# Patient Record
Sex: FEMALE | Race: WHITE | NOT HISPANIC OR LATINO | Employment: OTHER | URBAN - METROPOLITAN AREA
[De-identification: names, ages, dates, MRNs, and addresses within clinical notes are randomized per-mention and may not be internally consistent; named-entity substitution may affect disease eponyms.]

---

## 2017-02-19 RX ORDER — HYDROCHLOROTHIAZIDE 25 MG/1
TABLET ORAL
Qty: 90 TABLET | Refills: 1 | Status: SHIPPED | OUTPATIENT
Start: 2017-02-19 | End: 2017-08-18 | Stop reason: SDUPTHER

## 2017-06-13 ENCOUNTER — TELEPHONE (OUTPATIENT)
Dept: FAMILY MEDICINE | Facility: CLINIC | Age: 67
End: 2017-06-13

## 2017-06-13 DIAGNOSIS — Z12.31 ENCOUNTER FOR SCREENING MAMMOGRAM FOR BREAST CANCER: Primary | ICD-10-CM

## 2017-06-13 NOTE — TELEPHONE ENCOUNTER
----- Message from Linette Orozco sent at 6/13/2017 11:19 AM CDT -----  Pt called for mammogram order

## 2017-06-27 ENCOUNTER — HOSPITAL ENCOUNTER (OUTPATIENT)
Dept: RADIOLOGY | Facility: HOSPITAL | Age: 67
Discharge: HOME | End: 2017-06-27
Attending: FAMILY MEDICINE
Payer: COMMERCIAL

## 2017-06-27 VITALS — WEIGHT: 132 LBS | BODY MASS INDEX: 21.99 KG/M2 | HEIGHT: 65 IN

## 2017-06-27 DIAGNOSIS — Z12.31 ENCOUNTER FOR SCREENING MAMMOGRAM FOR BREAST CANCER: ICD-10-CM

## 2017-06-27 PROCEDURE — 77067 SCR MAMMO BI INCL CAD: CPT | Mod: 26,,, | Performed by: RADIOLOGY

## 2017-06-27 PROCEDURE — 77063 BREAST TOMOSYNTHESIS BI: CPT | Mod: 26,,, | Performed by: RADIOLOGY

## 2017-06-27 PROCEDURE — 77067 SCR MAMMO BI INCL CAD: CPT | Mod: TC

## 2017-06-28 ENCOUNTER — TELEPHONE (OUTPATIENT)
Dept: RADIOLOGY | Facility: HOSPITAL | Age: 67
End: 2017-06-28

## 2017-06-28 NOTE — TELEPHONE ENCOUNTER
Spoke with patient and explained mammogram findings.Patient expressed understanding of results. Patient is scheduled for a abnormal mammogram follow up appointment at [facility] on 6/29/17

## 2017-06-29 ENCOUNTER — TELEPHONE (OUTPATIENT)
Dept: RADIOLOGY | Facility: HOSPITAL | Age: 67
End: 2017-06-29

## 2017-06-29 ENCOUNTER — HOSPITAL ENCOUNTER (OUTPATIENT)
Dept: RADIOLOGY | Facility: HOSPITAL | Age: 67
Discharge: HOME | End: 2017-06-29
Attending: FAMILY MEDICINE
Payer: COMMERCIAL

## 2017-06-29 VITALS — BODY MASS INDEX: 21.99 KG/M2 | HEIGHT: 65 IN | WEIGHT: 132 LBS

## 2017-06-29 DIAGNOSIS — R92.8 ABNORMAL MAMMOGRAM: ICD-10-CM

## 2017-06-29 PROCEDURE — 77061 BREAST TOMOSYNTHESIS UNI: CPT | Mod: TC,LT

## 2017-06-29 PROCEDURE — 77065 DX MAMMO INCL CAD UNI: CPT | Mod: 26,,, | Performed by: RADIOLOGY

## 2017-06-29 PROCEDURE — 76642 ULTRASOUND BREAST LIMITED: CPT | Mod: TC,LT

## 2017-06-29 PROCEDURE — 76642 ULTRASOUND BREAST LIMITED: CPT | Mod: 26,LT,, | Performed by: RADIOLOGY

## 2017-06-29 PROCEDURE — 77061 BREAST TOMOSYNTHESIS UNI: CPT | Mod: 26,,, | Performed by: RADIOLOGY

## 2017-06-29 NOTE — TELEPHONE ENCOUNTER
Spoke with patient. Reviewed breast biopsy procedure and reviewed instructions for breast biopsy. Patient expressed understanding and all questions were answered. Provided patient with my phone number to call for any further concerns or questions.   Patient scheduled breast biopsy at the [facility] on 7/11/17

## 2017-07-03 ENCOUNTER — TELEPHONE (OUTPATIENT)
Dept: FAMILY MEDICINE | Facility: CLINIC | Age: 67
End: 2017-07-03

## 2017-07-03 NOTE — TELEPHONE ENCOUNTER
Patient had an abnormal mammogram, she had follow up films and she is now scheduled for a fine needle biopsy and states that she get really nervous before procedures.  Patient would like you to prescribe 1-2 valium prior to her procedure.  If you are willing please and sign and I will have to patient stop by to  the prescription. thanks

## 2017-07-03 NOTE — TELEPHONE ENCOUNTER
----- Message from Zenaida Sylmimi sent at 7/3/2017  2:19 PM CDT -----  x_  1st Request  _  2nd Request  _  3rd Request        Who:  MIGUELITO DODGE [[id number]]    Why: Pt would like to speak to the nurse in reference to getting a prescription for valium    What Number to Call Back: [phone]    When to Expect a call back: (With in 24 hours)

## 2017-07-03 NOTE — TELEPHONE ENCOUNTER
----- Message from Zenaida Sylmimi sent at 7/3/2017  2:19 PM CDT -----  x_  1st Request  _  2nd Request  _  3rd Request        Who:  MIGUELITO DODGE [[id number]]    Why: Pt would like to speak to the nurse in reference to getting a prescription for valium    What Number to Call Back: [phone]    When to Expect a call back: (With in 24 hours)

## 2017-07-05 RX ORDER — DIAZEPAM 2 MG/1
TABLET ORAL
Qty: 5 TABLET | Refills: 0 | Status: SHIPPED | OUTPATIENT
Start: 2017-07-05 | End: 2017-07-05 | Stop reason: SDUPTHER

## 2017-07-05 RX ORDER — DIAZEPAM 2 MG/1
TABLET ORAL
Qty: 5 TABLET | Refills: 0 | Status: SHIPPED | OUTPATIENT
Start: 2017-07-05 | End: 2018-02-21 | Stop reason: SDUPTHER

## 2017-07-10 DIAGNOSIS — Z12.11 COLON CANCER SCREENING: ICD-10-CM

## 2017-07-11 ENCOUNTER — HOSPITAL ENCOUNTER (OUTPATIENT)
Dept: RADIOLOGY | Facility: HOSPITAL | Age: 67
Discharge: HOME | End: 2017-07-11
Attending: FAMILY MEDICINE
Payer: COMMERCIAL

## 2017-07-11 DIAGNOSIS — R92.8 ABNORMAL MAMMOGRAM: ICD-10-CM

## 2017-07-11 PROCEDURE — 88305 TISSUE EXAM BY PATHOLOGIST: CPT | Performed by: PATHOLOGY

## 2017-07-11 PROCEDURE — 88342 IMHCHEM/IMCYTCHM 1ST ANTB: CPT | Mod: 26,59,, | Performed by: PATHOLOGY

## 2017-07-11 PROCEDURE — 88305 TISSUE EXAM BY PATHOLOGIST: CPT | Mod: 26,,, | Performed by: PATHOLOGY

## 2017-07-11 PROCEDURE — 88360 TUMOR IMMUNOHISTOCHEM/MANUAL: CPT | Performed by: PATHOLOGY

## 2017-07-11 PROCEDURE — 19081 BX BREAST 1ST LESION STRTCTC: CPT | Mod: ,,, | Performed by: RADIOLOGY

## 2017-07-11 PROCEDURE — 77065 DX MAMMO INCL CAD UNI: CPT | Mod: 26,LT,, | Performed by: RADIOLOGY

## 2017-07-11 PROCEDURE — A4648 IMPLANTABLE TISSUE MARKER: HCPCS

## 2017-07-11 PROCEDURE — 77065 DX MAMMO INCL CAD UNI: CPT | Mod: TC,LT

## 2017-07-11 PROCEDURE — 88360 TUMOR IMMUNOHISTOCHEM/MANUAL: CPT | Mod: 26,,, | Performed by: PATHOLOGY

## 2017-07-13 ENCOUNTER — DOCUMENTATION ONLY (OUTPATIENT)
Dept: SURGERY | Facility: CLINIC | Age: 67
End: 2017-07-13

## 2017-07-13 NOTE — PROGRESS NOTES
Patient phoned with results of left breast core biopsy, invasive mammary carcinoma. She was scheduled with Dr Martin as choice of date and surgeon.

## 2017-07-18 ENCOUNTER — OFFICE VISIT (OUTPATIENT)
Dept: SURGERY | Facility: CLINIC | Age: 67
End: 2017-07-18
Payer: COMMERCIAL

## 2017-07-18 ENCOUNTER — DOCUMENTATION ONLY (OUTPATIENT)
Dept: SURGERY | Facility: CLINIC | Age: 67
End: 2017-07-18

## 2017-07-18 VITALS
DIASTOLIC BLOOD PRESSURE: 78 MMHG | WEIGHT: 134.5 LBS | HEIGHT: 65 IN | SYSTOLIC BLOOD PRESSURE: 190 MMHG | TEMPERATURE: 98 F | BODY MASS INDEX: 22.41 KG/M2 | HEART RATE: 89 BPM

## 2017-07-18 DIAGNOSIS — Z17.0 MALIGNANT NEOPLASM OF LOWER-OUTER QUADRANT OF LEFT BREAST OF FEMALE, ESTROGEN RECEPTOR POSITIVE: Primary | ICD-10-CM

## 2017-07-18 DIAGNOSIS — C50.512 MALIGNANT NEOPLASM OF LOWER-OUTER QUADRANT OF LEFT BREAST OF FEMALE, ESTROGEN RECEPTOR POSITIVE: Primary | ICD-10-CM

## 2017-07-18 PROCEDURE — 1157F ADVNC CARE PLAN IN RCRD: CPT | Mod: S$GLB,,, | Performed by: SURGERY

## 2017-07-18 PROCEDURE — 99999 PR PBB SHADOW E&M-EST. PATIENT-LVL III: CPT | Mod: PBBFAC,,, | Performed by: SURGERY

## 2017-07-18 PROCEDURE — 1159F MED LIST DOCD IN RCRD: CPT | Mod: S$GLB,,, | Performed by: SURGERY

## 2017-07-18 PROCEDURE — 1126F AMNT PAIN NOTED NONE PRSNT: CPT | Mod: S$GLB,,, | Performed by: SURGERY

## 2017-07-18 PROCEDURE — 99205 OFFICE O/P NEW HI 60 MIN: CPT | Mod: S$GLB,,, | Performed by: SURGERY

## 2017-07-18 NOTE — Clinical Note
July 18, 2017      Jazmin Singh MD  [address]  [address]  [place] [address]           [facility]  [address]  [facility] [address]  Phone: [phone]  Fax: [phone]          Patient: Pantera Posey   MR Number: [id number]   YOB: 1950   Date of Visit: 7/18/2017       Dear Dr. Jazmin Singh:    Thank you for referring Pantera Posey to me for evaluation. Attached you will find relevant portions of my assessment and plan of care.    If you have questions, please do not hesitate to call me. I look forward to following Pantera Posey along with you.    Sincerely,    Rosalina Martin MD    Enclosure  CC:  No Recipients    If you would like to receive this communication electronically, please contact [email or web address] or [phone] to request more information on [facility] Link access.    For providers and/or their staff who would like to refer a patient to [facility], please contact us through our one-stop-shop provider referral line, [facility], at [phone].    If you feel you have received this communication in error or would no longer like to receive these types of communications, please e-mail [email or web address]

## 2017-07-18 NOTE — LETTER
July 18, 2017        Jazmin Singh MD  [address]  [address]  [facility] [address]             [facility] Breast Surgery  [address] [address]  Phone: [phone]  Fax: [phone]   Patient: Pantera Posey   MR Number: [id number]   YOB: 1950   Date of Visit: 7/18/2017       Dear Dr. Singh:    Thank you for referring Pantera Posey to me for evaluation. Attached you will find relevant portions of my assessment and plan of care.    If you have questions, please do not hesitate to call me. I look forward to following Pantera Posey along with you.    Sincerely,      Rosalina Martin MD            CC  No Recipients    Enclosure

## 2017-07-19 ENCOUNTER — OFFICE VISIT (OUTPATIENT)
Dept: PLASTIC SURGERY | Facility: CLINIC | Age: 67
End: 2017-07-19
Payer: MEDICARE

## 2017-07-19 VITALS
DIASTOLIC BLOOD PRESSURE: 85 MMHG | BODY MASS INDEX: 22.43 KG/M2 | SYSTOLIC BLOOD PRESSURE: 197 MMHG | HEART RATE: 72 BPM | WEIGHT: 134.81 LBS | TEMPERATURE: 99 F

## 2017-07-19 DIAGNOSIS — Z17.0 MALIGNANT NEOPLASM OF LOWER-OUTER QUADRANT OF LEFT BREAST OF FEMALE, ESTROGEN RECEPTOR POSITIVE: Primary | ICD-10-CM

## 2017-07-19 DIAGNOSIS — C50.512 MALIGNANT NEOPLASM OF LOWER-OUTER QUADRANT OF LEFT BREAST OF FEMALE, ESTROGEN RECEPTOR POSITIVE: Primary | ICD-10-CM

## 2017-07-19 PROCEDURE — 99999 PR PBB SHADOW E&M-EST. PATIENT-LVL II: CPT | Mod: PBBFAC,,, | Performed by: SURGERY

## 2017-07-19 PROCEDURE — 1157F ADVNC CARE PLAN IN RCRD: CPT | Mod: S$GLB,,, | Performed by: SURGERY

## 2017-07-19 PROCEDURE — 1126F AMNT PAIN NOTED NONE PRSNT: CPT | Mod: S$GLB,,, | Performed by: SURGERY

## 2017-07-19 PROCEDURE — 99204 OFFICE O/P NEW MOD 45 MIN: CPT | Mod: S$GLB,,, | Performed by: SURGERY

## 2017-07-19 PROCEDURE — 1159F MED LIST DOCD IN RCRD: CPT | Mod: S$GLB,,, | Performed by: SURGERY

## 2017-07-19 NOTE — PROGRESS NOTES
[facility] Plastic S[facility]  History & Physical  Plastic Surgery    SUBJECTIVE:     Chief Complaint/Reason for Admission: breast cancer considering reconstruction    History of Present Illness:  Patient is a 67 y.o. female presents with a history of R breast cancer s/p MRM in 1997 now presents with a new diagnosis of L breast cancer.  She was seen by breast surgery yesterday and decided that she would like a L mastectomy.  She was sent to see Dr. Murrell to discuss possible reconstruction.      Review of patient's allergies indicates:   Allergen Reactions    Ciprofloxacin Anaphylaxis     Other reaction(s): Difficulty breathing    Floxin [ofloxacin] Anaphylaxis    Amoxicillin      Other reaction(s): Rash    Indocin  [indomethacin sodium]      Other reaction(s): Headache    Amoxicillin Rash    Bactrim [sulfamethoxazole-trimethoprim] Rash       Past Medical History:   Diagnosis Date    Breast cancer     RIGHT    Cancer     breast cancer    Hypertension     Renea-Parkinson-White syndrome      Past Surgical History:   Procedure Laterality Date    BREAST BIOPSY      RIGHT    CARDIAC ELECTROPHYSIOLOGY STUDY AND ABLATION      MASTECTOMY      RIGHT/ RADIATION AND CHEMO     Family History   Problem Relation Age of Onset    Cancer Mother     Hypertension Father     Breast cancer Maternal Aunt     Ovarian cancer Paternal Aunt      Social History   Substance Use Topics    Smoking status: Former Smoker     Packs/day: 1.00     Quit date: 12/3/1988    Smokeless tobacco: Never Used    Alcohol use 3.0 oz/week     5 Glasses of wine per week      Comment: wine nightly        Review of Systems:  Constitutional: no fever or chills  Eyes: no visual changes  ENT: no nasal congestion or sore throat  Respiratory: no cough or shortness of breath  Cardiovascular: no chest pain or palpitations  Gastrointestinal: no nausea or vomiting, tolerating diet  Genitourinary: no hematuria or dysuria  Integument/Breast: no rash or  pruritis  Neurological: no seizures or tremors  Behavioral/Psych: no auditory or visual hallucinations    OBJECTIVE:     Vital Signs (Most Recent):  Temp: 98.5 °F (36.9 °C) (07/19/17 1208)  Pulse: 72 (07/19/17 1208)  BP: (!) 197/85 (07/19/17 1208)    Physical Exam:  NAD  RRR  Non-labored breathing  Soft, Nt, ND        ASSESSMENT/PLAN:     68yo woman with a history of R breast cancer s/p MRM in 1997 now with a new diagnosis of L breast cancer  Discussed reconstruction options including tissue expander/implant as well as SHANTELLE  The patient stated that she does not know if she wants reconstruction, and she would like to think about it more and call back.  She did state that if she does get reconstruction, she would probably prefer tissue expander/implant

## 2017-07-19 NOTE — LETTER
July 20, 2017      Rosalina Martin MD  [address]  [facility] [address]           [facility] - Plastic Surg [place]  [address] [address]  Phone: [phone]  Fax: [phone]          Patient: Pantera Posey   MR Number: [id number]   YOB: 1950   Date of Visit: 7/19/2017       Dear Dr. Rosalina Martin:    Thank you for referring Pantera Posey to me for evaluation. Attached you will find relevant portions of my assessment and plan of care.    If you have questions, please do not hesitate to call me. I look forward to following Pantera Posey along with you.    Sincerely,    Oni Murrell MD    Enclosure  CC:  No Recipients    If you would like to receive this communication electronically, please contact [email or web address] or [phone] to request more information on [employer] Link access.    For providers and/or their staff who would like to refer a patient to [facility], please contact us through our one-stop-shop provider referral line, [facility], at [phone].    If you feel you have received this communication in error or would no longer like to receive these types of communications, please e-mail [email or web address]

## 2017-07-19 NOTE — PROGRESS NOTES
Breast Surgery  [facility]  Department of Surgery      REFERRING PROVIDER: Jazmin Singh MD  [address]  [address]  [place] [address]    Chief Complaint: Consult (New Patient  Hx Right Breast Cancer 20 yrs  New Left Breast Cancer )      Subjective:      Patient ID: Pantera Posey is a 67 y.o. female who presents with newly diagnosed L breast invasive ductal carcinoma, grade III, ER+LA+H2N-, at 3OC, 3CFN just superior to prior biopsied fibroadenoma.  This area of asymmetry was identified on routine screening mammogram.  Her prior cancer in the R breast was identified after the patient felt a palpable abnormality which prompted an excisional biopsy.  There was a positive margin on an un-oriented specimen, so this was followed by MRM without reconstruction with 20 negative nodes.   This was performed in [place].  She has never undergone genetic testing.  She did not have adjuvant chemo or XRT but did take Tamoxifen for 5 years.    Patient does routinely do self breast exams.  Patient has not noted a change on breast exam.  Patient denies nipple discharge. Patient admits to to previous breast biopsy. Patient admits to a personal history of breast cancer.    GYN History:  Age of menarche was 13. Age of menopause was mid-40s. Patient denies current hormonal therapy. Patient is . Age of first live birth was 18. Patient did not breast feed.    Past Medical History:   Diagnosis Date    Breast cancer     RIGHT    Cancer     breast cancer    Hypertension     Renea-Parkinson-White syndrome      Past Surgical History:   Procedure Laterality Date    BREAST BIOPSY      RIGHT    CARDIAC ELECTROPHYSIOLOGY STUDY AND ABLATION      MASTECTOMY      RIGHT/ RADIATION AND CHEMO     Current Outpatient Prescriptions on File Prior to Visit   Medication Sig Dispense Refill    diazePAM (VALIUM) 2 MG tablet 1 po 30 min prior to procedure prn 5 tablet 0    hydrochlorothiazide (HYDRODIURIL) 25 MG tablet TAKE  "1 TABLET DAILY 90 tablet 1    FLUVIRIN [id number] 45 mcg (15 mcg x 3)/0.5 mL Susp   0     No current facility-administered medications on file prior to visit.      Social History     Social History    Marital status:      Spouse name: N/A    Number of children: N/A    Years of education: N/A     Occupational History    Not on file.     Social History Main Topics    Smoking status: Former Smoker     Packs/day: 1.00     Quit date: 12/3/1988    Smokeless tobacco: Never Used    Alcohol use 3.0 oz/week     5 Glasses of wine per week      Comment: wine nightly    Drug use: No    Sexual activity: Yes     Partners: Male     Other Topics Concern    Not on file     Social History Narrative    No narrative on file     Family History   Problem Relation Age of Onset    Cancer Mother     Hypertension Father     Breast cancer Maternal Aunt     Ovarian cancer Paternal Aunt         Review of Systems   Constitutional: Negative for appetite change, chills, fever and unexpected weight change.   HENT: Negative for facial swelling, postnasal drip and sore throat.    Eyes: Negative for redness and itching.   Respiratory: Negative for chest tightness and shortness of breath.    Cardiovascular: Negative for chest pain and palpitations.   Gastrointestinal: Negative for blood in stool, diarrhea, nausea and vomiting.   Genitourinary: Negative for difficulty urinating and dysuria.   Musculoskeletal: Negative for arthralgias and joint swelling.   Skin: Negative for rash and wound.   Neurological: Negative for dizziness and syncope.   Hematological: Negative for adenopathy.   Psychiatric/Behavioral: Negative for agitation. The patient is not nervous/anxious.      Objective:   BP (!) 190/78 (BP Location: Left arm, Patient Position: Sitting, BP Method: Automatic)   Pulse 89   Temp 98.2 °F (36.8 °C) (Oral)   Ht 5' 5" (1.651 m)   Wt 61 kg (134 lb 8 oz)   LMP  (LMP Unknown)   BMI 22.38 kg/m²     Physical Exam "   Constitutional: She appears well-developed and well-nourished.   HENT:   Head: Normocephalic.   Eyes: No scleral icterus.   Neck: Neck supple. No tracheal deviation present.   Cardiovascular: Normal rate and regular rhythm.    Pulmonary/Chest: Breath sounds normal. No respiratory distress. Right breast exhibits no inverted nipple, no mass, no nipple discharge and no skin change. Left breast exhibits no inverted nipple, no mass, no nipple discharge and no skin change.       Abdominal: Soft. She exhibits no mass. There is no tenderness.   Musculoskeletal: She exhibits no edema.   Lymphadenopathy:     She has no cervical adenopathy.   Neurological: She is alert.   Skin: No rash noted. No erythema.     Psychiatric: She has a normal mood and affect.       Radiology review: Images personally reviewed by me in the clinic.   Mammogram:There is asymmetry seen in the upper region of the left breast in the posterior portion, previously identified on screening mammogram.  This persists with additional views, and reflects a change from previous years.      This is superior to a biopsy marker and stable mass which was previously biopsied in 2011 with pathology of benign fibroadenoma.     US Breast Left Limited  At 3 o'clock, 3cm from the nipple there is a vague possible isoechoic mass measuring 13 x 13 x 6 which likely reflects a correlate to mammographic asymmetry.     Stable lobulated mass in the left breast at 4 o'clock measuring 1 cm is consistent with biopsy proven fibroadenoma.      Impression:  Left  Asymmetry: Left breast asymmetry at the posterior position seen on MLO view is suspicious.      BI-RADS Category:   Left: 4 - Suspicious  Overall: 4 - Suspicious     Recommendation:  Biopsy is recommended for the left breast. Biopsy should be performed with tomosynthesis guidance, as finding is not definitively seen sonographically    Assessment:       1. Malignant neoplasm of lower-outer quadrant of left breast of female,  estrogen receptor positive        Plan:     Options for management were discussed with the patient and her family. We reviewed the existing data noting the equivalency of breast conserving surgery with radiation therapy and mastectomy. We also reviewed the guidelines of the National Comprehensive Cancer Network for Stage IA breast carcinoma. We discussed the need for lumpectomy margins to be negative for carcinoma, the necessity for postoperative radiation therapy after breast conservation in most cases, the possibility of a failed or false negative sentinel lymph node biopsy and the potential need for complete lymphadenectomy for a failed or positive sentinel lymph node biopsy were fully discussed. In the setting of mastectomy, delayed or immediate reconstruction options are available and were discussed.     In the setting of lumpectomy, radiation therapy would be recommended majority of the time.  The duration and treatment side effects were discussed with the patient.  This will coordinated with the radiation oncologist pending final pathology.    We also discussed the role of systemic therapy in the treatment of early stage breast cancer.  We discussed that this is based on tumor biology and monik status and will be determined based on final pathology.  We discussed that if the cancer is hormone positive, endocrine therapy would be recommended in most cases and its use can reduce the risk of recurrence as well as improve survival. Side effects of treatment were briefly discussed. We also discussed the potential role for chemotherapy based on a number of factors such as tumor phenotype (ER+ vs. triple negative vs. Krg9zor+), possibility of oncotype, and this would be determined in coordination with the medical oncologist.    Due to her prior experience and existing R mastectomy, she has opted for a L mastectomy.  She does not want to undergo radiation and therefore is not interested in lumpectomy.  The patient,  in consultation with her family, has elected to proceed with left total mastectomy and sentinel lymph node biopsy.  She is considering undergoing bilateral reconstruction at the time of surgery.  She would prefer implant reconstruction and understands the details about tissue expander and implant reconstruction.   We will set her up for an appointment with Dr. Murrell tomorrow.  The operative risks of bleeding, infection, recurrence, scarring, and anesthetic complications and the possibility of requiring further surgery were all noted and informed consent obtained.    Surgery scheduled. Follow-up in clinic roughly 14 days after surgery.     Patient was educated on breast cancer, receptors, wire localization lumpectomy, mastectomy, sentinel lymph node mapping and biopsy, axillary lymph node dissection, reconstruction, breast prosthesis with post-mastectomy bra and radiation therapy. Patient was given patient information binder including [facility] breast cancer treatment brochure.  All her questions were answered.    Total time spent with the patient: 65 minutes.  50 minutes of face to face consultation and 15 minutes of chart review and coordination of care.

## 2017-07-21 NOTE — PROGRESS NOTES
Nurse Navigator Note:     Met with patient during her consult with Dr. Martin. Patient and I reviewed the information she discussed with Dr. Martin, including treatment options, diagnosis, and future plans for workup. Patient and I went through the new patient binder, explained some of the information and why it is provided.     Also offered patient consults with our other specialty clinics: Dr. Nguyen for gynecological health during treatment, Shayy Ayoub for physical therapy evaluation, Dr. Servin for psychological support, and Una Posadas for nutritional counseling. Explained to patient that all of these support services are completely optional. Discussed that physical therapy is the only service that is recommended pre-op specifically, everything else can be requested at a later time. Patient was given a copy of her appointments, Dr. Martin's card, and my card. Encouraged her to call me if she has any questions or concerns or would like to schedule any additional appointments. Verbalized understanding of all information.

## 2017-07-24 ENCOUNTER — TELEPHONE (OUTPATIENT)
Dept: SURGERY | Facility: CLINIC | Age: 67
End: 2017-07-24

## 2017-07-24 DIAGNOSIS — Z91.89 AT RISK FOR LYMPHEDEMA: Primary | ICD-10-CM

## 2017-07-24 NOTE — TELEPHONE ENCOUNTER
"Called patient to discuss referral to PT for evaluation before surgery. Patient is very interested in this, states she was concerned about the post-op complications "now that I'm having bilateral mastectomies." Scheduled for 8/4 at 9am per her request. Referral order placed   "

## 2017-07-27 ENCOUNTER — TELEPHONE (OUTPATIENT)
Dept: PLASTIC SURGERY | Facility: CLINIC | Age: 67
End: 2017-07-27

## 2017-07-27 ENCOUNTER — OFFICE VISIT (OUTPATIENT)
Dept: SURGERY | Facility: CLINIC | Age: 67
End: 2017-07-27
Payer: COMMERCIAL

## 2017-07-27 ENCOUNTER — TELEPHONE (OUTPATIENT)
Dept: SURGERY | Facility: CLINIC | Age: 67
End: 2017-07-27

## 2017-07-27 DIAGNOSIS — Z17.0 ESTROGEN RECEPTOR POSITIVE: Primary | ICD-10-CM

## 2017-07-27 DIAGNOSIS — C50.912 MALIGNANT NEOPLASM OF LEFT FEMALE BREAST, UNSPECIFIED ESTROGEN RECEPTOR STATUS, UNSPECIFIED SITE OF BREAST: Primary | ICD-10-CM

## 2017-07-27 DIAGNOSIS — Z17.0 MALIGNANT NEOPLASM OF LOWER-OUTER QUADRANT OF LEFT BREAST OF FEMALE, ESTROGEN RECEPTOR POSITIVE: Primary | ICD-10-CM

## 2017-07-27 DIAGNOSIS — C50.512 MALIGNANT NEOPLASM OF LOWER-OUTER QUADRANT OF LEFT BREAST OF FEMALE, ESTROGEN RECEPTOR POSITIVE: Primary | ICD-10-CM

## 2017-07-27 DIAGNOSIS — Z17.0 MALIGNANT NEOPLASM OF LOWER-OUTER QUADRANT OF LEFT BREAST OF FEMALE, ESTROGEN RECEPTOR POSITIVE: ICD-10-CM

## 2017-07-27 DIAGNOSIS — Z80.41 FAMILY HISTORY OF OVARIAN CANCER: ICD-10-CM

## 2017-07-27 DIAGNOSIS — C50.512 MALIGNANT NEOPLASM OF LOWER-OUTER QUADRANT OF LEFT BREAST OF FEMALE, ESTROGEN RECEPTOR POSITIVE: ICD-10-CM

## 2017-07-27 DIAGNOSIS — Z80.3 FAMILY HISTORY OF BREAST CANCER: ICD-10-CM

## 2017-07-27 PROCEDURE — 1159F MED LIST DOCD IN RCRD: CPT | Mod: S$GLB,,, | Performed by: NURSE PRACTITIONER

## 2017-07-27 PROCEDURE — 1157F ADVNC CARE PLAN IN RCRD: CPT | Mod: S$GLB,,, | Performed by: NURSE PRACTITIONER

## 2017-07-27 PROCEDURE — 99213 OFFICE O/P EST LOW 20 MIN: CPT | Mod: S$GLB,,, | Performed by: NURSE PRACTITIONER

## 2017-07-27 NOTE — TELEPHONE ENCOUNTER
----- Message from Rosalina Martin MD sent at 7/25/2017 10:23 AM CDT -----  Looks like we can begin to coordinate a surgery date for this patient if she has decided on reconstruction.  She has upcoming genetics appt but we don't need to wait on that if she is ready to work on scheduling with [employer].  Can one of you reach out to her?    Rosalina

## 2017-07-27 NOTE — TELEPHONE ENCOUNTER
Spoke with pt. Pt has decided to do implant reconstruction and will have left mastectomy performed by Dr Martin. Spoke with Tena in Dr Murrell's office to look at a date for surgery. She will contact me back with a date.

## 2017-07-27 NOTE — PROGRESS NOTES
Patient presents for genetic counseling, she is a 67year old female with history of right breast cancer at age 47 and recent diagnosis of left breast cancer at 67. See pedigree for full family history which will be scanned into Epic. Significant for a maternal aunt and 3 maternal cousins with postmenopausal breast cancer (one of which was two primaries) and a paternal aunt with ovarian cancer. This patient does not have a known hereditary cancer genetic mutation on either side of the family and does not have known Ashenazi Cheondoism ancestry.     We reviewed her medical and family history and discussed the genetics of breast cancer, cancer risks associated with a hereditary predisposition to cancer, and the benefits, risks, and limitations of genetic testing according to current NCCN guidelines.  Discussed sporadic verses family clustering verses hereditary cancer. The patients history raises the possibility of a genetic susceptibility to breast cancer, with the two genes most strongly associated with early-onset breast cancer are BRCA1 and BRCA2. Mutations in these genes increase the risk for both breast and ovarian cancer in women and breast and early prostate cancer in men, along with an elevated risk of pancreatic cancer and melanoma. Due to significant clinical overlap in hereditary cancer susceptibility genes, a molecular diagnosis of a hereditary cancer condition is necessary to clarify the cancer risks this patient and screening, management, and treatment options most appropriate for her. Multigene testing was discussed based on family history and possibility of another cancer susceptibility gene. Genetic testing for mutations in the BRCA1 and BRCA2 genes involves the complete sequencing of both BRCA1 and BRCA2, testing for 5 large gene rearrangement mutations in the BRCA1 gene, and the BRACAnalysis Large Rearrangement Test (RAIZA accounts for 6-10% of all mutations in HBOC). This testing is estimated to  identify greater than 92% of mutations in the BRCA1 and BRCA2 genes. We did explain there is an estimated 1-2% chance of a reported variant of uncertain significance which would not be utilized in medical management and up to a 30% with newer genes included in multigene panels.     We discussed possible results of genetic testing: positive result would mean that a mutation known to be associated with a higher risk of cancer was identified; negative result would mean that no mutation known to increase the risk of cancer was identified; variant of uncertain significance (VUS) in which a genetic change was identified in a gene, but it is unclear if this change causes an increase in cancer risk normally associated with mutations in the gene. Due to the clinical uncertainty of this type of change, VUSs are not used to make medical management decisions for a patient. Finally, I advised the patient that her medical management may change based on these results and may include increased surveillance, use of chemoprevention, prophylactic surgery, or modifications to family planning and lifestyle. A tailored cancer prevention plan and implications of the patients test results for relatives will be reviewed once results are available.     Women who carry mutations in the BRCA genes have a 56%-87% risk to develop breast cancer and up to a 27%-44% risk to develop ovarian cancer during their lifetime. Women who carry a BRCA gene mutation also have a greater chance of developing a second primary breast cancer, up to 40%.  Men who carry a BRCA gene mutation have up to a 6% risk to develop breast cancer and an increased risk for early-onset prostate cancer (diagnosed under age 60).  Mutations in the BRCA2 gene have also been associated with an increased risk other types of cancer in both men and women in some families, most notably pancreatic cancer and melanoma.  We discussed cancer risks vary depending on the tumor suppressor gene  in which a mutation arises. We reviewed that if she carries a mutation within an autosomal inherited gene, her children would have a 50% chance of having the same mutation.      This patient is s/p right mastectomy with left mastectomy planned shortly. We discussed management of ovarian cancer risk by increased screening(although unreliable and limited usefulness) with semiannual pelvic exam, CA-125 and transvaginal ultrasound verses recommended prophylactic bilateral salpingo-oophorectomy. We discussed medical management recommendations may be different for other cancers if another genetic susceptibility gene is identified. Discussed the cost of testing, varies labs which can conduct genetic testing and potential insurance coverage. She desires to proceed with testing, she expressed her understanding of the information discussed today and provided informed consent for testing.           RECOMMENDATIONS:                                                                1. Integrated BRACAnalysis with Jill through [employer], results expected in approximately 2-3 weeks.   2. Post test counseling will be provided once results are available with healthcare management per results, including testing of any additional family members if pathogenic mutation is identified.    Time in counseling 45 min, total time 45 min

## 2017-07-31 ENCOUNTER — TELEPHONE (OUTPATIENT)
Dept: PLASTIC SURGERY | Facility: CLINIC | Age: 67
End: 2017-07-31

## 2017-07-31 NOTE — TELEPHONE ENCOUNTER
Spoke to pt re: pre-op instructions for her 8/3/17 surgery. Pt verbalized understanding. All questions were answered.

## 2017-08-01 ENCOUNTER — HOSPITAL ENCOUNTER (OUTPATIENT)
Dept: CARDIOLOGY | Facility: CLINIC | Age: 67
Discharge: HOME | End: 2017-08-01
Payer: COMMERCIAL

## 2017-08-01 ENCOUNTER — TELEPHONE (OUTPATIENT)
Dept: HEMATOLOGY/ONCOLOGY | Facility: CLINIC | Age: 67
End: 2017-08-01

## 2017-08-01 ENCOUNTER — LAB VISIT (OUTPATIENT)
Dept: LAB | Facility: HOSPITAL | Age: 67
End: 2017-08-01
Attending: ANESTHESIOLOGY
Payer: COMMERCIAL

## 2017-08-01 ENCOUNTER — ANESTHESIA EVENT (OUTPATIENT)
Dept: SURGERY | Facility: HOSPITAL | Age: 67
End: 2017-08-01
Payer: COMMERCIAL

## 2017-08-01 DIAGNOSIS — Z01.818 PREOPERATIVE TESTING: Primary | ICD-10-CM

## 2017-08-01 DIAGNOSIS — Z01.818 PREOPERATIVE TESTING: ICD-10-CM

## 2017-08-01 LAB
ANION GAP SERPL CALC-SCNC: 13 MMOL/L
BUN SERPL-MCNC: 17 MG/DL
CALCIUM SERPL-MCNC: 9.8 MG/DL
CHLORIDE SERPL-SCNC: 102 MMOL/L
CO2 SERPL-SCNC: 25 MMOL/L
CREAT SERPL-MCNC: 0.9 MG/DL
EST. GFR  (AFRICAN AMERICAN): >60 ML/MIN/1.73 M^2
EST. GFR  (NON AFRICAN AMERICAN): >60 ML/MIN/1.73 M^2
GLUCOSE SERPL-MCNC: 98 MG/DL
HGB BLD-MCNC: 13.8 G/DL
POTASSIUM SERPL-SCNC: 4.5 MMOL/L
SODIUM SERPL-SCNC: 140 MMOL/L

## 2017-08-01 PROCEDURE — 85018 HEMOGLOBIN: CPT

## 2017-08-01 PROCEDURE — 80048 BASIC METABOLIC PNL TOTAL CA: CPT

## 2017-08-01 PROCEDURE — 93000 ELECTROCARDIOGRAM COMPLETE: CPT | Mod: S$GLB,,, | Performed by: INTERNAL MEDICINE

## 2017-08-01 PROCEDURE — 36415 COLL VENOUS BLD VENIPUNCTURE: CPT

## 2017-08-01 NOTE — ANESTHESIA PREPROCEDURE EVALUATION
Pre Admission Screening  Mellissa Rowland RN      []Hide copied text  Anesthesia Assessment: Preoperative EQUATION     Planned Procedure: Procedure(s) (LRB):  INSERTION-EXPANDER-BREAST Left (Left)  MASTECTOMY (Left)  BIOPSY-EXCISIONAL sentinal node (Left)  DISSECTION-LYMPH NODE-AXILLARY (Left)  Requested Anesthesia Type:General  Surgeon: Oni Murrell MD  Service: Plastics  Known or anticipated Date of Surgery:8/3/2017     Surgeon notes: reviewed     Electronic QUestionnaire Assessment completed via nurse interview with patient.         NO AQ        Triage considerations:      The patient has no apparent active cardiac condition (No unstable coronary Syndrome such as severe unstable angina or recent [<1 month] myocardial infarction, decompensated CHF, severe valvular   disease or significant arrhythmia)     Previous anesthesia records:GETA, MAC and Complications noted  HX OF PONV-requests patch and po meds   Spinal fusion 10/22/13; Placement Time: 0737; Method of Intubation: Direct laryngoscopy; Inserted by:  (teo moreno); Airway Device: Endotracheal Tube; Mask Ventilation: Easy; Intubated: Postinduction; Blade: Dutton #2; Airway Device Size: 7.0; Style: Cuffed; Cuff Inflation: Minimal occlusive pressure; Placement Verified By: Auscultation, Capnometry; Grade: Grade II; Complicating Factors: None; Intubation Findings: Positive EtCO2, Bilateral breath sounds, Atraumatic/Condition of teeth unchanged;  Depth of Insertion: 21; Securment: Lips; Complications: None; Breath Sounds: Equal Bilateral; Insertion Attempts: 1; Removal Date: 10/22/13;   Airway/Jaw/Neck:  Airway Findings: Mouth Opening: Normal Tongue: Normal and Large  General Airway Assessment: Adult  Mallampati: II  Improves to I with phonation.  TM Distance: Normal, at least 6 cm  Jaw/Neck Findings:  Neck ROM: Normal Extension, Painful       Last PCP note: > 1 year ago , within [facility] 5/2016  Subspecialty notes:  Hematology/Oncology     Other important co-morbidities: HTN, breast cancer, HX WPW (ablation x1)     Tests already available:  Available tests,  > 1 year ago .  5/2016 CBC, CMP. 2012 EKG                                                Instructions given. (See in Nurse's note)     Optimization:  Anesthesia Preop Clinic Assessment  Indicated-not required for this procedure                   Plan:              Testing:  Hemoglobin, BMP and EKG                                               Patient  has previously scheduled Medical Appointment:none     Navigation: Tests Scheduled.8/1                                                        Results will be tracked by Preop Clinic.                                          Electronically signed by Mellissa Rowland RN at 8/1/2017  9:53 AM        Pre-admit on 8/3/2017            Detailed Report        8/2 labs and EKG reviewed     Pre-operative evaluation for Procedure(s) (LRB):  INSERTION-EXPANDER-BREAST Left (Left)  MASTECTOMY (Left)  BIOPSY-EXCISIONAL sentinal node (Left)  DISSECTION-LYMPH NODE-AXILLARY (Left)    Pantera Posey is a 67 y.o. female with HTN, hx of WPW syndrome s/p ablation, and hx of right breast cancer s/p modified radical mastectomy in 1997 now with newly diagnosed left breast cancer for which she presents for the above procedure.     LDA: N/A    Prev airway: Method of Intubation: Direct laryngoscopy; Inserted by:  (teo moreno); Airway Device: Endotracheal Tube; Mask Ventilation: Easy; Intubated: Postinduction; Blade: Dutton #2; Airway Device Size: 7.0; Style: Cuffed; Cuff Inflation: Minimal occlusive pressure; Placement Verified By: Auscultation, Capnometry; Grade: Grade II; Complicating Factors: None; Intubation Findings: Positive EtCO2, Bilateral breath sounds, Atraumatic/Condition of teeth unchanged;  Depth of Insertion: 21; Securment: Lips; Complications: None; Breath Sounds: Equal Bilateral; Insertion Attempts: 1;    Drips: N/A    Patient  Active Problem List   Diagnosis    HTN (hypertension), benign    Spondylosis without myelopathy    Spinal stenosis, lumbar region, with neurogenic claudication    Thoracic or lumbosacral neuritis or radiculitis, unspecified    Acquired spondylolisthesis    Lumbago    Degeneration of lumbar or lumbosacral intervertebral disc    Back pain    Spinal stenosis    Fever    Malignant neoplasm of lower-outer quadrant of left breast of female, estrogen receptor positive       Review of patient's allergies indicates:   Allergen Reactions    Ciprofloxacin Anaphylaxis     Other reaction(s): Difficulty breathing    Floxin [ofloxacin] Anaphylaxis    Amoxicillin      Other reaction(s): Rash    Indocin  [indomethacin sodium]      Other reaction(s): Headache    Amoxicillin Rash    Bactrim [sulfamethoxazole-trimethoprim] Rash        No current facility-administered medications on file prior to encounter.      Current Outpatient Prescriptions on File Prior to Encounter   Medication Sig Dispense Refill    diazePAM (VALIUM) 2 MG tablet 1 po 30 min prior to procedure prn 5 tablet 0    hydrochlorothiazide (HYDRODIURIL) 25 MG tablet TAKE 1 TABLET DAILY 90 tablet 1    FLUVIRIN [id number] 45 mcg (15 mcg x 3)/0.5 mL Susp   0       Past Surgical History:   Procedure Laterality Date    BREAST BIOPSY      RIGHT    CARDIAC ELECTROPHYSIOLOGY STUDY AND ABLATION      MASTECTOMY      RIGHT/ RADIATION AND CHEMO       Social History     Social History    Marital status:      Spouse name: N/A    Number of children: N/A    Years of education: N/A     Occupational History    Not on file.     Social History Main Topics    Smoking status: Former Smoker     Packs/day: 1.00     Quit date: 12/3/1988    Smokeless tobacco: Never Used    Alcohol use 3.0 oz/week     5 Glasses of wine per week      Comment: wine nightly    Drug use: No    Sexual activity: Yes     Partners: Male     Other Topics Concern    Not on file      Social History Narrative    No narrative on file         Vital Signs Range (Last 24H):         CBC:   Recent Labs      17   1338   HGB  13.8       CMP:   Recent Labs      17   1338   NA  140   K  4.5   CL  102   CO2  25   BUN  17   CREATININE  0.9   GLU  98   CALCIUM  9.8       INR  No results for input(s): INR, PROTIME, APTT in the last 72 hours.    Invalid input(s): PT        Diagnostic Studies:  17 Left breast US  Impression:  Left  Asymmetry: Left breast asymmetry at the posterior position seen on MLO view is suspicious.      BI-RADS Category:   Left: 4 - Suspicious  Overall: 4 - Suspicious         EK17   Vent. Rate : 089 BPM     Atrial Rate : 089 BPM     P-R Int : 126 ms          QRS Dur : 088 ms      QT Int : 372 ms       P-R-T Axes : 082 073 065 degrees     QTc Int : 452 ms    Normal sinus rhythm  LVH with repolarization abnormality  Abnormal ECG  When compared with ECG of 03-DEC-2012 09:26,  Vent. rate has increased BY  30 BPM  QT has lengthened  Confirmed by SHABNAM BACH MD ([id number]) on 2017 6:31:04 PM    2D Echo: None on file          Anesthesia Evaluation         Review of Systems  Anesthesia Hx:  Denies Hx of Anesthetic complications PONV zofran works History of prior surgery of interest to airway management or planning: Previous anesthesia: General 2013 lumbar fusion with general anesthesia. Procedure performed at an [facility]. Airway issues documented on chart review include mask, easy, easy direct laryngoscopy , view on direct laryngoscopy Grade II  Denies Family Hx of Anesthesia complications.  Personal Hx of Anesthesia complications, Post-Operative Nausea/Vomiting   Social:  Former Smoker, Non-Smoker, Alcohol Use    Hematology/Oncology:  Hematology Normal       -- Denies Anemia:  Breast s/p surgery   Current/Recent Cancer. Breast right axillary node dissection no lymphedema Oncology Comments: 1997 right mastectomy-please do not use right arm for BP or IVs if  possible. New dx left breast cancer.     EENT/Dental:EENT/Dental Normal   Cardiovascular:   Hypertension  Denies Angina. Walks 5 miles three time a week. Functional Capacity 4 METS  Hypertension , Pt in normal range, systolic < 130 and diastolic < 85, Well Controlled on Rx  Disorder of Cardiac Rhythm, WPW, S/P ablation Rx    Pulmonary:  Pulmonary Normal  Denies COPD.  Denies Asthma.  Denies Shortness of breath.  Denies Recent URI.    Renal/:  Renal/ Normal     Hepatic/GI:  Hepatic/GI Normal    Musculoskeletal:   Arthritis     Neurological:  Neurology Normal Denies TIA.  Denies CVA. Denies Seizures.    Endocrine:  Endocrine Normal Denies Diabetes.        Physical Exam  General:  Well nourished    Airway/Jaw/Neck:  Airway Findings: Mouth Opening: Normal Tongue: Normal  General Airway Assessment: Adult, Average  Mallampati: II  TM Distance: Normal, at least 6 cm  Jaw/Neck Findings:  Neck ROM: Normal ROM  Partial removable upper is out now.     Chest/Lungs:  Chest/Lungs Findings: Clear to auscultation     Heart/Vascular:  Heart Findings: Rate: Normal  Rhythm: Regular Rhythm  Sounds: Normal        Mental Status:  Mental Status Findings:  Cooperative, Alert and Oriented         Anesthesia Plan  Type of Anesthesia, risks & benefits discussed:  Anesthesia Type:  general  Patient's Preference:   Intra-op Monitoring Plan:   Intra-op Monitoring Plan Comments:   Post Op Pain Control Plan:   Post Op Pain Control Plan Comments: As per surgeon's plan  Induction:   IV  Beta Blocker:  Patient is not currently on a Beta-Blocker (No further documentation required).       Informed Consent: Patient understands risks and agrees with Anesthesia plan.  Questions answered. Anesthesia consent signed with patient.  ASA Score: 2     Day of Surgery Review of History & Physical:    H&P update referred to the surgeon.         Ready For Surgery From Anesthesia Perspective.     Placement Date: 10/22/13; Placement Time: 0737; Method of  Intubation: Direct laryngoscopy; Inserted by:  (teo moreno); Airway Device: Endotracheal Tube; Mask Ventilation: Easy; Intubated: Postinduction; Blade: Dutton #2; Airway Device Size: 7.0; Style: Cuffed; Cuff Inflation: Minimal occlusive pressure; Placement Verified By: Auscultation, Capnometry; Grade: Grade II; Complicating Factors: None; Intubation Findings: Positive EtCO2, Bilateral breath sounds, Atraumatic/Condition of teeth unchanged;  Depth of Insertion: 21; Securment: Lips; Complications: None; Breath Sounds: Equal Bilateral; Insertion Attempts: 1; Removal Date: 10/22/13;  Removal Time: 1402

## 2017-08-01 NOTE — PRE ADMISSION SCREENING
Anesthesia Assessment: Preoperative EQUATION    Planned Procedure: Procedure(s) (LRB):  INSERTION-EXPANDER-BREAST Left (Left)  MASTECTOMY (Left)  BIOPSY-EXCISIONAL sentinal node (Left)  DISSECTION-LYMPH NODE-AXILLARY (Left)  Requested Anesthesia Type:General  Surgeon: Oni Murrell MD  Service: Plastics  Known or anticipated Date of Surgery:8/3/2017    Surgeon notes: reviewed    Electronic QUestionnaire Assessment completed via nurse interview with patient.        NO AQ      Triage considerations:     The patient has no apparent active cardiac condition (No unstable coronary Syndrome such as severe unstable angina or recent [<1 month] myocardial infarction, decompensated CHF, severe valvular   disease or significant arrhythmia)    Previous anesthesia records:GETA, MAC and Complications noted  HX OF PONV-requests patch and po meds   Spinal fusion 10/22/13; Placement Time: 0737; Method of Intubation: Direct laryngoscopy; Inserted by:  (teo moreno); Airway Device: Endotracheal Tube; Mask Ventilation: Easy; Intubated: Postinduction; Blade: Dutton #2; Airway Device Size: 7.0; Style: Cuffed; Cuff Inflation: Minimal occlusive pressure; Placement Verified By: Auscultation, Capnometry; Grade: Grade II; Complicating Factors: None; Intubation Findings: Positive EtCO2, Bilateral breath sounds, Atraumatic/Condition of teeth unchanged;  Depth of Insertion: 21; Securment: Lips; Complications: None; Breath Sounds: Equal Bilateral; Insertion Attempts: 1; Removal Date: 10/22/13;   Airway/Jaw/Neck:  Airway Findings: Mouth Opening: Normal Tongue: Normal and Large  General Airway Assessment: Adult  Mallampati: II  Improves to I with phonation.  TM Distance: Normal, at least 6 cm  Jaw/Neck Findings:  Neck ROM: Normal Extension, Painful      Last PCP note: > 1 year ago , within [facility] 5/2016  Subspecialty notes: Hematology/Oncology    Other important co-morbidities: HTN, breast cancer, HX WPW (ablation x1)     Tests  already available:  Available tests,  > 1 year ago .  5/2016 CBC, CMP. 2012 EKG            Instructions given. (See in Nurse's note)    Optimization:  Anesthesia Preop Clinic Assessment  Indicated-not required for this procedure      Plan:    Testing:  Hemoglobin, BMP and EKG     Patient  has previously scheduled Medical Appointment:none    Navigation: Tests Scheduled.8/1                       Results will be tracked by Preop Clinic.

## 2017-08-01 NOTE — TELEPHONE ENCOUNTER
"Patient had called and left voicemail "with a question." Returned call and patient states she was able to reach the pre-op center and get her questions answered by them.   "

## 2017-08-02 ENCOUNTER — TELEPHONE (OUTPATIENT)
Dept: PLASTIC SURGERY | Facility: CLINIC | Age: 67
End: 2017-08-02

## 2017-08-03 ENCOUNTER — ANESTHESIA (OUTPATIENT)
Dept: SURGERY | Facility: HOSPITAL | Age: 67
End: 2017-08-03
Payer: COMMERCIAL

## 2017-08-03 ENCOUNTER — SURGERY (OUTPATIENT)
Age: 67
End: 2017-08-03

## 2017-08-03 ENCOUNTER — HOSPITAL ENCOUNTER (OUTPATIENT)
Dept: RADIOLOGY | Facility: HOSPITAL | Age: 67
Discharge: HOME | End: 2017-08-03
Attending: SURGERY
Payer: COMMERCIAL

## 2017-08-03 ENCOUNTER — HOSPITAL ENCOUNTER (OUTPATIENT)
Facility: HOSPITAL | Age: 67
Discharge: HOME | End: 2017-08-04
Attending: SURGERY | Admitting: SURGERY
Payer: COMMERCIAL

## 2017-08-03 DIAGNOSIS — C50.912 MALIGNANT NEOPLASM OF LEFT FEMALE BREAST, UNSPECIFIED ESTROGEN RECEPTOR STATUS, UNSPECIFIED SITE OF BREAST: ICD-10-CM

## 2017-08-03 DIAGNOSIS — C50.912 BREAST CANCER, LEFT: ICD-10-CM

## 2017-08-03 PROCEDURE — 38792 RA TRACER ID OF SENTINL NODE: CPT | Mod: ,,, | Performed by: RADIOLOGY

## 2017-08-03 PROCEDURE — 88342 IMHCHEM/IMCYTCHM 1ST ANTB: CPT | Mod: 26,ICN,, | Performed by: PATHOLOGY

## 2017-08-03 PROCEDURE — 64520 N BLOCK LUMBAR/THORACIC: CPT | Performed by: PAIN MEDICINE

## 2017-08-03 PROCEDURE — 36000707: Performed by: SURGERY

## 2017-08-03 PROCEDURE — 88341 IMHCHEM/IMCYTCHM EA ADD ANTB: CPT | Mod: 26,59,ICN, | Performed by: PATHOLOGY

## 2017-08-03 PROCEDURE — 25000003 PHARM REV CODE 250: Performed by: SURGERY

## 2017-08-03 PROCEDURE — 25000003 PHARM REV CODE 250: Performed by: ANESTHESIOLOGY

## 2017-08-03 PROCEDURE — 88307 TISSUE EXAM BY PATHOLOGIST: CPT | Mod: 26,ICN,, | Performed by: PATHOLOGY

## 2017-08-03 PROCEDURE — C9290 INJ, BUPIVACAINE LIPOSOME: HCPCS | Performed by: SURGERY

## 2017-08-03 PROCEDURE — 27201423 OPTIME MED/SURG SUP & DEVICES STERILE SUPPLY: Performed by: SURGERY

## 2017-08-03 PROCEDURE — 63600175 PHARM REV CODE 636 W HCPCS: Performed by: NURSE ANESTHETIST, CERTIFIED REGISTERED

## 2017-08-03 PROCEDURE — 71000039 HC RECOVERY, EACH ADD'L HOUR: Performed by: SURGERY

## 2017-08-03 PROCEDURE — 63600175 PHARM REV CODE 636 W HCPCS: Performed by: SURGERY

## 2017-08-03 PROCEDURE — 88342 IMHCHEM/IMCYTCHM 1ST ANTB: CPT | Performed by: PATHOLOGY

## 2017-08-03 PROCEDURE — G0378 HOSPITAL OBSERVATION PER HR: HCPCS

## 2017-08-03 PROCEDURE — 38792 RA TRACER ID OF SENTINL NODE: CPT | Mod: TC

## 2017-08-03 PROCEDURE — 25000003 PHARM REV CODE 250: Performed by: GENERAL PRACTICE

## 2017-08-03 PROCEDURE — D9220A PRA ANESTHESIA: Mod: CRNA,,, | Performed by: NURSE ANESTHETIST, CERTIFIED REGISTERED

## 2017-08-03 PROCEDURE — 11000001 HC ACUTE MED/SURG PRIVATE ROOM

## 2017-08-03 PROCEDURE — D9220A PRA ANESTHESIA: Mod: ANES,,, | Performed by: ANESTHESIOLOGY

## 2017-08-03 PROCEDURE — 25000003 PHARM REV CODE 250: Performed by: NURSE ANESTHETIST, CERTIFIED REGISTERED

## 2017-08-03 PROCEDURE — 37000008 HC ANESTHESIA 1ST 15 MINUTES: Performed by: SURGERY

## 2017-08-03 PROCEDURE — 15777 ACELLULAR DERM MATRIX IMPLT: CPT | Mod: 50,,, | Performed by: SURGERY

## 2017-08-03 PROCEDURE — 25000003 PHARM REV CODE 250

## 2017-08-03 PROCEDURE — 88331 PATH CONSLTJ SURG 1 BLK 1SPC: CPT | Mod: 26,ICN,, | Performed by: PATHOLOGY

## 2017-08-03 PROCEDURE — 38525 BIOPSY/REMOVAL LYMPH NODES: CPT | Mod: 51,LT,, | Performed by: SURGERY

## 2017-08-03 PROCEDURE — 25000003 PHARM REV CODE 250: Performed by: PAIN MEDICINE

## 2017-08-03 PROCEDURE — 38792 RA TRACER ID OF SENTINL NODE: CPT | Mod: 59,LT,, | Performed by: SURGERY

## 2017-08-03 PROCEDURE — 63600175 PHARM REV CODE 636 W HCPCS

## 2017-08-03 PROCEDURE — 19303 MAST SIMPLE COMPLETE: CPT | Mod: LT,,, | Performed by: SURGERY

## 2017-08-03 PROCEDURE — 88307 TISSUE EXAM BY PATHOLOGIST: CPT | Performed by: PATHOLOGY

## 2017-08-03 PROCEDURE — 37000009 HC ANESTHESIA EA ADD 15 MINS: Performed by: SURGERY

## 2017-08-03 PROCEDURE — 36000706: Performed by: SURGERY

## 2017-08-03 PROCEDURE — C1729 CATH, DRAINAGE: HCPCS | Performed by: SURGERY

## 2017-08-03 PROCEDURE — 71000033 HC RECOVERY, INTIAL HOUR: Performed by: SURGERY

## 2017-08-03 PROCEDURE — C1789 PROSTHESIS, BREAST, IMP: HCPCS | Performed by: SURGERY

## 2017-08-03 PROCEDURE — C9399 UNCLASSIFIED DRUGS OR BIOLOG: HCPCS | Performed by: SURGERY

## 2017-08-03 PROCEDURE — 19357 TISS XPNDR PLMT BRST RCNSTJ: CPT | Mod: 50,,, | Performed by: SURGERY

## 2017-08-03 PROCEDURE — 63600175 PHARM REV CODE 636 W HCPCS: Performed by: PAIN MEDICINE

## 2017-08-03 PROCEDURE — 64520 N BLOCK LUMBAR/THORACIC: CPT | Mod: 59,50,, | Performed by: ANESTHESIOLOGY

## 2017-08-03 RX ORDER — ONDANSETRON 2 MG/ML
INJECTION INTRAMUSCULAR; INTRAVENOUS
Status: DISCONTINUED | OUTPATIENT
Start: 2017-08-03 | End: 2017-08-03

## 2017-08-03 RX ORDER — ROCURONIUM BROMIDE 10 MG/ML
INJECTION, SOLUTION INTRAVENOUS
Status: DISCONTINUED | OUTPATIENT
Start: 2017-08-03 | End: 2017-08-03

## 2017-08-03 RX ORDER — OXYCODONE HYDROCHLORIDE 5 MG/1
5 TABLET ORAL EVERY 4 HOURS PRN
Status: DISCONTINUED | OUTPATIENT
Start: 2017-08-03 | End: 2017-08-04 | Stop reason: HOSPADM

## 2017-08-03 RX ORDER — MIDAZOLAM HYDROCHLORIDE 1 MG/ML
1 INJECTION INTRAMUSCULAR; INTRAVENOUS EVERY 5 MIN PRN
Status: DISCONTINUED | OUTPATIENT
Start: 2017-08-03 | End: 2017-08-03

## 2017-08-03 RX ORDER — METHYLENE BLUE 5 MG/ML
INJECTION INTRAVENOUS
Status: DISCONTINUED | OUTPATIENT
Start: 2017-08-03 | End: 2017-08-03

## 2017-08-03 RX ORDER — SODIUM CHLORIDE 0.9 % (FLUSH) 0.9 %
3 SYRINGE (ML) INJECTION EVERY 8 HOURS
Status: DISCONTINUED | OUTPATIENT
Start: 2017-08-03 | End: 2017-08-04 | Stop reason: HOSPADM

## 2017-08-03 RX ORDER — BACITRACIN 50000 [IU]/1
INJECTION, POWDER, FOR SOLUTION INTRAMUSCULAR
Status: DISCONTINUED | OUTPATIENT
Start: 2017-08-03 | End: 2017-08-03

## 2017-08-03 RX ORDER — MIDAZOLAM HYDROCHLORIDE 1 MG/ML
INJECTION, SOLUTION INTRAMUSCULAR; INTRAVENOUS
Status: DISCONTINUED | OUTPATIENT
Start: 2017-08-03 | End: 2017-08-03

## 2017-08-03 RX ORDER — GLYCOPYRROLATE 0.2 MG/ML
INJECTION INTRAMUSCULAR; INTRAVENOUS
Status: DISCONTINUED | OUTPATIENT
Start: 2017-08-03 | End: 2017-08-03

## 2017-08-03 RX ORDER — CEFAZOLIN SODIUM 1 G/3ML
INJECTION, POWDER, FOR SOLUTION INTRAMUSCULAR; INTRAVENOUS
Status: DISCONTINUED | OUTPATIENT
Start: 2017-08-03 | End: 2017-08-03

## 2017-08-03 RX ORDER — LABETALOL HYDROCHLORIDE 5 MG/ML
20 INJECTION, SOLUTION INTRAVENOUS ONCE
Status: DISCONTINUED | OUTPATIENT
Start: 2017-08-03 | End: 2017-08-03 | Stop reason: HOSPADM

## 2017-08-03 RX ORDER — SODIUM CHLORIDE 9 MG/ML
INJECTION, SOLUTION INTRAVENOUS CONTINUOUS
Status: DISCONTINUED | OUTPATIENT
Start: 2017-08-03 | End: 2017-08-03

## 2017-08-03 RX ORDER — OXYCODONE HYDROCHLORIDE 5 MG/1
TABLET ORAL
Status: COMPLETED
Start: 2017-08-03 | End: 2017-08-03

## 2017-08-03 RX ORDER — NEOSTIGMINE METHYLSULFATE 1 MG/ML
INJECTION, SOLUTION INTRAVENOUS
Status: DISCONTINUED | OUTPATIENT
Start: 2017-08-03 | End: 2017-08-03

## 2017-08-03 RX ORDER — HYDRALAZINE HYDROCHLORIDE 20 MG/ML
5 INJECTION INTRAMUSCULAR; INTRAVENOUS ONCE
Status: COMPLETED | OUTPATIENT
Start: 2017-08-03 | End: 2017-08-03

## 2017-08-03 RX ORDER — CYCLOBENZAPRINE HCL 5 MG
5 TABLET ORAL 3 TIMES DAILY PRN
Status: DISCONTINUED | OUTPATIENT
Start: 2017-08-03 | End: 2017-08-04 | Stop reason: HOSPADM

## 2017-08-03 RX ORDER — LABETALOL HYDROCHLORIDE 5 MG/ML
10 INJECTION, SOLUTION INTRAVENOUS ONCE
Status: DISCONTINUED | OUTPATIENT
Start: 2017-08-03 | End: 2017-08-03 | Stop reason: HOSPADM

## 2017-08-03 RX ORDER — PROPOFOL 10 MG/ML
VIAL (ML) INTRAVENOUS
Status: DISCONTINUED | OUTPATIENT
Start: 2017-08-03 | End: 2017-08-03

## 2017-08-03 RX ORDER — LABETALOL HYDROCHLORIDE 5 MG/ML
10 INJECTION, SOLUTION INTRAVENOUS EVERY 4 HOURS PRN
Status: DISCONTINUED | OUTPATIENT
Start: 2017-08-03 | End: 2017-08-03 | Stop reason: HOSPADM

## 2017-08-03 RX ORDER — BUPIVACAINE HYDROCHLORIDE AND EPINEPHRINE 2.5; 5 MG/ML; UG/ML
INJECTION, SOLUTION EPIDURAL; INFILTRATION; INTRACAUDAL; PERINEURAL
Status: DISCONTINUED | OUTPATIENT
Start: 2017-08-03 | End: 2017-08-03

## 2017-08-03 RX ORDER — SODIUM CHLORIDE 9 MG/ML
INJECTION, SOLUTION INTRAVENOUS CONTINUOUS PRN
Status: DISCONTINUED | OUTPATIENT
Start: 2017-08-03 | End: 2017-08-03

## 2017-08-03 RX ORDER — OXYCODONE HYDROCHLORIDE 5 MG/1
10 TABLET ORAL EVERY 4 HOURS PRN
Status: DISCONTINUED | OUTPATIENT
Start: 2017-08-03 | End: 2017-08-04 | Stop reason: HOSPADM

## 2017-08-03 RX ORDER — HYDRALAZINE HYDROCHLORIDE 20 MG/ML
INJECTION INTRAMUSCULAR; INTRAVENOUS
Status: COMPLETED
Start: 2017-08-03 | End: 2017-08-03

## 2017-08-03 RX ORDER — FENTANYL CITRATE 50 UG/ML
INJECTION, SOLUTION INTRAMUSCULAR; INTRAVENOUS
Status: DISCONTINUED | OUTPATIENT
Start: 2017-08-03 | End: 2017-08-03

## 2017-08-03 RX ORDER — FENTANYL CITRATE 50 UG/ML
25 INJECTION, SOLUTION INTRAMUSCULAR; INTRAVENOUS EVERY 5 MIN PRN
Status: DISCONTINUED | OUTPATIENT
Start: 2017-08-03 | End: 2017-08-03

## 2017-08-03 RX ORDER — ACETAMINOPHEN 10 MG/ML
INJECTION, SOLUTION INTRAVENOUS
Status: DISCONTINUED | OUTPATIENT
Start: 2017-08-03 | End: 2017-08-03

## 2017-08-03 RX ORDER — LIDOCAINE HYDROCHLORIDE 10 MG/ML
INJECTION, SOLUTION INTRAVENOUS
Status: DISCONTINUED | OUTPATIENT
Start: 2017-08-03 | End: 2017-08-03

## 2017-08-03 RX ORDER — SCOLOPAMINE TRANSDERMAL SYSTEM 1 MG/1
1 PATCH, EXTENDED RELEASE TRANSDERMAL ONCE
Status: COMPLETED | OUTPATIENT
Start: 2017-08-03 | End: 2017-08-03

## 2017-08-03 RX ORDER — LABETALOL HYDROCHLORIDE 5 MG/ML
INJECTION, SOLUTION INTRAVENOUS
Status: DISPENSED
Start: 2017-08-03 | End: 2017-08-04

## 2017-08-03 RX ORDER — HYDROCHLOROTHIAZIDE 25 MG/1
25 TABLET ORAL DAILY
Status: DISCONTINUED | OUTPATIENT
Start: 2017-08-04 | End: 2017-08-04 | Stop reason: HOSPADM

## 2017-08-03 RX ORDER — PHENYLEPHRINE HYDROCHLORIDE 10 MG/ML
INJECTION INTRAVENOUS
Status: DISCONTINUED | OUTPATIENT
Start: 2017-08-03 | End: 2017-08-03

## 2017-08-03 RX ORDER — CLINDAMYCIN HYDROCHLORIDE 150 MG/1
150 CAPSULE ORAL EVERY 8 HOURS
Status: DISCONTINUED | OUTPATIENT
Start: 2017-08-03 | End: 2017-08-04 | Stop reason: HOSPADM

## 2017-08-03 RX ORDER — LIDOCAINE HYDROCHLORIDE 10 MG/ML
1 INJECTION, SOLUTION EPIDURAL; INFILTRATION; INTRACAUDAL; PERINEURAL ONCE
Status: DISCONTINUED | OUTPATIENT
Start: 2017-08-03 | End: 2017-08-03

## 2017-08-03 RX ADMIN — FENTANYL CITRATE 50 MCG: 50 INJECTION, SOLUTION INTRAMUSCULAR; INTRAVENOUS at 03:08

## 2017-08-03 RX ADMIN — ONDANSETRON 4 MG: 2 INJECTION INTRAMUSCULAR; INTRAVENOUS at 03:08

## 2017-08-03 RX ADMIN — MIDAZOLAM HYDROCHLORIDE 2 MG: 1 INJECTION, SOLUTION INTRAMUSCULAR; INTRAVENOUS at 02:08

## 2017-08-03 RX ADMIN — OXYCODONE HYDROCHLORIDE 5 MG: 5 TABLET ORAL at 10:08

## 2017-08-03 RX ADMIN — NEOSTIGMINE METHYLSULFATE 5 MG: 1 INJECTION INTRAVENOUS at 06:08

## 2017-08-03 RX ADMIN — HYDRALAZINE HYDROCHLORIDE 5 MG: 20 INJECTION INTRAMUSCULAR; INTRAVENOUS at 08:08

## 2017-08-03 RX ADMIN — PHENYLEPHRINE HYDROCHLORIDE 200 MCG: 10 INJECTION INTRAVENOUS at 03:08

## 2017-08-03 RX ADMIN — OXYCODONE HYDROCHLORIDE 5 MG: 5 TABLET ORAL at 07:08

## 2017-08-03 RX ADMIN — ACETAMINOPHEN 1000 MG: 10 INJECTION, SOLUTION INTRAVENOUS at 03:08

## 2017-08-03 RX ADMIN — FENTANYL CITRATE 50 MCG: 50 INJECTION, SOLUTION INTRAMUSCULAR; INTRAVENOUS at 04:08

## 2017-08-03 RX ADMIN — CEFAZOLIN 2 G: 1 INJECTION, POWDER, FOR SOLUTION INTRAVENOUS at 02:08

## 2017-08-03 RX ADMIN — SCOPALAMINE 1.5 MG: 1 PATCH, EXTENDED RELEASE TRANSDERMAL at 11:08

## 2017-08-03 RX ADMIN — SODIUM CHLORIDE: 0.9 INJECTION, SOLUTION INTRAVENOUS at 10:08

## 2017-08-03 RX ADMIN — GLYCOPYRROLATE 0.4 MG: 0.2 INJECTION, SOLUTION INTRAMUSCULAR; INTRAVENOUS at 06:08

## 2017-08-03 RX ADMIN — CLINDAMYCIN PHOSPHATE 900 MG: 150 INJECTION, SOLUTION, CONCENTRATE INTRAVENOUS at 03:08

## 2017-08-03 RX ADMIN — FENTANYL CITRATE 100 MCG: 50 INJECTION, SOLUTION INTRAMUSCULAR; INTRAVENOUS at 02:08

## 2017-08-03 RX ADMIN — BUPIVACAINE 20 ML: 13.3 INJECTION, SUSPENSION, LIPOSOMAL INFILTRATION at 03:08

## 2017-08-03 RX ADMIN — PHENYLEPHRINE HYDROCHLORIDE 200 MCG: 10 INJECTION INTRAVENOUS at 04:08

## 2017-08-03 RX ADMIN — SODIUM CHLORIDE: 0.9 INJECTION, SOLUTION INTRAVENOUS at 04:08

## 2017-08-03 RX ADMIN — METHYLENE BLUE 5 MG: 5 INJECTION INTRAVENOUS at 04:08

## 2017-08-03 RX ADMIN — EPHEDRINE SULFATE 25 MG: 50 INJECTION, SOLUTION INTRAMUSCULAR; INTRAVENOUS; SUBCUTANEOUS at 04:08

## 2017-08-03 RX ADMIN — MIDAZOLAM HYDROCHLORIDE 2 MG: 1 INJECTION, SOLUTION INTRAMUSCULAR; INTRAVENOUS at 11:08

## 2017-08-03 RX ADMIN — LIDOCAINE HYDROCHLORIDE 100 MG: 10 INJECTION, SOLUTION INTRAVENOUS at 02:08

## 2017-08-03 RX ADMIN — SODIUM CHLORIDE: 0.9 INJECTION, SOLUTION INTRAVENOUS at 02:08

## 2017-08-03 RX ADMIN — ROCURONIUM BROMIDE 30 MG: 10 INJECTION, SOLUTION INTRAVENOUS at 02:08

## 2017-08-03 RX ADMIN — CLINDAMYCIN HYDROCHLORIDE 150 MG: 150 CAPSULE ORAL at 10:08

## 2017-08-03 RX ADMIN — PROPOFOL 150 MG: 10 INJECTION, EMULSION INTRAVENOUS at 02:08

## 2017-08-03 RX ADMIN — BACITRACIN 50000 UNITS: 50000 INJECTION, POWDER, LYOPHILIZED, FOR SOLUTION INTRAMUSCULAR at 03:08

## 2017-08-03 RX ADMIN — EPHEDRINE SULFATE 25 MG: 50 INJECTION, SOLUTION INTRAMUSCULAR; INTRAVENOUS; SUBCUTANEOUS at 05:08

## 2017-08-03 RX ADMIN — ROCURONIUM BROMIDE 20 MG: 10 INJECTION, SOLUTION INTRAVENOUS at 03:08

## 2017-08-03 RX ADMIN — FENTANYL CITRATE 50 MCG: 50 INJECTION INTRAMUSCULAR; INTRAVENOUS at 11:08

## 2017-08-03 RX ADMIN — SODIUM CHLORIDE: 0.9 INJECTION, SOLUTION INTRAVENOUS at 05:08

## 2017-08-03 RX ADMIN — BUPIVACAINE HYDROCHLORIDE AND EPINEPHRINE BITARTRATE 40 ML: 2.5; .0091 INJECTION, SOLUTION EPIDURAL; INFILTRATION; INTRACAUDAL; PERINEURAL at 04:08

## 2017-08-03 NOTE — BRIEF OP NOTE
[facility]  Brief Operative Note    SUMMARY     Surgery Date: 8/3/2017     Surgeon(s) and Role:  Panel 1:     * Rosalina Martin MD - Primary     * Filiberto Holguin MD - Resident - Assisting    Panel 2:    * Oni Murrell MD - Primary      Pre-op Diagnosis:  Estrogen receptor positive [Z17.0]  Malignant neoplasm of lower-outer quadrant of left breast of female, estrogen receptor positive [C50.512, Z17.0]    Post-op Diagnosis:  Post-Op Diagnosis Codes:     * Estrogen receptor positive [Z17.0]     * Malignant neoplasm of lower-outer quadrant of left breast of female, estrogen receptor positive [C50.512, Z17.0]    Procedure(s) (LRB):  INSERTION-EXPANDER-BREAST Left (Left)  MASTECTOMY (Left)  BIOPSY-EXCISIONAL sentinal node (Left)    Anesthesia: General    Description of Procedure: Left total mastectomy and SLNB    Description of the findings of the procedure: Left total mastectomy via circumareolar incision, sentinel LNs negative on frozen section    Estimated Blood Loss: 15 mL    Total IV Fluids: Per Anesthesia         Specimens: Left breast, left sentinel nodes

## 2017-08-03 NOTE — ANESTHESIA PROCEDURE NOTES
Paravertebral Single Injection Block(s)    Patient location during procedure: pre-op   Block not for primary anesthetic.  Reason for block: at surgeon's request and post-op pain management   Post-op Pain Location: bilateral chest wall pain   Start time: 8/3/2017 11:50 AM  Timeout: 8/3/2017 11:45 AM   End time: 8/3/2017 12:08 PM  Staffing  Anesthesiologist: CRISTIN FARIAS  Resident/CRNA: KALLIE BROWN  Performed: resident/CRNA and anesthesiologist   Preanesthetic Checklist  Completed: patient identified, site marked, surgical consent, pre-op evaluation, timeout performed, IV checked, risks and benefits discussed and monitors and equipment checked  Peripheral Block  Patient position: sitting  Prep: ChloraPrep  Patient monitoring: heart rate, cardiac monitor, continuous pulse ox, continuous capnometry and frequent blood pressure checks  Block type: paravertebral - thoracic  Laterality: bilateral  Injection technique: single shot  Needle  Needle type: Tuohy   Needle gauge: 17 G  Needle length: 3.5 in  Needle localization: anatomical landmarks     Assessment  Injection assessment: negative aspiration and negative parasthesia  Paresthesia pain: none  Heart rate change: no  Slow fractionated injection: yes  Medications:  Bolus administered: 40 mL of 0.375 bupivacaine  Epinephrine added: 3.75 mcg/mL (1/300,000)  Additional Notes  Right side paravertebral block at T4 without difficulty. Left side paravertebral with difficulty at T4 level and adjusted to T3 level and procedure performed without difficulty.   VSS.  [facility] RN monitoring vitals throughout procedure.  Patient tolerated procedure well.

## 2017-08-03 NOTE — PROGRESS NOTES
Dr. Guzmán stated that PIV may be inserted in patient's foot for surgery today due to limb restrictions to BUE.

## 2017-08-04 VITALS
HEIGHT: 65 IN | SYSTOLIC BLOOD PRESSURE: 140 MMHG | BODY MASS INDEX: 22.33 KG/M2 | HEART RATE: 85 BPM | TEMPERATURE: 98 F | OXYGEN SATURATION: 96 % | WEIGHT: 134 LBS | DIASTOLIC BLOOD PRESSURE: 84 MMHG | RESPIRATION RATE: 18 BRPM

## 2017-08-04 PROBLEM — Z85.3 HISTORY OF RIGHT BREAST CANCER: Status: ACTIVE | Noted: 2017-08-04

## 2017-08-04 PROCEDURE — 25000003 PHARM REV CODE 250: Performed by: GENERAL PRACTICE

## 2017-08-04 PROCEDURE — 25000003 PHARM REV CODE 250: Performed by: SURGERY

## 2017-08-04 PROCEDURE — A4216 STERILE WATER/SALINE, 10 ML: HCPCS | Performed by: GENERAL PRACTICE

## 2017-08-04 PROCEDURE — G0378 HOSPITAL OBSERVATION PER HR: HCPCS

## 2017-08-04 RX ORDER — OXYCODONE HYDROCHLORIDE 5 MG/1
5 TABLET ORAL ONCE
Status: COMPLETED | OUTPATIENT
Start: 2017-08-04 | End: 2017-08-04

## 2017-08-04 RX ORDER — CYCLOBENZAPRINE HCL 5 MG
5 TABLET ORAL 3 TIMES DAILY PRN
Qty: 30 TABLET | Refills: 0 | Status: SHIPPED | OUTPATIENT
Start: 2017-08-04 | End: 2017-08-14

## 2017-08-04 RX ORDER — AMOXICILLIN 250 MG
1 CAPSULE ORAL DAILY PRN
Status: DISCONTINUED | OUTPATIENT
Start: 2017-08-04 | End: 2017-08-04 | Stop reason: HOSPADM

## 2017-08-04 RX ORDER — CLINDAMYCIN HYDROCHLORIDE 150 MG/1
300 CAPSULE ORAL EVERY 8 HOURS
Qty: 30 CAPSULE | Refills: 0 | Status: ON HOLD | OUTPATIENT
Start: 2017-08-04 | End: 2017-11-20 | Stop reason: HOSPADM

## 2017-08-04 RX ORDER — OXYCODONE HYDROCHLORIDE 5 MG/1
5 TABLET ORAL EVERY 6 HOURS PRN
Qty: 50 TABLET | Refills: 0 | Status: ON HOLD | OUTPATIENT
Start: 2017-08-04 | End: 2018-02-22 | Stop reason: HOSPADM

## 2017-08-04 RX ADMIN — Medication 3 ML: at 06:08

## 2017-08-04 RX ADMIN — OXYCODONE HYDROCHLORIDE 5 MG: 5 TABLET ORAL at 06:08

## 2017-08-04 RX ADMIN — CLINDAMYCIN HYDROCHLORIDE 150 MG: 150 CAPSULE ORAL at 06:08

## 2017-08-04 RX ADMIN — OXYCODONE HYDROCHLORIDE 5 MG: 5 TABLET ORAL at 04:08

## 2017-08-04 RX ADMIN — OXYCODONE HYDROCHLORIDE 10 MG: 5 TABLET ORAL at 08:08

## 2017-08-04 RX ADMIN — OXYCODONE HYDROCHLORIDE 10 MG: 5 TABLET ORAL at 12:08

## 2017-08-04 RX ADMIN — HYDROCHLOROTHIAZIDE 25 MG: 25 TABLET ORAL at 08:08

## 2017-08-04 NOTE — OP NOTE
DATE OF PROCEDURE:  08/03/2017.    PREOPERATIVE DIAGNOSIS:  Breast cancer.    POSTOPERATIVE DIAGNOSIS:  Breast cancer.    PROCEDURES PERFORMED:  1.  Delayed right breast reconstruction using a tissue expander.  2.  Placement of AlloDerm, right breast.  3.  Immediate left breast reconstruction using tissue expander.  4.  Placement of AlloDerm, left breast.    SURGEON:  Oni Murrell M.D., F.A.C.S.    ANESTHESIA:  General.    DESCRIPTION OF PROCEDURE:  While the general surgeons were performing a   mastectomy on the left side, we went ahead and reconstructed the right breast   using a tissue expander.  The previous mastectomy scar was then reincised.    Flaps were then elevated superiorly and inferiorly.  Next, the inframammary fold   was delineated.  A running 3-0 PDS suture was then used to reinforce the fold.    Exparel mixed with bupivacaine was then injected as a nerve block.  Multiple   nerve blocks were done as well as injected into the pectoralis major muscle.    Next, markings were made for the prepectoral AlloDerm placement.  The AlloDerm   was opened on the back table, it was perforated and was soaked in triple   antibiotic solution.  It was then sutured to the pectoralis muscle using running   2-0 PDS sutures.  A 500 mL tissue expander was opened on the back table.  All   air was removed.  Then, 100 mL of air was replaced.  It was placed in the   sub-AlloDerm pocket.  The two inferior suture tabs were then sutured down to the   chest wall using 2-0 PDS.  Next, the AlloDerm was folded underneath the implant   and a double layer of AlloDerm was then used to suture to the inframammary   fold.  A drain was placed.  The skin was closed using 3-0 Monocryl followed by   running 4-0 Monocryl subcuticular suture.  After completion of the mastectomy on   the left side, again markings were made for the tissue expander placement.  The   inframammary fold was reinforced using 3-0 Prolene suture.  Exparel in    combination with bupivacaine was used for a rib block.  It was also injected   into the pectoralis major muscle, AlloDerm was then sutured to the pectoralis   major muscle superiorly to inferiorly heading down to the inframammary fold.  A   500 mL tissue expander was opened on the back table.  All air was removed, 100   mL was then replaced.  It was placed in the sub-AlloDerm pocket.  Two inferior   suture tabs were then sutured using 2-0 PDS.  The excess AlloDerm was then   folded underneath the implant and a double layer of AlloDerm was then sutured   down to the chest wall at the inframammary fold.  A drain was placed.  The   incision was closed using interrupted 3-0 Monocryl followed by running 4-0   Monocryl subcuticular suture.  There were no complications with this procedure.      TOPHER/KASSIDY  dd: 08/04/2017 12:43:38 (CDT)  td: 08/04/2017 13:10:19 (CD)  Doc ID   #[id number]  Job ID #[id number]    CC:

## 2017-08-04 NOTE — PROGRESS NOTES
[facility]  Plastic Surgery  Progress Note    Subjective:     History of Present Illness:  POD #1 s/p Left mastectomy and bilateral tissue expander placement. Pt doing well today. Pain controlled. Ambulating around halls.     Post-Op Info:  Procedure(s) (LRB):  INSERTION-EXPANDER-BREAST Left (Bilateral)  MASTECTOMY (Left)  BIOPSY-EXCISIONAL sentinal node (Left)   1 Day Post-Op     No current facility-administered medications on file prior to encounter.      Current Outpatient Prescriptions on File Prior to Encounter   Medication Sig    diazePAM (VALIUM) 2 MG tablet 1 po 30 min prior to procedure prn    hydrochlorothiazide (HYDRODIURIL) 25 MG tablet TAKE 1 TABLET DAILY       Review of patient's allergies indicates:   Allergen Reactions    Ciprofloxacin Anaphylaxis     Other reaction(s): Difficulty breathing    Floxin [ofloxacin] Anaphylaxis    Amoxicillin      Other reaction(s): Rash    Indocin  [indomethacin sodium]      Other reaction(s): Headache    Amoxicillin Rash    Bactrim [sulfamethoxazole-trimethoprim] Rash       Past Medical History:   Diagnosis Date    Breast cancer     RIGHT    Cancer     breast cancer    Hypertension     Renea-Parkinson-White syndrome      Past Surgical History:   Procedure Laterality Date    BACK SURGERY      Spinal fusion    BREAST BIOPSY      RIGHT    BREAST BIOPSY Left     CARDIAC ELECTROPHYSIOLOGY STUDY AND ABLATION      MASTECTOMY      RIGHT/ RADIATION AND CHEMO    TUBAL LIGATION       Family History     Problem Relation (Age of Onset)    Breast cancer Maternal Aunt    Cancer Mother    Hypertension Father    Ovarian cancer Paternal Aunt        Social History Main Topics    Smoking status: Former Smoker     Packs/day: 1.00     Quit date: 12/3/1988    Smokeless tobacco: Never Used    Alcohol use 3.0 oz/week     5 Glasses of wine per week      Comment: wine nightly    Drug use: No    Sexual activity: Yes     Partners: Male     Review of  Systems  Objective:     Vital Signs (Most Recent):  Temp: 98 °F (36.7 °C) (08/04/17 0744)  Pulse: 85 (08/04/17 0744)  Resp: 18 (08/04/17 0744)  BP: (!) 140/84 (08/04/17 0744)  SpO2: 96 % (08/04/17 0744) Vital Signs (24h Range):  Temp:  [97.3 °F (36.3 °C)-98.3 °F (36.8 °C)] 98 °F (36.7 °C)  Pulse:  [58-92] 85  Resp:  [15-21] 18  SpO2:  [96 %-100 %] 96 %  BP: (120-197)/(54-95) 140/84     Weight: 60.8 kg (134 lb)  Body mass index is 22.3 kg/m².    Physical Exam   NAD, AAOx3  No pallor of skin or mucous membranes  Symmetric, non labored respirations  Rt Breast: incision c/d/i- no drainage/erythema/signs of infection/TTP; one RHIANNON drain in place with 75 cc s/s output  Left Breast: incision c/d/i- no drainage/erythema/TTP; 2 RHIANNON drains in place with 50 and 130 cc of s/s output;     Significant Labs:  CBC:   Recent Labs  Lab 08/01/17  1338   HGB 13.8     BMP:   Recent Labs  Lab 08/01/17  1338   GLU 98      K 4.5      CO2 25   BUN 17   CREATININE 0.9   CALCIUM 9.8       Significant Diagnostics:  I have reviewed and interpreted all pertinent imaging results/findings within the past 24 hours.    Assessment/Plan:     * Breast cancer, left    - d/c to home today  - complete 10 day course of Clindamycin  - Percocet 5mg for pain and Flexeril for spasms  - followup with Dr. Murrell in clinic on Wednesday             Jeannine Mackey MD  Plastic Surgery  [facility]

## 2017-08-04 NOTE — ANESTHESIA POSTPROCEDURE EVALUATION
"Anesthesia Post Evaluation    Patient: Pantera Posey    Procedure(s) Performed: Procedure(s) (LRB):  INSERTION-EXPANDER-BREAST Left (Bilateral)  MASTECTOMY (Left)  BIOPSY-EXCISIONAL sentinal node (Left)    Final Anesthesia Type: general  Patient location during evaluation: PACU  Patient participation: Yes- Able to Participate  Level of consciousness: awake and alert  Post-procedure vital signs: reviewed and stable  Pain management: adequate  Airway patency: patent  PONV status at discharge: No PONV  Anesthetic complications: no      Cardiovascular status: hemodynamically stable  Respiratory status: unassisted, spontaneous ventilation and room air  Hydration status: euvolemic  Follow-up not needed.        Visit Vitals  BP (!) 159/75   Pulse 66   Temp 36.8 °C (98.3 °F) (Oral)   Resp 18   Ht 5' 5" (1.651 m)   Wt 60.8 kg (134 lb)   LMP  (LMP Unknown)   SpO2 99%   Breastfeeding? No   BMI 22.30 kg/m²       Pain/Jori Score: Pain Assessment Performed: Yes (8/4/2017  6:00 AM)  Presence of Pain: complains of pain/discomfort (8/4/2017  6:27 AM)  Pain Rating Prior to Med Admin: 5 (8/4/2017  6:27 AM)  Pain Rating Post Med Admin: 3 (8/4/2017  4:47 AM)  Jori Score: 10 (8/3/2017 10:00 PM)      "

## 2017-08-04 NOTE — DISCHARGE SUMMARY
[facility]  General Surgery  Discharge Summary      Patient Name: Pantera Poesy  MRN: [id number]  Admission Date: 8/3/2017  Hospital Length of Stay: 0 days  Discharge Date and Time:  08/04/2017 11:15 AM  Attending Physician: Oni Murrell, *   Discharging Provider: Chema Hedrick MD  Primary Care Provider: Jazmin Singh MD     HPI:  Pantera Posey is a 67 y.o. female who presents with newly diagnosed L breast invasive ductal carcinoma, grade III, ER+RI+H2N-, at 3OC, 3CFN just superior to prior biopsied fibroadenoma.  This area of asymmetry was identified on routine screening mammogram.  Her prior cancer in the R breast was identified after the patient felt a palpable abnormality which prompted an excisional biopsy.  There was a positive margin on an un-oriented specimen, so this was followed by MRM without reconstruction with 20 negative nodes.   This was performed in [place].  She has never undergone genetic testing.  She did not have adjuvant chemo or XRT but did take Tamoxifen for 5 years.    Procedure(s) (LRB):  INSERTION-EXPANDER-BREAST Left (Bilateral)  MASTECTOMY (Left)  BIOPSY-EXCISIONAL sentinal node (Left)     Hospital Course: Patient arrived for a scheduled surgery of therapeutic left mastectomy with L SLNB, followed by bilateral tissue expander placement for reconstruction by Plastic Surgery. She tolerated the surgery well. POD1 she was ambulating, tolerating diet well, and her pain was well controlled with PO pain meds. She has 3 yusra drains to bulb suction in place, two on the left and one on the right. The right was draining more of a sanguinous fluid, and the left two were draining serosanguinous fluid. She will be discharged with the drains with instructions for care and logging output at home. She was discharged on POD 1. She was discharged with prescriptions for flexeril and clindamycin for 10 days, and percocet and and bowel regimen. She is to follow up with  Dr. Murrell in 1 week, and with Dr. Martin in 2 weeks.    Consults: Dr. Murrell, Plastic Surgery      Pending Diagnostic Studies:     None        Final Active Diagnoses:    Diagnosis Date Noted POA    PRINCIPAL PROBLEM:  Breast cancer, left [C50.912] 08/03/2017 Yes    History of right breast cancer [Z85.3] 08/04/2017 Not Applicable    HTN (hypertension), benign [I10] 12/03/2012 Yes      Problems Resolved During this Admission:    Diagnosis Date Noted Date Resolved POA      Discharged Condition: good    Disposition: Home or Self Care    Follow Up:  Follow-up Information     Rosalina Martin MD In 2 weeks.    Specialties:  General Surgery, Breast Surgery  Contact information:  [address] [facility] [address]  [phone]             Oni Murrell MD In 1 week.    Specialty:  Plastic Surgery  Contact information:  [address]  [place] [address]  [phone]                 Patient Instructions:     Diet general     Activity as tolerated     Call MD for:  increased confusion or weakness     Call MD for:  persistent dizziness, light-headedness, or visual disturbances     Call MD for:  worsening rash     Call MD for:  severe persistent headache     Call MD for:  difficulty breathing or increased cough     Call MD for:  redness, tenderness, or signs of infection (pain, swelling, redness, odor or green/yellow discharge around incision site)     Call MD for:  severe uncontrolled pain     Call MD for:  persistent nausea and vomiting or diarrhea     Call MD for:  temperature >100.4     Remove dressing in 24 hours   Order Comments: After that, no dressing needed  Ok to shower over incision  No bathing in a tub or pool for 4 weeks  Steri strips will fall off on their own       Medications:  Reconciled Home Medications:   Current Discharge Medication List      START taking these medications    Details   clindamycin (CLEOCIN) 150 MG capsule Take 2 capsules (300 mg total) by mouth every 8 (eight)  hours.  Qty: 30 capsule, Refills: 0      cyclobenzaprine (FLEXERIL) 5 MG tablet Take 1 tablet (5 mg total) by mouth 3 (three) times daily as needed for Muscle spasms.  Qty: 30 tablet, Refills: 0      oxycodone (ROXICODONE) 5 MG immediate release tablet Take 1 tablet (5 mg total) by mouth every 6 (six) hours as needed.  Qty: 50 tablet, Refills: 0         CONTINUE these medications which have NOT CHANGED    Details   diazePAM (VALIUM) 2 MG tablet 1 po 30 min prior to procedure prn  Qty: 5 tablet, Refills: 0      hydrochlorothiazide (HYDRODIURIL) 25 MG tablet TAKE 1 TABLET DAILY  Qty: 90 tablet, Refills: 1             Chema Hedrick MD  General Surgery  [facility]

## 2017-08-04 NOTE — ASSESSMENT & PLAN NOTE
Now s/p left total mastectomy and SLNB with bilateral tissue expander placement 1 Day Post-Op    - Plastic Surgery following, appreciate assistance  - Continue Clindamycin and Flexeril per Plastic Surgery  - Regular diet  - Continue current pain control regimen  - Continue local wound care and SurgiBra  - Mynor drains to remain in place to bulb suction

## 2017-08-04 NOTE — SUBJECTIVE & OBJECTIVE
Interval History:   No acute events overnight. Afebrile. VSS. Denies any chest wall pain this morning. Does report some mild L shoulder soreness. States she is happy with the surgical results from yesterday. Mynor drains in place x 3 to bulb suction. Right chest wall drain slightly more sanguinous in appearance. Right chest wall drain with 75 mL output. Left chest wall drains x 2 with 180 mL (130 mL + 50 mL) serosanguinous output.      Medications:  Continuous Infusions:   Scheduled Meds:   clindamycin  150 mg Oral Q8H    hydrochlorothiazide  25 mg Oral Daily    labetalol        sodium chloride 0.9%  3 mL Intravenous Q8H     PRN Meds:cyclobenzaprine, oxycodone, oxycodone     Review of patient's allergies indicates:   Allergen Reactions    Ciprofloxacin Anaphylaxis     Other reaction(s): Difficulty breathing    Floxin [ofloxacin] Anaphylaxis    Amoxicillin      Other reaction(s): Rash    Indocin  [indomethacin sodium]      Other reaction(s): Headache    Amoxicillin Rash    Bactrim [sulfamethoxazole-trimethoprim] Rash     Objective:     Vital Signs (Most Recent):  Temp: 98.3 °F (36.8 °C) (08/04/17 0417)  Pulse: 66 (08/04/17 0417)  Resp: 18 (08/04/17 0417)  BP: (!) 159/75 (08/04/17 0417)  SpO2: 99 % (08/04/17 0417) Vital Signs (24h Range):  Temp:  [97.3 °F (36.3 °C)-98.3 °F (36.8 °C)] 98.3 °F (36.8 °C)  Pulse:  [58-92] 66  Resp:  [15-21] 18  SpO2:  [97 %-100 %] 99 %  BP: (120-197)/(54-95) 159/75     Weight: 60.8 kg (134 lb)  Body mass index is 22.3 kg/m².    Intake/Output - Last 3 Shifts       08/02 0700 - 08/03 0659 08/03 0700 - 08/04 0659 08/04 0700 - 08/05 0659    P.O.  700     I.V. (mL/kg)  4100 (67.4)     Total Intake(mL/kg)  4800 (78.9)     Urine (mL/kg/hr)  900     Drains  255     Total Output   1155      Net   +3645             Urine Occurrence  1 x           Physical Exam   Constitutional: She is oriented to person, place, and time. She appears well-developed and well-nourished. No distress.   HENT:    Head: Normocephalic and atraumatic.   Eyes: EOM are normal.   Neck: Normal range of motion. Neck supple.   Cardiovascular: Normal rate, regular rhythm and intact distal pulses.    Pulmonary/Chest: Effort normal. No respiratory distress. She has no wheezes.   Abdominal: Soft. She exhibits no distension. There is no tenderness.   Musculoskeletal: Normal range of motion.   Neurological: She is alert and oriented to person, place, and time.   Skin: Skin is warm and dry. No rash noted. She is not diaphoretic. No erythema.   Psychiatric: She has a normal mood and affect. Her behavior is normal.   Nursing note and vitals reviewed.  Chest wall: Bilateral chest wall incisions well approximated and without erythema or drainage, small hematoma at the superior margin of the pocket on the right chest wall with bulb drain in place and [id number] output; left chest wall drains in place x 2 to bulb suction with serosanguinous output    Significant Labs:  None    Significant Diagnostics:  None

## 2017-08-04 NOTE — OP NOTE
Operative Note     8/3/2017    PRE-OP DIAGNOSIS: Estrogen receptor positive [Z17.0]  Malignant neoplasm of lower-outer quadrant of left breast of female, estrogen receptor positive [C50.512, Z17.0]      POST-OP DIAGNOSIS: Post-Op Diagnosis Codes:     * Estrogen receptor positive [Z17.0]     * Malignant neoplasm of lower-outer quadrant of left breast of female, estrogen receptor positive [C50.512, Z17.0]    Procedure(s):  INSERTION-EXPANDER-BREAST Bilateral (To be dictated separately by Dr. Murrell)  MASTECTOMY Total Skin sparing  BIOPSY-EXCISIONAL deep sentinal node   Injection technetium for sentinel node    SURGEON: Surgeon(s) and Role:  Panel 1:     * Oni Murrell MD - Primary    Panel 2:     * Filiberto Holguin MD - Resident - Assisting     * Rosalina Martin MD - Primary    ANESTHESIA: General     OPERATIVE FINDINGS: Cluster of abnormal appearing nodes adjacent to sentinel node #2.  All negative on frozen section.  Tumor posterior in breast.  SOme muscle fibers taken where tumor was adherent.    INDICATION FOR PROCEDURE: This patient presents with a history of IDC of the left breast and remote history of right breast cancer.    PROCEDURE IN DETAIL:  Pantera Posey is a 67 y.o. female brought to the operating room for definitive surgery of IDC of the left breast.  The patient has elected to undergo left simple mastectomy with sentinel lymph node biopsy for monik assessment followed by bilateral expander placement. The patient was informed of the possible risks and complications of the procedure, including but not limited to anesthetic risks, bleeding, infection, and need for additional surgery.  The patient concurred with the proposed plan, and has given informed consent.  The site of surgery was properly noted/marked in the preoperative holding area.    Prior to arriving in the operating room, the patient's left breast was injected with technetium to facilitate sentinel lymph node identification.  The patient was then brought to the operating room and placed in the supine position with both upper extremities extended.  regional and general anesthesia administered.  Perioperative antibiotics were administered consisting of Ancef and a time out was performed confirming the patient, site, and procedure.  The bilateral chest and axilla was then prepped and draped in the usual sterile fashion.    We first turned our attention to the left  breast where an circumareolar incision was made around the  nipple areolar complex.  The incision was made with a 15-blade and deepened through the subcutaneous tissues with Bovie electrocautery.  Skin flaps were raised to the clavicle superiorly, to the lateral border of the sternum medially, to the inframammary fold inferiorly, and to the anterior border of the latissimus dorsi muscle laterally. The breast tissue was sharply excised off the chest wall taking care to incorporate the pectoralis fascia while leaving the serratus fascia behind.  There was an area posterior to the tumor that was adherent to the pectoralis muscle so there were muscle fibers taken in that area although not full thickness.  The resulting mastectomy specimen was marked using a short stitch superiorly and a long stitch laterally.  The breast was sent to pathology for permanent evaluation.   The operative field was irrigated with normal saline and all bleeding points were secured with Bovie electrocautery.    Expander placement will be done by the plastic surgery service.      We then  turned our attention to the left axilla.  The gamma probe was used to identify an area of increased radioactivity within the lower axilla. The clavipectoral sheath was sharply incised to reveal the level I axillary lymph nodes. The probe was used to identify a single node with increased radioactivity.  This node was brought into the operative field and carefully dissected free of the surrounding lymphovascular structures.   The highest ex vivo count of the node was elevated.  The node was then sent to pathology for frozen section evaluation, labeled as sentinel node #1.  A total of 4 axillary sentinel nodes and 0 axillary non-sentinel nodes were identified, excised and submitted to pathology.  Bed counts were obtained to confirm that the 10% rule had not been violated.   The wound was irrigated with normal saline, and all bleeding points were secured with Bovie electrocautery.     Frozen section monik evaluation revealed no evidence of metastatic disease.  Therefore, the operative field was irrigated with normal saline and all bleeding points were secured with Bovie electrocautery.  The closure will be by the plastic surgery service.      A post surgical bra was placed on the patient. At the end of the operation, all sponge, instrument, and needle counts x 2 were correct.    ESTIMATED BLOOD LOSS: less than 100 mL    TOTAL IV FLUIDS: 4000 ml crystalloid    SPECIMENS:   Specimens     Start     Ordered    08/03/17 1756  Specimen to Pathology - Surgery  Once      08/03/17 1755         L breast  L SLNB #1  L SLB #2    COMPLICATIONS: none    DISPOSITION: PACU - hemodynamically stable.    ATTESTATION:   I was present and scrubbed for the entire procedure.

## 2017-08-04 NOTE — PLAN OF CARE
Problem: Fall Risk (Adult)  Intervention: Patient Rounds   Pt AA0x3 and VSS.  Two side rails up, bed locked, call light within reach. No falls noted as these precautions remain. Pt free of skin breakdown as the pt moves well independently. Pain controlled well with PRN meds. Hourly rounds made and no complaints at this time noted. Will resume with plan of care.

## 2017-08-04 NOTE — PROGRESS NOTES
[facility]  Breast Surgery  Progress Note    Subjective:     Post-Op Info:  Procedure(s) (LRB):  INSERTION-EXPANDER-BREAST Left (Bilateral)  MASTECTOMY (Left)  BIOPSY-EXCISIONAL sentinal node (Left)   1 Day Post-Op     Interval History:   No acute events overnight. Afebrile. Some HTN. Otherwise VSS. Denies any chest wall pain this morning. Does report some mild L shoulder soreness. States she is happy with the surgical results from yesterday. Mynor drains in place x 3 to bulb suction. Right chest wall drain slightly more sanguinous in appearance. Right chest wall drain with 75 mL output. Left chest wall drains x 2 with 180 mL (130 mL + 50 mL) serosanguinous output.      Medications:  Continuous Infusions:   Scheduled Meds:   clindamycin  150 mg Oral Q8H    hydrochlorothiazide  25 mg Oral Daily    labetalol        sodium chloride 0.9%  3 mL Intravenous Q8H     PRN Meds:cyclobenzaprine, oxycodone, oxycodone     Review of patient's allergies indicates:   Allergen Reactions    Ciprofloxacin Anaphylaxis     Other reaction(s): Difficulty breathing    Floxin [ofloxacin] Anaphylaxis    Amoxicillin      Other reaction(s): Rash    Indocin  [indomethacin sodium]      Other reaction(s): Headache    Amoxicillin Rash    Bactrim [sulfamethoxazole-trimethoprim] Rash     Objective:     Vital Signs (Most Recent):  Temp: 98.3 °F (36.8 °C) (08/04/17 0417)  Pulse: 66 (08/04/17 0417)  Resp: 18 (08/04/17 0417)  BP: (!) 159/75 (08/04/17 0417)  SpO2: 99 % (08/04/17 0417) Vital Signs (24h Range):  Temp:  [97.3 °F (36.3 °C)-98.3 °F (36.8 °C)] 98.3 °F (36.8 °C)  Pulse:  [58-92] 66  Resp:  [15-21] 18  SpO2:  [97 %-100 %] 99 %  BP: (120-197)/(54-95) 159/75     Weight: 60.8 kg (134 lb)  Body mass index is 22.3 kg/m².    Intake/Output - Last 3 Shifts       08/02 0700 - 08/03 0659 08/03 0700 - 08/04 0659 08/04 0700 - 08/05 0659    P.O.  700     I.V. (mL/kg)  4100 (67.4)     Total Intake(mL/kg)  4800 (78.9)     Urine  (mL/kg/hr)  900     Drains  255     Total Output   1155      Net   +3645             Urine Occurrence  1 x           Physical Exam   Constitutional: She is oriented to person, place, and time. She appears well-developed and well-nourished. No distress.   HENT:   Head: Normocephalic and atraumatic.   Eyes: EOM are normal.   Neck: Normal range of motion. Neck supple.   Cardiovascular: Normal rate, regular rhythm and intact distal pulses.    Pulmonary/Chest: Effort normal. No respiratory distress. She has no wheezes.   Abdominal: Soft. She exhibits no distension. There is no tenderness.   Musculoskeletal: Normal range of motion.   Neurological: She is alert and oriented to person, place, and time.   Skin: Skin is warm and dry. No rash noted. She is not diaphoretic. No erythema.   Psychiatric: She has a normal mood and affect. Her behavior is normal.   Nursing note and vitals reviewed.  Chest wall: Bilateral chest wall incisions well approximated and without erythema or drainage, small hematoma at the superior margin of the pocket on the right chest wall with bulb drain in place and oqlnjhqsopecys-xe-skcqqxeycq output; left chest wall drains in place x 2 to bulb suction with serosanguinous output    Significant Labs:  None    Significant Diagnostics:  None    Assessment/Plan:     * Breast cancer, left    Now s/p left total mastectomy and SLNB with bilateral tissue expander placement 1 Day Post-Op    - Plastic Surgery following, appreciate assistance  - Continue Clindamycin and Flexeril per Plastic Surgery  - Regular diet  - Continue current pain control regimen  - Continue local wound care and SurgiBra  - Mynor drains to remain in place to bulb suction      History of right breast cancer    - Prior R MRM without reconstruction after initial diagnosis  - See above      HTN (hypertension), benign    - Home medications      Dispo        - Anticipate discharge to home today        - Will discuss with Plastic Surgery this  morning      Filiberto Holguin MD  Surgery Resident, PGY-III  Pager: [phone]  8/4/2017 7:43 AM

## 2017-08-04 NOTE — PLAN OF CARE
CM met with patient this am. Patient sitting up in bed. Planned discharge is home with family - Plan (A) or home with family and Home Health - Plan (B).    PCP:  Jazmin Singh MD     Payor: [place] [employer] / Plan: [facility] CHOICE PLUS / Product Type: Commercial /        [facility] [address] [facility] [address] AT [address]  [address]  [facility] [address]  Phone: [phone] Fax: [phone]       08/04/17 0920   Discharge Assessment   Assessment Type Discharge Planning Assessment   Confirmed/corrected address and phone number on facesheet? Yes   Assessment information obtained from? Patient   Expected Length of Stay (days) 1   Communicated expected length of stay with patient/caregiver yes   Prior to hospitilization cognitive status: Alert/Oriented   Prior to hospitalization functional status: Independent   Current cognitive status: Alert/Oriented   Current Functional Status: Independent   Arrived From home or self-care   Lives With spouse   Able to Return to Prior Arrangements yes   Is patient able to care for self after discharge? Yes   How many people do you have in your home that can help with your care after discharge? 1   Who are your caregiver(s) and their phone number(s)? Victorino Kalpesh - spouse [phone]   Patient's perception of discharge disposition home or selfcare   Readmission Within The Last 30 Days no previous admission in last 30 days   Patient currently being followed by outpatient case management? No   Patient currently receives home health services? No   Does the patient currently use HME? No   Patient currently receives private duty nursing? N/A   Patient currently receives any other outside agency services? No   Equipment Currently Used at Home none   Do you have any problems affording any of your prescribed medications? No   Is the patient taking medications as prescribed? yes   Do you have any financial concerns preventing you from  receiving the healthcare you need? No   Does the patient have transportation to healthcare appointments? Yes   Transportation Available family or friend will provide   On Dialysis? No   Does the patient receive services at the Coumadin Clinic? No   Are there any open cases? No   Discharge Plan A Home with family   Discharge Plan B Home Health   Patient/Family In Agreement With Plan yes

## 2017-08-04 NOTE — TRANSFER OF CARE
"Anesthesia Transfer of Care Note    Patient: Pantera Posey    Procedure(s) Performed: Procedure(s) (LRB):  INSERTION-EXPANDER-BREAST Left (Bilateral)  MASTECTOMY (Left)  BIOPSY-EXCISIONAL sentinal node (Left)    Patient location: PACU    Anesthesia Type: general    Transport from OR: Transported from OR on 6-10 L/min O2 by face mask with adequate spontaneous ventilation    Post pain: adequate analgesia    Post assessment: no apparent anesthetic complications    Post vital signs: stable    Level of consciousness: sedated    Nausea/Vomiting: no nausea/vomiting    Complications: none    Transfer of care protocol was followed      Last vitals:   Visit Vitals  /60 (BP Location: Left arm, Patient Position: Lying, BP Method: Automatic)   Pulse 73   Temp 36.8 °C (98.2 °F) (Oral)   Resp 16   Ht 5' 5" (1.651 m)   Wt 60.8 kg (134 lb)   LMP  (LMP Unknown)   SpO2 99%   BMI 22.30 kg/m²     "

## 2017-08-04 NOTE — ANESTHESIA RELEASE NOTE
"Anesthesia Release from PACU Note    Patient: Pantera Posey    Procedure(s) Performed: Procedure(s) (LRB):  INSERTION-EXPANDER-BREAST Left (Bilateral)  MASTECTOMY (Left)  BIOPSY-EXCISIONAL sentinal node (Left)    Anesthesia type: general    Post pain: Adequate analgesia    Post assessment: no apparent anesthetic complications and tolerated procedure well    Last Vitals:   Visit Vitals  BP (!) 159/75   Pulse 66   Temp 36.8 °C (98.3 °F) (Oral)   Resp 18   Ht 5' 5" (1.651 m)   Wt 60.8 kg (134 lb)   LMP  (LMP Unknown)   SpO2 99%   Breastfeeding? No   BMI 22.30 kg/m²       Post vital signs: stable    Level of consciousness: awake and alert     Nausea/Vomiting: no nausea/no vomiting    Complications: none    Airway Patency: patent    Respiratory: unassisted, spontaneous ventilation, room air    Cardiovascular: stable and blood pressure at baseline    Hydration: euvolemic  "

## 2017-08-04 NOTE — PLAN OF CARE
Patient discharged home with family. Patient did not have any home needs. Follow up appointments scheduled.    Future Appointments  Date Time Provider Department Center   8/9/2017 1:45 PM Oni Murrell MD [facility] PLASTIC [place] [facility]   8/16/2017 1:45 PM Rosalina Martin MD [facility] BRSDONALDOUR [place] [facility]        08/04/17 1349   Final Note   Assessment Type Final Discharge Note   Discharge Disposition Home   Discharge planning education complete? Yes   Hospital Follow Up  Appt(s) scheduled? Yes   Discharge plans and expectations educations in teach back method with documentation complete? Yes   Offered [facility] -- Bedside Delivery? n/a   Discharge/Hospital Encounter Summary to ([facility]) PCP n/a   Referral to Outpatient Case Management complete? n/a   Referral to / orders for Home Health Complete? n/a   30 day supply of medicines given at discharge, if documented non-compliance / non-adherence? n/a   Any social issues identified prior to discharge? No   Did you assess the readiness or willingness of the family or caregiver to support self management of care? Yes

## 2017-08-04 NOTE — ASSESSMENT & PLAN NOTE
- d/c to home today  - complete 10 day course of Clindamycin  - Percocet 5mg for pain and Flexeril for spasms  - followup with Dr. Murrell in clinic on Wednesday

## 2017-08-04 NOTE — PROGRESS NOTES
Pt discharged. Instructions and prescriptions given and explained. Pt verbalized understanding with no questions. Pt AAOx3,waiting on transport

## 2017-08-04 NOTE — SUBJECTIVE & OBJECTIVE
No current facility-administered medications on file prior to encounter.      Current Outpatient Prescriptions on File Prior to Encounter   Medication Sig    diazePAM (VALIUM) 2 MG tablet 1 po 30 min prior to procedure prn    hydrochlorothiazide (HYDRODIURIL) 25 MG tablet TAKE 1 TABLET DAILY       Review of patient's allergies indicates:   Allergen Reactions    Ciprofloxacin Anaphylaxis     Other reaction(s): Difficulty breathing    Floxin [ofloxacin] Anaphylaxis    Amoxicillin      Other reaction(s): Rash    Indocin  [indomethacin sodium]      Other reaction(s): Headache    Amoxicillin Rash    Bactrim [sulfamethoxazole-trimethoprim] Rash       Past Medical History:   Diagnosis Date    Breast cancer     RIGHT    Cancer     breast cancer    Hypertension     Renea-Parkinson-White syndrome      Past Surgical History:   Procedure Laterality Date    BACK SURGERY      Spinal fusion    BREAST BIOPSY      RIGHT    BREAST BIOPSY Left     CARDIAC ELECTROPHYSIOLOGY STUDY AND ABLATION      MASTECTOMY      RIGHT/ RADIATION AND CHEMO    TUBAL LIGATION       Family History     Problem Relation (Age of Onset)    Breast cancer Maternal Aunt    Cancer Mother    Hypertension Father    Ovarian cancer Paternal Aunt        Social History Main Topics    Smoking status: Former Smoker     Packs/day: 1.00     Quit date: 12/3/1988    Smokeless tobacco: Never Used    Alcohol use 3.0 oz/week     5 Glasses of wine per week      Comment: wine nightly    Drug use: No    Sexual activity: Yes     Partners: Male     Review of Systems  Objective:     Vital Signs (Most Recent):  Temp: 98 °F (36.7 °C) (08/04/17 0744)  Pulse: 85 (08/04/17 0744)  Resp: 18 (08/04/17 0744)  BP: (!) 140/84 (08/04/17 0744)  SpO2: 96 % (08/04/17 0744) Vital Signs (24h Range):  Temp:  [97.3 °F (36.3 °C)-98.3 °F (36.8 °C)] 98 °F (36.7 °C)  Pulse:  [58-92] 85  Resp:  [15-21] 18  SpO2:  [96 %-100 %] 96 %  BP: (120-197)/(54-95) 140/84     Weight: 60.8 kg (134  lb)  Body mass index is 22.3 kg/m².    Physical Exam   NAD, AAOx3  No pallor of skin or mucous membranes  Symmetric, non labored respirations  Rt Breast: incision c/d/i- no drainage/erythema/signs of infection/TTP; one RHIANNON drain in place with 75 cc s/s output  Left Breast: incision c/d/i- no drainage/erythema/TTP; 2 RHIANNON drains in place with 50 and 130 cc of s/s output;     Significant Labs:  CBC:   Recent Labs  Lab 08/01/17  1338   HGB 13.8     BMP:   Recent Labs  Lab 08/01/17  1338   GLU 98      K 4.5      CO2 25   BUN 17   CREATININE 0.9   CALCIUM 9.8       Significant Diagnostics:  I have reviewed and interpreted all pertinent imaging results/findings within the past 24 hours.

## 2017-08-04 NOTE — ANESTHESIA RELEASE NOTE
"Anesthesia Release from PACU Note    Patient: Pantera Posey    Procedure(s) Performed: Procedure(s) (LRB):  INSERTION-EXPANDER-BREAST Left (Bilateral)  MASTECTOMY (Left)  BIOPSY-EXCISIONAL sentinal node (Left)    Anesthesia type: general    Post pain: Adequate analgesia    Post assessment: no apparent anesthetic complications    Last Vitals:   Visit Vitals  BP (!) 147/69   Pulse 75   Temp 36.3 °C (97.3 °F) (Oral)   Resp (!) 21   Ht 5' 5" (1.651 m)   Wt 60.8 kg (134 lb)   LMP  (LMP Unknown)   SpO2 100%   BMI 22.30 kg/m²       Post vital signs: stable    Level of consciousness: awake, alert  and oriented    Nausea/Vomiting: no nausea/no vomiting    Complications: none    Airway Patency: patent    Respiratory: unassisted    Cardiovascular: stable and blood pressure at baseline    Hydration: euvolemic  "

## 2017-08-09 ENCOUNTER — OFFICE VISIT (OUTPATIENT)
Dept: PLASTIC SURGERY | Facility: CLINIC | Age: 67
End: 2017-08-09
Payer: COMMERCIAL

## 2017-08-09 VITALS — WEIGHT: 132.25 LBS | TEMPERATURE: 98 F | BODY MASS INDEX: 22.01 KG/M2

## 2017-08-09 DIAGNOSIS — Z09 SURGERY FOLLOW-UP EXAMINATION: Primary | ICD-10-CM

## 2017-08-09 PROCEDURE — 99024 POSTOP FOLLOW-UP VISIT: CPT | Mod: S$GLB,,, | Performed by: SURGERY

## 2017-08-09 PROCEDURE — 99999 PR PBB SHADOW E&M-EST. PATIENT-LVL II: CPT | Mod: PBBFAC,,, | Performed by: SURGERY

## 2017-08-09 RX ORDER — CLINDAMYCIN HYDROCHLORIDE 300 MG/1
300 CAPSULE ORAL 3 TIMES DAILY
Qty: 42 CAPSULE | Refills: 0 | Status: SHIPPED | OUTPATIENT
Start: 2017-08-09 | End: 2017-08-23

## 2017-08-09 NOTE — PROGRESS NOTES
S/p bilateral prepectoral tissue expander placement    Doing well, states that there is a little discomfort in the left > right, denies fevers/chills    Vitals:    08/09/17 1345   Temp: 98.3 °F (36.8 °C)     NAD  RRR  Non-labored breathing  L breast with fluid    Drains with SS fluid    A/P: s/p above  L drains stripped with immediate return of 75cc of SS fluid  Both tissue expanders filled to 150cc  Refill on clindamycin given  F/u in 1 week

## 2017-08-10 ENCOUNTER — PATIENT MESSAGE (OUTPATIENT)
Dept: SURGERY | Facility: CLINIC | Age: 67
End: 2017-08-10

## 2017-08-11 ENCOUNTER — DOCUMENTATION ONLY (OUTPATIENT)
Dept: SURGERY | Facility: CLINIC | Age: 67
End: 2017-08-11

## 2017-08-11 NOTE — PROGRESS NOTES
Results received from [facility], negative for clinically significant mutations, there was a VUS reported in RAD51D. She was phoned and results discussed

## 2017-08-16 ENCOUNTER — OFFICE VISIT (OUTPATIENT)
Dept: SURGERY | Facility: CLINIC | Age: 67
End: 2017-08-16
Payer: COMMERCIAL

## 2017-08-16 ENCOUNTER — OFFICE VISIT (OUTPATIENT)
Dept: PLASTIC SURGERY | Facility: CLINIC | Age: 67
End: 2017-08-16
Payer: COMMERCIAL

## 2017-08-16 VITALS
WEIGHT: 127.88 LBS | HEART RATE: 78 BPM | DIASTOLIC BLOOD PRESSURE: 91 MMHG | TEMPERATURE: 99 F | BODY MASS INDEX: 21.28 KG/M2 | SYSTOLIC BLOOD PRESSURE: 193 MMHG

## 2017-08-16 VITALS
TEMPERATURE: 99 F | HEART RATE: 78 BPM | WEIGHT: 127.88 LBS | SYSTOLIC BLOOD PRESSURE: 193 MMHG | BODY MASS INDEX: 21.28 KG/M2 | DIASTOLIC BLOOD PRESSURE: 91 MMHG

## 2017-08-16 DIAGNOSIS — C50.512 MALIGNANT NEOPLASM OF LOWER-OUTER QUADRANT OF LEFT BREAST OF FEMALE, ESTROGEN RECEPTOR POSITIVE: Primary | ICD-10-CM

## 2017-08-16 DIAGNOSIS — Z09 SURGERY FOLLOW-UP EXAMINATION: Primary | ICD-10-CM

## 2017-08-16 DIAGNOSIS — Z17.0 MALIGNANT NEOPLASM OF LOWER-OUTER QUADRANT OF LEFT BREAST OF FEMALE, ESTROGEN RECEPTOR POSITIVE: Primary | ICD-10-CM

## 2017-08-16 PROCEDURE — 99024 POSTOP FOLLOW-UP VISIT: CPT | Mod: S$GLB,,, | Performed by: PHYSICIAN ASSISTANT

## 2017-08-16 PROCEDURE — 99024 POSTOP FOLLOW-UP VISIT: CPT | Mod: S$GLB,,, | Performed by: SURGERY

## 2017-08-16 PROCEDURE — 99999 PR PBB SHADOW E&M-EST. PATIENT-LVL III: CPT | Mod: PBBFAC,,, | Performed by: SURGERY

## 2017-08-16 PROCEDURE — 99999 PR PBB SHADOW E&M-EST. PATIENT-LVL III: CPT | Mod: PBBFAC,,, | Performed by: PHYSICIAN ASSISTANT

## 2017-08-16 NOTE — PROGRESS NOTES
"REFERRING PHYSICIAN:  Jazmin Singh MD    MEDICAL ONCOLOGIST:    Dr. Dionte Fritz    DIAGNOSIS:    This is a 67 y.o. female with a stage IA,  pT1c snN0 MX grade 3 ER + NJ + HER2 - IDC of the left breast.    TREATMENT SUMMARY:  The patient is status post left total mastectomy and sentinel node biopsy on 8/3/17, a hx of R breast cancer 20 years ago s/p R mastectomy with ALND, therefore she had bilateral pre-pectoral tissue expanders placed by Dr. Murrell on 8/3/17. Final pathology showed     FINAL PATHOLOGIC DIAGNOSIS  1. " LEFT BREAST TISSUE" (MASTECTOMY):  -Invasive ductal carcinoma, see synoptic report below  2. SENTINEL LYMPH NODE, LEFT AXILLARY #2 HOT NOT BLUE 360 (RESECTION):  -One lymph node, negative for metastatic carcinoma (0/1)  -Multiple H&E levels and cytokeratins examined (CAM 5.2, keratin AE1/AE3, WSK)  -Immunostains with appropriate positive and negative controls  3. SENTINEL LYMPH NODE, LEFT AXILLARY HOT, NOT BLUE 96 (RESECTION):  -Three lymph nodes, negative for metastatic carcinoma (0/3)  -Multiple H&E levels and cytokeratins examined (CAM 5.2, keratin AE1/AE3, WSK)  -Immunostains with appropriate positive and negative controls    INTERVAL HISTORY:   Pantera Posey comes in for a post-op check.  She denies fever, chills, chest pain or shortness of breath.  Her pain is well controlled.  Her RHIANNON drains are draining less that 10 ccs/day of serosanguinous fluid. She has seen Dr. Murrell already last week, who injected 150 mLs of fluid into each tissue expander. He also extended her clindamycin prescription.    MEDICATIONS:  Current Outpatient Prescriptions   Medication Sig Dispense Refill    clindamycin (CLEOCIN) 300 MG capsule Take 1 capsule (300 mg total) by mouth 3 (three) times daily. 42 capsule 0    diazePAM (VALIUM) 2 MG tablet 1 po 30 min prior to procedure prn 5 tablet 0    hydrochlorothiazide (HYDRODIURIL) 25 MG tablet TAKE 1 TABLET DAILY 90 tablet 1    oxycodone (ROXICODONE) 5 MG " immediate release tablet Take 1 tablet (5 mg total) by mouth every 6 (six) hours as needed. 50 tablet 0    clindamycin (CLEOCIN) 150 MG capsule Take 2 capsules (300 mg total) by mouth every 8 (eight) hours. 30 capsule 0     No current facility-administered medications for this visit.        ALLERGIES:   Review of patient's allergies indicates:   Allergen Reactions    Ciprofloxacin Anaphylaxis     Other reaction(s): Difficulty breathing    Floxin [ofloxacin] Anaphylaxis    Amoxicillin      Other reaction(s): Rash    Indocin  [indomethacin sodium]      Other reaction(s): Headache    Amoxicillin Rash    Bactrim [sulfamethoxazole-trimethoprim] Rash     Review of Systems  Review of Systems   Constitutional: Negative for chills and fever.   HENT: Negative for sore throat.    Eyes: Negative for double vision.   Respiratory: Negative for cough and shortness of breath.    Cardiovascular: Negative for chest pain.   Gastrointestinal: Negative for abdominal pain, constipation, diarrhea, nausea and vomiting.   Genitourinary: Negative for dysuria.   Musculoskeletal: Negative for back pain.   Skin: Negative for itching and rash.   Neurological: Negative for sensory change and headaches.       PHYSICAL EXAMINATION:   Physical Exam   Constitutional: She is oriented to person, place, and time. She appears well-nourished. No distress.   HENT:   Head: Normocephalic and atraumatic.   Eyes: EOM are normal. Pupils are equal, round, and reactive to light.   Neck: Normal range of motion. Neck supple.   Cardiovascular: Normal rate, regular rhythm and normal heart sounds.    Pulmonary/Chest: Effort normal and breath sounds normal. No respiratory distress. Right breast exhibits no mass, no skin change and no tenderness. Left breast exhibits no mass, no skin change and no tenderness.       Abdominal: Soft. She exhibits no distension.   Musculoskeletal: Normal range of motion. She exhibits no edema.   Lymphadenopathy:     She has no  cervical adenopathy.   Neurological: She is alert and oriented to person, place, and time.   Skin: Skin is warm and dry. No rash noted. No erythema.         IMPRESSION:   The patient has had an uneventful postoperative course.  68 y/o female with Stage IA IDC, ER+    PLAN:   1. return in 4 months for a follow up office visit and breast exam  2. The patient is advised in continued exam of the breast chest wall and to report to this office sooner should she note any areas of abnormality or concern.   3.  She has been instructed to meet with med onc for discussion of adjuvant treatment recommendations  4. She will meet with Dr. Murrell today for evaluation of her drains.

## 2017-08-16 NOTE — LETTER
August 21, 2017        Jazmin Singh MD  [address]  [address]  [facility] [address]             [facility] Breast Surgery  [address]  [facility] [address]  Phone: [phone]  Fax: [phone]   Patient: Pantera Posey   MR Number: [id number]   YOB: 1950   Date of Visit: 8/16/2017       Dear Dr. Singh:    Thank you for referring Pantera Posey to me for evaluation. Attached you will find relevant portions of my assessment and plan of care.    If you have questions, please do not hesitate to call me. I look forward to following Pantera Posey along with you.    Sincerely,      Rosalina Martin MD            CC  No Recipients    Enclosure

## 2017-08-16 NOTE — PROGRESS NOTES
Pantera Posey presents to Plastic Surgery Clinic on 8/16/2017 for a follow up status post B breast recon with TE placement on 08/03/2017. Doing well with no issues since her last visit. She was seen today by myself and Dr. Oni Murrell      Review of patient's allergies indicates:   Allergen Reactions    Ciprofloxacin Anaphylaxis     Other reaction(s): Difficulty breathing    Floxin [ofloxacin] Anaphylaxis    Amoxicillin      Other reaction(s): Rash    Indocin  [indomethacin sodium]      Other reaction(s): Headache    Amoxicillin Rash    Bactrim [sulfamethoxazole-trimethoprim] Rash     Current Outpatient Prescriptions on File Prior to Visit   Medication Sig Dispense Refill    clindamycin (CLEOCIN) 150 MG capsule Take 2 capsules (300 mg total) by mouth every 8 (eight) hours. 30 capsule 0    clindamycin (CLEOCIN) 300 MG capsule Take 1 capsule (300 mg total) by mouth 3 (three) times daily. 42 capsule 0    diazePAM (VALIUM) 2 MG tablet 1 po 30 min prior to procedure prn 5 tablet 0    hydrochlorothiazide (HYDRODIURIL) 25 MG tablet TAKE 1 TABLET DAILY 90 tablet 1    oxycodone (ROXICODONE) 5 MG immediate release tablet Take 1 tablet (5 mg total) by mouth every 6 (six) hours as needed. 50 tablet 0     No current facility-administered medications on file prior to visit.      Patient Active Problem List   Diagnosis    HTN (hypertension), benign    Spondylosis without myelopathy    Spinal stenosis, lumbar region, with neurogenic claudication    Thoracic or lumbosacral neuritis or radiculitis, unspecified    Acquired spondylolisthesis    Lumbago    Degeneration of lumbar or lumbosacral intervertebral disc    Back pain    Spinal stenosis    Fever    Malignant neoplasm of lower-outer quadrant of left breast of female, estrogen receptor positive    Breast cancer, left    History of right breast cancer     Past Surgical History:   Procedure Laterality Date    BACK SURGERY      Spinal fusion     BREAST BIOPSY      RIGHT    BREAST BIOPSY Left     CARDIAC ELECTROPHYSIOLOGY STUDY AND ABLATION      MASTECTOMY      RIGHT/ RADIATION AND CHEMO    TUBAL LIGATION       PHYSICAL EXAMINATION  Vitals:    08/16/17 1453   BP: (!) 193/91   Pulse: 78   Temp: 98.5 °F (36.9 °C)     WD WN NAD  VSS  Lungs - normal effort  R breast - incision CDI, no erythema, expander intact  L breast - incision CDI, no erythema, expander intact  Skin - warm/dry, no rash    ASSESSMENT/PLAN  67 y.o. F s/p B breast recon with TE placement  - doing well, no issues  - all drains removed  - 150cc air added to B TE, tolerated well  - RTC x 2 weeks for exchange of air to normal saline    All questions were answered. The patient was advised to call the clinic with any questions or concerns prior to their next visit.

## 2017-08-18 RX ORDER — HYDROCHLOROTHIAZIDE 25 MG/1
TABLET ORAL
Qty: 90 TABLET | Refills: 0 | Status: SHIPPED | OUTPATIENT
Start: 2017-08-18 | End: 2017-11-16 | Stop reason: SDUPTHER

## 2017-08-21 ENCOUNTER — TELEPHONE (OUTPATIENT)
Dept: SURGERY | Facility: CLINIC | Age: 67
End: 2017-08-21

## 2017-08-21 NOTE — TELEPHONE ENCOUNTER
----- Message from Shukri Venegas sent at 8/21/2017 11:46 AM CDT -----  Pt wants to know if the report from her genetic test was ever mailed. Pt said she did get results from [facility] but never received the report. Please call pt at [phone]

## 2017-08-21 NOTE — TELEPHONE ENCOUNTER
Returned the patient's call regarding the message below.  The patient was informed that the results were mailed out on this morning Monday 8/21/17.  The patient voiced understanding of this information.

## 2017-08-24 ENCOUNTER — OFFICE VISIT (OUTPATIENT)
Dept: SURGERY | Facility: CLINIC | Age: 67
End: 2017-08-24
Payer: COMMERCIAL

## 2017-08-24 VITALS
HEIGHT: 65 IN | WEIGHT: 130 LBS | TEMPERATURE: 98 F | RESPIRATION RATE: 18 BRPM | BODY MASS INDEX: 21.66 KG/M2 | DIASTOLIC BLOOD PRESSURE: 99 MMHG | HEART RATE: 66 BPM | SYSTOLIC BLOOD PRESSURE: 220 MMHG

## 2017-08-24 DIAGNOSIS — Z85.3 HISTORY OF RIGHT BREAST CANCER: ICD-10-CM

## 2017-08-24 DIAGNOSIS — C50.512 MALIGNANT NEOPLASM OF LOWER-OUTER QUADRANT OF LEFT BREAST OF FEMALE, ESTROGEN RECEPTOR POSITIVE: Primary | ICD-10-CM

## 2017-08-24 DIAGNOSIS — Z17.0 MALIGNANT NEOPLASM OF LOWER-OUTER QUADRANT OF LEFT BREAST OF FEMALE, ESTROGEN RECEPTOR POSITIVE: Primary | ICD-10-CM

## 2017-08-24 PROCEDURE — 99999 PR PBB SHADOW E&M-EST. PATIENT-LVL III: CPT | Mod: PBBFAC,,, | Performed by: INTERNAL MEDICINE

## 2017-08-24 PROCEDURE — 1157F ADVNC CARE PLAN IN RCRD: CPT | Mod: S$GLB,,, | Performed by: INTERNAL MEDICINE

## 2017-08-24 PROCEDURE — 1126F AMNT PAIN NOTED NONE PRSNT: CPT | Mod: S$GLB,,, | Performed by: INTERNAL MEDICINE

## 2017-08-24 PROCEDURE — 3077F SYST BP >= 140 MM HG: CPT | Mod: S$GLB,,, | Performed by: INTERNAL MEDICINE

## 2017-08-24 PROCEDURE — 1159F MED LIST DOCD IN RCRD: CPT | Mod: S$GLB,,, | Performed by: INTERNAL MEDICINE

## 2017-08-24 PROCEDURE — 3080F DIAST BP >= 90 MM HG: CPT | Mod: S$GLB,,, | Performed by: INTERNAL MEDICINE

## 2017-08-24 PROCEDURE — 3008F BODY MASS INDEX DOCD: CPT | Mod: S$GLB,,, | Performed by: INTERNAL MEDICINE

## 2017-08-24 PROCEDURE — 99205 OFFICE O/P NEW HI 60 MIN: CPT | Mod: S$GLB,,, | Performed by: INTERNAL MEDICINE

## 2017-08-24 NOTE — PROGRESS NOTES
Subjective:       Patient ID: Pantera Posey is a 67 y.o. female.    Chief Complaint: Consult    HPI   REFERRING PHYSICIAN:  Dr. Martin.    REASON FOR REFERRAL:  Recent diagnosis of breast cancer, consideration for   adjuvant treatment.    HISTORY OF PRESENT ILLNESS:  Mrs. Posey is a very pleasant 67-year-old    female originally from [place] who has a remote history of breast   cancer treated in [place] 20 years ago with mastectomy and five years of   tamoxifen.  Her physician at that time was Dr. Sravanthi Ayala at [facility].  A recent routine mammogram on 2017 was read as BIRADS   category 0 and showed an asymmetry in the upper region of the left breast   posteriorly.  A diagnostic mammogram two days later was read as BIRADS category   4 and a biopsy was performed.  The biopsy was performed on 2017 and showed   a carcinoma that was ER strongly positive, OH positive, and HER-2 negative.    The patient was seen by Dr. Martin and she underwent a left-sided mastectomy and   sentinel lymph node biopsy on 2017 with insertion of bilateral tissue   expanders.  The pathology report from the procedure indicates that 4 left   axillary lymph nodes were negative.  On the mastectomy sample, she had a 15 mm   invasive ductal carcinoma; resection margins were clear.  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  There was no lymphovascular invasion.  She   has made an uneventful recovery and she is here to discuss adjuvant treatment   options.    PAST MEDICAL HISTORY:  As above.  She has a history of contralateral carcinoma,   treated 20 years ago as mentioned above.  She also has a history of hypertension   and tachyarrhythmia secondary to Renea-Parkinson-White syndrome, which was   treated with an ablation.    GYN HISTORY:  Menarche was at 13 and menopause in her mid 40s and she denies any   estrogen replacement therapy.  She is  with age at first live birth being   18.   She did not breast-feed.    FAMILY HISTORY:  Her mother had breast cancer while a paternal aunt had ovarian   cancer.  A maternal aunt had breast cancer.      Review of Systems    Overall she feels well.  She is still somewhat sore from her surgery, but she denies any anxiety, depression, easy bruising, fevers, chills, night  sweats, weight loss, nausea, vomiting, diarrhea, constipation, diplopia, blurred vision, headache, chest pain, palpitations, shortness of breath, breast pain, abdominal pain, extremity pain, or difficulty ambulating.  The remainder of the ten-point ROS, including general, skin, lymph, H/N, cardiorespiratory, GI, , Neuro, Endocrine, and psychiatric is negative.       Objective:      Physical Exam    She is alert, oriented to time, place, person, pleasant, well      nourished, in no acute physical distress.                                    VITAL SIGNS:  Reviewed                                      HEENT:    There are no nasal, oral, lip, gingival, auricular, lid,    or conjunctival lesions.  Mucosae are moist and pink, and there is no        thrush.  Pupils are equal, reactive to light and accommodation.              Extraocular muscle movements are intact.  Dentition is fair. Maxillary teeth are missing and she has dentures.  There is no frontal or maxillary tenderness.                                     NECK:  Supple without JVD, adenopathy, or thyromegaly.                       LUNGS:  Clear to auscultation without wheezing, rales, or rhonchi.           CARDIOVASCULAR:  Reveals an S1, S2, no murmurs, no rubs, no gallops.         ABDOMEN:  Soft, nontender, without organomegaly.  Bowel sounds are    present.                                                                     EXTREMITIES:  No cyanosis, clubbing, or edema.                               BREASTS:  She is status post bilateral mastectomies with tissue expanders in place.  Her incisions are well healed.                                         LYMPHATIC:  There is no cervical, axillary, or supraclavicular adenopathy.   SKIN:  Warm and moist, without petechiae, rashes, induration, or ecchymoses.           NEUROLOGIC:  DTRs are 0-1+ bilaterally, symmetrical, motor function is 5/5,  and cranial nerves are  within normal limits.    Assessment:       1. Malignant neoplasm of lower-outer quadrant of left breast of female, estrogen receptor positive    2. History of right breast cancer        Plan:        I had a long discussion with her and her .  Duration of visit was one hour with most of it spent discussing her treatment options.  She would definitely benefit from adjuvant hormonal therapy with anastrazole.  The pros and cons of anastrazole were explained to her in detail.  Whether she would benefit from chemotherapy can be determined after we obtain an Oncotype score, and we will initiate the process.  I will see her again inh 18 days for follow up. Of note, we also discussed the potential chemotherapy options at length (TC x 4 versus CMF x 6). She also understands that in the unlikely event that chemotherapy is recommended, she would need to have a port placed.  Her multiple questions were answered to her satisfaction.      Distress Screening Results: Psychosocial Distress screening score of Distress Score: 3

## 2017-08-30 ENCOUNTER — OFFICE VISIT (OUTPATIENT)
Dept: PLASTIC SURGERY | Facility: CLINIC | Age: 67
End: 2017-08-30
Payer: COMMERCIAL

## 2017-08-30 VITALS
SYSTOLIC BLOOD PRESSURE: 202 MMHG | HEART RATE: 92 BPM | DIASTOLIC BLOOD PRESSURE: 102 MMHG | WEIGHT: 128.13 LBS | BODY MASS INDEX: 21.35 KG/M2 | HEIGHT: 65 IN | TEMPERATURE: 98 F

## 2017-08-30 DIAGNOSIS — Z09 SURGERY FOLLOW-UP EXAMINATION: Primary | ICD-10-CM

## 2017-08-30 PROCEDURE — 99024 POSTOP FOLLOW-UP VISIT: CPT | Mod: S$GLB,,, | Performed by: PHYSICIAN ASSISTANT

## 2017-08-30 PROCEDURE — 99999 PR PBB SHADOW E&M-EST. PATIENT-LVL III: CPT | Mod: PBBFAC,,, | Performed by: PHYSICIAN ASSISTANT

## 2017-08-30 NOTE — PROGRESS NOTES
Pantera Posey [facility] for a follow up visit status post B breast reconstruction with tissue expander placement on 08/03/2017. She has 250cc of air in each tissue expander. She was seen today by myself and Dr. Oni Murrell      Review of patient's allergies indicates:   Allergen Reactions    Ciprofloxacin Anaphylaxis     Other reaction(s): Difficulty breathing    Floxin [ofloxacin] Anaphylaxis    Amoxicillin      Other reaction(s): Rash    Indocin  [indomethacin sodium]      Other reaction(s): Headache    Amoxicillin Rash    Bactrim [sulfamethoxazole-trimethoprim] Rash     Current Outpatient Prescriptions on File Prior to Visit   Medication Sig Dispense Refill    clindamycin (CLEOCIN) 150 MG capsule Take 2 capsules (300 mg total) by mouth every 8 (eight) hours. 30 capsule 0    diazePAM (VALIUM) 2 MG tablet 1 po 30 min prior to procedure prn 5 tablet 0    hydrochlorothiazide (HYDRODIURIL) 25 MG tablet TAKE 1 TABLET DAILY 90 tablet 0    oxycodone (ROXICODONE) 5 MG immediate release tablet Take 1 tablet (5 mg total) by mouth every 6 (six) hours as needed. 50 tablet 0     No current facility-administered medications on file prior to visit.      Patient Active Problem List   Diagnosis    HTN (hypertension), benign    Spondylosis without myelopathy    Spinal stenosis, lumbar region, with neurogenic claudication    Thoracic or lumbosacral neuritis or radiculitis, unspecified    Acquired spondylolisthesis    Lumbago    Degeneration of lumbar or lumbosacral intervertebral disc    Back pain    Spinal stenosis    Fever    Malignant neoplasm of lower-outer quadrant of left breast of female, estrogen receptor positive    Breast cancer, left    History of right breast cancer     Past Surgical History:   Procedure Laterality Date    BACK SURGERY      Spinal fusion    BREAST BIOPSY      RIGHT    BREAST BIOPSY Left     CARDIAC ELECTROPHYSIOLOGY STUDY AND  ABLATION      MASTECTOMY      RIGHT/ RADIATION AND CHEMO    TUBAL LIGATION       PHYSICAL EXAMINATION    Vitals:    08/30/17 0955   BP: (!) 202/102   Pulse: 92   Temp: 98.2 °F (36.8 °C)     WD WN NAD  VSS  Breast - incisions CDI, no erythema, no drainage  Skin - warm/dry, no rash    ASSESSMENT/PLAN  67 y.o. F s/p B breast recon with TE placement  - doing well, no issues  - all air removed (250cc/TE) replaced with 300ccNS/TE  - tolerated expansion well  - RTC x 2 weeks    All questions were answered. The patient was advised to call the clinic with any questions or concerns prior to their next visit.

## 2017-09-11 NOTE — PROGRESS NOTES
Subjective:       Patient ID: Pantera Posey is a 67 y.o. female.    Chief Complaint: No chief complaint on file.    HPI   Mrs. Posey returns today for follow up.  Her Oncotype score was 10, suggesting a 9 % chance of recurrence at ten years with tamoxifen alone..   Briefly, she  is a  67-year-old  female who has a remote history of breast cancer treated in [place] 20 years ago with mastectomy and five years of tamoxifen.      A recent routine mammogram on 06/27/2017 was read as BIRADS category 0 and showed an asymmetry in the upper region of the left breast   posteriorly.  A diagnostic mammogram two days later was read as BIRADS category 4 and a biopsy was performed on 07/11/2017 and showed a carcinoma that was ER strongly positive, MI positive, and HER-2 negative.    The patient was seen by Dr. Martin and eventually underwent a left-sided mastectomy and sentinel lymph node biopsy on 08/03/2017 with insertion of bilateral tissue expanders.      The pathology report from the procedure indicates that 4 left axillary lymph nodes were negative.  On the mastectomy sample, she had a 15 mm invasive ductal carcinoma; resection margins were clear.  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  There was no lymphovascular invasion.      Review of Systems    Overall she feels well.  She has fully recovered from her surgery, but she denies any anxiety, depression, easy bruising, fevers, chills, night  sweats, weight loss, nausea, vomiting, diarrhea, constipation, diplopia, blurred vision, headache, chest pain, palpitations, shortness of breath, breast pain, abdominal pain, extremity pain, or difficulty ambulating.  The remainder of the ten-point ROS, including general, skin, lymph, H/N, cardiorespiratory, GI, , Neuro, Endocrine, and psychiatric is negative.       Objective:      Physical Exam    She is alert, oriented to time, place, person, pleasant, well      nourished, in no acute physical  distress.                                    VITAL SIGNS:  Reviewed                                      HEENT:    There are no nasal, oral, lip, gingival, auricular, lid,    or conjunctival lesions.  Mucosae are moist and pink, and there is no        thrush.  Pupils are equal, reactive to light and accommodation.              Extraocular muscle movements are intact.  Dentition is fair. Maxillary teeth are missing and she has dentures.                                       NECK:  Supple without JVD, adenopathy, or thyromegaly.                       LUNGS:  Clear to auscultation without wheezing, rales, or rhonchi.           CARDIOVASCULAR:  Reveals an S1, S2, no murmurs, no rubs, no gallops.         ABDOMEN:  Soft, nontender, without organomegaly.  Bowel sounds are    present.                                                                     EXTREMITIES:  No cyanosis, clubbing, or edema.                               BREASTS:  She is status post bilateral mastectomies with tissue expanders in place.  Her incisions are well healed.                                        LYMPHATIC:  There is no cervical, axillary, or supraclavicular adenopathy.   SKIN:  Warm and moist, without petechiae, rashes, induration, or ecchymoses.           NEUROLOGIC:  DTRs are 0-1+ bilaterally, symmetrical, motor function is 5/5,  and cranial nerves are  within normal limits.    Assessment:       1. Malignant neoplasm of lower-outer quadrant of left breast of female, estrogen receptor positive        Plan:        I had a long discussion with her and her . Given her low Oncotype score, I see no reason to offer adjuvant chemotherapy.  The pros and cons of adjuvant hormonal therapy with anastrazole were discussed in detail; she was given a prescription for anastrazole 1 mg tablets with instructions to take one per day, and she will return to see me in one month with a DXA scan.  Her questions were answered to her satisfaction.

## 2017-09-12 ENCOUNTER — OFFICE VISIT (OUTPATIENT)
Dept: HEMATOLOGY/ONCOLOGY | Facility: CLINIC | Age: 67
End: 2017-09-12
Payer: COMMERCIAL

## 2017-09-12 VITALS
BODY MASS INDEX: 21.66 KG/M2 | HEART RATE: 86 BPM | TEMPERATURE: 98 F | DIASTOLIC BLOOD PRESSURE: 95 MMHG | RESPIRATION RATE: 18 BRPM | HEIGHT: 65 IN | WEIGHT: 130 LBS | SYSTOLIC BLOOD PRESSURE: 209 MMHG

## 2017-09-12 DIAGNOSIS — C50.512 MALIGNANT NEOPLASM OF LOWER-OUTER QUADRANT OF LEFT BREAST OF FEMALE, ESTROGEN RECEPTOR POSITIVE: Primary | ICD-10-CM

## 2017-09-12 DIAGNOSIS — Z17.0 MALIGNANT NEOPLASM OF LOWER-OUTER QUADRANT OF LEFT BREAST OF FEMALE, ESTROGEN RECEPTOR POSITIVE: Primary | ICD-10-CM

## 2017-09-12 PROCEDURE — 99999 PR PBB SHADOW E&M-EST. PATIENT-LVL II: CPT | Mod: PBBFAC,,, | Performed by: INTERNAL MEDICINE

## 2017-09-12 PROCEDURE — 3008F BODY MASS INDEX DOCD: CPT | Mod: S$GLB,,, | Performed by: INTERNAL MEDICINE

## 2017-09-12 PROCEDURE — 1159F MED LIST DOCD IN RCRD: CPT | Mod: S$GLB,,, | Performed by: INTERNAL MEDICINE

## 2017-09-12 PROCEDURE — 1157F ADVNC CARE PLAN IN RCRD: CPT | Mod: S$GLB,,, | Performed by: INTERNAL MEDICINE

## 2017-09-12 PROCEDURE — 99214 OFFICE O/P EST MOD 30 MIN: CPT | Mod: S$GLB,,, | Performed by: INTERNAL MEDICINE

## 2017-09-12 RX ORDER — ANASTROZOLE 1 MG/1
1 TABLET ORAL DAILY
Qty: 90 TABLET | Refills: 2 | Status: SHIPPED | OUTPATIENT
Start: 2017-09-12 | End: 2018-06-12 | Stop reason: SDUPTHER

## 2017-09-13 ENCOUNTER — OFFICE VISIT (OUTPATIENT)
Dept: PLASTIC SURGERY | Facility: CLINIC | Age: 67
End: 2017-09-13
Payer: COMMERCIAL

## 2017-09-13 VITALS — BODY MASS INDEX: 22.32 KG/M2 | HEIGHT: 64 IN | WEIGHT: 130.75 LBS

## 2017-09-13 DIAGNOSIS — Z09 SURGERY FOLLOW-UP EXAMINATION: Primary | ICD-10-CM

## 2017-09-13 PROCEDURE — 99024 POSTOP FOLLOW-UP VISIT: CPT | Mod: S$GLB,,, | Performed by: PHYSICIAN ASSISTANT

## 2017-09-13 PROCEDURE — 99999 PR PBB SHADOW E&M-EST. PATIENT-LVL III: CPT | Mod: PBBFAC,,, | Performed by: PHYSICIAN ASSISTANT

## 2017-09-28 ENCOUNTER — TELEPHONE (OUTPATIENT)
Dept: PLASTIC SURGERY | Facility: CLINIC | Age: 67
End: 2017-09-28

## 2017-10-10 ENCOUNTER — TELEPHONE (OUTPATIENT)
Dept: PLASTIC SURGERY | Facility: CLINIC | Age: 67
End: 2017-10-10

## 2017-10-10 DIAGNOSIS — Z85.3 PERSONAL HISTORY OF BREAST CANCER: Primary | ICD-10-CM

## 2017-10-23 PROBLEM — Z79.811 USE OF AROMATASE INHIBITORS: Status: ACTIVE | Noted: 2017-10-23

## 2017-10-23 NOTE — PROGRESS NOTES
Subjective:       Patient ID: Pantera Posey is a 67 y.o. female.    Chief Complaint: No chief complaint on file.    HPI   Mrs. Posey returns today for follow up.  As of mid Sep[etmebr 2017 she has been on anastrazole 1 mg daily.  .   Briefly, she  is a  67-year-old  female who has a remote history of breast cancer treated in [place] 20 years ago with mastectomy and five years of tamoxifen.      A recent routine mammogram on 06/27/2017 was read as BIRADS category 0 and showed an asymmetry in the upper region of the left breast   posteriorly.  A diagnostic mammogram two days later was read as BIRADS category 4 and a biopsy was performed on 07/11/2017 and showed a carcinoma that was ER strongly positive, NH positive, and HER-2 negative.    The patient was seen by Dr. Martin and eventually underwent a left-sided mastectomy and sentinel lymph node biopsy on 08/03/2017 with insertion of bilateral tissue expanders.    The pathology report from the procedure indicates that 4 left axillary lymph nodes were negative.  On the mastectomy sample, she had a 15 mm invasive ductal carcinoma; resection margins were clear.  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  There was no lymphovascular invasion.  Her Oncotype score was 10, suggesting a 9 % chance of recurrence at ten years with tamoxifen alone.    Her DXA scan shows mild osteopenia of the hip but normal density on the L spine.    The medical, surgical, as well as family history remain unchanged.        Review of Systems    Overall she feels well.  She has fully recovered from her surgery.   Her main complaint is frequent hot flashes from the AIs.  She has not experienced any joint stiffness. ECOG PS is 1.   She denies any anxiety, depression, easy bruising, fevers, chills, night  sweats, weight loss, nausea, vomiting, diarrhea, constipation, diplopia, blurred vision, headache, chest pain, palpitations, shortness of breath, breast pain, abdominal  pain, extremity pain, or difficulty ambulating.  The remainder of the ten-point ROS, including general, skin, lymph, H/N, cardiorespiratory, GI, , Neuro, Endocrine, and psychiatric is negative.       Objective:      Physical Exam    She is alert, oriented to time, place, person, pleasant, well      nourished, in no acute physical distress.   She is here with her .                                 VITAL SIGNS:  Reviewed                                      HEENT:    There are no nasal, oral, lip, gingival, auricular, lid,    or conjunctival lesions.  Mucosae are moist and pink, and there is no        thrush.  Pupils are equal, reactive to light and accommodation.              Extraocular muscle movements are intact.  Dentition is fair. Maxillary teeth are missing and she has dentures.                                       NECK:  Supple without JVD, adenopathy, or thyromegaly.                       LUNGS:  Clear to auscultation without wheezing, rales, or rhonchi.           CARDIOVASCULAR:  Reveals an S1, S2, no murmurs, no rubs, no gallops.         ABDOMEN:  Soft, nontender, without organomegaly.  Bowel sounds are    present.                                                                     EXTREMITIES:  No cyanosis, clubbing, or edema.                               BREASTS:  She is status post bilateral mastectomies with tissue expanders in place.  Her incisions are well healed.                                        LYMPHATIC:  There is no cervical, axillary, or supraclavicular adenopathy.   SKIN:  Warm and moist, without petechiae, rashes, induration, or ecchymoses.           NEUROLOGIC:  DTRs are 0-1+ bilaterally, symmetrical, motor function is 5/5,  and cranial nerves are  within normal limits.    Assessment:       1. Malignant neoplasm of lower-outer quadrant of left breast of female, estrogen receptor positive    2. Use of aromatase inhibitors      3.    Hot flashes secondary to AIs.  Plan:        I  have asked her to remain on anastrazole, which she will take through September 2022. I will see her again in 3 months.  Her multiple questions were answered to her satisfaction.

## 2017-10-24 ENCOUNTER — OFFICE VISIT (OUTPATIENT)
Dept: HEMATOLOGY/ONCOLOGY | Facility: CLINIC | Age: 67
End: 2017-10-24
Payer: COMMERCIAL

## 2017-10-24 ENCOUNTER — HOSPITAL ENCOUNTER (OUTPATIENT)
Dept: RADIOLOGY | Facility: CLINIC | Age: 67
Discharge: HOME | End: 2017-10-24
Attending: INTERNAL MEDICINE
Payer: COMMERCIAL

## 2017-10-24 VITALS
HEART RATE: 76 BPM | RESPIRATION RATE: 18 BRPM | TEMPERATURE: 98 F | HEIGHT: 65 IN | SYSTOLIC BLOOD PRESSURE: 185 MMHG | BODY MASS INDEX: 22.33 KG/M2 | WEIGHT: 134 LBS | DIASTOLIC BLOOD PRESSURE: 84 MMHG

## 2017-10-24 DIAGNOSIS — C50.512 MALIGNANT NEOPLASM OF LOWER-OUTER QUADRANT OF LEFT BREAST OF FEMALE, ESTROGEN RECEPTOR POSITIVE: Primary | ICD-10-CM

## 2017-10-24 DIAGNOSIS — Z17.0 MALIGNANT NEOPLASM OF LOWER-OUTER QUADRANT OF LEFT BREAST OF FEMALE, ESTROGEN RECEPTOR POSITIVE: ICD-10-CM

## 2017-10-24 DIAGNOSIS — Z17.0 MALIGNANT NEOPLASM OF LOWER-OUTER QUADRANT OF LEFT BREAST OF FEMALE, ESTROGEN RECEPTOR POSITIVE: Primary | ICD-10-CM

## 2017-10-24 DIAGNOSIS — Z79.811 USE OF AROMATASE INHIBITORS: ICD-10-CM

## 2017-10-24 DIAGNOSIS — C50.512 MALIGNANT NEOPLASM OF LOWER-OUTER QUADRANT OF LEFT BREAST OF FEMALE, ESTROGEN RECEPTOR POSITIVE: ICD-10-CM

## 2017-10-24 PROCEDURE — 77080 DXA BONE DENSITY AXIAL: CPT | Mod: 26,,, | Performed by: INTERNAL MEDICINE

## 2017-10-24 PROCEDURE — 99213 OFFICE O/P EST LOW 20 MIN: CPT | Mod: S$GLB,,, | Performed by: INTERNAL MEDICINE

## 2017-10-24 PROCEDURE — 77080 DXA BONE DENSITY AXIAL: CPT | Mod: TC

## 2017-10-24 PROCEDURE — 99999 PR PBB SHADOW E&M-EST. PATIENT-LVL I: CPT | Mod: PBBFAC,,, | Performed by: INTERNAL MEDICINE

## 2017-11-06 ENCOUNTER — ANESTHESIA EVENT (OUTPATIENT)
Dept: SURGERY | Facility: HOSPITAL | Age: 67
End: 2017-11-06
Payer: COMMERCIAL

## 2017-11-06 DIAGNOSIS — Z01.818 PREOPERATIVE TESTING: Primary | ICD-10-CM

## 2017-11-06 NOTE — ANESTHESIA PREPROCEDURE EVALUATION
Pre Admission Screening  Mellissa Rowland RN      []Hide copied text  Anesthesia Assessment: Preoperative EQUATION     Planned Procedure: Procedure(s) (LRB):  EXCHANGE IMPLANT-BREAST (Bilateral)  Requested Anesthesia Type:General  Surgeon: Oni Murrell MD  Service: Plastics  Known or anticipated Date of Surgery:11/20/2017     Surgeon notes: reviewed     Electronic QUestionnaire Assessment completed via nurse interview with patient.         NO AQ     Triage considerations:      The patient has no apparent active cardiac condition (No unstable coronary Syndrome such as severe unstable angina or recent [<1 month] myocardial infarction, decompensated CHF, severe valvular   disease or significant arrhythmia)     Previous anesthesia records:GETA, MAC and Complications noted-HX of PONV -requests patch and po meds  08/03/17; Placement Time: 1444; Method of Intubation: Direct laryngoscopy; Inserted by: CRNA; Airway Device: Endotracheal Tube; Mask Ventilation: Easy; Intubated: Postinduction; Blade: Dutton #2; Airway Device Size: 7.0; Style: Cuffed; Cuff Inflation: Minimal occlusive pressure; Inflation Amount: 7; Placement Verified By: Auscultation, Capnometry, Colorimetric EtCO2 device; Grade: Grade I; Complicating Factors: None; Intubation Findings: Positive EtCO2, Atraumatic/Condition of teeth unchanged, Bilateral breath sounds;  Depth of Insertion: 22; Securment: Lips; Complications: None; Breath Sounds: Equal Bilateral; Insertion Attempts: 1;   Airway/Jaw/Neck:  Airway Findings: Mouth Opening: Normal Tongue: Normal  General Airway Assessment: Adult, Average  Mallampati: II  TM Distance: Normal, at least 6 cm  Jaw/Neck Findings:  Neck ROM: Normal ROM  Partial removable upper is out now.      Last PCP note: > 1 year ago , within [facility] 5/2016  Subspecialty notes: Hematology/Oncology     Other important co-morbidities: HTN, HX WPW (S/P ablation x1), HX of breast cancer     Tests already available:  Available  tests,  3-6 months ago , within [facility] . 8/1/17 BMP, Hgb, EKG                            Instructions given. (See in Nurse's note)     Optimization:  Anesthesia Preop Clinic Assessment  Indicated-not required for that procedure                Plan:    Testing:  Hemoglobin and BMP                           Patient  has previously scheduled Medical Appointment:11/9 preop with surgeon     Navigation: Tests Scheduled. 11/9                                             Results will be tracked by Preop Clinic.                                Electronically signed by Mellissa Rowland RN at 11/6/2017  1:54 PM      11/9/17 lab results noted                                                                                                               11/06/2017  Pantera Posey is a 67 y.o., female.  Pre-operative evaluation for EXCHANGE IMPLANT-BREAST (Bilateral)    Chief Complaint: breast CA    PMH:  Spinal fusion L4-5  And mastectomy in past without anesthetic problems.  blation for Renea-parkinosn-White severla years ago    Past Surgical History:   Procedure Laterality Date    BACK SURGERY      Spinal fusion    BREAST BIOPSY      RIGHT    BREAST BIOPSY Left     CARDIAC ELECTROPHYSIOLOGY STUDY AND ABLATION      MASTECTOMY      RIGHT/ RADIATION AND CHEMO    MASTECTOMY      left    TUBAL LIGATION           Vital Signs Range (Last 24H):  Temp:  [36.7 °C (98.1 °F)]   Pulse:  [75]   Resp:  [14]   BP: (178-208)/(80-89)   SpO2:  [100 %]       CBC:     Recent Labs  Lab 11/09/17  1130   HGB 13.2       CMP:   Recent Labs  Lab 11/09/17  1130      K 4.0      CO2 27   BUN 15   CREATININE 0.8   GLU 93   CALCIUM 9.9       INR:  No results for input(s): INR, PROTIME, APTT in the last 720 hours.    Invalid input(s): PT      Diagnostic Studies:      EKG:abnormal      2D Echo:  Anesthesia Evaluation    I have reviewed the Patient Summary Reports.     I have reviewed the Nursing Notes.   I have reviewed the Medications.      Review of Systems  Anesthesia Hx:  Hx of Anesthetic complications PONV uses scope patch History of prior surgery of interest to airway management or planning: Previous anesthesia: General 8/3/17 mastectomy with general anesthesia.  Procedure performed at an [facility] Facility. Denies Family Hx of Anesthesia complications.  Personal Hx of Anesthesia complications, Post-Operative Nausea/Vomiting (requests patch and po meds) Malignant Hyperthermia   Social:  Former Smoker  Alcohol Use: Pt consumes daily glass of wine,    Hematology/Oncology:  Hematology Normal       Breast s/p surgery  Current/Recent Cancer. Oncology Comments: 1997 right side mastectomy, 2017 left side mastectomy     EENT/Dental:EENT/Dental Normal   Cardiovascular:    Denies Angina.  Functional Capacity 5 METS  Hypertension , Well Controlled on Rx  Disorder of Cardiac Rhythm (S/P ablation), WPW  Disorder of Cardiac Conduction    Pulmonary:  Pulmonary Normal  Denies Shortness of breath.  Denies Recent URI.    Renal/:  Renal/ Normal     Hepatic/GI:  Hepatic/GI Normal    Musculoskeletal:  Lumbar Spine Disorders, Lumbar Disc Disease   Neurological:  Neurology Normal    Endocrine:  Endocrine Normal        Physical Exam  General:  Obesity    Airway/Jaw/Neck:  Airway Findings: Mouth Opening: Normal Tongue: Normal  General Airway Assessment: Good  Mallampati: I  TM Distance: Normal, at least 6 cm  Jaw/Neck Findings:  Neck ROM: Normal ROM      Dental:  Dental Findings: In tact   Chest/Lungs:  Chest/Lungs Findings: Clear to auscultation, Normal Respiratory Rate     Heart/Vascular:  Heart Findings: Rate: Normal  Rhythm: Regular Rhythm  Sounds: Normal        Mental Status:  Mental Status Findings:  Cooperative, Alert and Oriented         Anesthesia Plan  Type of Anesthesia, risks & benefits discussed:  Anesthesia Type:  general  Patient's Preference:   Intra-op Monitoring Plan:   Intra-op Monitoring Plan Comments:   Post Op Pain Control Plan:   Post Op Pain  Control Plan Comments:   Induction:   IV  Beta Blocker:  Patient is not currently on a Beta-Blocker (No further documentation required).       Informed Consent: Patient understands risks and agrees with Anesthesia plan.  Questions answered. Anesthesia consent signed with patient.  ASA Score: 2     Day of Surgery Review of History & Physical:    H&P update referred to the surgeon.         Ready For Surgery From Anesthesia Perspective.

## 2017-11-06 NOTE — PRE ADMISSION SCREENING
Anesthesia Assessment: Preoperative EQUATION    Planned Procedure: Procedure(s) (LRB):  EXCHANGE IMPLANT-BREAST (Bilateral)  Requested Anesthesia Type:General  Surgeon: Oni Murrell MD  Service: Plastics  Known or anticipated Date of Surgery:11/20/2017    Surgeon notes: reviewed    Electronic QUestionnaire Assessment completed via nurse interview with patient.        NO AQ    Triage considerations:     The patient has no apparent active cardiac condition (No unstable coronary Syndrome such as severe unstable angina or recent [<1 month] myocardial infarction, decompensated CHF, severe valvular   disease or significant arrhythmia)    Previous anesthesia records:GETA, MAC and Complications noted-HX of PONV -requests patch and po meds  08/03/17; Placement Time: 1444; Method of Intubation: Direct laryngoscopy; Inserted by: CRNA; Airway Device: Endotracheal Tube; Mask Ventilation: Easy; Intubated: Postinduction; Blade: Dutton #2; Airway Device Size: 7.0; Style: Cuffed; Cuff Inflation: Minimal occlusive pressure; Inflation Amount: 7; Placement Verified By: Auscultation, Capnometry, Colorimetric EtCO2 device; Grade: Grade I; Complicating Factors: None; Intubation Findings: Positive EtCO2, Atraumatic/Condition of teeth unchanged, Bilateral breath sounds;  Depth of Insertion: 22; Securment: Lips; Complications: None; Breath Sounds: Equal Bilateral; Insertion Attempts: 1;   Airway/Jaw/Neck:  Airway Findings: Mouth Opening: Normal Tongue: Normal  General Airway Assessment: Adult, Average  Mallampati: II  TM Distance: Normal, at least 6 cm  Jaw/Neck Findings:  Neck ROM: Normal ROM  Partial removable upper is out now.     Last PCP note: > 1 year ago , within [facility] 5/2016  Subspecialty notes: Hematology/Oncology    Other important co-morbidities: HTN, HX WPW (S/P ablation x1), HX of breast cancer     Tests already available:  Available tests,  3-6 months ago , within [facility] . 8/1/17 BMP, Hgb, EKG             Instructions given. (See in Nurse's note)    Optimization:  Anesthesia Preop Clinic Assessment  Indicated-not required for that procedure       Plan:    Testing:  Hemoglobin and BMP     Patient  has previously scheduled Medical Appointment:11/9 preop with surgeon    Navigation: Tests Scheduled. 11/9                       Results will be tracked by Preop Clinic.

## 2017-11-09 ENCOUNTER — LAB VISIT (OUTPATIENT)
Dept: LAB | Facility: HOSPITAL | Age: 67
End: 2017-11-09
Attending: ANESTHESIOLOGY
Payer: COMMERCIAL

## 2017-11-09 ENCOUNTER — CLINICAL SUPPORT (OUTPATIENT)
Dept: PLASTIC SURGERY | Facility: CLINIC | Age: 67
End: 2017-11-09
Payer: COMMERCIAL

## 2017-11-09 DIAGNOSIS — Z01.818 PREOPERATIVE TESTING: ICD-10-CM

## 2017-11-09 LAB
ANION GAP SERPL CALC-SCNC: 12 MMOL/L
BUN SERPL-MCNC: 15 MG/DL
CALCIUM SERPL-MCNC: 9.9 MG/DL
CHLORIDE SERPL-SCNC: 103 MMOL/L
CO2 SERPL-SCNC: 27 MMOL/L
CREAT SERPL-MCNC: 0.8 MG/DL
EST. GFR  (AFRICAN AMERICAN): >60 ML/MIN/1.73 M^2
EST. GFR  (NON AFRICAN AMERICAN): >60 ML/MIN/1.73 M^2
GLUCOSE SERPL-MCNC: 93 MG/DL
HGB BLD-MCNC: 13.2 G/DL
POTASSIUM SERPL-SCNC: 4 MMOL/L
SODIUM SERPL-SCNC: 142 MMOL/L

## 2017-11-09 PROCEDURE — 80048 BASIC METABOLIC PNL TOTAL CA: CPT

## 2017-11-09 PROCEDURE — 36415 COLL VENOUS BLD VENIPUNCTURE: CPT

## 2017-11-09 PROCEDURE — 85018 HEMOGLOBIN: CPT

## 2017-11-09 NOTE — PROGRESS NOTES
Pt arrived to clinic for pre-op nursing appointment, pre-op education given to patient at this time, pt verbalized understanding, all questions answered at this time, pre-op education paperwork given to patient

## 2017-11-16 RX ORDER — HYDROCHLOROTHIAZIDE 25 MG/1
TABLET ORAL
Qty: 90 TABLET | Refills: 0 | Status: SHIPPED | OUTPATIENT
Start: 2017-11-16 | End: 2018-02-13 | Stop reason: SDUPTHER

## 2017-11-17 ENCOUNTER — TELEPHONE (OUTPATIENT)
Dept: PLASTIC SURGERY | Facility: CLINIC | Age: 67
End: 2017-11-17

## 2017-11-20 ENCOUNTER — SURGERY (OUTPATIENT)
Age: 67
End: 2017-11-20

## 2017-11-20 ENCOUNTER — HOSPITAL ENCOUNTER (OUTPATIENT)
Facility: HOSPITAL | Age: 67
Discharge: HOME | End: 2017-11-20
Attending: SURGERY | Admitting: SURGERY
Payer: COMMERCIAL

## 2017-11-20 ENCOUNTER — ANESTHESIA (OUTPATIENT)
Dept: SURGERY | Facility: HOSPITAL | Age: 67
End: 2017-11-20
Payer: COMMERCIAL

## 2017-11-20 VITALS
HEART RATE: 64 BPM | TEMPERATURE: 100 F | BODY MASS INDEX: 22.33 KG/M2 | SYSTOLIC BLOOD PRESSURE: 161 MMHG | RESPIRATION RATE: 16 BRPM | HEIGHT: 65 IN | DIASTOLIC BLOOD PRESSURE: 78 MMHG | OXYGEN SATURATION: 98 % | WEIGHT: 134 LBS

## 2017-11-20 DIAGNOSIS — C50.919 BREAST CANCER: ICD-10-CM

## 2017-11-20 PROCEDURE — S0077 INJECTION, CLINDAMYCIN PHOSP: HCPCS | Performed by: SURGERY

## 2017-11-20 PROCEDURE — D9220A PRA ANESTHESIA: Mod: CRNA,,, | Performed by: NURSE ANESTHETIST, CERTIFIED REGISTERED

## 2017-11-20 PROCEDURE — 36000706: Performed by: SURGERY

## 2017-11-20 PROCEDURE — 71000015 HC POSTOP RECOV 1ST HR: Performed by: SURGERY

## 2017-11-20 PROCEDURE — 25000003 PHARM REV CODE 250: Performed by: ANESTHESIOLOGY

## 2017-11-20 PROCEDURE — 25000003 PHARM REV CODE 250: Performed by: STUDENT IN AN ORGANIZED HEALTH CARE EDUCATION/TRAINING PROGRAM

## 2017-11-20 PROCEDURE — 37000009 HC ANESTHESIA EA ADD 15 MINS: Performed by: SURGERY

## 2017-11-20 PROCEDURE — 88300 SURGICAL PATH GROSS: CPT | Performed by: PATHOLOGY

## 2017-11-20 PROCEDURE — 71000033 HC RECOVERY, INTIAL HOUR: Performed by: SURGERY

## 2017-11-20 PROCEDURE — 63600175 PHARM REV CODE 636 W HCPCS: Performed by: NURSE ANESTHETIST, CERTIFIED REGISTERED

## 2017-11-20 PROCEDURE — 25000003 PHARM REV CODE 250: Performed by: NURSE ANESTHETIST, CERTIFIED REGISTERED

## 2017-11-20 PROCEDURE — 71000039 HC RECOVERY, EACH ADD'L HOUR: Performed by: SURGERY

## 2017-11-20 PROCEDURE — 11970 RPLCMT TISS XPNDR PERM IMPLT: CPT | Mod: 50,,, | Performed by: SURGERY

## 2017-11-20 PROCEDURE — 94761 N-INVAS EAR/PLS OXIMETRY MLT: CPT

## 2017-11-20 PROCEDURE — 25000003 PHARM REV CODE 250: Performed by: SURGERY

## 2017-11-20 PROCEDURE — C1789 PROSTHESIS, BREAST, IMP: HCPCS | Performed by: SURGERY

## 2017-11-20 PROCEDURE — 71000016 HC POSTOP RECOV ADDL HR: Performed by: SURGERY

## 2017-11-20 PROCEDURE — 63600175 PHARM REV CODE 636 W HCPCS: Performed by: SURGERY

## 2017-11-20 PROCEDURE — S0077 INJECTION, CLINDAMYCIN PHOSP: HCPCS | Performed by: STUDENT IN AN ORGANIZED HEALTH CARE EDUCATION/TRAINING PROGRAM

## 2017-11-20 PROCEDURE — 36000707: Performed by: SURGERY

## 2017-11-20 PROCEDURE — 88300 SURGICAL PATH GROSS: CPT | Mod: 26,,, | Performed by: PATHOLOGY

## 2017-11-20 PROCEDURE — 20926 PR REMV TISSUE FOR GRAFT OTHR: CPT | Mod: 51,RT,, | Performed by: SURGERY

## 2017-11-20 PROCEDURE — 37000008 HC ANESTHESIA 1ST 15 MINUTES: Performed by: SURGERY

## 2017-11-20 PROCEDURE — D9220A PRA ANESTHESIA: Mod: ANES,,, | Performed by: ANESTHESIOLOGY

## 2017-11-20 DEVICE — IMPLANTABLE DEVICE: Type: IMPLANTABLE DEVICE | Site: BREAST | Status: FUNCTIONAL

## 2017-11-20 RX ORDER — GLYCOPYRROLATE 0.2 MG/ML
INJECTION INTRAMUSCULAR; INTRAVENOUS
Status: DISCONTINUED | OUTPATIENT
Start: 2017-11-20 | End: 2017-11-20

## 2017-11-20 RX ORDER — PROPOFOL 10 MG/ML
VIAL (ML) INTRAVENOUS
Status: DISCONTINUED | OUTPATIENT
Start: 2017-11-20 | End: 2017-11-20

## 2017-11-20 RX ORDER — MIDAZOLAM HYDROCHLORIDE 1 MG/ML
INJECTION, SOLUTION INTRAMUSCULAR; INTRAVENOUS
Status: DISCONTINUED | OUTPATIENT
Start: 2017-11-20 | End: 2017-11-20

## 2017-11-20 RX ORDER — SODIUM BICARBONATE 1 MEQ/ML
SYRINGE (ML) INTRAVENOUS
Status: DISCONTINUED | OUTPATIENT
Start: 2017-11-20 | End: 2017-11-20 | Stop reason: HOSPADM

## 2017-11-20 RX ORDER — SCOLOPAMINE TRANSDERMAL SYSTEM 1 MG/1
1 PATCH, EXTENDED RELEASE TRANSDERMAL ONCE
Status: COMPLETED | OUTPATIENT
Start: 2017-11-20 | End: 2017-11-20

## 2017-11-20 RX ORDER — CLINDAMYCIN PHOSPHATE 900 MG/50ML
900 INJECTION, SOLUTION INTRAVENOUS
Status: COMPLETED | OUTPATIENT
Start: 2017-11-20 | End: 2017-11-20

## 2017-11-20 RX ORDER — PHENYLEPHRINE HYDROCHLORIDE 10 MG/ML
INJECTION INTRAVENOUS
Status: DISCONTINUED | OUTPATIENT
Start: 2017-11-20 | End: 2017-11-20

## 2017-11-20 RX ORDER — HYDROCODONE BITARTRATE AND ACETAMINOPHEN 5; 325 MG/1; MG/1
1 TABLET ORAL EVERY 6 HOURS PRN
Qty: 28 TABLET | Refills: 0 | Status: ON HOLD | OUTPATIENT
Start: 2017-11-20 | End: 2018-02-22 | Stop reason: HOSPADM

## 2017-11-20 RX ORDER — HYDROCODONE BITARTRATE AND ACETAMINOPHEN 5; 325 MG/1; MG/1
1 TABLET ORAL ONCE
Status: COMPLETED | OUTPATIENT
Start: 2017-11-20 | End: 2017-11-20

## 2017-11-20 RX ORDER — CLINDAMYCIN HYDROCHLORIDE 300 MG/1
300 CAPSULE ORAL 3 TIMES DAILY
Qty: 30 CAPSULE | Refills: 0 | Status: SHIPPED | OUTPATIENT
Start: 2017-11-20 | End: 2017-11-29 | Stop reason: SDUPTHER

## 2017-11-20 RX ORDER — ONDANSETRON 2 MG/ML
INJECTION INTRAMUSCULAR; INTRAVENOUS
Status: DISCONTINUED | OUTPATIENT
Start: 2017-11-20 | End: 2017-11-20

## 2017-11-20 RX ORDER — ROCURONIUM BROMIDE 10 MG/ML
INJECTION, SOLUTION INTRAVENOUS
Status: DISCONTINUED | OUTPATIENT
Start: 2017-11-20 | End: 2017-11-20

## 2017-11-20 RX ORDER — BACITRACIN 50000 [IU]/1
INJECTION, POWDER, FOR SOLUTION INTRAMUSCULAR
Status: DISCONTINUED | OUTPATIENT
Start: 2017-11-20 | End: 2017-11-20 | Stop reason: HOSPADM

## 2017-11-20 RX ORDER — TRIAMCINOLONE ACETONIDE 40 MG/ML
INJECTION, SUSPENSION INTRA-ARTICULAR; INTRAMUSCULAR
Status: DISCONTINUED | OUTPATIENT
Start: 2017-11-20 | End: 2017-11-20 | Stop reason: HOSPADM

## 2017-11-20 RX ORDER — NEOSTIGMINE METHYLSULFATE 1 MG/ML
INJECTION, SOLUTION INTRAVENOUS
Status: DISCONTINUED | OUTPATIENT
Start: 2017-11-20 | End: 2017-11-20

## 2017-11-20 RX ORDER — LIDOCAINE HYDROCHLORIDE 10 MG/ML
1 INJECTION, SOLUTION EPIDURAL; INFILTRATION; INTRACAUDAL; PERINEURAL ONCE
Status: DISCONTINUED | OUTPATIENT
Start: 2017-11-20 | End: 2017-11-20 | Stop reason: HOSPADM

## 2017-11-20 RX ORDER — LABETALOL HYDROCHLORIDE 5 MG/ML
10 INJECTION, SOLUTION INTRAVENOUS ONCE
Status: COMPLETED | OUTPATIENT
Start: 2017-11-20 | End: 2017-11-20

## 2017-11-20 RX ORDER — FENTANYL CITRATE 50 UG/ML
INJECTION, SOLUTION INTRAMUSCULAR; INTRAVENOUS
Status: DISCONTINUED | OUTPATIENT
Start: 2017-11-20 | End: 2017-11-20

## 2017-11-20 RX ORDER — EPINEPHRINE CONVENIENCE KIT 1 MG/ML(1)
KIT INTRAMUSCULAR; SUBCUTANEOUS
Status: DISCONTINUED | OUTPATIENT
Start: 2017-11-20 | End: 2017-11-20 | Stop reason: HOSPADM

## 2017-11-20 RX ORDER — HYDROMORPHONE HYDROCHLORIDE 1 MG/ML
0.2 INJECTION, SOLUTION INTRAMUSCULAR; INTRAVENOUS; SUBCUTANEOUS EVERY 5 MIN PRN
Status: DISCONTINUED | OUTPATIENT
Start: 2017-11-20 | End: 2017-11-20 | Stop reason: HOSPADM

## 2017-11-20 RX ORDER — ONDANSETRON 4 MG/1
4 TABLET, FILM COATED ORAL EVERY 8 HOURS PRN
Qty: 20 TABLET | Refills: 0 | Status: SHIPPED | OUTPATIENT
Start: 2017-11-20 | End: 2019-01-09

## 2017-11-20 RX ORDER — SODIUM CHLORIDE 9 MG/ML
INJECTION, SOLUTION INTRAVENOUS CONTINUOUS PRN
Status: DISCONTINUED | OUTPATIENT
Start: 2017-11-20 | End: 2017-11-20

## 2017-11-20 RX ORDER — LIDOCAINE HCL/PF 100 MG/5ML
SYRINGE (ML) INTRAVENOUS
Status: DISCONTINUED | OUTPATIENT
Start: 2017-11-20 | End: 2017-11-20

## 2017-11-20 RX ORDER — ACETAMINOPHEN 10 MG/ML
INJECTION, SOLUTION INTRAVENOUS
Status: DISCONTINUED | OUTPATIENT
Start: 2017-11-20 | End: 2017-11-20

## 2017-11-20 RX ORDER — FERROUS SULFATE, DRIED 160(50) MG
2 TABLET, EXTENDED RELEASE ORAL 2 TIMES DAILY WITH MEALS
COMMUNITY

## 2017-11-20 RX ORDER — DEXAMETHASONE SODIUM PHOSPHATE 4 MG/ML
INJECTION, SOLUTION INTRA-ARTICULAR; INTRALESIONAL; INTRAMUSCULAR; INTRAVENOUS; SOFT TISSUE
Status: DISCONTINUED | OUTPATIENT
Start: 2017-11-20 | End: 2017-11-20

## 2017-11-20 RX ORDER — LIDOCAINE HYDROCHLORIDE 10 MG/ML
INJECTION, SOLUTION EPIDURAL; INFILTRATION; INTRACAUDAL; PERINEURAL
Status: DISCONTINUED | OUTPATIENT
Start: 2017-11-20 | End: 2017-11-20 | Stop reason: HOSPADM

## 2017-11-20 RX ADMIN — CLINDAMYCIN PHOSPHATE 900 MG: 18 INJECTION, SOLUTION INTRAVENOUS at 10:11

## 2017-11-20 RX ADMIN — HYDROCODONE BITARTRATE AND ACETAMINOPHEN 1 TABLET: 5; 325 TABLET ORAL at 12:11

## 2017-11-20 RX ADMIN — LIDOCAINE HYDROCHLORIDE 50 MG: 20 INJECTION, SOLUTION INTRAVENOUS at 09:11

## 2017-11-20 RX ADMIN — Medication 20 MG: at 09:11

## 2017-11-20 RX ADMIN — ACETAMINOPHEN 1000 MG: 10 INJECTION, SOLUTION INTRAVENOUS at 10:11

## 2017-11-20 RX ADMIN — CLINDAMYCIN PHOSPHATE 150 MG: 150 INJECTION, SOLUTION, CONCENTRATE INTRAVENOUS at 10:11

## 2017-11-20 RX ADMIN — FENTANYL CITRATE 50 MCG: 50 INJECTION, SOLUTION INTRAMUSCULAR; INTRAVENOUS at 10:11

## 2017-11-20 RX ADMIN — PROPOFOL 150 MG: 10 INJECTION, EMULSION INTRAVENOUS at 09:11

## 2017-11-20 RX ADMIN — PHENYLEPHRINE HYDROCHLORIDE 50 MCG: 10 INJECTION INTRAVENOUS at 11:11

## 2017-11-20 RX ADMIN — NEOSTIGMINE METHYLSULFATE 3 MG: 1 INJECTION INTRAVENOUS at 12:11

## 2017-11-20 RX ADMIN — EPINEPHRINE 1 MG: 1 INJECTION, SOLUTION INTRAMUSCULAR; SUBCUTANEOUS at 10:11

## 2017-11-20 RX ADMIN — PROPOFOL 30 MG: 10 INJECTION, EMULSION INTRAVENOUS at 11:11

## 2017-11-20 RX ADMIN — TRIAMCINOLONE ACETONIDE 40 MG: 40 INJECTION, SUSPENSION INTRA-ARTICULAR; INTRAMUSCULAR at 10:11

## 2017-11-20 RX ADMIN — SODIUM CHLORIDE, SODIUM GLUCONATE, SODIUM ACETATE, POTASSIUM CHLORIDE, MAGNESIUM CHLORIDE, SODIUM PHOSPHATE, DIBASIC, AND POTASSIUM PHOSPHATE: .53; .5; .37; .037; .03; .012; .00082 INJECTION, SOLUTION INTRAVENOUS at 11:11

## 2017-11-20 RX ADMIN — FENTANYL CITRATE 100 MCG: 50 INJECTION, SOLUTION INTRAMUSCULAR; INTRAVENOUS at 09:11

## 2017-11-20 RX ADMIN — MIDAZOLAM HYDROCHLORIDE 2 MG: 1 INJECTION, SOLUTION INTRAMUSCULAR; INTRAVENOUS at 09:11

## 2017-11-20 RX ADMIN — SCOPALAMINE 1 PATCH: 1 PATCH, EXTENDED RELEASE TRANSDERMAL at 09:11

## 2017-11-20 RX ADMIN — LABETALOL HYDROCHLORIDE 10 MG: 5 INJECTION, SOLUTION INTRAVENOUS at 01:11

## 2017-11-20 RX ADMIN — BACITRACIN 50000 UNITS: 50000 INJECTION, POWDER, LYOPHILIZED, FOR SOLUTION INTRAMUSCULAR at 10:11

## 2017-11-20 RX ADMIN — SODIUM CHLORIDE: 0.9 INJECTION, SOLUTION INTRAVENOUS at 09:11

## 2017-11-20 RX ADMIN — LIDOCAINE HYDROCHLORIDE 37.5 ML: 10 INJECTION, SOLUTION EPIDURAL; INFILTRATION; INTRACAUDAL; PERINEURAL at 10:11

## 2017-11-20 RX ADMIN — DEXAMETHASONE SODIUM PHOSPHATE 8 MG: 4 INJECTION, SOLUTION INTRAMUSCULAR; INTRAVENOUS at 10:11

## 2017-11-20 RX ADMIN — PROPOFOL 50 MG: 10 INJECTION, EMULSION INTRAVENOUS at 10:11

## 2017-11-20 RX ADMIN — SODIUM CHLORIDE, SODIUM GLUCONATE, SODIUM ACETATE, POTASSIUM CHLORIDE, MAGNESIUM CHLORIDE, SODIUM PHOSPHATE, DIBASIC, AND POTASSIUM PHOSPHATE: .53; .5; .37; .037; .03; .012; .00082 INJECTION, SOLUTION INTRAVENOUS at 10:11

## 2017-11-20 RX ADMIN — GLYCOPYRROLATE 0.4 MG: 0.2 INJECTION, SOLUTION INTRAMUSCULAR; INTRAVENOUS at 12:11

## 2017-11-20 RX ADMIN — ROCURONIUM BROMIDE 50 MG: 10 INJECTION, SOLUTION INTRAVENOUS at 09:11

## 2017-11-20 RX ADMIN — Medication 20 MG: at 10:11

## 2017-11-20 RX ADMIN — ONDANSETRON 8 MG: 2 INJECTION INTRAMUSCULAR; INTRAVENOUS at 10:11

## 2017-11-20 RX ADMIN — SODIUM BICARBONATE 10 MEQ: 84 INJECTION, SOLUTION INTRAVENOUS at 10:11

## 2017-11-20 NOTE — SUBJECTIVE & OBJECTIVE
No current facility-administered medications on file prior to encounter.      Current Outpatient Prescriptions on File Prior to Encounter   Medication Sig    anastrozole (ARIMIDEX) 1 mg Tab Take 1 tablet (1 mg total) by mouth once daily.    diazePAM (VALIUM) 2 MG tablet 1 po 30 min prior to procedure prn    clindamycin (CLEOCIN) 150 MG capsule Take 2 capsules (300 mg total) by mouth every 8 (eight) hours.    oxycodone (ROXICODONE) 5 MG immediate release tablet Take 1 tablet (5 mg total) by mouth every 6 (six) hours as needed.       Review of patient's allergies indicates:   Allergen Reactions    Ciprofloxacin Anaphylaxis     Other reaction(s): Difficulty breathing    Floxin [ofloxacin] Anaphylaxis    Amoxicillin      Other reaction(s): Rash    Indocin  [indomethacin sodium]      Other reaction(s): Headache    Amoxicillin Rash    Bactrim [sulfamethoxazole-trimethoprim] Rash       Past Medical History:   Diagnosis Date    Breast cancer     RIGHT    Cancer     breast cancer    Hypertension     Renea-Parkinson-White syndrome      Past Surgical History:   Procedure Laterality Date    BACK SURGERY      Spinal fusion    BREAST BIOPSY      RIGHT    BREAST BIOPSY Left     CARDIAC ELECTROPHYSIOLOGY STUDY AND ABLATION      MASTECTOMY      RIGHT/ RADIATION AND CHEMO    MASTECTOMY      left    TUBAL LIGATION       Family History     Problem Relation (Age of Onset)    Breast cancer Maternal Aunt    Cancer Mother    Hypertension Father    Ovarian cancer Paternal Aunt        Social History Main Topics    Smoking status: Former Smoker     Packs/day: 1.00     Quit date: 12/3/1988    Smokeless tobacco: Never Used    Alcohol use 3.0 oz/week     5 Glasses of wine per week      Comment: wine nightly    Drug use: No    Sexual activity: Yes     Partners: Male     Review of Systems   Constitutional: Negative for activity change, appetite change, chills and unexpected weight change.   HENT: Negative.    Respiratory:  Negative.  Negative for chest tightness and shortness of breath.    Cardiovascular: Negative for chest pain and palpitations.   Gastrointestinal: Negative.    Genitourinary: Negative.    Neurological: Negative.      Objective:     Vital Signs (Most Recent):  Temp: 98.1 °F (36.7 °C) (11/20/17 0808)  Pulse: 75 (11/20/17 0808)  Resp: 14 (11/20/17 0808)  BP: (!) 178/80 (11/20/17 0816)  SpO2: 100 % (11/20/17 0808) Vital Signs (24h Range):  Temp:  [98.1 °F (36.7 °C)] 98.1 °F (36.7 °C)  Pulse:  [75] 75  Resp:  [14] 14  SpO2:  [100 %] 100 %  BP: (178-208)/(80-89) 178/80     Weight: 60.8 kg (134 lb)  Body mass index is 22.3 kg/m².    Physical Exam  WD WN NAD  VSS  Breast - incisions CDI, no erythema, no drainage  Skin - warm/dry, no rash

## 2017-11-20 NOTE — DISCHARGE INSTRUCTIONS
"Please see attached discharged instructions titled "Post-Op Instructions Breast Implant Exchange."  "

## 2017-11-20 NOTE — ASSESSMENT & PLAN NOTE
67 y.o. F s/p B breast reconstruction with TE placement.   - Patient to OR Today for removal of TE, exchange of bilateral breast implant with possible fat grafting   -Has been consented and marked in preoperative area. States she understands the risks and benefits of the procedure. All questions and concerns addressed.   -Plan to discharge home today with po pain medications, abx and fu appt in plastic surgery clinic in 1 week

## 2017-11-20 NOTE — HPI
Patient is a 67 y.o. female with history of R breast cancer s/p MRM in 1997 with recent diagnosis of L breast cancer. Had bilateral tissue expanders placed on 08/03/2017. Patient has been seen multiple times in plastic surgery clinic for follow up of bilateral tissue expanders. She is schedule for removal of tissue expanders and placement of bilateral breast implants 11/20/17

## 2017-11-20 NOTE — TRANSFER OF CARE
"Anesthesia Transfer of Care Note    Patient: Pantera Posey    Procedure(s) Performed: Procedure(s) (LRB):  EXCHANGE IMPLANT-BREAST (Bilateral)  LIPOSUCTION WITH FAT TRANSFER (N/A)    Patient location: PACU    Anesthesia Type: general    Transport from OR: Transported from OR on room air with adequate spontaneous ventilation    Post pain: adequate analgesia    Post assessment: no apparent anesthetic complications    Post vital signs: stable    Level of consciousness: awake    Nausea/Vomiting: no nausea/vomiting    Complications: none    Transfer of care protocol was followed      Last vitals:   Visit Vitals  BP (!) 147/67 (BP Location: Left leg, Patient Position: Lying)   Pulse 95   Temp 36.5 °C (97.7 °F) (Temporal)   Resp 15   Ht 5' 5" (1.651 m)   Wt 60.8 kg (134 lb)   LMP  (LMP Unknown)   SpO2 100%   Breastfeeding? No   BMI 22.30 kg/m²     "

## 2017-11-20 NOTE — H&P
[facility]  General Surgery  History & Physical    Patient Name: Pantera Posey  MRN: [id number]  Admission Date: 11/20/2017  Attending Physician: Oni Murrell, *   Primary Care Provider: Jazmin Singh MD    Patient information was obtained from patient.     Subjective:     Chief Complaint/Reason for Admission: breast cancer s/p bilateal reconstruction with TE placement     History of Present Illness: Patient is a 67 y.o. female  with history of R breast cancer s/p MRM in 1997 with recent diagnosis of L breast cancer. Had bilateral tissue expanders placed on 08/03/2017. Patient has been seen multiple times in plastic surgery clinic for follow up of bilateral tissue expanders. She is schedule for removal of tissue expanders and placement of bilateral breast implants 11/20/17    No current facility-administered medications on file prior to encounter.      Current Outpatient Prescriptions on File Prior to Encounter   Medication Sig    anastrozole (ARIMIDEX) 1 mg Tab Take 1 tablet (1 mg total) by mouth once daily.    diazePAM (VALIUM) 2 MG tablet 1 po 30 min prior to procedure prn    clindamycin (CLEOCIN) 150 MG capsule Take 2 capsules (300 mg total) by mouth every 8 (eight) hours.    oxycodone (ROXICODONE) 5 MG immediate release tablet Take 1 tablet (5 mg total) by mouth every 6 (six) hours as needed.       Review of patient's allergies indicates:   Allergen Reactions    Ciprofloxacin Anaphylaxis     Other reaction(s): Difficulty breathing    Floxin [ofloxacin] Anaphylaxis    Amoxicillin      Other reaction(s): Rash    Indocin  [indomethacin sodium]      Other reaction(s): Headache    Amoxicillin Rash    Bactrim [sulfamethoxazole-trimethoprim] Rash       Past Medical History:   Diagnosis Date    Breast cancer     RIGHT    Cancer     breast cancer    Hypertension     Renea-Parkinson-White syndrome      Past Surgical History:   Procedure Laterality Date    BACK SURGERY       Spinal fusion    BREAST BIOPSY      RIGHT    BREAST BIOPSY Left     CARDIAC ELECTROPHYSIOLOGY STUDY AND ABLATION      MASTECTOMY      RIGHT/ RADIATION AND CHEMO    MASTECTOMY      left    TUBAL LIGATION       Family History     Problem Relation (Age of Onset)    Breast cancer Maternal Aunt    Cancer Mother    Hypertension Father    Ovarian cancer Paternal Aunt        Social History Main Topics    Smoking status: Former Smoker     Packs/day: 1.00     Quit date: 12/3/1988    Smokeless tobacco: Never Used    Alcohol use 3.0 oz/week     5 Glasses of wine per week      Comment: wine nightly    Drug use: No    Sexual activity: Yes     Partners: Male     Review of Systems   Constitutional: Negative for activity change, appetite change, chills and unexpected weight change.   HENT: Negative.    Respiratory: Negative.  Negative for chest tightness and shortness of breath.    Cardiovascular: Negative for chest pain and palpitations.   Gastrointestinal: Negative.    Genitourinary: Negative.    Neurological: Negative.      Objective:     Vital Signs (Most Recent):  Temp: 98.1 °F (36.7 °C) (11/20/17 0808)  Pulse: 75 (11/20/17 0808)  Resp: 14 (11/20/17 0808)  BP: (!) 178/80 (11/20/17 0816)  SpO2: 100 % (11/20/17 0808) Vital Signs (24h Range):  Temp:  [98.1 °F (36.7 °C)] 98.1 °F (36.7 °C)  Pulse:  [75] 75  Resp:  [14] 14  SpO2:  [100 %] 100 %  BP: (178-208)/(80-89) 178/80     Weight: 60.8 kg (134 lb)  Body mass index is 22.3 kg/m².    Physical Exam  WD WN NAD  VSS  Breast - incisions CDI, no erythema, no drainage  Skin - warm/dry, no rash      Assessment/Plan:     Breast cancer    67 y.o. F s/p B breast reconstruction with TE placement.   - Patient to OR Today for removal of TE, exchange of bilateral breast implant with possible fat grafting   -Has been consented and marked in preoperative area. States she understands the risks and benefits of the procedure. All questions and concerns addressed.   -Plan to discharge home  today with po pain medications, abx and fu appt in plastic surgery clinic in 1 week          VTE Risk Mitigation         Ordered     Place ED hose  Until discontinued      11/20/17 0730     Place sequential compression device  Until discontinued      11/20/17 0730     Medium Risk of VTE  Once      11/20/17 0730     Place sequential compression device  Until discontinued      11/20/17 0730          Yesenia Pelaez MD  General Surgery  [facility]

## 2017-11-20 NOTE — BRIEF OP NOTE
[facility]  Brief Operative Note     SUMMARY     Surgery Date: 11/20/2017     Surgeon(s) and Role:     * Aldo King MD - Fellow     * Oni Murrell MD - Primary    Assisting Surgeon: None    Pre-op Diagnosis:  Personal history of breast cancer [Z85.3]    Post-op Diagnosis:  Post-Op Diagnosis Codes:     * Personal history of breast cancer [Z85.3]    Procedure(s) (LRB):  EXCHANGE IMPLANT-BREAST (Bilateral)  LIPOSUCTION WITH FAT TRANSFER (N/A)    Anesthesia: General    Description of the findings of the procedure: bilateral implant exchange with bilateral free fat grafting. Please see dictated operative note for more detail     Findings/Key Components: bilateral implant exchange with bilateral free fat grafting. Please see dictated operative note for more detail     Estimated Blood Loss: minimal         Specimens:   Specimen (12h ago through future)    Start     Ordered    11/20/17 1143  Specimen to Pathology - Surgery  Once     Comments:  1.) Bilateral tissue expanders- Gross only.      11/20/17 1147          Discharge Note    SUMMARY     Admit Date: 11/20/2017    Discharge Date and Time:  11/20/2017 12:21 PM    Hospital Course (synopsis of major diagnoses, care, treatment, and services provided during the course of the hospital stay): Pt underwent bilateral implant exchange with bilateral free fat grafting on 11/20/17. See operative note for procedure details. Tolerated procedure well. Post-operative course was uncomplicated. Prior to discharge pt was transitioned to oral pain medications. Patient was instructed and educated on discharge instructions. At time of discharge, pt was tolerating oral intake, ambulating well, and pain was controlled with oral pain medications. Will see patient in general surgery clinic in 1 weeks for follow up.       Final Diagnosis: Post-Op Diagnosis Codes:     * Personal history of breast cancer [Z85.3]    Disposition: Home or Self Care    Follow  Up/Patient Instructions: Call clinic to make follow up appointment for 11/29/17.     Medications:  Reconciled Home Medications:   Current Discharge Medication List      START taking these medications    Details   hydrocodone-acetaminophen 5-325mg (NORCO) 5-325 mg per tablet Take 1 tablet by mouth every 6 (six) hours as needed for Pain.  Qty: 28 tablet, Refills: 0      ondansetron (ZOFRAN) 4 MG tablet Take 1 tablet (4 mg total) by mouth every 8 (eight) hours as needed for Nausea.  Qty: 20 tablet, Refills: 0         CONTINUE these medications which have CHANGED    Details   clindamycin (CLEOCIN) 300 MG capsule Take 1 capsule (300 mg total) by mouth 3 (three) times daily.  Qty: 30 capsule, Refills: 0         CONTINUE these medications which have NOT CHANGED    Details   anastrozole (ARIMIDEX) 1 mg Tab Take 1 tablet (1 mg total) by mouth once daily.  Qty: 90 tablet, Refills: 2    Associated Diagnoses: Malignant neoplasm of lower-outer quadrant of left breast of female, estrogen receptor positive      calcium-vitamin D3 500 mg(1,250mg) -200 unit per tablet Take 2 tablets by mouth 2 (two) times daily with meals.      diazePAM (VALIUM) 2 MG tablet 1 po 30 min prior to procedure prn  Qty: 5 tablet, Refills: 0      hydroCHLOROthiazide (HYDRODIURIL) 25 MG tablet TAKE 1 TABLET DAILY  Qty: 90 tablet, Refills: 0      oxycodone (ROXICODONE) 5 MG immediate release tablet Take 1 tablet (5 mg total) by mouth every 6 (six) hours as needed.  Qty: 50 tablet, Refills: 0             Discharge Procedure Orders  Diet general     Activity as tolerated     Lifting restrictions     Shower on day dressing removed (No bath)   Order Comments: Okay to take a shower in 48 hours.     Call MD for:  increased confusion or weakness     Call MD for:  persistent dizziness, light-headedness, or visual disturbances     Call MD for:  worsening rash     Call MD for:  severe persistent headache     Call MD for:  difficulty breathing or increased cough      Call MD for:  redness, tenderness, or signs of infection (pain, swelling, redness, odor or green/yellow discharge around incision site)     Call MD for:  severe uncontrolled pain     Call MD for:  persistent nausea and vomiting or diarrhea     Call MD for:  temperature >100.4     No dressing needed   Order Comments: Incision dressed with tape and dermabond- do not remove this dressing. Dressing will either be removed in clinic or will begin to fall off within 7-10 days.   Do not wear a bra until seen in clinic.   Please wear abdominal binder until seen in clinic. Okay to take off for a shower but should be worn otherwise.       Follow-up Information     Oni Murrell MD On 11/29/2017.    Specialty:  Plastic Surgery  Why:  Please call office to make appointment   Contact information:  [address]  [facility] [address] [phone]

## 2017-11-20 NOTE — PROGRESS NOTES
at bedside assessing patient. Dr alvarez patients VSS. Instructed patient to take scheduled hydrochlorothiazide when she gets home.

## 2017-11-20 NOTE — ANESTHESIA POSTPROCEDURE EVALUATION
"Anesthesia Post Evaluation    Patient: Pantera Posey    Procedure(s) Performed: Procedure(s) (LRB):  EXCHANGE IMPLANT-BREAST (Bilateral)  LIPOSUCTION WITH FAT TRANSFER (N/A)    Final Anesthesia Type: general  Patient location during evaluation: PACU  Patient participation: Yes- Able to Participate  Level of consciousness: awake and alert and oriented  Post-procedure vital signs: reviewed and stable  Pain management: adequate  Airway patency: patent  PONV status at discharge: No PONV  Anesthetic complications: no      Cardiovascular status: blood pressure returned to baseline  Respiratory status: unassisted  Hydration status: euvolemic  Follow-up not needed.        Visit Vitals  BP (!) 179/78 (BP Location: Left arm, Patient Position: Lying)   Pulse (!) 59   Temp 36.5 °C (97.7 °F) (Temporal)   Resp 16   Ht 5' 5" (1.651 m)   Wt 60.8 kg (134 lb)   LMP  (LMP Unknown)   SpO2 100%   Breastfeeding? No   BMI 22.30 kg/m²       Pain/Jori Score: Pain Assessment Performed: Yes (11/20/2017  1:15 PM)  Presence of Pain: complains of pain/discomfort (11/20/2017  1:15 PM)  Pain Rating Prior to Med Admin: 5 (11/20/2017 12:41 PM)  Pain Rating Post Med Admin: 2 (11/20/2017  1:15 PM)  Jori Score: 10 (11/20/2017  1:15 PM)      "

## 2017-11-21 NOTE — OP NOTE
DATE OF PROCEDURE:  11/20/2017.    PREOPERATIVE DIAGNOSIS:  History of breast cancer.    POSTOPERATIVE DIAGNOSIS:  History of breast cancer.    PROCEDURES PERFORMED:  1.  Bilateral implant exchange.  2.  Free fat transfer to the right breast.  3.  Free fat transfer to the left breast.    SURGEON:  Oni Murrell M.D., F.A.C.S.    ANESTHESIA:  General.    SPECIMENS:  None.    ESTIMATED BLOOD LOSS:  Approximately 150 mL.    DESCRIPTION OF PROCEDURE  The patient was evaluated in the preoperative holding   area.  She had had a prepectoral placement of tissue expanders with AlloDerm.    The patient would need to have extensive fat grafting to the medial aspect of   the left breast as well as superiorly.  We discussed at that time that she may   need multiple fat graftings in this area to equal the opposite side, which   appeared to have more native breast tissue.  The patient was taken to the   Operative Room, placed in the supine position and, after adequate general   endotracheal anesthesia, was prepped and draped in the normal sterile fashion.    The incisions were re-excised.  The tissue expanders were removed.  Next, the   tumescent fluid was instilled into the abdomen.  Next, liposuction through the   Revolve filtration system was performed.  Approximately 250 mL of fat was   harvested and washed and loaded into syringes.  Approximately 150 mL of fat was   injected into the flap on the left side with large volumes in the medial and   superior aspect, but virtually the entire flaps were infiltrated with fat.  On   the opposite side, approximately 75 mL of fat was then used.  Next, the chest   wall was then re-prepped and re-draped with blue towels.  Gloves were changed.    Two smooth silicone implants were opened on the back table, soaked in triple   antibiotic solution and placed in the pocket.  The incisions were closed in   layers using 2-0 Vicryl, 3-0 Monocryl and a running 4-0 Monocryl subcuticular    suture.  There were no complications with this procedure.      CRB/HN  dd: 11/21/2017 09:27:37 (CST)  td: 11/21/2017 11:20:27 (CST)  Doc ID   #[id number]  Job ID #[id number]    CC:

## 2017-11-24 ENCOUNTER — TELEPHONE (OUTPATIENT)
Dept: SURGERY | Facility: CLINIC | Age: 67
End: 2017-11-24

## 2017-11-29 ENCOUNTER — OFFICE VISIT (OUTPATIENT)
Dept: PLASTIC SURGERY | Facility: CLINIC | Age: 67
End: 2017-11-29
Payer: COMMERCIAL

## 2017-11-29 VITALS
SYSTOLIC BLOOD PRESSURE: 175 MMHG | BODY MASS INDEX: 22.96 KG/M2 | WEIGHT: 137.81 LBS | TEMPERATURE: 98 F | HEIGHT: 65 IN | DIASTOLIC BLOOD PRESSURE: 82 MMHG | HEART RATE: 93 BPM

## 2017-11-29 DIAGNOSIS — Z09 SURGERY FOLLOW-UP EXAMINATION: Primary | ICD-10-CM

## 2017-11-29 PROCEDURE — 99024 POSTOP FOLLOW-UP VISIT: CPT | Mod: S$GLB,,, | Performed by: SURGERY

## 2017-11-29 PROCEDURE — 99999 PR PBB SHADOW E&M-EST. PATIENT-LVL III: CPT | Mod: PBBFAC,,, | Performed by: SURGERY

## 2017-11-29 RX ORDER — CLINDAMYCIN HYDROCHLORIDE 300 MG/1
300 CAPSULE ORAL 3 TIMES DAILY
Qty: 30 CAPSULE | Refills: 0 | Status: SHIPPED | OUTPATIENT
Start: 2017-11-29 | End: 2017-12-09

## 2017-11-29 NOTE — PROGRESS NOTES
Pantera Posey presents to Plastic Surgery Clinic on 11/29/2017 for a follow up visit for implant exchange and and free fat grafting. She has been doing well post operatively. She report some pain fromt he liposuction sites but is otherwise well. She reports no wound drainage or breakdown.    Review of patient's allergies indicates:   Allergen Reactions    Ciprofloxacin Anaphylaxis     Other reaction(s): Difficulty breathing    Floxin [ofloxacin] Anaphylaxis    Amoxicillin      Other reaction(s): Rash    Indocin  [indomethacin sodium]      Other reaction(s): Headache    Amoxicillin Rash    Bactrim [sulfamethoxazole-trimethoprim] Rash     Current Outpatient Prescriptions on File Prior to Visit   Medication Sig Dispense Refill    anastrozole (ARIMIDEX) 1 mg Tab Take 1 tablet (1 mg total) by mouth once daily. 90 tablet 2    calcium-vitamin D3 500 mg(1,250mg) -200 unit per tablet Take 2 tablets by mouth 2 (two) times daily with meals.      clindamycin (CLEOCIN) 300 MG capsule Take 1 capsule (300 mg total) by mouth 3 (three) times daily. 30 capsule 0    diazePAM (VALIUM) 2 MG tablet 1 po 30 min prior to procedure prn 5 tablet 0    hydroCHLOROthiazide (HYDRODIURIL) 25 MG tablet TAKE 1 TABLET DAILY 90 tablet 0    hydrocodone-acetaminophen 5-325mg (NORCO) 5-325 mg per tablet Take 1 tablet by mouth every 6 (six) hours as needed for Pain. 28 tablet 0    ondansetron (ZOFRAN) 4 MG tablet Take 1 tablet (4 mg total) by mouth every 8 (eight) hours as needed for Nausea. 20 tablet 0    oxycodone (ROXICODONE) 5 MG immediate release tablet Take 1 tablet (5 mg total) by mouth every 6 (six) hours as needed. 50 tablet 0     No current facility-administered medications on file prior to visit.      Patient Active Problem List   Diagnosis    HTN (hypertension), benign    Spondylosis without myelopathy    Spinal stenosis, lumbar region, with neurogenic claudication    Thoracic or lumbosacral neuritis or radiculitis,  unspecified    Acquired spondylolisthesis    Lumbago    Degeneration of lumbar or lumbosacral intervertebral disc    Back pain    Spinal stenosis    Fever    Malignant neoplasm of lower-outer quadrant of left breast of female, estrogen receptor positive    Breast cancer, left    History of right breast cancer    Use of aromatase inhibitors    Breast cancer     Past Surgical History:   Procedure Laterality Date    BACK SURGERY      Spinal fusion    BREAST BIOPSY      RIGHT    BREAST BIOPSY Left     CARDIAC ELECTROPHYSIOLOGY STUDY AND ABLATION      MASTECTOMY      RIGHT/ RADIATION AND CHEMO    MASTECTOMY      left    TUBAL LIGATION       PHYSICAL EXAMINATION  WD WN NAD  VSS  Normal resp effort  Breast - incisions healed, no erythema or warmts. Liposustion sited well healed, stitched removed.     ASSESSMENT/PLAN  67 y.o. F s/p B breast recon  - doing well, no issues post operatively with minimal pain and no wound breakdown  - Stitches removed from liposuction sites  - f/u in 2 weeks  - clindamycin refilled pt will take for 1 more week.

## 2017-12-13 ENCOUNTER — OFFICE VISIT (OUTPATIENT)
Dept: PLASTIC SURGERY | Facility: CLINIC | Age: 67
End: 2017-12-13
Payer: COMMERCIAL

## 2017-12-13 VITALS — WEIGHT: 133.5 LBS | BODY MASS INDEX: 22.24 KG/M2 | HEIGHT: 65 IN

## 2017-12-13 DIAGNOSIS — Z09 SURGERY FOLLOW-UP EXAMINATION: Primary | ICD-10-CM

## 2017-12-13 PROCEDURE — 99024 POSTOP FOLLOW-UP VISIT: CPT | Mod: S$GLB,,, | Performed by: SURGERY

## 2017-12-13 PROCEDURE — 99999 PR PBB SHADOW E&M-EST. PATIENT-LVL II: CPT | Mod: PBBFAC,,, | Performed by: SURGERY

## 2017-12-13 NOTE — PROGRESS NOTES
Ms. Posey is status post second stage implant exchange and fat grafting.  She   still lacks volume in the superomedial pole of the left breast and I think that   will remain.  We extensively fat grafted her.  I do think that she may actually   need either a larger implant on the left side or a smaller implant on the right   side.  I think it would take extensive fat grafting to even it out.  She is   happy with the result, but I do think that over time when both breasts drop a   little bit, this may be more noticeable and I think that we probably will have   to do that.      TOPHER/KASSIDY  dd: 12/13/2017 09:19:50 (CST)  td: 12/14/2017 07:31:44 (CST)  Doc ID   #[id number]  Job ID #[id number]    CC:

## 2017-12-19 ENCOUNTER — OFFICE VISIT (OUTPATIENT)
Dept: SURGERY | Facility: CLINIC | Age: 67
End: 2017-12-19
Payer: COMMERCIAL

## 2017-12-19 VITALS
BODY MASS INDEX: 22.17 KG/M2 | TEMPERATURE: 98 F | HEIGHT: 65 IN | DIASTOLIC BLOOD PRESSURE: 108 MMHG | SYSTOLIC BLOOD PRESSURE: 186 MMHG | WEIGHT: 133.06 LBS

## 2017-12-19 DIAGNOSIS — Z17.0 MALIGNANT NEOPLASM OF LOWER-OUTER QUADRANT OF LEFT BREAST OF FEMALE, ESTROGEN RECEPTOR POSITIVE: Primary | ICD-10-CM

## 2017-12-19 DIAGNOSIS — C50.512 MALIGNANT NEOPLASM OF LOWER-OUTER QUADRANT OF LEFT BREAST OF FEMALE, ESTROGEN RECEPTOR POSITIVE: Primary | ICD-10-CM

## 2017-12-19 PROCEDURE — 99999 PR PBB SHADOW E&M-EST. PATIENT-LVL III: CPT | Mod: PBBFAC,,, | Performed by: SURGERY

## 2017-12-19 PROCEDURE — 99213 OFFICE O/P EST LOW 20 MIN: CPT | Mod: S$GLB,,, | Performed by: SURGERY

## 2017-12-19 NOTE — PROGRESS NOTES
"REFERRING PHYSICIAN:  Jazmin Singh MD    MEDICAL ONCOLOGIST:    Dr. Dionte Fritz    DIAGNOSIS:    This is a 67 y.o. female with a stage IA,  pT1c snN0 MX grade 3 ER + SD + HER2 - IDC of the left breast.    TREATMENT SUMMARY:  The patient is status post left total mastectomy and sentinel node biopsy on 8/3/17, a hx of R breast cancer 20 years ago s/p R mastectomy with ALND, therefore she had bilateral pre-pectoral tissue expanders placed by Dr. Murrell on 8/3/17, followed by second stage implant exchange and fat grafting on 11/20/17. Final pathology showed:    FINAL PATHOLOGIC DIAGNOSIS  1. " LEFT BREAST TISSUE" (MASTECTOMY):  -Invasive ductal carcinoma, see synoptic report below  2. SENTINEL LYMPH NODE, LEFT AXILLARY #2 HOT NOT BLUE 360 (RESECTION):  -One lymph node, negative for metastatic carcinoma (0/1)  -Multiple H&E levels and cytokeratins examined (CAM 5.2, keratin AE1/AE3, WSK)  -Immunostains with appropriate positive and negative controls  3. SENTINEL LYMPH NODE, LEFT AXILLARY HOT, NOT BLUE 96 (RESECTION):  -Three lymph nodes, negative for metastatic carcinoma (0/3)  -Multiple H&E levels and cytokeratins examined (CAM 5.2, keratin AE1/AE3, WSK)  -Immunostains with appropriate positive and negative controls    INTERVAL HISTORY:   Pantera Posey comes in for a 4-month follow up. She had her implants exchanged by Dr. Murrell on 11/20/17. She is happy with the results but there are some modifications that still need to be done per Dr. Murrell. She is otherwise doing well, just some soreness where the fat was grafted from.  She denies fever, chills, chest pain or shortness of breath.  Her pain is well controlled.      MEDICATIONS:  Current Outpatient Prescriptions   Medication Sig Dispense Refill    anastrozole (ARIMIDEX) 1 mg Tab Take 1 tablet (1 mg total) by mouth once daily. 90 tablet 2    calcium-vitamin D3 500 mg(1,250mg) -200 unit per tablet Take 2 tablets by mouth 2 (two) times daily with " meals.      diazePAM (VALIUM) 2 MG tablet 1 po 30 min prior to procedure prn 5 tablet 0    hydroCHLOROthiazide (HYDRODIURIL) 25 MG tablet TAKE 1 TABLET DAILY 90 tablet 0    hydrocodone-acetaminophen 5-325mg (NORCO) 5-325 mg per tablet Take 1 tablet by mouth every 6 (six) hours as needed for Pain. 28 tablet 0    ondansetron (ZOFRAN) 4 MG tablet Take 1 tablet (4 mg total) by mouth every 8 (eight) hours as needed for Nausea. 20 tablet 0    oxycodone (ROXICODONE) 5 MG immediate release tablet Take 1 tablet (5 mg total) by mouth every 6 (six) hours as needed. 50 tablet 0     No current facility-administered medications for this visit.        ALLERGIES:   Review of patient's allergies indicates:   Allergen Reactions    Ciprofloxacin Anaphylaxis     Other reaction(s): Difficulty breathing    Floxin [ofloxacin] Anaphylaxis    Amoxicillin      Other reaction(s): Rash    Indocin  [indomethacin sodium]      Other reaction(s): Headache    Amoxicillin Rash    Bactrim [sulfamethoxazole-trimethoprim] Rash     Review of Systems  Review of Systems   Constitutional: Negative for chills and fever.   HENT: Negative for sore throat.    Eyes: Negative for double vision.   Respiratory: Negative for cough and shortness of breath.    Cardiovascular: Negative for chest pain.   Gastrointestinal: Negative for abdominal pain, constipation, diarrhea, nausea and vomiting.   Genitourinary: Negative for dysuria.   Musculoskeletal: Negative for back pain.   Skin: Negative for itching and rash.   Neurological: Negative for sensory change and headaches.       PHYSICAL EXAMINATION:   Physical Exam   Constitutional: She is oriented to person, place, and time. She appears well-nourished. No distress.   HENT:   Head: Normocephalic and atraumatic.   Eyes: EOM are normal. Pupils are equal, round, and reactive to light.   Neck: Normal range of motion. Neck supple.   Cardiovascular: Normal rate, regular rhythm and normal heart sounds.     Pulmonary/Chest: Effort normal and breath sounds normal. No respiratory distress. Right breast exhibits no mass, no skin change and no tenderness. Left breast exhibits no mass, no skin change and no tenderness.       Abdominal: Soft. She exhibits no distension.   Musculoskeletal: Normal range of motion. She exhibits no edema.   Lymphadenopathy:     She has no cervical adenopathy.   Neurological: She is alert and oriented to person, place, and time.   Skin: Skin is warm and dry. No rash noted. No erythema.         IMPRESSION:   The patient has had an uneventful postoperative course.  66 y/o female with Stage IA IDC, ER+    PLAN:   1. Return in 6 months for a follow up office visit and breast exam.  2. The patient is advised in continued exam of the breast chest wall and to report to this office sooner should she note any areas of abnormality or concern.   3. She will continue to follow up with medical oncology for management of adjuvant endocrine therapy.  4. She is scheduled to meet with Dr. Murrell the second week of January.    30 minutes were spent on this encounter, 20 of which were face to face, and 10 of chart review

## 2018-01-10 ENCOUNTER — OFFICE VISIT (OUTPATIENT)
Dept: PLASTIC SURGERY | Facility: CLINIC | Age: 68
End: 2018-01-10
Payer: COMMERCIAL

## 2018-01-10 VITALS
HEIGHT: 65 IN | BODY MASS INDEX: 22.59 KG/M2 | WEIGHT: 135.56 LBS | HEART RATE: 87 BPM | TEMPERATURE: 98 F | DIASTOLIC BLOOD PRESSURE: 82 MMHG | SYSTOLIC BLOOD PRESSURE: 173 MMHG

## 2018-01-10 DIAGNOSIS — Z09 SURGERY FOLLOW-UP EXAMINATION: Primary | ICD-10-CM

## 2018-01-10 PROCEDURE — 99024 POSTOP FOLLOW-UP VISIT: CPT | Mod: S$GLB,,, | Performed by: SURGERY

## 2018-01-10 PROCEDURE — 99999 PR PBB SHADOW E&M-EST. PATIENT-LVL III: CPT | Mod: PBBFAC,,, | Performed by: SURGERY

## 2018-01-10 NOTE — PROGRESS NOTES
Pantera Posey presents to [facility] clinic on 1/10/2018 for follow up visit status post B breast reconstruction with implants. On 11/20/2017 she underwent B implant exchange with free fat transfer. She is doing well today with no issues since her last visit.     Review of patient's allergies indicates:   Allergen Reactions    Ciprofloxacin Anaphylaxis     Other reaction(s): Difficulty breathing    Floxin [ofloxacin] Anaphylaxis    Clindamycin Diarrhea and Rash    Amoxicillin      Other reaction(s): Rash    Indocin  [indomethacin sodium]      Other reaction(s): Headache    Bactrim [sulfamethoxazole-trimethoprim] Rash     Current Outpatient Prescriptions on File Prior to Visit   Medication Sig Dispense Refill    anastrozole (ARIMIDEX) 1 mg Tab Take 1 tablet (1 mg total) by mouth once daily. 90 tablet 2    calcium-vitamin D3 500 mg(1,250mg) -200 unit per tablet Take 2 tablets by mouth 2 (two) times daily with meals.      diazePAM (VALIUM) 2 MG tablet 1 po 30 min prior to procedure prn 5 tablet 0    hydroCHLOROthiazide (HYDRODIURIL) 25 MG tablet TAKE 1 TABLET DAILY 90 tablet 0    hydrocodone-acetaminophen 5-325mg (NORCO) 5-325 mg per tablet Take 1 tablet by mouth every 6 (six) hours as needed for Pain. 28 tablet 0    ondansetron (ZOFRAN) 4 MG tablet Take 1 tablet (4 mg total) by mouth every 8 (eight) hours as needed for Nausea. 20 tablet 0    oxycodone (ROXICODONE) 5 MG immediate release tablet Take 1 tablet (5 mg total) by mouth every 6 (six) hours as needed. 50 tablet 0     No current facility-administered medications on file prior to visit.      Patient Active Problem List   Diagnosis    HTN (hypertension), benign    Spondylosis without myelopathy    Spinal stenosis, lumbar region, with neurogenic claudication    Thoracic or lumbosacral neuritis or radiculitis, unspecified    Acquired spondylolisthesis    Lumbago    Degeneration of lumbar or lumbosacral intervertebral disc    Back pain    Spinal  stenosis    Fever    Malignant neoplasm of lower-outer quadrant of left breast of female, estrogen receptor positive    Breast cancer, left    History of right breast cancer    Use of aromatase inhibitors    Breast cancer     Past Surgical History:   Procedure Laterality Date    BACK SURGERY      Spinal fusion    BREAST BIOPSY      RIGHT    BREAST BIOPSY Left     CARDIAC ELECTROPHYSIOLOGY STUDY AND ABLATION      MASTECTOMY      RIGHT/ RADIATION AND CHEMO    MASTECTOMY      left    TUBAL LIGATION       PHYSICAL EXAMINATION  Vitals:    01/10/18 1409   BP: (!) 173/82   Pulse: 87   Temp: 98 °F (36.7 °C)     WD WN NAD  VSS  Normal resp effort  Breast - incisions well healed, no erythema    ASSESSMENT/PLAN  67 y.o. F s/p B breast reconstruction  - doing well, no issues  - patient would like B nipple tattoo, office staff to scheduled

## 2018-01-29 ENCOUNTER — TELEPHONE (OUTPATIENT)
Dept: PLASTIC SURGERY | Facility: CLINIC | Age: 68
End: 2018-01-29

## 2018-01-31 ENCOUNTER — TELEPHONE (OUTPATIENT)
Dept: SURGERY | Facility: CLINIC | Age: 68
End: 2018-01-31

## 2018-01-31 NOTE — TELEPHONE ENCOUNTER
Spoke with pt regarding surgery request, pt states that she will need left breast free fat transfer with bilateral nipple tattooing, will speak with providers at this time, surgery date of 2/22/18 discussed, will give pt a return call after following up with providers, pt verbalized understanding

## 2018-02-02 DIAGNOSIS — Z85.3 PERSONAL HISTORY OF BREAST CANCER: Primary | ICD-10-CM

## 2018-02-08 ENCOUNTER — ANESTHESIA EVENT (OUTPATIENT)
Dept: SURGERY | Facility: HOSPITAL | Age: 68
End: 2018-02-08
Payer: COMMERCIAL

## 2018-02-08 ENCOUNTER — CLINICAL SUPPORT (OUTPATIENT)
Dept: PLASTIC SURGERY | Facility: CLINIC | Age: 68
End: 2018-02-08
Payer: COMMERCIAL

## 2018-02-08 DIAGNOSIS — Z01.818 PREOPERATIVE TESTING: Primary | ICD-10-CM

## 2018-02-08 NOTE — ANESTHESIA PREPROCEDURE EVALUATION
Pre Admission Screening  Mellissa Rowland RN      []Hide copied text  Anesthesia Assessment: Preoperative EQUATION     Planned Procedure: Procedure(s) (LRB):  TRANSFER-FAT (Left)  Requested Anesthesia Type:General  Surgeon: Oni Murrell MD  Service: Plastics  Known or anticipated Date of Surgery:2/22/2018     Surgeon notes: reviewed     Electronic QUestionnaire Assessment completed via nurse interview with patient.         NO AQ     Triage considerations:      The patient has no apparent active cardiac condition (No unstable coronary Syndrome such as severe unstable angina or recent [<1 month] myocardial infarction, decompensated CHF, severe valvular   disease or significant arrhythmia)     Previous anesthesia records:GETA, MAC and Complications noted-HX of severe PONV -requests scopolamine patch and po antiemetics. 11/20/17; Placement Time: 0954; Method of Intubation: Direct laryngoscopy; Inserted by: CRNA; Airway Device: Endotracheal Tube; Mask Ventilation: Easy; Intubated: Postinduction; Blade: Dutton #2; Airway Device Size: 7.0; Style: Cuffed; Cuff Inflation: Minimal occlusive pressure; Placement Verified By: Auscultation, Capnometry, ETT Condensation; Grade: Grade I; Complicating Factors: None; Intubation Findings: Positive EtCO2, Bilateral breath sounds, Atraumatic/Condition of teeth unchanged;  Depth of Insertion: 22; Securment: Lips; Complications: None; Breath Sounds: Equal Bilateral; Insertion Attempts: 1;  Airway/Jaw/Neck:  Airway Findings: Mouth Opening: Normal Tongue: Normal  General Airway Assessment: Good  Mallampati: I  TM Distance: Normal, at least 6 cm  Jaw/Neck Findings:  Neck ROM: Normal ROM      Dental:  Dental Findings: In tact      Last PCP note: > 1 year ago , [facility] (has annual appt 2/21/18)  Subspecialty notes: Hematology/Oncology     Other important co-morbidities: HTN, HX WPW (S/P ablation x1), HX of breast cancer     Tests already available:  Available tests,  3-6  months ago , within [facility] . 11/9/17 BMP, Hgb. 8/2017 EKG.                            Instructions given. (See in Nurse's note)     Optimization:  Anesthesia Preop Clinic Assessment  Indicated-not required for this procedure                Plan:    Testing:  Hemoglobin and BMP                           Patient  has previously scheduled Medical Appointment:2/20 Heme Onc, 2/21 PCP for annual.     Navigation: Tests Scheduled. 2/20                                    Results will be tracked by Preop Clinic.                                Electronically signed by Mellissa Rowland RN at 2/8/2018  2:09 PM        Pre-admit on 2/22/2018            Detailed Report    2/20-lab results noted.    2/21-PCP annual exam note reviewed.                                                                                                               02/08/2018  Pantera Posey is a 67 y.o., female with hx of HTN, WPW syndrome, chronic back pain, breast cancer   who presents for:    Pre-operative evaluation for Procedure(s) (LRB):  TRANSFER-FAT (Left)    Previous Anesthesia:  Present Prior to Hospital Arrival?: No; Placement Date: 11/20/17; Placement Time: 0954; Method of Intubation: Direct laryngoscopy; Inserted by: CRNA; Airway Device: Endotracheal Tube; Mask Ventilation: Easy; Intubated: Postinduction; Blade: Dutton #2; Airway Device Size: 7.0; Style: Cuffed; Cuff Inflation: Minimal occlusive pressure; Placement Verified By: Auscultation, Capnometry, ETT Condensation; Grade: Grade I; Complicating Factors: None; Intubation Findings: Positive EtCO2, Bilateral breath sounds, Atraumatic/Condition of teeth unchanged;  Depth of Insertion: 22; Securment: Lips; Complications: None; Breath Sounds: Equal Bilateral; Insertion Attempts: 1; Removal Date: 11/20/17;  Removal Time: 1214 8/1/17 EKG:  Normal sinus rhythm  LVH with repolarization abnormality      Patient Active Problem List   Diagnosis    HTN (hypertension), benign    Spondylosis  without myelopathy    Spinal stenosis, lumbar region, with neurogenic claudication    Thoracic or lumbosacral neuritis or radiculitis, unspecified    Acquired spondylolisthesis    Lumbago    Degeneration of lumbar or lumbosacral intervertebral disc    Back pain    Spinal stenosis    Fever    Malignant neoplasm of lower-outer quadrant of left breast of female, estrogen receptor positive    Breast cancer, left    History of right breast cancer    Use of aromatase inhibitors    Breast cancer    Osteopenia    Vasomotor instability- secondary to AI's       Review of patient's allergies indicates:   Allergen Reactions    Ciprofloxacin Anaphylaxis     Other reaction(s): Difficulty breathing    Floxin [ofloxacin] Anaphylaxis    Clindamycin Diarrhea and Rash    Amoxicillin      Other reaction(s): Rash    Indocin  [indomethacin sodium]      Other reaction(s): Headache    Bactrim [sulfamethoxazole-trimethoprim] Rash        No current facility-administered medications on file prior to encounter.      Current Outpatient Prescriptions on File Prior to Encounter   Medication Sig Dispense Refill    anastrozole (ARIMIDEX) 1 mg Tab Take 1 tablet (1 mg total) by mouth once daily. 90 tablet 2    calcium-vitamin D3 500 mg(1,250mg) -200 unit per tablet Take 2 tablets by mouth 2 (two) times daily with meals.      hydrocodone-acetaminophen 5-325mg (NORCO) 5-325 mg per tablet Take 1 tablet by mouth every 6 (six) hours as needed for Pain. 28 tablet 0    ondansetron (ZOFRAN) 4 MG tablet Take 1 tablet (4 mg total) by mouth every 8 (eight) hours as needed for Nausea. 20 tablet 0    oxycodone (ROXICODONE) 5 MG immediate release tablet Take 1 tablet (5 mg total) by mouth every 6 (six) hours as needed. 50 tablet 0       Past Surgical History:   Procedure Laterality Date    BACK SURGERY      Spinal fusion    BREAST BIOPSY      RIGHT    BREAST BIOPSY Left     BREAST SURGERY      CARDIAC ELECTROPHYSIOLOGY STUDY AND  ABLATION      MASTECTOMY      RIGHT/ RADIATION AND CHEMO    MASTECTOMY      left    TUBAL LIGATION         Social History     Social History    Marital status:      Spouse name: N/A    Number of children: N/A    Years of education: N/A     Occupational History    Not on file.     Social History Main Topics    Smoking status: Former Smoker     Packs/day: 1.00     Quit date: 12/3/1988    Smokeless tobacco: Never Used    Alcohol use 3.0 oz/week     5 Glasses of wine per week      Comment: wine nightly    Drug use: No    Sexual activity: Yes     Partners: Male     Birth control/ protection: Post-menopausal     Other Topics Concern    Not on file     Social History Narrative    No narrative on file         Vital Signs Range (Last 24H):  Pulse:  [68]   BP: (180)/(94)       CBC:   Recent Labs      02/20/18   1235   HGB  13.6       CMP:   Recent Labs      02/20/18   1235   NA  143   K  4.6   CL  106   CO2  25   BUN  15   CREATININE  0.8   GLU  90   CALCIUM  9.9       INR  No results for input(s): PT, INR, PROTIME, APTT in the last 72 hours.          Anesthesia Evaluation    I have reviewed the Patient Summary Reports.    I have reviewed the Nursing Notes.   I have reviewed the Medications.     Review of Systems  Anesthesia Hx:  Hx of Anesthetic complications  History of prior surgery of interest to airway management or planning: Previous anesthesia: General 11/20/17 breast implant exchange. with general anesthesia.  Procedure performed at an [facility]. Denies Family Hx of Anesthesia complications.  Personal Hx of Anesthesia complications, Post-Operative Nausea/Vomiting (severe-requests scopolamine patch and po antiemetics.)   Social:  Alcohol Use    Hematology/Oncology:  Hematology Normal       -- Cancer in past history: s/p surgery, s/p chemotherapy and s/p radiation therapy   Breast bilateral Oncology Comments: 1997 right breast cancer, S/P mastectomy, radiation and chemo. 2017 left breast  mastectomy, remains on Arimidex.     EENT/Dental:EENT/Dental Normal   Cardiovascular:   Exercise tolerance: good Hypertension  Denies Angina. ECG has been reviewed.  Functional Capacity 4 METS  Hypertension , Well Controlled on Rx , Recent typical home B/P of 116/71 Disorder of Cardiac Conduction (HX of WPW, S/P ablation.)    Pulmonary:  Pulmonary Normal  Denies Shortness of breath.  Denies Recent URI.    Renal/:  Renal/ Normal     Hepatic/GI:  Hepatic/GI Normal    Musculoskeletal:   Arthritis     Neurological:  Neurology Normal    Endocrine:  Endocrine Normal    Psych:   anxiety          Physical Exam  General:  Well nourished    Airway/Jaw/Neck:  Airway Findings: Mouth Opening: Normal Tongue: Normal  General Airway Assessment: Adult  Mallampati: II  Improves to II with phonation.  TM Distance: Normal, at least 6 cm  Jaw/Neck Findings:  Neck ROM: Normal ROM      Dental:  Dental Findings: In tact, upper partial dentures   Chest/Lungs:  Chest/Lungs Findings: Clear to auscultation, Normal Respiratory Rate     Heart/Vascular:  Heart Findings: Rate: Normal  Rhythm: Regular Rhythm  Sounds: Normal        Mental Status:  Mental Status Findings:  Cooperative, Alert and Oriented         Anesthesia Plan  Type of Anesthesia, risks & benefits discussed:  Anesthesia Type:  general  Patient's Preference:   Intra-op Monitoring Plan: standard ASA monitors  Intra-op Monitoring Plan Comments:   Post Op Pain Control Plan: multimodal analgesia  Post Op Pain Control Plan Comments:   Induction:   IV  Beta Blocker:         Informed Consent: Patient understands risks and agrees with Anesthesia plan.  Questions answered. Anesthesia consent signed with patient.  ASA Score: 3     Day of Surgery Review of History & Physical:    H&P update referred to the surgeon.         Ready For Surgery From Anesthesia Perspective.

## 2018-02-08 NOTE — PROGRESS NOTES
Patient arrived for pre op visit. Pre op instructions, dates and times reviewed with patient. Patient verbalized understanding. Paperwork given to patient.

## 2018-02-08 NOTE — PRE ADMISSION SCREENING
Anesthesia Assessment: Preoperative EQUATION    Planned Procedure: Procedure(s) (LRB):  TRANSFER-FAT (Left)  Requested Anesthesia Type:General  Surgeon: Oni Murrell MD  Service: Plastics  Known or anticipated Date of Surgery:2/22/2018    Surgeon notes: reviewed    Electronic QUestionnaire Assessment completed via nurse interview with patient.        NO AQ    Triage considerations:     The patient has no apparent active cardiac condition (No unstable coronary Syndrome such as severe unstable angina or recent [<1 month] myocardial infarction, decompensated CHF, severe valvular   disease or significant arrhythmia)    Previous anesthesia records:GETA, MAC and Complications noted-HX of severe PONV -requests scopolamine patch and po antiemetics. 11/20/17; Placement Time: 0954; Method of Intubation: Direct laryngoscopy; Inserted by: CRNA; Airway Device: Endotracheal Tube; Mask Ventilation: Easy; Intubated: Postinduction; Blade: Dutton #2; Airway Device Size: 7.0; Style: Cuffed; Cuff Inflation: Minimal occlusive pressure; Placement Verified By: Auscultation, Capnometry, ETT Condensation; Grade: Grade I; Complicating Factors: None; Intubation Findings: Positive EtCO2, Bilateral breath sounds, Atraumatic/Condition of teeth unchanged;  Depth of Insertion: 22; Securment: Lips; Complications: None; Breath Sounds: Equal Bilateral; Insertion Attempts: 1;  Airway/Jaw/Neck:  Airway Findings: Mouth Opening: Normal Tongue: Normal  General Airway Assessment: Good  Mallampati: I  TM Distance: Normal, at least 6 cm  Jaw/Neck Findings:  Neck ROM: Normal ROM      Dental:  Dental Findings: In tact     Last PCP note: > 1 year ago , within [facility] (has annual appt 2/21/18)  Subspecialty notes: Hematology/Oncology    Other important co-morbidities: HTN, HX WPW (S/P ablation x1), HX of breast cancer     Tests already available:  Available tests,  3-6 months ago , within [facility] . 11/9/17 BMP, Hgb. 8/2017 EKG.            Instructions  given. (See in Nurse's note)    Optimization:  Anesthesia Preop Clinic Assessment  Indicated-not required for this procedure      Plan:    Testing:  Hemoglobin and BMP      Patient  has previously scheduled Medical Appointment:2/20 Heme Onc, 2/21 PC for annual.    Navigation: Tests Scheduled. 2/20                      Results will be tracked by Preop Clinic.

## 2018-02-14 RX ORDER — HYDROCHLOROTHIAZIDE 25 MG/1
TABLET ORAL
Qty: 90 TABLET | Refills: 0 | Status: SHIPPED | OUTPATIENT
Start: 2018-02-14 | End: 2018-05-15 | Stop reason: SDUPTHER

## 2018-02-19 PROBLEM — R55 VASOMOTOR INSTABILITY: Status: ACTIVE | Noted: 2018-02-19

## 2018-02-19 PROBLEM — M85.80 OSTEOPENIA: Status: ACTIVE | Noted: 2018-02-19

## 2018-02-19 NOTE — PROGRESS NOTES
Subjective:       Patient ID: Pantera Posey is a 67 y.o. female.    Chief Complaint: Breast Cancer    HPI   Mrs. Posey returns today for a follow up.  As of mid Sepetmebr 2017 she has been on anastrazole 1 mg daily. Since her last visit, she underwent a bilateral implant exchange with free fat transfer on 11/20/2017. She is scheduled for left breast free fat transfer with bilateral nipple tattooing on 2/22/18.     Briefly, she  is a  67-year-old  female who has a remote history of breast cancer treated in [place] 20 years ago with mastectomy and five years of tamoxifen.      A recent routine mammogram on 06/27/2017 was read as BIRADS category 0 and showed an asymmetry in the upper region of the left breast   posteriorly.  A diagnostic mammogram two days later was read as BIRADS category 4 and a biopsy was performed on 07/11/2017 and showed a carcinoma that was ER strongly positive, FL positive, and HER-2 negative.    The patient was seen by Dr. Martin and eventually underwent a left-sided mastectomy and sentinel lymph node biopsy on 08/03/2017 with insertion of bilateral tissue expanders.    The pathology report from the procedure indicates that 4 left axillary lymph nodes were negative.  On the mastectomy sample, she had a 15 mm invasive ductal carcinoma; resection margins were clear.  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  There was no lymphovascular invasion.  Her Oncotype score was 10, suggesting a 9 % chance of recurrence at ten years with tamoxifen alone.  She underwent a Bilateral implant exchange with free fat transfer on 11/20/17.     Her DXA scan shows mild osteopenia of the hip but normal density on the L spine.  Repeat BDS 10/2019.     Overall she feels well.  She has fully recovered from her surgery 11/2017 and is ready for her left breast free fat transfer with bilateral nipple tattooing on 2/22/18. Anastrozole is currently on hold (5 days prior to surgery) and she  plans to resume 2/23/18.  She continues to have hot flashes from the AIs but reports them being less frequent. She has noticed occasional mild right knee pain. No injury or trauma.  She has not experienced any joint stiffness. ECOG PS is 1.  She denies any anxiety, depression, easy bruising, fevers, chills, night  sweats, weight loss, nausea, vomiting, diarrhea, constipation, diplopia, blurred vision, headache, chest pain, palpitations, shortness of breath, breast pain, abdominal pain, extremity pain, or difficulty ambulating.  The remainder of the ten-point ROS, including general, skin, lymph, H/N, cardiorespiratory, GI, , Neuro, Endocrine, and psychiatric is negative.       Review of Systems   Constitutional: Negative for appetite change and unexpected weight change.   Eyes: Negative for visual disturbance.   Respiratory: Negative for cough and shortness of breath.    Cardiovascular: Negative for chest pain.   Gastrointestinal: Negative for abdominal pain and diarrhea.   Genitourinary: Negative for frequency.   Musculoskeletal: Negative for back pain.   Skin: Negative for rash.   Neurological: Negative for headaches.   Hematological: Negative for adenopathy.   Psychiatric/Behavioral: The patient is not nervous/anxious.            Objective:      Physical Exam    She is alert, oriented to time, place, person, pleasant, well      nourished, in no acute physical distress.   She is here with her .                                 VITAL SIGNS:  Reviewed                                      HEENT:    There are no nasal, oral, lip, gingival, auricular, lid,    or conjunctival lesions.  Mucosae are moist and pink, and there is no        thrush.  Pupils are equal, reactive to light and accommodation.              Extraocular muscle movements are intact.  Dentition is fair. Maxillary teeth are missing and she has dentures.                                       NECK:  Supple without JVD, adenopathy, or thyromegaly.                        LUNGS:  Clear to auscultation without wheezing, rales, or rhonchi.           CARDIOVASCULAR:  Reveals an S1, S2, no murmurs, no rubs, no gallops.         ABDOMEN:  Soft, nontender, without organomegaly.  Bowel sounds are    present.                                                                     EXTREMITIES:  No cyanosis, clubbing, or edema.                               BREASTS:  She is status post bilateral mastectomies with implants in place.  Her incisions are well healed.                                        LYMPHATIC:  There is no cervical, axillary, or supraclavicular adenopathy.   SKIN:  Warm and moist, without petechiae, rashes, induration, or ecchymoses.           NEUROLOGIC:  Motor function is 5/5,  and cranial nerves are  within normal limits.    Assessment:       1. Malignant neoplasm of lower-outer quadrant of left breast of female, estrogen receptor positive    2. History of right breast cancer    3. Use of aromatase inhibitors    4. Osteopenia, unspecified location    5. 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 HTN (hypertension), benign          Plan:        Patient is doing well and is clinically stable. I have asked her to remain on anastrazole, which she will take through September 2022. RTC in  3 months to see Dr. Trotter.    Continue Calcium and Vit D. Next BDS 10/2019. Long discussion regarding her BP. She is to continue HCTZ 25mg daily as prescribed by her PCP. She has an appointment with Dr. Singh tomorrow and will bring in her home BP recordings as usual. Patient states that her home BP's are normal with systolic usually being around 110. She has calibrated her home BP monitor and it has been shown to be correct.       Patient is in agreement with the proposed treatment plan. All questions were answered to the patient's satisfaction. Pt knows to call clinic if anything is needed before the next clinic visit.      ISABELLE Hanson-KAREN  Hematology &  Oncology  [address] [place] [address]  ph. [phone]  Fax. [phone]     30 minutes were spent in coordination of patient's care, record review and counseling. More than 50% of the time was face-to-face.

## 2018-02-20 ENCOUNTER — LAB VISIT (OUTPATIENT)
Dept: LAB | Facility: HOSPITAL | Age: 68
End: 2018-02-20
Attending: ANESTHESIOLOGY
Payer: COMMERCIAL

## 2018-02-20 ENCOUNTER — OFFICE VISIT (OUTPATIENT)
Dept: HEMATOLOGY/ONCOLOGY | Facility: CLINIC | Age: 68
End: 2018-02-20
Payer: COMMERCIAL

## 2018-02-20 VITALS
BODY MASS INDEX: 23.07 KG/M2 | WEIGHT: 138.44 LBS | TEMPERATURE: 98 F | HEART RATE: 85 BPM | SYSTOLIC BLOOD PRESSURE: 180 MMHG | HEIGHT: 65 IN | DIASTOLIC BLOOD PRESSURE: 77 MMHG

## 2018-02-20 DIAGNOSIS — Z85.3 HISTORY OF RIGHT BREAST CANCER: ICD-10-CM

## 2018-02-20 DIAGNOSIS — R55 VASOMOTOR INSTABILITY: ICD-10-CM

## 2018-02-20 DIAGNOSIS — I10 HTN (HYPERTENSION), BENIGN: ICD-10-CM

## 2018-02-20 DIAGNOSIS — Z79.811 USE OF AROMATASE INHIBITORS: ICD-10-CM

## 2018-02-20 DIAGNOSIS — C50.512 MALIGNANT NEOPLASM OF LOWER-OUTER QUADRANT OF LEFT BREAST OF FEMALE, ESTROGEN RECEPTOR POSITIVE: Primary | ICD-10-CM

## 2018-02-20 DIAGNOSIS — Z01.818 PREOPERATIVE TESTING: ICD-10-CM

## 2018-02-20 DIAGNOSIS — M85.80 OSTEOPENIA, UNSPECIFIED LOCATION: ICD-10-CM

## 2018-02-20 DIAGNOSIS — Z17.0 MALIGNANT NEOPLASM OF LOWER-OUTER QUADRANT OF LEFT BREAST OF FEMALE, ESTROGEN RECEPTOR POSITIVE: Primary | ICD-10-CM

## 2018-02-20 LAB
ANION GAP SERPL CALC-SCNC: 12 MMOL/L
BUN SERPL-MCNC: 15 MG/DL
CALCIUM SERPL-MCNC: 9.9 MG/DL
CHLORIDE SERPL-SCNC: 106 MMOL/L
CO2 SERPL-SCNC: 25 MMOL/L
CREAT SERPL-MCNC: 0.8 MG/DL
EST. GFR  (AFRICAN AMERICAN): >60 ML/MIN/1.73 M^2
EST. GFR  (NON AFRICAN AMERICAN): >60 ML/MIN/1.73 M^2
GLUCOSE SERPL-MCNC: 90 MG/DL
HGB BLD-MCNC: 13.6 G/DL
POTASSIUM SERPL-SCNC: 4.6 MMOL/L
SODIUM SERPL-SCNC: 143 MMOL/L

## 2018-02-20 PROCEDURE — 99213 OFFICE O/P EST LOW 20 MIN: CPT | Mod: SA,S$GLB,, | Performed by: NURSE PRACTITIONER

## 2018-02-20 PROCEDURE — 36415 COLL VENOUS BLD VENIPUNCTURE: CPT

## 2018-02-20 PROCEDURE — 3008F BODY MASS INDEX DOCD: CPT | Mod: S$GLB,,, | Performed by: NURSE PRACTITIONER

## 2018-02-20 PROCEDURE — 80048 BASIC METABOLIC PNL TOTAL CA: CPT

## 2018-02-20 PROCEDURE — 1125F AMNT PAIN NOTED PAIN PRSNT: CPT | Mod: S$GLB,,, | Performed by: NURSE PRACTITIONER

## 2018-02-20 PROCEDURE — 99999 PR PBB SHADOW E&M-EST. PATIENT-LVL III: CPT | Mod: PBBFAC,,, | Performed by: NURSE PRACTITIONER

## 2018-02-20 PROCEDURE — 1159F MED LIST DOCD IN RCRD: CPT | Mod: S$GLB,,, | Performed by: NURSE PRACTITIONER

## 2018-02-20 PROCEDURE — 85018 HEMOGLOBIN: CPT

## 2018-02-21 ENCOUNTER — PATIENT MESSAGE (OUTPATIENT)
Dept: FAMILY MEDICINE | Facility: CLINIC | Age: 68
End: 2018-02-21

## 2018-02-21 ENCOUNTER — OFFICE VISIT (OUTPATIENT)
Dept: FAMILY MEDICINE | Facility: CLINIC | Age: 68
End: 2018-02-21
Attending: FAMILY MEDICINE
Payer: COMMERCIAL

## 2018-02-21 ENCOUNTER — LAB VISIT (OUTPATIENT)
Dept: LAB | Facility: HOSPITAL | Age: 68
End: 2018-02-21
Attending: FAMILY MEDICINE
Payer: COMMERCIAL

## 2018-02-21 VITALS
HEART RATE: 68 BPM | BODY MASS INDEX: 23.16 KG/M2 | SYSTOLIC BLOOD PRESSURE: 180 MMHG | WEIGHT: 139 LBS | HEIGHT: 65 IN | DIASTOLIC BLOOD PRESSURE: 94 MMHG

## 2018-02-21 DIAGNOSIS — Z01.419 ENCOUNTER FOR GYNECOLOGICAL EXAMINATION WITH PAPANICOLAOU SMEAR OF CERVIX: ICD-10-CM

## 2018-02-21 DIAGNOSIS — Z01.419 ENCOUNTER FOR GYNECOLOGICAL EXAMINATION WITH PAPANICOLAOU SMEAR OF CERVIX: Primary | ICD-10-CM

## 2018-02-21 DIAGNOSIS — Z00.00 ANNUAL PHYSICAL EXAM: ICD-10-CM

## 2018-02-21 DIAGNOSIS — I10 HTN (HYPERTENSION), BENIGN: ICD-10-CM

## 2018-02-21 DIAGNOSIS — E78.9 ABNORMAL CHOLESTEROL TEST: ICD-10-CM

## 2018-02-21 DIAGNOSIS — R82.90 ABNORMAL URINE: ICD-10-CM

## 2018-02-21 DIAGNOSIS — Z12.11 SCREEN FOR COLON CANCER: ICD-10-CM

## 2018-02-21 LAB
C TRACH DNA SPEC QL NAA+PROBE: NOT DETECTED
CHOLEST SERPL-MCNC: 280 MG/DL
CHOLEST/HDLC SERPL: 2.3 {RATIO}
HDLC SERPL-MCNC: 122 MG/DL
HDLC SERPL: 43.6 %
LDLC SERPL CALC-MCNC: 148.2 MG/DL
N GONORRHOEA DNA SPEC QL NAA+PROBE: NOT DETECTED
NONHDLC SERPL-MCNC: 158 MG/DL
TRIGL SERPL-MCNC: 49 MG/DL

## 2018-02-21 PROCEDURE — 88175 CYTOPATH C/V AUTO FLUID REDO: CPT

## 2018-02-21 PROCEDURE — 87088 URINE BACTERIA CULTURE: CPT

## 2018-02-21 PROCEDURE — 87186 SC STD MICRODIL/AGAR DIL: CPT

## 2018-02-21 PROCEDURE — 3008F BODY MASS INDEX DOCD: CPT | Mod: S$GLB,,, | Performed by: FAMILY MEDICINE

## 2018-02-21 PROCEDURE — 80061 LIPID PANEL: CPT

## 2018-02-21 PROCEDURE — 87077 CULTURE AEROBIC IDENTIFY: CPT

## 2018-02-21 PROCEDURE — 36415 COLL VENOUS BLD VENIPUNCTURE: CPT | Mod: PO

## 2018-02-21 PROCEDURE — 87491 CHLMYD TRACH DNA AMP PROBE: CPT

## 2018-02-21 PROCEDURE — 1126F AMNT PAIN NOTED NONE PRSNT: CPT | Mod: S$GLB,,, | Performed by: FAMILY MEDICINE

## 2018-02-21 PROCEDURE — 99999 PR PBB SHADOW E&M-EST. PATIENT-LVL III: CPT | Mod: PBBFAC,,, | Performed by: FAMILY MEDICINE

## 2018-02-21 PROCEDURE — 1159F MED LIST DOCD IN RCRD: CPT | Mod: S$GLB,,, | Performed by: FAMILY MEDICINE

## 2018-02-21 PROCEDURE — 99214 OFFICE O/P EST MOD 30 MIN: CPT | Mod: S$GLB,,, | Performed by: FAMILY MEDICINE

## 2018-02-21 PROCEDURE — 87086 URINE CULTURE/COLONY COUNT: CPT

## 2018-02-21 RX ORDER — DIAZEPAM 2 MG/1
TABLET ORAL
Qty: 10 TABLET | Refills: 0 | Status: SHIPPED | OUTPATIENT
Start: 2018-02-21 | End: 2019-04-29

## 2018-02-21 NOTE — PROGRESS NOTES
Subjective:       Patient ID: Pantera Posey is a 67 y.o. female.    Chief Complaint: Annual Exam and Gynecologic Exam    HPI   Pt is here for wwe she is well pt is over 65 however she is sexually active requests pap no discharge itching or burning  Pt has htn takes her bp routinely with normal bp's at home verified with comparison with her cuff at other md office she is on hctz  Declines med change  Review of Systems   Constitutional: Negative for activity change, chills, diaphoresis, fatigue, fever and unexpected weight change.   HENT: Negative for congestion, ear discharge, ear pain, hearing loss, postnasal drip, rhinorrhea, sinus pressure, sneezing, sore throat, trouble swallowing and voice change.    Eyes: Negative for photophobia, discharge, redness, itching and visual disturbance.   Respiratory: Negative for cough, chest tightness, shortness of breath and wheezing.    Cardiovascular: Negative for chest pain, palpitations and leg swelling.   Gastrointestinal: Negative for abdominal pain, anal bleeding, blood in stool, constipation, diarrhea, nausea, rectal pain and vomiting.   Endocrine: Negative for polydipsia and polyuria.   Genitourinary: Negative for difficulty urinating, dyspareunia, dysuria, flank pain, frequency, hematuria, menstrual problem, pelvic pain, urgency, vaginal bleeding and vaginal discharge.   Musculoskeletal: Positive for arthralgias. Negative for back pain, joint swelling and neck pain.   Skin: Negative for color change and rash.   Neurological: Negative for dizziness, speech difficulty, weakness, light-headedness, numbness and headaches.   Hematological: Does not bruise/bleed easily.   Psychiatric/Behavioral: Negative for agitation, confusion, decreased concentration, dysphoric mood, sleep disturbance and suicidal ideas. The patient is not nervous/anxious.        Objective:      Physical Exam   Constitutional: She appears well-developed and well-nourished.   HENT:   Head: Normocephalic  "and atraumatic.   Right Ear: External ear normal.   Left Ear: External ear normal.   Nose: Nose normal.   Mouth/Throat: Oropharynx is clear and moist.   Eyes: Conjunctivae and EOM are normal. Pupils are equal, round, and reactive to light. Right eye exhibits no discharge. Left eye exhibits no discharge.   Neck: Normal range of motion. Neck supple. No thyromegaly present.   Cardiovascular: Normal rate, regular rhythm and intact distal pulses.  Exam reveals no gallop and no friction rub.    Pulmonary/Chest: Effort normal and breath sounds normal. She has no wheezes. She has no rales.   Abdominal: Soft. Bowel sounds are normal. She exhibits no distension. There is no tenderness. There is no rebound and no guarding.   Genitourinary: Vagina normal and uterus normal. No vaginal discharge found.   Genitourinary Comments: nefg v/v urethra/urethral meatus w/o lesions or prolapse normal hair distribution cervix pink no adnexal tenderness or fullness moveable uterus     Breast exam declined s/p bilateral mastectomy   Musculoskeletal: Normal range of motion. She exhibits no edema or tenderness.   Lymphadenopathy:     She has no cervical adenopathy.   Neurological: She is alert. No cranial nerve deficit. She exhibits normal muscle tone. Coordination normal.   Skin: Skin is warm and dry. No rash noted. No erythema.   Psychiatric: She has a normal mood and affect. Her behavior is normal. Judgment and thought content normal.       Assessment:       1. Encounter for gynecological examination with Papanicolaou smear of cervix    2. Abnormal cholesterol test    3. HTN (hypertension), benign        Plan:     orders lipid  Cont meds   low salt diet  Graded exercise  rtc annually and prn    Health maintenance  Pap today pt request  Flu discussed  Tetanus q 10 years  Lipid ordered  Colonoscopy discussed  Pneumonia discussed  zostavax discussed    "This note will not be shared with the patient."   "

## 2018-02-22 ENCOUNTER — HOSPITAL ENCOUNTER (OUTPATIENT)
Facility: HOSPITAL | Age: 68
Discharge: HOME | End: 2018-02-22
Attending: SURGERY | Admitting: SURGERY
Payer: COMMERCIAL

## 2018-02-22 ENCOUNTER — ANESTHESIA (OUTPATIENT)
Dept: SURGERY | Facility: HOSPITAL | Age: 68
End: 2018-02-22
Payer: COMMERCIAL

## 2018-02-22 VITALS
TEMPERATURE: 98 F | WEIGHT: 138.88 LBS | OXYGEN SATURATION: 100 % | SYSTOLIC BLOOD PRESSURE: 149 MMHG | BODY MASS INDEX: 23.14 KG/M2 | RESPIRATION RATE: 18 BRPM | HEART RATE: 65 BPM | DIASTOLIC BLOOD PRESSURE: 75 MMHG | HEIGHT: 65 IN

## 2018-02-22 DIAGNOSIS — Z98.890 S/P BREAST RECONSTRUCTION: Primary | ICD-10-CM

## 2018-02-22 PROCEDURE — 36000704 HC OR TIME LEV I 1ST 15 MIN: Performed by: SURGERY

## 2018-02-22 PROCEDURE — 25000003 PHARM REV CODE 250: Performed by: STUDENT IN AN ORGANIZED HEALTH CARE EDUCATION/TRAINING PROGRAM

## 2018-02-22 PROCEDURE — 36000705 HC OR TIME LEV I EA ADD 15 MIN: Performed by: SURGERY

## 2018-02-22 PROCEDURE — 25000003 PHARM REV CODE 250: Performed by: SURGERY

## 2018-02-22 PROCEDURE — 27201423 OPTIME MED/SURG SUP & DEVICES STERILE SUPPLY: Performed by: SURGERY

## 2018-02-22 PROCEDURE — 71000033 HC RECOVERY, INTIAL HOUR: Performed by: SURGERY

## 2018-02-22 PROCEDURE — D9220A PRA ANESTHESIA: Mod: ,,, | Performed by: ANESTHESIOLOGY

## 2018-02-22 PROCEDURE — 37000009 HC ANESTHESIA EA ADD 15 MINS: Performed by: SURGERY

## 2018-02-22 PROCEDURE — 37000008 HC ANESTHESIA 1ST 15 MINUTES: Performed by: SURGERY

## 2018-02-22 PROCEDURE — 20926 PR REMV TISSUE FOR GRAFT OTHR: CPT | Mod: LT,,, | Performed by: SURGERY

## 2018-02-22 PROCEDURE — 63600175 PHARM REV CODE 636 W HCPCS: Performed by: STUDENT IN AN ORGANIZED HEALTH CARE EDUCATION/TRAINING PROGRAM

## 2018-02-22 PROCEDURE — 25000003 PHARM REV CODE 250: Performed by: ANESTHESIOLOGY

## 2018-02-22 PROCEDURE — 71000015 HC POSTOP RECOV 1ST HR: Performed by: SURGERY

## 2018-02-22 PROCEDURE — 63600175 PHARM REV CODE 636 W HCPCS

## 2018-02-22 PROCEDURE — 63600175 PHARM REV CODE 636 W HCPCS: Performed by: ANESTHESIOLOGY

## 2018-02-22 PROCEDURE — 63600175 PHARM REV CODE 636 W HCPCS: Performed by: SURGERY

## 2018-02-22 PROCEDURE — 71000039 HC RECOVERY, EACH ADD'L HOUR: Performed by: SURGERY

## 2018-02-22 RX ORDER — HYDRALAZINE HYDROCHLORIDE 20 MG/ML
INJECTION INTRAMUSCULAR; INTRAVENOUS
Status: COMPLETED
Start: 2018-02-22 | End: 2018-02-22

## 2018-02-22 RX ORDER — MIDAZOLAM HYDROCHLORIDE 1 MG/ML
INJECTION INTRAMUSCULAR; INTRAVENOUS
Status: DISCONTINUED | OUTPATIENT
Start: 2018-02-22 | End: 2018-02-22

## 2018-02-22 RX ORDER — SODIUM CHLORIDE 0.9 % (FLUSH) 0.9 %
3 SYRINGE (ML) INJECTION
Status: DISCONTINUED | OUTPATIENT
Start: 2018-02-22 | End: 2018-02-22 | Stop reason: HOSPADM

## 2018-02-22 RX ORDER — LIDOCAINE HYDROCHLORIDE 10 MG/ML
INJECTION, SOLUTION EPIDURAL; INFILTRATION; INTRACAUDAL; PERINEURAL
Status: DISCONTINUED | OUTPATIENT
Start: 2018-02-22 | End: 2018-02-22 | Stop reason: HOSPADM

## 2018-02-22 RX ORDER — FENTANYL CITRATE 50 UG/ML
25 INJECTION, SOLUTION INTRAMUSCULAR; INTRAVENOUS EVERY 5 MIN PRN
Status: DISCONTINUED | OUTPATIENT
Start: 2018-02-22 | End: 2018-02-22

## 2018-02-22 RX ORDER — ONDANSETRON HYDROCHLORIDE 8 MG/1
8 TABLET, FILM COATED ORAL EVERY 8 HOURS PRN
Qty: 60 TABLET | Refills: 0 | Status: SHIPPED | OUTPATIENT
Start: 2018-02-22 | End: 2019-01-09

## 2018-02-22 RX ORDER — LIDOCAINE HCL/PF 100 MG/5ML
SYRINGE (ML) INTRAVENOUS
Status: DISCONTINUED | OUTPATIENT
Start: 2018-02-22 | End: 2018-02-22

## 2018-02-22 RX ORDER — ONDANSETRON 2 MG/ML
INJECTION INTRAMUSCULAR; INTRAVENOUS
Status: DISCONTINUED | OUTPATIENT
Start: 2018-02-22 | End: 2018-02-22

## 2018-02-22 RX ORDER — TRIAMCINOLONE ACETONIDE 40 MG/ML
INJECTION, SUSPENSION INTRA-ARTICULAR; INTRAMUSCULAR
Status: DISCONTINUED | OUTPATIENT
Start: 2018-02-22 | End: 2018-02-22 | Stop reason: HOSPADM

## 2018-02-22 RX ORDER — EPINEPHRINE 1 MG/ML
INJECTION INTRAMUSCULAR; INTRAVENOUS; SUBCUTANEOUS
Status: DISCONTINUED | OUTPATIENT
Start: 2018-02-22 | End: 2018-02-22 | Stop reason: HOSPADM

## 2018-02-22 RX ORDER — OXYCODONE HYDROCHLORIDE 5 MG/1
10 TABLET ORAL EVERY 4 HOURS PRN
Status: DISCONTINUED | OUTPATIENT
Start: 2018-02-22 | End: 2018-02-22 | Stop reason: HOSPADM

## 2018-02-22 RX ORDER — SODIUM CHLORIDE 9 MG/ML
INJECTION, SOLUTION INTRAVENOUS CONTINUOUS
Status: DISCONTINUED | OUTPATIENT
Start: 2018-02-22 | End: 2018-02-22 | Stop reason: HOSPADM

## 2018-02-22 RX ORDER — PHENYLEPHRINE HYDROCHLORIDE 10 MG/ML
INJECTION INTRAVENOUS
Status: DISCONTINUED | OUTPATIENT
Start: 2018-02-22 | End: 2018-02-22

## 2018-02-22 RX ORDER — SODIUM BICARBONATE 42 MG/ML
INJECTION, SOLUTION INTRAVENOUS
Status: DISCONTINUED | OUTPATIENT
Start: 2018-02-22 | End: 2018-02-22 | Stop reason: HOSPADM

## 2018-02-22 RX ORDER — HYDROCODONE BITARTRATE AND ACETAMINOPHEN 5; 325 MG/1; MG/1
1 TABLET ORAL EVERY 6 HOURS PRN
Qty: 45 TABLET | Refills: 0 | Status: SHIPPED | OUTPATIENT
Start: 2018-02-22 | End: 2018-06-13

## 2018-02-22 RX ORDER — ONDANSETRON 2 MG/ML
4 INJECTION INTRAMUSCULAR; INTRAVENOUS DAILY PRN
Status: DISCONTINUED | OUTPATIENT
Start: 2018-02-22 | End: 2018-02-22 | Stop reason: HOSPADM

## 2018-02-22 RX ORDER — FENTANYL CITRATE 50 UG/ML
25 INJECTION, SOLUTION INTRAMUSCULAR; INTRAVENOUS EVERY 5 MIN PRN
Status: DISCONTINUED | OUTPATIENT
Start: 2018-02-22 | End: 2018-02-22 | Stop reason: HOSPADM

## 2018-02-22 RX ORDER — ASPIRIN 81 MG
100 TABLET, DELAYED RELEASE (ENTERIC COATED) ORAL 2 TIMES DAILY
Qty: 60 TABLET | Refills: 0 | Status: SHIPPED | OUTPATIENT
Start: 2018-02-22 | End: 2019-01-09

## 2018-02-22 RX ORDER — CEPHALEXIN 500 MG/1
500 CAPSULE ORAL EVERY 6 HOURS
Qty: 40 CAPSULE | Refills: 0 | Status: SHIPPED | OUTPATIENT
Start: 2018-02-22 | End: 2018-03-04

## 2018-02-22 RX ORDER — PROPOFOL 10 MG/ML
INJECTION, EMULSION INTRAVENOUS
Status: DISCONTINUED | OUTPATIENT
Start: 2018-02-22 | End: 2018-02-22

## 2018-02-22 RX ORDER — FENTANYL CITRATE 50 UG/ML
INJECTION, SOLUTION INTRAMUSCULAR; INTRAVENOUS
Status: DISCONTINUED | OUTPATIENT
Start: 2018-02-22 | End: 2018-02-22

## 2018-02-22 RX ORDER — HYDRALAZINE HYDROCHLORIDE 20 MG/ML
5 INJECTION INTRAMUSCULAR; INTRAVENOUS ONCE
Status: COMPLETED | OUTPATIENT
Start: 2018-02-22 | End: 2018-02-22

## 2018-02-22 RX ORDER — HYDRALAZINE HYDROCHLORIDE 20 MG/ML
5 INJECTION INTRAMUSCULAR; INTRAVENOUS ONCE
Status: DISCONTINUED | OUTPATIENT
Start: 2018-02-22 | End: 2018-02-22

## 2018-02-22 RX ORDER — SCOLOPAMINE TRANSDERMAL SYSTEM 1 MG/1
1 PATCH, EXTENDED RELEASE TRANSDERMAL
Status: DISCONTINUED | OUTPATIENT
Start: 2018-02-22 | End: 2018-02-22 | Stop reason: HOSPADM

## 2018-02-22 RX ORDER — LIDOCAINE HYDROCHLORIDE 10 MG/ML
1 INJECTION, SOLUTION EPIDURAL; INFILTRATION; INTRACAUDAL; PERINEURAL ONCE
Status: DISCONTINUED | OUTPATIENT
Start: 2018-02-22 | End: 2018-02-22 | Stop reason: HOSPADM

## 2018-02-22 RX ORDER — LIDOCAINE HYDROCHLORIDE 10 MG/ML
1 INJECTION, SOLUTION EPIDURAL; INFILTRATION; INTRACAUDAL; PERINEURAL ONCE
Status: COMPLETED | OUTPATIENT
Start: 2018-02-22 | End: 2018-02-22

## 2018-02-22 RX ORDER — MIDAZOLAM HYDROCHLORIDE 1 MG/ML
INJECTION, SOLUTION INTRAMUSCULAR; INTRAVENOUS
Status: DISCONTINUED | OUTPATIENT
Start: 2018-02-22 | End: 2018-02-22

## 2018-02-22 RX ADMIN — PROPOFOL 150 MG: 10 INJECTION, EMULSION INTRAVENOUS at 11:02

## 2018-02-22 RX ADMIN — PROPOFOL 50 MG: 10 INJECTION, EMULSION INTRAVENOUS at 11:02

## 2018-02-22 RX ADMIN — HYDRALAZINE HYDROCHLORIDE 5 MG: 20 INJECTION INTRAMUSCULAR; INTRAVENOUS at 01:02

## 2018-02-22 RX ADMIN — ONDANSETRON 4 MG: 2 INJECTION INTRAMUSCULAR; INTRAVENOUS at 12:02

## 2018-02-22 RX ADMIN — OXYCODONE HYDROCHLORIDE 10 MG: 5 TABLET ORAL at 12:02

## 2018-02-22 RX ADMIN — LIDOCAINE HYDROCHLORIDE 75 MG: 20 INJECTION, SOLUTION INTRAVENOUS at 11:02

## 2018-02-22 RX ADMIN — FENTANYL CITRATE 50 MCG: 50 INJECTION, SOLUTION INTRAMUSCULAR; INTRAVENOUS at 12:02

## 2018-02-22 RX ADMIN — FENTANYL CITRATE 50 MCG: 50 INJECTION, SOLUTION INTRAMUSCULAR; INTRAVENOUS at 11:02

## 2018-02-22 RX ADMIN — SCOPALAMINE 1 PATCH: 1 PATCH, EXTENDED RELEASE TRANSDERMAL at 10:02

## 2018-02-22 RX ADMIN — LIDOCAINE HYDROCHLORIDE 10 MG: 10 INJECTION, SOLUTION EPIDURAL; INFILTRATION; INTRACAUDAL; PERINEURAL at 10:02

## 2018-02-22 RX ADMIN — SODIUM CHLORIDE: 0.9 INJECTION, SOLUTION INTRAVENOUS at 11:02

## 2018-02-22 RX ADMIN — MIDAZOLAM HYDROCHLORIDE 2 MG: 1 INJECTION, SOLUTION INTRAMUSCULAR; INTRAVENOUS at 11:02

## 2018-02-22 RX ADMIN — PHENYLEPHRINE HYDROCHLORIDE 200 MCG: 10 INJECTION INTRAVENOUS at 12:02

## 2018-02-22 RX ADMIN — PHENYLEPHRINE HYDROCHLORIDE 100 MCG: 10 INJECTION INTRAVENOUS at 12:02

## 2018-02-22 RX ADMIN — Medication 1000 MG: at 11:02

## 2018-02-22 RX ADMIN — SODIUM CHLORIDE: 0.9 INJECTION, SOLUTION INTRAVENOUS at 10:02

## 2018-02-22 NOTE — PLAN OF CARE
Patient resting. PIV placed in right foot as patient has limb alerts on both arms. Seen by Handy VELEZ; correct site marked. MD on call paged regarding h & p update, procedure consent, and admission order. Awaiting call back. VS stable. Patient safety maintained.

## 2018-02-22 NOTE — TRANSFER OF CARE
"Anesthesia Transfer of Care Note    Patient: Pantera Posey    Procedure(s) Performed: Procedure(s) (LRB):  LIPOSUCTION WITH FAT TRANSFER (Left)    Patient location: PACU    Anesthesia Type: general    Transport from OR: Transported from OR on room air with adequate spontaneous ventilation    Post pain: adequate analgesia    Post assessment: no apparent anesthetic complications    Post vital signs: stable    Level of consciousness: awake and alert    Nausea/Vomiting: no nausea/vomiting    Complications: none          Last vitals:   Visit Vitals  /84 (BP Location: Right leg, Patient Position: Lying)   Pulse 78   Temp 36.4 °C (97.5 °F) (Temporal)   Resp 20   Ht 5' 5" (1.651 m)   Wt 63 kg (138 lb 14.2 oz)   LMP  (LMP Unknown)   SpO2 (!) 94%   BMI 23.11 kg/m²     "

## 2018-02-22 NOTE — ANESTHESIA POSTPROCEDURE EVALUATION
"Anesthesia Post Evaluation    Patient: Pantera Posey    Procedure(s) Performed: Procedure(s) (LRB):  LIPOSUCTION WITH FAT TRANSFER (Left)    Final Anesthesia Type: general  Patient location during evaluation: PACU  Patient participation: Yes- Able to Participate  Level of consciousness: awake and alert  Post-procedure vital signs: reviewed and stable  Pain management: adequate  Airway patency: patent  PONV status at discharge: No PONV  Anesthetic complications: no      Cardiovascular status: blood pressure returned to baseline  Respiratory status: unassisted  Hydration status: euvolemic  Follow-up not needed.        Visit Vitals  BP (!) 149/75   Pulse 65   Temp 36.7 °C (98 °F) (Temporal)   Resp 18   Ht 5' 5" (1.651 m)   Wt 63 kg (138 lb 14.2 oz)   LMP  (LMP Unknown)   SpO2 100%   BMI 23.11 kg/m²       Pain/Jori Score: Pain Assessment Performed: Yes (2/22/2018  3:00 PM)  Presence of Pain: denies (2/22/2018  3:00 PM)  Pain Rating Prior to Med Admin: 5 (2/22/2018 12:51 PM)  Pain Rating Post Med Admin: 4 (2/22/2018  2:10 PM)  Jori Score: 10 (2/22/2018  2:00 PM)      "

## 2018-02-22 NOTE — PLAN OF CARE
"Vss. sats 99% on room air. Left breast incision with nonadherent drsg/tape   No drainage noted.     denny groins with bandaids cdi and surgical pressure griddle.  Pt assisted to bathroom with pacu rn. Voided clear yellow urine.  drsg cdi.  See flowsheet for full assessment.  Pt states "pain is tolerable at 4 on pain scale to denny thighs.".  Ok for pt to go to [facility] per dr bell, telephone order read back.   "

## 2018-02-22 NOTE — H&P
Plastic Surgery History and Physical    HPI:  Pantera Posey 67 y.o. female here today for fat grafting.      PMH:  Past Medical History:   Diagnosis Date    Breast cancer     RIGHT    Cancer     breast cancer    Hypertension     Renea-Parkinson-White syndrome        PSH:  Past Surgical History:   Procedure Laterality Date    BACK SURGERY      Spinal fusion    BREAST BIOPSY      RIGHT    BREAST BIOPSY Left     BREAST SURGERY      CARDIAC ELECTROPHYSIOLOGY STUDY AND ABLATION      MASTECTOMY      RIGHT/ RADIATION AND CHEMO    MASTECTOMY      left    TUBAL LIGATION         SH:  Tobacco:   History   Smoking Status    Former Smoker    Packs/day: 1.00    Quit date: 12/3/1988   Smokeless Tobacco    Never Used     ETOH:   History   Alcohol Use    3.0 oz/week    5 Glasses of wine per week     Comment: wine nightly     Illicit Drugs:   History   Drug Use No       Medications    Current Facility-Administered Medications:     0.9%  NaCl infusion, , Intravenous, Continuous, Alaina Taylor MD, Last Rate: 10 mL/hr at 02/22/18 1008    fentaNYL injection 25 mcg, 25 mcg, Intravenous, Q5 Min PRN, Alaina Taylor MD    lidocaine (PF) 10 mg/ml (1%) injection 10 mg, 1 mL, Intradermal, Once, Johny Wetzel MD    ondansetron injection 4 mg, 4 mg, Intravenous, Daily PRN, Alaina Taylor MD    promethazine (PHENERGAN) 6.25 mg in dextrose 5 % 50 mL IVPB, 6.25 mg, Intravenous, Q10 Min PRN, Alaina Taylor MD    scopolamine 1.3-1.5 mg (1 mg over 3 days) 1 patch, 1 patch, Transdermal, Q3 Days, Alaina Taylor MD, 1 patch at 02/22/18 1012    sodium chloride 0.9% flush 3 mL, 3 mL, Intravenous, PRN, Alaina Taylor MD    vancomycin 1 g in 0.9% sodium chloride 250 mL IVPB (ready to mix system), 15 mg/kg, Intravenous, On Call Procedure, Johny Wetzel MD    Physical Exam  General: NAD, Resting comfortably in bed  Chest: CTA Bl  Heart: RRR  Abd: Soft, NT, ND  Breast - incisions healed, no erythema or  warmts. Liposustion sited well healed    Diagnostics:  Lab Results   Component Value Date    WBC 4.79 05/30/2016    HGB 13.6 02/20/2018    HCT 42.3 05/30/2016     (H) 05/30/2016     05/30/2016     Lab Results   Component Value Date    CREATININE 0.8 02/20/2018    BUN 15 02/20/2018     02/20/2018    K 4.6 02/20/2018     02/20/2018    CO2 25 02/20/2018     Lab Results   Component Value Date    ALT 14 05/30/2016    AST 22 05/30/2016    ALKPHOS 64 05/30/2016    BILITOT 0.6 05/30/2016     Lab Results   Component Value Date    ALBUMIN 4.0 05/30/2016         Assessment/Plan  Pantera Posey 67 y.o. female here today for fat grafting.    -To OR now   -NPO  -Patient has been marked and consented  -I have explained the risks, benefits, and alternatives of the procedure in detail.  The patient voices understanding and all questions have been answered.  The patient agrees to proceed as planned.      Johny Wetzel MD  [facility] [place] [facility]

## 2018-02-24 LAB — BACTERIA UR CULT: NORMAL

## 2018-02-25 NOTE — DISCHARGE SUMMARY
[facility]  Plastic Surgery  Discharge Summary      Patient Name: Pantera Posey  MRN: [id number]  Admission Date: 2/22/2018  Hospital Length of Stay: 0 days  Discharge Date and Time: 2/22/2018  3:11 PM  Attending Physician: Oni Murrell  Discharging Provider: Johny Wetzel MD  Primary Care Provider: Jazmin Singh MD     HPI: Patient is status post B breast reconstruction with implants. On 11/20/2017 she underwent B implant exchange with free fat transfer.  Here today for fat transfer.    Procedure(s) (LRB):  LIPOSUCTION WITH FAT TRANSFER (Left)     Hospital Course: Patient presented for above procedure.  Tolerated it well and was discharged home POD0 after voiding, tolerating diet, ambulating, pain controlled.  Discharge instructions, follow-up appointment, and med rec are below.      Significant Diagnostic Studies: Labs: CBC No results for input(s): WBC, HGB, HCT, PLT in the last 48 hours.    Pending Diagnostic Studies:     None        Final Active Diagnoses:    Diagnosis Date Noted POA    PRINCIPAL PROBLEM:  S/P breast reconstruction [Z98.82] 02/22/2018 Not Applicable      Problems Resolved During this Admission:    Diagnosis Date Noted Date Resolved POA      Discharged Condition: fair    Disposition: Home or Self Care    Follow Up:  Follow-up Information     Oni Murrell MD On 2/28/2018.    Specialty:  Plastic Surgery  Contact information:  [address] [place] [id number]  [phone]                 Patient Instructions:     Call MD for:  temperature >100.4     Call MD for:  persistent nausea and vomiting or diarrhea     Call MD for:  severe uncontrolled pain     Call MD for:  redness, tenderness, or signs of infection (pain, swelling, redness, odor or green/yellow discharge around incision site)     Call MD for:  difficulty breathing or increased cough     Call MD for:  severe persistent headache     Call MD for:  worsening rash     Call MD for:   persistent dizziness, light-headedness, or visual disturbances     Call MD for:  increased confusion or weakness     Other restrictions (specify):   Scheduling Instructions: Patient can shower in 48 hours.  Can NOT wear a bra until after first clinic visit.       Medications:  Reconciled Home Medications:   Discharge Medication List as of 2/22/2018  2:18 PM      START taking these medications    Details   cephALEXin (KEFLEX) 500 MG capsule Take 1 capsule (500 mg total) by mouth every 6 (six) hours., Starting Thu 2/22/2018, Until Sun 3/4/2018, Print      docusate sodium 100 mg capsule Take 100 mg by mouth 2 (two) times daily., Starting Thu 2/22/2018, Print      !! ondansetron (ZOFRAN) 8 MG tablet Take 1 tablet (8 mg total) by mouth every 8 (eight) hours as needed for Nausea., Starting Thu 2/22/2018, Print       !! - Potential duplicate medications found. Please discuss with provider.      CONTINUE these medications which have CHANGED    Details   hydrocodone-acetaminophen 5-325mg (NORCO) 5-325 mg per tablet Take 1 tablet by mouth every 6 (six) hours as needed for Pain., Starting Thu 2/22/2018, Print         CONTINUE these medications which have NOT CHANGED    Details   anastrozole (ARIMIDEX) 1 mg Tab Take 1 tablet (1 mg total) by mouth once daily., Starting Tue 9/12/2017, Until Wed 9/12/2018, Normal      calcium-vitamin D3 500 mg(1,250mg) -200 unit per tablet Take 2 tablets by mouth 2 (two) times daily with meals., Historical Med      diazePAM (VALIUM) 2 MG tablet 1 po 30 min prior to procedure prn, Print      hydroCHLOROthiazide (HYDRODIURIL) 25 MG tablet TAKE 1 TABLET DAILY, Normal      !! ondansetron (ZOFRAN) 4 MG tablet Take 1 tablet (4 mg total) by mouth every 8 (eight) hours as needed for Nausea., Starting Mon 11/20/2017, Normal      pneumococcal 23-kory ps vaccine 25 mcg/0.5 mL Syrg Disp amount required, Print       !! - Potential duplicate medications found. Please discuss with provider.      STOP taking these  medications       oxycodone (ROXICODONE) 5 MG immediate release tablet Comments:   Reason for Stopping:         zoster vaccine live, PF, (ZOSTAVAX) 19,400 unit/0.65 mL injection Comments:   Reason for Stopping:               Johny Wetzel MD  Plastic Surgery  [facility]

## 2018-02-26 NOTE — OP NOTE
DATE OF PROCEDURE:  02/22/2018.    PREOPERATIVE DIAGNOSIS:  History of breast cancer.    POSTOPERATIVE DIAGNOSIS:  History of breast cancer.    PROCEDURE PERFORMED:  Free fat transfer to the left breast.    SURGEON:  Oni Murrell M.D., F.A.C.S.    ANESTHESIA:  General.    DESCRIPTION OF PROCEDURE:  The patient was evaluated in the preoperative holding   area.  In the standing position, markings to harvest the fat from the inner   thighs was then made.  It was to be placed in the superior pole and medial   aspect of the left breast.  The patient was taken to the Operative Room, placed   in the supine position, after adequate general endotracheal anesthesia, was   prepped and draped in the normal sterile fashion.  Tumescent fluid was instilled   into both thighs.  Liposuction then proceeded, hooked up to the Azima system.    The fat was washed, loaded into syringes and then dispersed into the left   breast in the superior and medial aspects of the breast.  Approximately a total   of 150 mL of fat was instilled.  All incisions were closed using 6-0 nylon.    There were no complications.      [facility]  dd: 02/26/2018 07:44:36 (CST)  td: 02/26/2018 08:50:43 (CST)  Doc ID   #[id number]  Job ID #[id number]    CC:

## 2018-02-28 ENCOUNTER — OFFICE VISIT (OUTPATIENT)
Dept: PLASTIC SURGERY | Facility: CLINIC | Age: 68
End: 2018-02-28
Payer: COMMERCIAL

## 2018-02-28 VITALS
HEART RATE: 92 BPM | SYSTOLIC BLOOD PRESSURE: 176 MMHG | BODY MASS INDEX: 22.99 KG/M2 | TEMPERATURE: 98 F | WEIGHT: 138 LBS | DIASTOLIC BLOOD PRESSURE: 82 MMHG | HEIGHT: 65 IN

## 2018-02-28 DIAGNOSIS — Z09 SURGERY FOLLOW-UP EXAMINATION: Primary | ICD-10-CM

## 2018-02-28 PROCEDURE — 99999 PR PBB SHADOW E&M-EST. PATIENT-LVL III: CPT | Mod: PBBFAC,,, | Performed by: SURGERY

## 2018-02-28 PROCEDURE — 99024 POSTOP FOLLOW-UP VISIT: CPT | Mod: S$GLB,,, | Performed by: SURGERY

## 2018-02-28 NOTE — PROGRESS NOTES
Pantera Posey presents to [facility] clinic on 2/28/2018 for follow up after undergoing free fat transfer from her bilateral thighs to her left breast on 2/22/18. She is doing well today with no issues since her last visit.     Review of patient's allergies indicates:   Allergen Reactions    Ciprofloxacin Anaphylaxis     Other reaction(s): Difficulty breathing    Floxin [ofloxacin] Anaphylaxis    Clindamycin Diarrhea and Rash    Amoxicillin      Other reaction(s): Rash    Indocin  [indomethacin sodium]      Other reaction(s): Headache    Bactrim [sulfamethoxazole-trimethoprim] Rash     Current Outpatient Prescriptions on File Prior to Visit   Medication Sig Dispense Refill    anastrozole (ARIMIDEX) 1 mg Tab Take 1 tablet (1 mg total) by mouth once daily. 90 tablet 2    calcium-vitamin D3 500 mg(1,250mg) -200 unit per tablet Take 2 tablets by mouth 2 (two) times daily with meals.      cephALEXin (KEFLEX) 500 MG capsule Take 1 capsule (500 mg total) by mouth every 6 (six) hours. 40 capsule 0    diazePAM (VALIUM) 2 MG tablet 1 po 30 min prior to procedure prn 10 tablet 0    docusate sodium 100 mg capsule Take 100 mg by mouth 2 (two) times daily. 60 tablet 0    hydroCHLOROthiazide (HYDRODIURIL) 25 MG tablet TAKE 1 TABLET DAILY 90 tablet 0    hydrocodone-acetaminophen 5-325mg (NORCO) 5-325 mg per tablet Take 1 tablet by mouth every 6 (six) hours as needed for Pain. 45 tablet 0    ondansetron (ZOFRAN) 4 MG tablet Take 1 tablet (4 mg total) by mouth every 8 (eight) hours as needed for Nausea. 20 tablet 0    ondansetron (ZOFRAN) 8 MG tablet Take 1 tablet (8 mg total) by mouth every 8 (eight) hours as needed for Nausea. 60 tablet 0    pneumococcal 23-kory ps vaccine 25 mcg/0.5 mL Syrg Disp amount required 1 Syringe 0     No current facility-administered medications on file prior to visit.      Patient Active Problem List   Diagnosis    HTN (hypertension), benign    Spondylosis without myelopathy    Spinal  stenosis, lumbar region, with neurogenic claudication    Thoracic or lumbosacral neuritis or radiculitis, unspecified    Acquired spondylolisthesis    Lumbago    Degeneration of lumbar or lumbosacral intervertebral disc    Back pain    Spinal stenosis    Fever    Malignant neoplasm of lower-outer quadrant of left breast of female, estrogen receptor positive    Breast cancer, left    History of right breast cancer    Use of aromatase inhibitors    Breast cancer    Osteopenia    Vasomotor instability- secondary to AI's    S/P breast reconstruction     Past Surgical History:   Procedure Laterality Date    BACK SURGERY      Spinal fusion    BREAST BIOPSY      RIGHT    BREAST BIOPSY Left     BREAST SURGERY      CARDIAC ELECTROPHYSIOLOGY STUDY AND ABLATION      MASTECTOMY      RIGHT/ RADIATION AND CHEMO    MASTECTOMY      left    TUBAL LIGATION       PHYSICAL EXAMINATION  Vitals:    02/28/18 1415   BP: (!) 176/82   Pulse: 92   Temp: 98 °F (36.7 °C)     WD WN NAD  VSS  Normal resp effort  Breast - incision well healed, no erythema  Thigh - Incisions CDI bilaterally, bruising distal to incisions healing well    ASSESSMENT/PLAN  67 y.o. F s/p left breast free fat transplant  - doing well, no issues  - will follow up in clinic in 4 weeks  - patient would like B nipple tattoo, can discuss at follow up visit

## 2018-03-13 ENCOUNTER — OFFICE VISIT (OUTPATIENT)
Dept: PLASTIC SURGERY | Facility: CLINIC | Age: 68
End: 2018-03-13
Payer: COMMERCIAL

## 2018-03-13 VITALS
TEMPERATURE: 99 F | WEIGHT: 140.19 LBS | HEIGHT: 65 IN | DIASTOLIC BLOOD PRESSURE: 89 MMHG | SYSTOLIC BLOOD PRESSURE: 191 MMHG | BODY MASS INDEX: 23.36 KG/M2 | HEART RATE: 83 BPM

## 2018-03-13 DIAGNOSIS — Z09 SURGERY FOLLOW-UP EXAMINATION: Primary | ICD-10-CM

## 2018-03-13 PROCEDURE — 99999 PR PBB SHADOW E&M-EST. PATIENT-LVL III: CPT | Mod: PBBFAC,,, | Performed by: PHYSICIAN ASSISTANT

## 2018-03-13 PROCEDURE — 99024 POSTOP FOLLOW-UP VISIT: CPT | Mod: S$GLB,,, | Performed by: PHYSICIAN ASSISTANT

## 2018-03-13 PROCEDURE — 99499 UNLISTED E&M SERVICE: CPT | Mod: S$GLB,,, | Performed by: PHYSICIAN ASSISTANT

## 2018-03-15 NOTE — PROGRESS NOTES
"Pantera Posey presents to [facility] on 3/13/2018 for a follow up visit status post free fat transfer to left breast on 02/22/2018 for breast reconstruction. She is doing well today but reports "swelling of right inner thigh"    Review of patient's allergies indicates:   Allergen Reactions    Ciprofloxacin Anaphylaxis     Other reaction(s): Difficulty breathing    Floxin [ofloxacin] Anaphylaxis    Clindamycin Diarrhea and Rash    Amoxicillin      Other reaction(s): Rash    Indocin  [indomethacin sodium]      Other reaction(s): Headache    Bactrim [sulfamethoxazole-trimethoprim] Rash     Current Outpatient Prescriptions on File Prior to Visit   Medication Sig Dispense Refill    anastrozole (ARIMIDEX) 1 mg Tab Take 1 tablet (1 mg total) by mouth once daily. 90 tablet 2    calcium-vitamin D3 500 mg(1,250mg) -200 unit per tablet Take 2 tablets by mouth 2 (two) times daily with meals.      diazePAM (VALIUM) 2 MG tablet 1 po 30 min prior to procedure prn 10 tablet 0    docusate sodium 100 mg capsule Take 100 mg by mouth 2 (two) times daily. 60 tablet 0    hydroCHLOROthiazide (HYDRODIURIL) 25 MG tablet TAKE 1 TABLET DAILY 90 tablet 0    hydrocodone-acetaminophen 5-325mg (NORCO) 5-325 mg per tablet Take 1 tablet by mouth every 6 (six) hours as needed for Pain. 45 tablet 0    ondansetron (ZOFRAN) 4 MG tablet Take 1 tablet (4 mg total) by mouth every 8 (eight) hours as needed for Nausea. 20 tablet 0    ondansetron (ZOFRAN) 8 MG tablet Take 1 tablet (8 mg total) by mouth every 8 (eight) hours as needed for Nausea. 60 tablet 0    pneumococcal 23-kory ps vaccine 25 mcg/0.5 mL Syrg Disp amount required 1 Syringe 0     No current facility-administered medications on file prior to visit.      Patient Active Problem List   Diagnosis    HTN (hypertension), benign    Spondylosis without myelopathy    Spinal stenosis, lumbar region, with neurogenic claudication    Thoracic or lumbosacral neuritis or " radiculitis, unspecified    Acquired spondylolisthesis    Lumbago    Degeneration of lumbar or lumbosacral intervertebral disc    Back pain    Spinal stenosis    Fever    Malignant neoplasm of lower-outer quadrant of left breast of female, estrogen receptor positive    Breast cancer, left    History of right breast cancer    Use of aromatase inhibitors    Breast cancer    Osteopenia    Vasomotor instability- secondary to AI's    S/P breast reconstruction     Past Surgical History:   Procedure Laterality Date    BACK SURGERY      Spinal fusion    BREAST BIOPSY      RIGHT    BREAST BIOPSY Left     BREAST SURGERY      CARDIAC ELECTROPHYSIOLOGY STUDY AND ABLATION      MASTECTOMY      RIGHT/ RADIATION AND CHEMO    MASTECTOMY      left    TUBAL LIGATION       PHYSICAL EXAMINATION  Vitals:    03/13/18 0953   BP: (!) 191/89   Pulse: 83   Temp: 98.9 °F (37.2 °C)     WD WN NAD  VSS  Normal resp effort  R Breast - incision CDI, no erythema/drainage  L Breast - incision CDI, no erythema/drainage  R inner thigh- seroma, no erythema/warmth/tenderness    ASSESSMENT/PLAN  67 y.o. F s/p breast reconstruction  - Doing well, no issues, pleased with outcome  - R inner thigh seroma aspirated - 20cc serous fluid  - Stressed importance of wearing LE compression garment at all times  - RTC x 1 week    All questions were answered. The patient was advised to call the clinic with any questions or concerns prior to their next visit.

## 2018-03-28 ENCOUNTER — OFFICE VISIT (OUTPATIENT)
Dept: PLASTIC SURGERY | Facility: CLINIC | Age: 68
End: 2018-03-28
Payer: COMMERCIAL

## 2018-03-28 VITALS — TEMPERATURE: 99 F | HEIGHT: 65 IN | WEIGHT: 137 LBS | BODY MASS INDEX: 22.82 KG/M2

## 2018-03-28 DIAGNOSIS — Z09 SURGERY FOLLOW-UP EXAMINATION: Primary | ICD-10-CM

## 2018-03-28 PROCEDURE — 99999 PR PBB SHADOW E&M-EST. PATIENT-LVL II: CPT | Mod: PBBFAC,,, | Performed by: PHYSICIAN ASSISTANT

## 2018-03-28 PROCEDURE — 99024 POSTOP FOLLOW-UP VISIT: CPT | Mod: S$GLB,,, | Performed by: PHYSICIAN ASSISTANT

## 2018-03-28 NOTE — PROGRESS NOTES
Pantera Posey presents to [facility] on 3/28/2018 for a follow up visit status post B breast reconstruction with implants and free fat transfer. She is doing well today with no issues since her last visit. She was seen and evaluated by myself and Dr. Oni Murrell today    Review of patient's allergies indicates:   Allergen Reactions    Ciprofloxacin Anaphylaxis     Other reaction(s): Difficulty breathing    Floxin [ofloxacin] Anaphylaxis    Clindamycin Diarrhea and Rash    Amoxicillin      Other reaction(s): Rash    Indocin  [indomethacin sodium]      Other reaction(s): Headache    Bactrim [sulfamethoxazole-trimethoprim] Rash     Current Outpatient Prescriptions on File Prior to Visit   Medication Sig Dispense Refill    anastrozole (ARIMIDEX) 1 mg Tab Take 1 tablet (1 mg total) by mouth once daily. 90 tablet 2    calcium-vitamin D3 500 mg(1,250mg) -200 unit per tablet Take 2 tablets by mouth 2 (two) times daily with meals.      diazePAM (VALIUM) 2 MG tablet 1 po 30 min prior to procedure prn 10 tablet 0    docusate sodium 100 mg capsule Take 100 mg by mouth 2 (two) times daily. 60 tablet 0    hydroCHLOROthiazide (HYDRODIURIL) 25 MG tablet TAKE 1 TABLET DAILY 90 tablet 0    hydrocodone-acetaminophen 5-325mg (NORCO) 5-325 mg per tablet Take 1 tablet by mouth every 6 (six) hours as needed for Pain. 45 tablet 0    ondansetron (ZOFRAN) 4 MG tablet Take 1 tablet (4 mg total) by mouth every 8 (eight) hours as needed for Nausea. 20 tablet 0    ondansetron (ZOFRAN) 8 MG tablet Take 1 tablet (8 mg total) by mouth every 8 (eight) hours as needed for Nausea. 60 tablet 0    pneumococcal 23-kory ps vaccine 25 mcg/0.5 mL Syrg Disp amount required 1 Syringe 0     No current facility-administered medications on file prior to visit.      Patient Active Problem List   Diagnosis    HTN (hypertension), benign    Spondylosis without myelopathy    Spinal stenosis, lumbar region, with neurogenic  claudication    Thoracic or lumbosacral neuritis or radiculitis, unspecified    Acquired spondylolisthesis    Lumbago    Degeneration of lumbar or lumbosacral intervertebral disc    Back pain    Spinal stenosis    Fever    Malignant neoplasm of lower-outer quadrant of left breast of female, estrogen receptor positive    Breast cancer, left    History of right breast cancer    Use of aromatase inhibitors    Breast cancer    Osteopenia    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 F s/p B breast reconstruction  - Doing well, no issues. Very pleased with outcome  - Small seroma of Medial thigh, no erythema/warmth/tenderness. Will not aspirate today, continue with compression garment  - Will schedule for B 3D nipple tattoo    All questions were answered. The patient was advised to call the clinic with any questions or concerns prior to their next visit.

## 2018-05-15 RX ORDER — HYDROCHLOROTHIAZIDE 25 MG/1
TABLET ORAL
Qty: 90 TABLET | Refills: 0 | Status: SHIPPED | OUTPATIENT
Start: 2018-05-15 | End: 2018-08-12 | Stop reason: SDUPTHER

## 2018-05-18 ENCOUNTER — PROCEDURE VISIT (OUTPATIENT)
Dept: PLASTIC SURGERY | Facility: CLINIC | Age: 68
End: 2018-05-18
Payer: COMMERCIAL

## 2018-05-18 DIAGNOSIS — Z98.890 S/P BREAST RECONSTRUCTION: Primary | ICD-10-CM

## 2018-05-18 PROCEDURE — 11921 CORRECT SKN COLOR 6.1-20.0CM: CPT | Mod: 78,S$GLB,, | Performed by: PHYSICIAN ASSISTANT

## 2018-05-18 NOTE — PROCEDURES
Pantera Posey presents to [facility] on 5/18/2018 for Bilateral 3D nipple tattoo under local anesthesia for completion of implant based reconstruction.     Review of patient's allergies indicates:   Allergen Reactions    Ciprofloxacin Anaphylaxis     Other reaction(s): Difficulty breathing    Floxin [ofloxacin] Anaphylaxis    Clindamycin Diarrhea and Rash    Amoxicillin      Other reaction(s): Rash    Indocin  [indomethacin sodium]      Other reaction(s): Headache    Bactrim [sulfamethoxazole-trimethoprim] Rash     Current Outpatient Prescriptions on File Prior to Visit   Medication Sig Dispense Refill    anastrozole (ARIMIDEX) 1 mg Tab Take 1 tablet (1 mg total) by mouth once daily. 90 tablet 2    calcium-vitamin D3 500 mg(1,250mg) -200 unit per tablet Take 2 tablets by mouth 2 (two) times daily with meals.      diazePAM (VALIUM) 2 MG tablet 1 po 30 min prior to procedure prn 10 tablet 0    docusate sodium 100 mg capsule Take 100 mg by mouth 2 (two) times daily. 60 tablet 0    hydroCHLOROthiazide (HYDRODIURIL) 25 MG tablet TAKE 1 TABLET DAILY 90 tablet 0    hydrocodone-acetaminophen 5-325mg (NORCO) 5-325 mg per tablet Take 1 tablet by mouth every 6 (six) hours as needed for Pain. 45 tablet 0    ondansetron (ZOFRAN) 4 MG tablet Take 1 tablet (4 mg total) by mouth every 8 (eight) hours as needed for Nausea. 20 tablet 0    ondansetron (ZOFRAN) 8 MG tablet Take 1 tablet (8 mg total) by mouth every 8 (eight) hours as needed for Nausea. 60 tablet 0    pneumococcal 23-kory ps vaccine 25 mcg/0.5 mL Syrg Disp amount required 1 Syringe 0     No current facility-administered medications on file prior to visit.      Patient Active Problem List   Diagnosis    HTN (hypertension), benign    Spondylosis without myelopathy    Spinal stenosis, lumbar region, with neurogenic claudication    Thoracic or lumbosacral neuritis or radiculitis, unspecified    Acquired spondylolisthesis    Lumbago     Degeneration of lumbar or lumbosacral intervertebral disc    Back pain    Spinal stenosis    Fever    Malignant neoplasm of lower-outer quadrant of left breast of female, estrogen receptor positive    Breast cancer, left    History of right breast cancer    Use of aromatase inhibitors    Breast cancer    Osteopenia    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 She understands that the tattoo process is usually a two stage process and she may indeed require second stage tattoo procedure. She understands that the risks include but are not limited to bleeding, scarring, infection, pain, numbness, asymmetry, fading, allergic reaction and need for touch up. After all questions were answered, the patient signed the informed consent form and that will be scanned into her medical record     Bilateral nipple areola complexes were placed using circular stencil and permanent marker while patient was standing. Patient and her  visualized placement and agreed to proceed. Nipple and areola colors were mixed and tested on skin of breast, patient agreed to proceed with colors.      B nipples were then tattoos using 3 dimensional technique. There was punctate bleeding noted in all quadrants. Patient tolerated the procedure well. There were no complications.      Bilateral tattoos were cleaned with normal saline then covered with Bacitracin and nonstick dressing.      Post tattoo instructions were reviewed in detail, patient voiced understanding. She will return to  clinic in 4 weeks, appointment scheduled for 06/20/2018. All questions were answered. The patient was advised to call the clinic with any questions or concerns prior to their next visit

## 2018-05-18 NOTE — PROGRESS NOTES
Subjective:       Patient ID: Pantera Posey is a 67 y.o. female.    Chief Complaint: No chief complaint on file.    HPI   Mrs. Posey returns today for follow up.  As of mid September 2017 she has been on anastrazole 1 mg daily.     Briefly, she  is a  67-year-old  female who has a remote history of breast cancer treated in [place] 20 years ago with mastectomy and five years of tamoxifen.      A recent routine mammogram on 06/27/2017 was read as BIRADS category 0 and showed an asymmetry in the upper region of the left breast   posteriorly.  A diagnostic mammogram two days later was read as BIRADS category 4 and a biopsy was performed on 07/11/2017 and showed a carcinoma that was ER strongly positive, IL positive, and HER-2 negative.    The patient was seen by Dr. Martin and eventually underwent a left-sided mastectomy and sentinel lymph node biopsy on 08/03/2017 with insertion of bilateral tissue expanders.    The pathology report from the procedure indicates that 4 left axillary lymph nodes were negative.  On the mastectomy sample, she had a 15 mm invasive ductal carcinoma; resection margins were clear.  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  There was no lymphovascular invasion.  Her Oncotype score was 10, suggesting a 9 % chance of recurrence at ten years with tamoxifen alone.    Her DXA scan showed mild osteopenia of the hip but normal density on the L spine.    The medical, surgical, as well as family history remain unchanged.        Review of Systems    Overall she feels well.  She states that her hot flashes have improved buts he does experience intermittent joint stiffness. ECOG PS is 1.   She denies any anxiety, depression, easy bruising, fevers, chills, night  sweats, weight loss, nausea, vomiting, diarrhea, constipation, diplopia, blurred vision, headache, chest pain, palpitations, shortness of breath, breast pain, abdominal pain, extremity pain, or difficulty ambulating.   The remainder of the ten-point ROS, including general, skin, lymph, H/N, cardiorespiratory, GI, , Neuro, Endocrine, and psychiatric is negative.       Objective:      Physical Exam    She is alert, oriented to time, place, person, pleasant, well      nourished, in no acute physical distress.   She is here with her .                                 VITAL SIGNS:  Reviewed                                      HEENT:    There are no nasal, oral, lip, gingival, auricular, lid,    or conjunctival lesions.  Mucosae are moist and pink, and there is no        thrush.  Pupils are equal, reactive to light and accommodation.              Extraocular muscle movements are intact.  Dentition is fair. Maxillary teeth are missing and she has dentures.                                       NECK:  Supple without JVD, adenopathy, or thyromegaly.                       LUNGS:  Clear to auscultation without wheezing, rales, or rhonchi.           CARDIOVASCULAR:  Reveals an S1, S2, no murmurs, no rubs, no gallops.         ABDOMEN:  Soft, nontender, without organomegaly.  Bowel sounds are    present.                                                                     EXTREMITIES:  No cyanosis, clubbing, or edema.                               BREASTS:  She is status post bilateral mastectomies with reconstructions,.  EHr nipples have recently been tattooed.                                        LYMPHATIC:  There is no cervical, axillary, or supraclavicular adenopathy.   SKIN:  Warm and moist, without petechiae, rashes, induration, or ecchymoses.           NEUROLOGIC:  DTRs are 0-1+ bilaterally, symmetrical, motor function is 5/5,  and cranial nerves are  within normal limits.    Assessment:       1. Malignant neoplasm of lower-outer quadrant of left breast of female, estrogen receptor positive    2. Use of aromatase inhibitors      3.    Musculoskeletal pains secondary to AIs.  Plan:        I have asked her to remain on  anastrazole, which she will take through September 2022. I will see her again in 3 months.  Her multiple questions were answered to her satisfaction.

## 2018-05-21 ENCOUNTER — OFFICE VISIT (OUTPATIENT)
Dept: HEMATOLOGY/ONCOLOGY | Facility: CLINIC | Age: 68
End: 2018-05-21
Payer: COMMERCIAL

## 2018-05-21 VITALS
OXYGEN SATURATION: 99 % | DIASTOLIC BLOOD PRESSURE: 76 MMHG | HEART RATE: 86 BPM | SYSTOLIC BLOOD PRESSURE: 140 MMHG | RESPIRATION RATE: 18 BRPM | TEMPERATURE: 98 F

## 2018-05-21 DIAGNOSIS — C50.512 MALIGNANT NEOPLASM OF LOWER-OUTER QUADRANT OF LEFT BREAST OF FEMALE, ESTROGEN RECEPTOR POSITIVE: Primary | ICD-10-CM

## 2018-05-21 DIAGNOSIS — Z79.811 USE OF AROMATASE INHIBITORS: ICD-10-CM

## 2018-05-21 DIAGNOSIS — Z17.0 MALIGNANT NEOPLASM OF LOWER-OUTER QUADRANT OF LEFT BREAST OF FEMALE, ESTROGEN RECEPTOR POSITIVE: Primary | ICD-10-CM

## 2018-05-21 PROCEDURE — 99999 PR PBB SHADOW E&M-EST. PATIENT-LVL I: CPT | Mod: PBBFAC,,, | Performed by: INTERNAL MEDICINE

## 2018-05-21 PROCEDURE — 99213 OFFICE O/P EST LOW 20 MIN: CPT | Mod: S$GLB,,, | Performed by: INTERNAL MEDICINE

## 2018-05-28 ENCOUNTER — PATIENT MESSAGE (OUTPATIENT)
Dept: SURGERY | Facility: CLINIC | Age: 68
End: 2018-05-28

## 2018-05-28 DIAGNOSIS — M79.89 SWELLING OF HAND, UNSPECIFIED LATERALITY: Primary | ICD-10-CM

## 2018-06-01 ENCOUNTER — CLINICAL SUPPORT (OUTPATIENT)
Dept: REHABILITATION | Facility: HOSPITAL | Age: 68
End: 2018-06-01
Attending: SURGERY
Payer: COMMERCIAL

## 2018-06-01 DIAGNOSIS — I89.0 LYMPHEDEMA OF BREAST: Primary | ICD-10-CM

## 2018-06-01 DIAGNOSIS — M79.89 SWELLING OF HAND, UNSPECIFIED LATERALITY: ICD-10-CM

## 2018-06-01 DIAGNOSIS — I89.0 LYMPHEDEMA OF LEFT UPPER EXTREMITY: ICD-10-CM

## 2018-06-01 PROCEDURE — 97161 PT EVAL LOW COMPLEX 20 MIN: CPT | Mod: PO | Performed by: PHYSICAL THERAPIST

## 2018-06-01 NOTE — PLAN OF CARE
Physical Therapy Initial Evaluation     Patient: Pantera Posey  [facility] #: [id number]    Date: 6/1/2018  Physician: Rosalina Martin MD  Diagnosis: L hand swelling    Patient is a 67 y.o. female with breast cancer in 1997 on the R, 20 LL nodes removed. L axilla 4 ll nodes removed. Estrogen + on hormone replacement.  pt reports L UE swelling. Pt is R handed  Onset: pt noticed swelling began this past Saturday. No precipitating event  Surgery: pt is status post B breast reconstruction with implants and free fat transfer.   Radiation: none   Chemotherapy: anastrazole 1 mg  Previous Lymphedema Treatment: none    Past Medical History:   Diagnosis Date    Breast cancer     RIGHT    Cancer     breast cancer    Hypertension     Renea-Parkinson-White syndrome      Current Outpatient Prescriptions   Medication Sig    anastrozole (ARIMIDEX) 1 mg Tab Take 1 tablet (1 mg total) by mouth once daily.    calcium-vitamin D3 500 mg(1,250mg) -200 unit per tablet Take 2 tablets by mouth 2 (two) times daily with meals.    diazePAM (VALIUM) 2 MG tablet 1 po 30 min prior to procedure prn    docusate sodium 100 mg capsule Take 100 mg by mouth 2 (two) times daily.    hydroCHLOROthiazide (HYDRODIURIL) 25 MG tablet TAKE 1 TABLET DAILY    hydrocodone-acetaminophen 5-325mg (NORCO) 5-325 mg per tablet Take 1 tablet by mouth every 6 (six) hours as needed for Pain.    ondansetron (ZOFRAN) 4 MG tablet Take 1 tablet (4 mg total) by mouth every 8 (eight) hours as needed for Nausea.    ondansetron (ZOFRAN) 8 MG tablet Take 1 tablet (8 mg total) by mouth every 8 (eight) hours as needed for Nausea.    pneumococcal 23-kory ps vaccine 25 mcg/0.5 mL Syrg Disp amount required     No current facility-administered medications for this visit.      Review of patient's allergies indicates:   Allergen Reactions    Ciprofloxacin Anaphylaxis     Other reaction(s): Difficulty breathing    Floxin  [ofloxacin] Anaphylaxis    Clindamycin Diarrhea and Rash    Amoxicillin      Other reaction(s): Rash    Indocin  [indomethacin sodium]      Other reaction(s): Headache    Bactrim [sulfamethoxazole-trimethoprim] Rash       Precautions: lymphedema      Subjective     Pantera Posey rates pain at a 0/10 where 0 = no pain and 10 = maximal pain.    Patient's goals are as follows: resolve swelling in the L hand/fingers      Objective     Amount of Swelling: minimal, pt has non pitting swelling of the hand fingers on the L, no redness or warmth noted  Location of Swelling: L hand/fingers  Skin Integrity: intact   Palpation/Texture: soft to touch non pitting edema  Posture: wfl    Range of Motion - UE/LE  (R) wnl  (L) wnl    Current Functional Status:  Gait: i  Transfers: i  Bed Mobility: i    Girth Measurements (in centimeters)                                             R                L  Palm                                  18               18  Wrist                                  15.5            14.8  10 cm below elbow            19.8            20.3  Elbow                                  23              22.8  10 cm above elbow            26.8            27  18 cm above elbow            30               30    Sensation: intact to light touch B UEs    Treatment Evaluation, pt received lymphedema risk reduction practices, reviewed with patient and , lymphedmea education, MLD to cervical, L axilla, trunk and L UE f/b fitting with size 2 glove 20 mmhg, Pt to wear during the day only. Pt verbalized understanding. Pt issued HEP to promote lymphatic circulation. Pt to perform with compression intact.   Time In: 11  Time Out: 12    This patient was in agreement to participate in Lymphedema treatment.    Assessment     This patient presents s/p L breast cancer with swelling of the L hand/fingers,  increased stiffness in the L UE, as well as difficulty performing exercise and placing the pt at rist of higher  infection. This pt would benefit from skilled therapy services to address the above impairments.    The following goals were discussed with the patient and patient is in agreement with them as to be addressed in the treatment plan.     Short Term Goals: (4 weeks)  1. Decrease girth in L UE hand/fingers equal to R hand/fingers  2. Patient will demonstrate 100% knowledge of lymphedema precautions and signs of infection.   3. Patient will perform self lymph drainage techniques.  4. Patient will tolerate daily activities with compression    Long Term Goals: (6 weeks)    1. Patient to rogerio/doff compression garment.     PLAN   Patient to be seen 1visit e every other week x  6 weeks for the medically necessary treatments to include: decongestive massage, HEP, education in lymphedema precautions, self massage, self bandaging, and assistance in obtaining appropriate compression garment.    Shayy Gentile, PT  6/1/2018

## 2018-06-04 ENCOUNTER — PATIENT MESSAGE (OUTPATIENT)
Dept: SURGERY | Facility: CLINIC | Age: 68
End: 2018-06-04

## 2018-06-08 ENCOUNTER — PATIENT MESSAGE (OUTPATIENT)
Dept: SURGERY | Facility: CLINIC | Age: 68
End: 2018-06-08

## 2018-06-11 ENCOUNTER — PATIENT MESSAGE (OUTPATIENT)
Dept: HEMATOLOGY/ONCOLOGY | Facility: CLINIC | Age: 68
End: 2018-06-11

## 2018-06-12 DIAGNOSIS — Z17.0 MALIGNANT NEOPLASM OF LOWER-OUTER QUADRANT OF LEFT BREAST OF FEMALE, ESTROGEN RECEPTOR POSITIVE: Primary | ICD-10-CM

## 2018-06-12 DIAGNOSIS — C50.512 MALIGNANT NEOPLASM OF LOWER-OUTER QUADRANT OF LEFT BREAST OF FEMALE, ESTROGEN RECEPTOR POSITIVE: Primary | ICD-10-CM

## 2018-06-12 RX ORDER — ANASTROZOLE 1 MG/1
1 TABLET ORAL DAILY
Qty: 90 TABLET | Refills: 2 | Status: SHIPPED | OUTPATIENT
Start: 2018-06-12 | End: 2019-02-24 | Stop reason: SDUPTHER

## 2018-06-13 ENCOUNTER — OFFICE VISIT (OUTPATIENT)
Dept: SURGERY | Facility: CLINIC | Age: 68
End: 2018-06-13
Payer: COMMERCIAL

## 2018-06-13 VITALS — WEIGHT: 138 LBS | BODY MASS INDEX: 22.99 KG/M2 | TEMPERATURE: 98 F | HEIGHT: 65 IN

## 2018-06-13 DIAGNOSIS — Z17.0 MALIGNANT NEOPLASM OF LOWER-OUTER QUADRANT OF LEFT BREAST OF FEMALE, ESTROGEN RECEPTOR POSITIVE: Primary | ICD-10-CM

## 2018-06-13 DIAGNOSIS — C50.512 MALIGNANT NEOPLASM OF LOWER-OUTER QUADRANT OF LEFT BREAST OF FEMALE, ESTROGEN RECEPTOR POSITIVE: Primary | ICD-10-CM

## 2018-06-13 PROCEDURE — 99213 OFFICE O/P EST LOW 20 MIN: CPT | Mod: S$GLB,,, | Performed by: SURGERY

## 2018-06-13 PROCEDURE — 99999 PR PBB SHADOW E&M-EST. PATIENT-LVL III: CPT | Mod: PBBFAC,,, | Performed by: SURGERY

## 2018-06-20 ENCOUNTER — CLINICAL SUPPORT (OUTPATIENT)
Dept: REHABILITATION | Facility: HOSPITAL | Age: 68
End: 2018-06-20
Attending: SURGERY
Payer: COMMERCIAL

## 2018-06-20 ENCOUNTER — OFFICE VISIT (OUTPATIENT)
Dept: PLASTIC SURGERY | Facility: CLINIC | Age: 68
End: 2018-06-20
Payer: COMMERCIAL

## 2018-06-20 VITALS — WEIGHT: 141.56 LBS | BODY MASS INDEX: 23.58 KG/M2 | HEIGHT: 65 IN

## 2018-06-20 DIAGNOSIS — Z09 SURGERY FOLLOW-UP EXAMINATION: Primary | ICD-10-CM

## 2018-06-20 DIAGNOSIS — M79.89 SWELLING OF HAND, UNSPECIFIED LATERALITY: ICD-10-CM

## 2018-06-20 PROCEDURE — 99999 PR PBB SHADOW E&M-EST. PATIENT-LVL III: CPT | Mod: PBBFAC,,, | Performed by: PHYSICIAN ASSISTANT

## 2018-06-20 PROCEDURE — 99024 POSTOP FOLLOW-UP VISIT: CPT | Mod: S$GLB,,, | Performed by: PHYSICIAN ASSISTANT

## 2018-06-20 PROCEDURE — 97140 MANUAL THERAPY 1/> REGIONS: CPT | Mod: PO | Performed by: PHYSICAL THERAPIST

## 2018-06-20 NOTE — PROGRESS NOTES
"                                                    Physical Therapy Discharge Note     Name: Pantera Posey  Clinic Number: [id number]  Diagnosis:   Encounter Diagnosis   Name Primary?    Swelling of hand, unspecified laterality      Physician: Rosalina Martin MD  Precautions: lymphedema  Visit #: 2 of 12  PTA Visit #: 0  Time In: 4:45  Time Out: 5:20  Total Treatment Time 1:1: 30 min    Evaluation Date: 6/1/18  Visit # authorized: 20  Authorization period: 5/29/18-12/31/18  Plan of care Expiration: 7/1/18  MD referral: yes    Subjective     Pt reports: she received the glove and sleeve and is very pleased with her progress. "I can see all of my veins when I first get up in the morning. Pt reports I with HEP, pt's  reports he does massage for pt daily.   Pain Scale: Pantera rates pain on a scale of 0-10 to be n/a currently.    Objective      Girth Measurements (in centimeters)                                             R                L  Palm                                  18               18  Wrist                                  15.5            14.8  10 cm below elbow            19.8            20.3  Elbow                                  23              22.8  10 cm above elbow            26.8            27  18 cm above elbow            30               30    Pantera received the following manual therapy techniques: Manual Lymphatic Drainage were applied to the: cervical, axilla B, trunk and L UE for 25 minutes         Written Home Exercises Provided: current from initial evaluation  Pt demo good understanding of the education provided. Pantera demonstrated good return demonstration of activities.     Education provided re: continue with HEP, wear compression when exercising, flying, yardwork and as needed during the day  Pantera verbalized good understanding of education provided.   No spiritual or educational barriers to learning provided    Assessment     Patient tolerated treatment well, no visible " swelling noted today. Pt in agreement with DC.   Goals as follows:     Short Term Goals: (4 weeks)  1. Decrease girth in L UE hand/fingers equal to R hand/fingers MET  2. Patient will demonstrate 100% knowledge of lymphedema precautions and signs of infection. MET  3. Patient will perform self lymph drainage techniques.MET  4. Patient will tolerate daily activities with compression MET     Long Term Goals: (6 weeks)     1. Patient to rogerio/doff compression garment. MET     Plan     Pt is discharged from PT at this time.     Shayy Gentile, PT  6/20/2018

## 2018-06-23 NOTE — PROGRESS NOTES
"REFERRING PHYSICIAN:  Jazmin Singh MD    MEDICAL ONCOLOGIST:    Dr. Dionte Fritz    DIAGNOSIS:    This is a 67 y.o. female with a stage IA,  pT1c snN0 MX grade 3 ER + ME + HER2 - IDC of the left breast.    TREATMENT SUMMARY:  The patient is status post left total mastectomy and sentinel node biopsy on 8/3/17, a hx of R breast cancer 20 years ago s/p R mastectomy with ALND, therefore she had bilateral pre-pectoral tissue expanders placed by Dr. Murrell on 8/3/17, followed by second stage implant exchange and fat grafting on 11/20/17. Final pathology showed:    FINAL PATHOLOGIC DIAGNOSIS  1. " LEFT BREAST TISSUE" (MASTECTOMY):  -Invasive ductal carcinoma, see synoptic report below  2. SENTINEL LYMPH NODE, LEFT AXILLARY #2 HOT NOT BLUE 360 (RESECTION):  -One lymph node, negative for metastatic carcinoma (0/1)  -Multiple H&E levels and cytokeratins examined (CAM 5.2, keratin AE1/AE3, WSK)  -Immunostains with appropriate positive and negative controls  3. SENTINEL LYMPH NODE, LEFT AXILLARY HOT, NOT BLUE 96 (RESECTION):  -Three lymph nodes, negative for metastatic carcinoma (0/3)  -Multiple H&E levels and cytokeratins examined (CAM 5.2, keratin AE1/AE3, WSK)  -Immunostains with appropriate positive and negative controls    INTERVAL HISTORY:   Pantera Posey comes in for a 6-month follow up. She had her implants exchanged by Dr. Murrell on 11/20/17. She is happy with the results but there are some modifications that still need to be done per Dr. Murrell. She is otherwise doing well, just some soreness where the fat was grafted from.  She denies fever, chills, chest pain or shortness of breath.  Her pain is well controlled.  No new breast concerns, no bone pain, no visual disturbance or headaches.      MEDICATIONS:  Current Outpatient Prescriptions   Medication Sig Dispense Refill    anastrozole (ARIMIDEX) 1 mg Tab Take 1 tablet (1 mg total) by mouth once daily. 90 tablet 2    calcium-vitamin D3 500 mg(1,250mg) " -200 unit per tablet Take 2 tablets by mouth 2 (two) times daily with meals.      diazePAM (VALIUM) 2 MG tablet 1 po 30 min prior to procedure prn 10 tablet 0    hydroCHLOROthiazide (HYDRODIURIL) 25 MG tablet TAKE 1 TABLET DAILY 90 tablet 0    ondansetron (ZOFRAN) 4 MG tablet Take 1 tablet (4 mg total) by mouth every 8 (eight) hours as needed for Nausea. 20 tablet 0    ondansetron (ZOFRAN) 8 MG tablet Take 1 tablet (8 mg total) by mouth every 8 (eight) hours as needed for Nausea. 60 tablet 0    pneumococcal 23-kory ps vaccine 25 mcg/0.5 mL Syrg Disp amount required 1 Syringe 0    docusate sodium 100 mg capsule Take 100 mg by mouth 2 (two) times daily. 60 tablet 0     No current facility-administered medications for this visit.        ALLERGIES:   Review of patient's allergies indicates:   Allergen Reactions    Ciprofloxacin Anaphylaxis     Other reaction(s): Difficulty breathing    Floxin [ofloxacin] Anaphylaxis    Amoxicillin      Other reaction(s): Rash    Indocin  [indomethacin sodium]      Other reaction(s): Headache    Amoxicillin Rash    Bactrim [sulfamethoxazole-trimethoprim] Rash     Review of Systems  Review of Systems   Constitutional: Negative for chills and fever.   HENT: Negative for sore throat.    Eyes: Negative for double vision.   Respiratory: Negative for cough and shortness of breath.    Cardiovascular: Negative for chest pain.   Gastrointestinal: Negative for abdominal pain, constipation, diarrhea, nausea and vomiting.   Genitourinary: Negative for dysuria.   Musculoskeletal: Negative for back pain.   Skin: Negative for itching and rash.   Neurological: Negative for sensory change and headaches.       PHYSICAL EXAMINATION:   Physical Exam   Constitutional: She is oriented to person, place, and time. She appears well-nourished. No distress.   HENT:   Head: Normocephalic and atraumatic.   Eyes: EOM are normal. Pupils are equal, round, and reactive to light.   Neck: Normal range of  motion. Neck supple.   Cardiovascular: Normal rate, regular rhythm and normal heart sounds.    Pulmonary/Chest: Effort normal and breath sounds normal. No respiratory distress. Right breast exhibits no mass, no skin change and no tenderness. Left breast exhibits no mass, no skin change and no tenderness.       Abdominal: Soft. She exhibits no distension.   Musculoskeletal: Normal range of motion. She exhibits no edema.   Lymphadenopathy:     She has no cervical adenopathy.   Neurological: She is alert and oriented to person, place, and time.   Skin: Skin is warm and dry. No rash noted. No erythema.     Mild lymphedema present in the L hand.      IMPRESSION:     68 y/o female with Stage IA IDC, ER+    PLAN:   1. Return in 6 months for a follow up office visit and breast exam.  2. The patient is advised in continued exam of the breast chest wall and to report to this office sooner should she note any areas of abnormality or concern.   3. She will continue to follow up with medical oncology for management of adjuvant endocrine therapy.  4. She is scheduled to meet with Dr. Murrell for follow up  5.  Continue therapy and home care for lymphedema    30 minutes were spent on this encounter, 20 of which were face to face, and 10 of chart review

## 2018-07-03 DIAGNOSIS — Z12.11 SPECIAL SCREENING FOR MALIGNANT NEOPLASMS, COLON: Primary | ICD-10-CM

## 2018-07-03 RX ORDER — POLYETHYLENE GLYCOL 3350, SODIUM SULFATE ANHYDROUS, SODIUM BICARBONATE, SODIUM CHLORIDE, POTASSIUM CHLORIDE 236; 22.74; 6.74; 5.86; 2.97 G/4L; G/4L; G/4L; G/4L; G/4L
4 POWDER, FOR SOLUTION ORAL ONCE
Qty: 4000 ML | Refills: 0 | Status: SHIPPED | OUTPATIENT
Start: 2018-07-03 | End: 2018-07-03

## 2018-07-20 ENCOUNTER — ANESTHESIA (OUTPATIENT)
Dept: ENDOSCOPY | Facility: HOSPITAL | Age: 68
End: 2018-07-20
Payer: COMMERCIAL

## 2018-07-20 ENCOUNTER — SURGERY (OUTPATIENT)
Age: 68
End: 2018-07-20

## 2018-07-20 ENCOUNTER — HOSPITAL ENCOUNTER (OUTPATIENT)
Facility: HOSPITAL | Age: 68
Discharge: HOME | End: 2018-07-20
Attending: INTERNAL MEDICINE | Admitting: INTERNAL MEDICINE
Payer: COMMERCIAL

## 2018-07-20 ENCOUNTER — ANESTHESIA EVENT (OUTPATIENT)
Dept: ENDOSCOPY | Facility: HOSPITAL | Age: 68
End: 2018-07-20
Payer: COMMERCIAL

## 2018-07-20 VITALS
WEIGHT: 140 LBS | TEMPERATURE: 98 F | HEART RATE: 76 BPM | RESPIRATION RATE: 18 BRPM | OXYGEN SATURATION: 100 % | HEIGHT: 65 IN | BODY MASS INDEX: 23.32 KG/M2 | SYSTOLIC BLOOD PRESSURE: 169 MMHG | DIASTOLIC BLOOD PRESSURE: 73 MMHG

## 2018-07-20 DIAGNOSIS — Z12.11 SPECIAL SCREENING FOR MALIGNANT NEOPLASMS, COLON: Primary | ICD-10-CM

## 2018-07-20 DIAGNOSIS — Z12.11 COLON CANCER SCREENING: ICD-10-CM

## 2018-07-20 PROCEDURE — 37000009 HC ANESTHESIA EA ADD 15 MINS: Performed by: INTERNAL MEDICINE

## 2018-07-20 PROCEDURE — 37000008 HC ANESTHESIA 1ST 15 MINUTES: Performed by: INTERNAL MEDICINE

## 2018-07-20 PROCEDURE — 63600175 PHARM REV CODE 636 W HCPCS: Performed by: ANESTHESIOLOGY

## 2018-07-20 PROCEDURE — 63600175 PHARM REV CODE 636 W HCPCS: Performed by: NURSE ANESTHETIST, CERTIFIED REGISTERED

## 2018-07-20 PROCEDURE — E9220 PRA ENDO ANESTHESIA: HCPCS | Mod: ,,, | Performed by: NURSE ANESTHETIST, CERTIFIED REGISTERED

## 2018-07-20 PROCEDURE — G0121 COLON CA SCRN NOT HI RSK IND: HCPCS | Performed by: INTERNAL MEDICINE

## 2018-07-20 PROCEDURE — G0121 COLON CA SCRN NOT HI RSK IND: HCPCS | Mod: ,,, | Performed by: INTERNAL MEDICINE

## 2018-07-20 PROCEDURE — 25000003 PHARM REV CODE 250: Performed by: INTERNAL MEDICINE

## 2018-07-20 RX ORDER — MIDAZOLAM HYDROCHLORIDE 1 MG/ML
INJECTION, SOLUTION INTRAMUSCULAR; INTRAVENOUS
Status: DISCONTINUED | OUTPATIENT
Start: 2018-07-20 | End: 2018-07-20

## 2018-07-20 RX ORDER — PROPOFOL 10 MG/ML
VIAL (ML) INTRAVENOUS CONTINUOUS PRN
Status: DISCONTINUED | OUTPATIENT
Start: 2018-07-20 | End: 2018-07-20

## 2018-07-20 RX ORDER — PROPOFOL 10 MG/ML
VIAL (ML) INTRAVENOUS
Status: DISCONTINUED | OUTPATIENT
Start: 2018-07-20 | End: 2018-07-20

## 2018-07-20 RX ORDER — SODIUM CHLORIDE 0.9 % (FLUSH) 0.9 %
3 SYRINGE (ML) INJECTION
Status: DISCONTINUED | OUTPATIENT
Start: 2018-07-20 | End: 2018-07-20 | Stop reason: HOSPADM

## 2018-07-20 RX ORDER — SODIUM CHLORIDE 9 MG/ML
INJECTION, SOLUTION INTRAVENOUS CONTINUOUS
Status: DISCONTINUED | OUTPATIENT
Start: 2018-07-20 | End: 2018-07-20 | Stop reason: HOSPADM

## 2018-07-20 RX ORDER — LIDOCAINE HCL/PF 100 MG/5ML
SYRINGE (ML) INTRAVENOUS
Status: DISCONTINUED | OUTPATIENT
Start: 2018-07-20 | End: 2018-07-20

## 2018-07-20 RX ADMIN — PROPOFOL 150 MCG/KG/MIN: 10 INJECTION, EMULSION INTRAVENOUS at 02:07

## 2018-07-20 RX ADMIN — SODIUM CHLORIDE: 0.9 INJECTION, SOLUTION INTRAVENOUS at 01:07

## 2018-07-20 RX ADMIN — PROPOFOL 100 MG: 10 INJECTION, EMULSION INTRAVENOUS at 02:07

## 2018-07-20 RX ADMIN — MIDAZOLAM HYDROCHLORIDE 2 MG: 1 INJECTION, SOLUTION INTRAMUSCULAR; INTRAVENOUS at 01:07

## 2018-07-20 RX ADMIN — LIDOCAINE HYDROCHLORIDE 50 MG: 20 INJECTION, SOLUTION INTRAVENOUS at 02:07

## 2018-07-20 NOTE — ANESTHESIA PREPROCEDURE EVALUATION
07/20/2018  Pantera Posey is a 68 y.o., female     Patient Active Problem List   Diagnosis    HTN (hypertension), benign    Spondylosis without myelopathy    Spinal stenosis, lumbar region, with neurogenic claudication    Thoracic or lumbosacral neuritis or radiculitis, unspecified    Acquired spondylolisthesis    Lumbago    Degeneration of lumbar or lumbosacral intervertebral disc    Back pain    Spinal stenosis    Fever    Malignant neoplasm of lower-outer quadrant of left breast of female, estrogen receptor positive    Breast cancer, left    History of right breast cancer    Use of aromatase inhibitors    Breast cancer    Osteopenia    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    I have reviewed the Nursing Notes.   I have reviewed the Medications.     Review of Systems  Anesthesia Hx:  No problems with previous Anesthesia    Cardiovascular:   Hypertension, poorly controlled    Pulmonary:  Pulmonary Normal    Renal/:  Renal/ Normal     Hepatic/GI:  Hepatic/GI Normal    Musculoskeletal:   Arthritis     Endocrine:  Endocrine Normal        Physical Exam  General:  Well nourished    Airway/Jaw/Neck:  Airway Findings: Mouth Opening: Normal Tongue: Normal  General Airway Assessment: Adult  Mallampati: II  TM Distance: Normal, at least 6 cm       Chest/Lungs:  Chest/Lungs Findings: Clear to auscultation, Normal Respiratory Rate     Heart/Vascular:  Heart Findings: Rate: Normal  Rhythm: Regular Rhythm             Anesthesia Plan  Type of Anesthesia, risks & benefits discussed:  Anesthesia Type:  general, MAC  Patient's Preference:   Intra-op Monitoring Plan:   Intra-op Monitoring Plan Comments:   Post Op Pain Control Plan:   Post Op Pain  Control Plan Comments:   Induction:   IV  Beta Blocker:  Patient is not currently on a Beta-Blocker (No further documentation required).       Informed Consent: Patient understands risks and agrees with Anesthesia plan.  Questions answered. Anesthesia consent signed with patient.  ASA Score: 3     Day of Surgery Review of History & Physical:        Anesthesia Plan Notes: Pt hypertensive in preop. Checks BP at home regularly, reports yesterday was approx 133/70. Pt also reports being anxious and that her preop BP are usually very high. Prior anesthetic records indicate a similar pattern. Will treat with anxiolytic and anti-HTN as needed.     Given midazolam 2mg and labetalol 10mg in preop  BP and HR came down to 184/80 and 90        Ready For Surgery From Anesthesia Perspective.

## 2018-07-20 NOTE — H&P
Short Stay Endoscopy History and Physical    PCP - Jazmin Singh MD    Procedure - Colonoscopy  ASA - per anesthesia  Mallampati - per anesthesia  History of Anesthesia problems - no  Family history Anesthesia problems - no   Plan of anesthesia - General    HPI:  This is a 68 y.o. female here for evaluation of : asymptomatic screening exam      ROS:  Constitutional: No fevers, chills, No weight loss  CV: No chest pain  Pulm: No cough, No shortness of breath  GI: see HPI  Derm: No rash    Medical History:  has a past medical history of Breast cancer; Cancer; Hypertension; and Renea-Parkinson-White syndrome.    Surgical History:  has a past surgical history that includes Cardiac electrophysiology study and ablation; Breast biopsy; Back surgery; Tubal ligation; Breast biopsy (Left); Mastectomy; Mastectomy; and Breast surgery.    Family History: family history includes Breast cancer in her maternal aunt; Cancer in her mother; Hypertension in her father; Ovarian cancer in her paternal aunt.. Otherwise no colon cancer, inflammatory bowel disease, or GI malignancies.    Social History:  reports that she quit smoking about 29 years ago. She smoked 1.00 pack per day. She has never used smokeless tobacco. She reports that she drinks about 3.0 oz of alcohol per week . She reports that she does not use drugs.    Review of patient's allergies indicates:   Allergen Reactions    Ciprofloxacin Anaphylaxis     Other reaction(s): Difficulty breathing    Floxin [ofloxacin] Anaphylaxis    Clindamycin Diarrhea and Rash    Amoxicillin      Other reaction(s): Rash    Indocin  [indomethacin sodium]      Other reaction(s): Headache    Bactrim [sulfamethoxazole-trimethoprim] Rash       Medications:   Prescriptions Prior to Admission   Medication Sig Dispense Refill Last Dose    anastrozole (ARIMIDEX) 1 mg Tab Take 1 tablet (1 mg total) by mouth once daily. 90 tablet 2 7/20/2018 at Unknown time    calcium-vitamin D3 500  mg(1,250mg) -200 unit per tablet Take 2 tablets by mouth 2 (two) times daily with meals.   7/19/2018 at Unknown time    diazePAM (VALIUM) 2 MG tablet 1 po 30 min prior to procedure prn 10 tablet 0 7/19/2018 at Unknown time    docusate sodium 100 mg capsule Take 100 mg by mouth 2 (two) times daily. 60 tablet 0 Past Week at Unknown time    hydroCHLOROthiazide (HYDRODIURIL) 25 MG tablet TAKE 1 TABLET DAILY 90 tablet 0 7/20/2018 at Unknown time    ondansetron (ZOFRAN) 4 MG tablet Take 1 tablet (4 mg total) by mouth every 8 (eight) hours as needed for Nausea. 20 tablet 0 More than a month at Unknown time    ondansetron (ZOFRAN) 8 MG tablet Take 1 tablet (8 mg total) by mouth every 8 (eight) hours as needed for Nausea. 60 tablet 0 Taking    pneumococcal 23-kory ps vaccine 25 mcg/0.5 mL Syrg Disp amount required 1 Syringe 0 Taking         Physical Exam:    Vital Signs:   Vitals:    07/20/18 1310   BP: (!) 249/132   Pulse: (!) 118   Resp: 16   Temp: 98.4 °F (36.9 °C)       General Appearance: Well appearing in no acute distress  Eyes:    No scleral icterus  ENT: Neck supple, Lips, mucosa, and tongue normal; teeth and gums normal  Lungs: CTA bilaterally  Heart:  S1, S2 normal, no murmurs heard  Abdomen: Soft, non tender, non distended with positive bowel sounds. No hepatosplenomegaly, ascites, or mass.  Extremities: 2+ pulses, no clubbing, cyanosis or edema  Skin: No rash      Labs:  Lab Results   Component Value Date    WBC 4.79 05/30/2016    HGB 13.6 02/20/2018    HCT 42.3 05/30/2016     05/30/2016    CHOL 280 (H) 02/21/2018    TRIG 49 02/21/2018     (H) 02/21/2018    ALT 14 05/30/2016    AST 22 05/30/2016     02/20/2018    K 4.6 02/20/2018     02/20/2018    CREATININE 0.8 02/20/2018    BUN 15 02/20/2018    CO2 25 02/20/2018    TSH 1.496 05/30/2016    INR 1.0 10/25/2013       I have explained the risks and benefits of endoscopy procedures to the patient including but not limited to bleeding,  perforation, infection, and death.      David Mcdonald MD

## 2018-07-20 NOTE — ANESTHESIA POSTPROCEDURE EVALUATION
"Anesthesia Post Evaluation    Patient: Pantera Posey    Procedure(s) Performed: Procedure(s) (LRB):  COLONOSCOPY (N/A)    Final Anesthesia Type: general  Patient location during evaluation: PACU  Patient participation: Yes- Able to Participate  Level of consciousness: awake and alert  Post-procedure vital signs: reviewed and stable  Pain management: adequate  Airway patency: patent  PONV status at discharge: No PONV  Anesthetic complications: no      Cardiovascular status: blood pressure returned to baseline  Respiratory status: unassisted  Hydration status: euvolemic  Follow-up not needed.        Visit Vitals  BP (!) 169/73 (BP Location: Left arm, Patient Position: Lying)   Pulse 76   Temp 36.6 °C (97.8 °F) (Temporal)   Resp 18   Ht 5' 5" (1.651 m)   Wt 63.5 kg (140 lb)   LMP  (LMP Unknown)   SpO2 100%   Breastfeeding? No   BMI 23.30 kg/m²       Pain/Jori Score: Pain Assessment Performed: Yes (7/20/2018  2:49 PM)  Presence of Pain: denies (7/20/2018  2:49 PM)  Jori Score: 10 (7/20/2018  2:21 PM)      "

## 2018-07-20 NOTE — DISCHARGE INSTRUCTIONS
Colonoscopy     A camera attached to a flexible tube with a viewing lens is used to take video pictures.     Colonoscopy is a test to view the inside of your lower digestive tract (colon and rectum). Sometimes it can show the last part of the small intestine (ileum). During the test, small pieces of tissue may be removed for testing. This is called a biopsy. Small growths, such as polyps, may also be removed.   Why is colonoscopy done?  The test is done to help look for colon cancer. And it can help find the source of abdominal pain, bleeding, and changes in bowel habits. It may be needed once a year, depending on factors such as your:  · Age  · Health history  · Family health history  · Symptoms  · Results from any prior colonoscopy  Risks and possible complications  These include:  · Bleeding               · A puncture or tear in the colon   · Risks of anesthesia  · A cancer lesion not being seen  Getting ready   To prepare for the test:  · Talk with your healthcare provider about the risks of the test (see below). Also ask your healthcare provider about alternatives to the test.  · Tell your healthcare provider about any medicines you take. Also tell him or her about any health conditions you may have.  · Make sure your rectum and colon are empty for the test. Follow the diet and bowel prep instructions exactly. If you dont, the test may need to be rescheduled.  · Plan for a friend or family member to drive you home after the test.     Colonoscopy provides an inside view of the entire colon.     You may discuss the results with your doctor right away or at a future visit.  During the test   The test is usually done in the hospital on an outpatient basis. This means you go home the same day. The procedure takes about 30 minutes. During that time:  · You are given relaxing (sedating) medicine through an IV line. You may be drowsy, or fully asleep.  · The healthcare provider will first give you a physical exam to  check for anal and rectal problems.  · Then the anus is lubricated and the scope inserted.  · If you are awake, you may have a feeling similar to needing to have a bowel movement. You may also feel pressure as air is pumped into the colon. Its OK to pass gas during the procedure.  · Biopsy, polyp removal, or other treatments may be done during the test.  After the test   You may have gas right after the test. It can help to try to pass it to help prevent later bloating. Your healthcare provider may discuss the results with you right away. Or you may need to schedule a follow-up visit to talk about the results. After the test, you can go back to your normal eating and other activities. You may be tired from the sedation and need to rest for a few hours.  Date Last Reviewed: 11/1/2016 © 2000-2017 [employer]. [address] [place] [address], [place] [address]. All rights reserved. This information is not intended as a substitute for professional medical care. Always follow your healthcare professional's instructions.

## 2018-07-20 NOTE — TRANSFER OF CARE
Anesthesia Transfer of Care Note    Patient: Pantera Posey    Procedure(s) Performed: Procedure(s) (LRB):  COLONOSCOPY (N/A)    Patient location: GI    Anesthesia Type: general    Transport from OR: Transported from OR on room air with adequate spontaneous ventilation    Post pain: adequate analgesia    Post assessment: no apparent anesthetic complications    Post vital signs: stable    Level of consciousness: awake and sedated    Nausea/Vomiting: no nausea/vomiting    Complications: none    Transfer of care protocol was followed      Last vitals:   36  113/62  65  12  100% on RA

## 2018-07-20 NOTE — [employer] PATIENT INSTRUCTIONS
Discharge Summary/Instructions after an Endoscopic Procedure  Patient Name: Pantera Posey  Patient MRN: [id number]  Patient YOB: 1950 Friday, July 20, 2018  David Mcdonald MD  RESTRICTIONS:  During your procedure today, you received medications for sedation.  These   medications may affect your judgment, balance and coordination.  Therefore,   for 24 hours, you have the following restrictions:   - DO NOT drive a car, operate machinery, make legal/financial decisions,   sign important papers or drink alcohol.    ACTIVITY:  Today: no heavy lifting, straining or running due to procedural   sedation/anesthesia.  The following day: return to full activity including work.  DIET:  Eat and drink normally unless instructed otherwise.     TREATMENT FOR COMMON SIDE EFFECTS:  - Mild abdominal pain, nausea, belching, bloating or excessive gas:  rest,   eat lightly and use a heating pad.  - Sore Throat: treat with throat lozenges and/or gargle with warm salt   water.  - Because air was used during the procedure, expelling large amounts of air   from your rectum or belching is normal.  - If a bowel prep was taken, you may not have a bowel movement for 1-3 days.    This is normal.  SYMPTOMS TO WATCH FOR AND REPORT TO YOUR PHYSICIAN:  1. Abdominal pain or bloating, other than gas cramps.  2. Chest pain.  3. Back pain.  4. Signs of infection such as: chills or fever occurring within 24 hours   after the procedure.  5. Rectal bleeding, which would show as bright red, maroon, or black stools.   (A tablespoon of blood from the rectum is not serious, especially if   hemorrhoids are present.)  6. Vomiting.  7. Weakness or dizziness.  GO DIRECTLY TO THE NEAREST EMERGENCY ROOM IF YOU HAVE ANY OF THE FOLLOWING:      Difficulty breathing              Chills and/or fever over 101 F   Persistent vomiting and/or vomiting blood   Severe abdominal pain   Severe chest pain   Black, tarry stools   Bleeding- more than one tablespoon   Any  other symptom or condition that you feel may need urgent attention  Your doctor recommends these additional instructions:  If any biopsies were taken, your doctors clinic will contact you in 1 to 2   weeks with any results.  - Patient has a contact number available for emergencies.  The signs and   symptoms of potential delayed complications were discussed with the   patient.  Return to normal activities tomorrow.  Written discharge   instructions were provided to the patient.   - Discharge patient to home.   - Repeat colonoscopy in 10 years for screening purposes.   - The findings and recommendations were discussed with the designated   responsible adult.  For questions, problems or results please call your physician - David Mcdonald MD at Work:  [phone].  [facility], EMERGENCY ROOM PHONE NUMBER: [phone]  IF A COMPLICATION OR EMERGENCY SITUATION ARISES AND YOU ARE UNABLE TO REACH   YOUR PHYSICIAN - GO DIRECTLY TO THE EMERGENCY ROOM.  David Mcdonald MD  7/20/2018 2:14:37 PM  This report has been verified and signed electronically.  [employer]

## 2018-07-27 ENCOUNTER — TELEPHONE (OUTPATIENT)
Dept: ENDOSCOPY | Facility: HOSPITAL | Age: 68
End: 2018-07-27

## 2018-08-13 RX ORDER — HYDROCHLOROTHIAZIDE 25 MG/1
TABLET ORAL
Qty: 90 TABLET | Refills: 0 | Status: SHIPPED | OUTPATIENT
Start: 2018-08-13 | End: 2018-11-11 | Stop reason: SDUPTHER

## 2018-08-16 ENCOUNTER — PATIENT MESSAGE (OUTPATIENT)
Dept: PLASTIC SURGERY | Facility: CLINIC | Age: 68
End: 2018-08-16

## 2018-09-12 ENCOUNTER — OFFICE VISIT (OUTPATIENT)
Dept: HEMATOLOGY/ONCOLOGY | Facility: CLINIC | Age: 68
End: 2018-09-12
Payer: COMMERCIAL

## 2018-09-12 VITALS
RESPIRATION RATE: 20 BRPM | HEART RATE: 82 BPM | TEMPERATURE: 98 F | BODY MASS INDEX: 24.03 KG/M2 | WEIGHT: 144.38 LBS | OXYGEN SATURATION: 100 %

## 2018-09-12 DIAGNOSIS — C50.512 MALIGNANT NEOPLASM OF LOWER-OUTER QUADRANT OF LEFT BREAST OF FEMALE, ESTROGEN RECEPTOR POSITIVE: Primary | ICD-10-CM

## 2018-09-12 DIAGNOSIS — Z79.811 USE OF AROMATASE INHIBITORS: ICD-10-CM

## 2018-09-12 DIAGNOSIS — Z17.0 MALIGNANT NEOPLASM OF LOWER-OUTER QUADRANT OF LEFT BREAST OF FEMALE, ESTROGEN RECEPTOR POSITIVE: Primary | ICD-10-CM

## 2018-09-12 PROCEDURE — 99999 PR PBB SHADOW E&M-EST. PATIENT-LVL III: CPT | Mod: PBBFAC,,, | Performed by: INTERNAL MEDICINE

## 2018-09-12 PROCEDURE — 99213 OFFICE O/P EST LOW 20 MIN: CPT | Mod: S$GLB,,, | Performed by: INTERNAL MEDICINE

## 2018-09-12 PROCEDURE — 1101F PT FALLS ASSESS-DOCD LE1/YR: CPT | Mod: CPTII,S$GLB,, | Performed by: INTERNAL MEDICINE

## 2018-11-11 RX ORDER — HYDROCHLOROTHIAZIDE 25 MG/1
TABLET ORAL
Qty: 90 TABLET | Refills: 0 | Status: SHIPPED | OUTPATIENT
Start: 2018-11-11 | End: 2019-02-08 | Stop reason: SDUPTHER

## 2018-12-21 ENCOUNTER — LAB VISIT (OUTPATIENT)
Dept: LAB | Facility: HOSPITAL | Age: 68
End: 2018-12-21
Attending: INTERNAL MEDICINE
Payer: COMMERCIAL

## 2018-12-21 ENCOUNTER — PATIENT MESSAGE (OUTPATIENT)
Dept: FAMILY MEDICINE | Facility: CLINIC | Age: 68
End: 2018-12-21

## 2018-12-21 ENCOUNTER — OFFICE VISIT (OUTPATIENT)
Dept: PRIMARY CARE CLINIC | Facility: CLINIC | Age: 68
End: 2018-12-21
Payer: COMMERCIAL

## 2018-12-21 ENCOUNTER — TELEPHONE (OUTPATIENT)
Dept: PRIMARY CARE CLINIC | Facility: CLINIC | Age: 68
End: 2018-12-21

## 2018-12-21 VITALS
HEIGHT: 65 IN | BODY MASS INDEX: 25.05 KG/M2 | DIASTOLIC BLOOD PRESSURE: 78 MMHG | OXYGEN SATURATION: 94 % | WEIGHT: 150.38 LBS | SYSTOLIC BLOOD PRESSURE: 182 MMHG | HEART RATE: 112 BPM

## 2018-12-21 DIAGNOSIS — I16.0 ASYMPTOMATIC HYPERTENSIVE URGENCY: Primary | ICD-10-CM

## 2018-12-21 DIAGNOSIS — I16.0 ASYMPTOMATIC HYPERTENSIVE URGENCY: ICD-10-CM

## 2018-12-21 DIAGNOSIS — I10 UNCONTROLLED HYPERTENSION: ICD-10-CM

## 2018-12-21 LAB
ALBUMIN SERPL BCP-MCNC: 4.3 G/DL
ALP SERPL-CCNC: 86 U/L
ALT SERPL W/O P-5'-P-CCNC: 43 U/L
ANION GAP SERPL CALC-SCNC: 15 MMOL/L
AST SERPL-CCNC: 61 U/L
BILIRUB SERPL-MCNC: 0.5 MG/DL
BUN SERPL-MCNC: 8 MG/DL
CALCIUM SERPL-MCNC: 10.2 MG/DL
CHLORIDE SERPL-SCNC: 103 MMOL/L
CO2 SERPL-SCNC: 23 MMOL/L
CORTIS SERPL-MCNC: 15.2 UG/DL
CREAT SERPL-MCNC: 0.8 MG/DL
EST. GFR  (AFRICAN AMERICAN): >60 ML/MIN/1.73 M^2
EST. GFR  (NON AFRICAN AMERICAN): >60 ML/MIN/1.73 M^2
GLUCOSE SERPL-MCNC: 92 MG/DL
POTASSIUM SERPL-SCNC: 4.2 MMOL/L
PROT SERPL-MCNC: 8.7 G/DL
SODIUM SERPL-SCNC: 141 MMOL/L
TSH SERPL DL<=0.005 MIU/L-ACNC: 1.08 UIU/ML

## 2018-12-21 PROCEDURE — 93005 ELECTROCARDIOGRAM TRACING: CPT | Mod: S$GLB,,, | Performed by: INTERNAL MEDICINE

## 2018-12-21 PROCEDURE — 82533 TOTAL CORTISOL: CPT

## 2018-12-21 PROCEDURE — 1101F PT FALLS ASSESS-DOCD LE1/YR: CPT | Mod: CPTII,S$GLB,, | Performed by: INTERNAL MEDICINE

## 2018-12-21 PROCEDURE — 36415 COLL VENOUS BLD VENIPUNCTURE: CPT | Mod: PN

## 2018-12-21 PROCEDURE — 93010 ELECTROCARDIOGRAM REPORT: CPT | Mod: S$GLB,,, | Performed by: INTERNAL MEDICINE

## 2018-12-21 PROCEDURE — 99213 OFFICE O/P EST LOW 20 MIN: CPT | Mod: S$GLB,,, | Performed by: INTERNAL MEDICINE

## 2018-12-21 PROCEDURE — 82088 ASSAY OF ALDOSTERONE: CPT

## 2018-12-21 PROCEDURE — 3078F DIAST BP <80 MM HG: CPT | Mod: CPTII,S$GLB,, | Performed by: INTERNAL MEDICINE

## 2018-12-21 PROCEDURE — 84443 ASSAY THYROID STIM HORMONE: CPT

## 2018-12-21 PROCEDURE — 3077F SYST BP >= 140 MM HG: CPT | Mod: CPTII,S$GLB,, | Performed by: INTERNAL MEDICINE

## 2018-12-21 PROCEDURE — 99999 PR PBB SHADOW E&M-EST. PATIENT-LVL III: CPT | Mod: PBBFAC,,, | Performed by: INTERNAL MEDICINE

## 2018-12-21 PROCEDURE — 83835 ASSAY OF METANEPHRINES: CPT

## 2018-12-21 PROCEDURE — 80053 COMPREHEN METABOLIC PANEL: CPT

## 2018-12-21 RX ORDER — AMLODIPINE BESYLATE 5 MG/1
5 TABLET ORAL DAILY
Qty: 30 TABLET | Refills: 3 | Status: SHIPPED | OUTPATIENT
Start: 2018-12-21 | End: 2019-03-07

## 2018-12-21 NOTE — PROGRESS NOTES
Subjective:       Patient ID: Pantera Posey is a 68 y.o. female with a PMH significant for HTN, Spinal Stenosis (lumbar), WPW Syndrome, Breast Cancer who is followed by Dr. Singh and last seen in 2/2018 presents with complaints of elevated Blood Pressure.    Chief Complaint: Hypertension    HPI   Has been doing blood pressures in her legs for the past year and was running 120/70s at home, but yesterday it was 208/121 and 206/120 and today was 197/113.  No symptoms.  No change in diet.  No swelling or pain in her legs.  Patient denies f/c, n/v/d.  No chest pain or SOB.  No abdominal pain or dysuria.  No headaches or change in vision.  No dizziness.  No significant  weight gain or weight loss.  Remaining ROS negative.    Review of Systems   Constitutional: Negative for appetite change, chills, diaphoresis, fatigue, fever and unexpected weight change.   HENT: Negative for ear pain, hearing loss, sinus pain, tinnitus, trouble swallowing and voice change.    Eyes: Negative for photophobia, pain and visual disturbance.   Respiratory: Negative for chest tightness, shortness of breath and wheezing.    Cardiovascular: Negative for chest pain, palpitations and leg swelling.   Gastrointestinal: Negative for abdominal pain, blood in stool, constipation, diarrhea, nausea and vomiting.   Endocrine: Negative for cold intolerance, heat intolerance, polydipsia, polyphagia and polyuria.   Genitourinary: Negative for decreased urine volume, difficulty urinating, dysuria, flank pain, hematuria, pelvic pain, vaginal bleeding, vaginal discharge and vaginal pain.   Musculoskeletal: Negative for arthralgias, gait problem, joint swelling, myalgias and neck pain.   Skin: Negative for rash.   Neurological: Negative for dizziness, tremors, syncope, weakness, numbness and headaches.   Hematological: Does not bruise/bleed easily.   Psychiatric/Behavioral: Negative for agitation, confusion and sleep disturbance. The patient is not  nervous/anxious.        Objective:      Physical Exam   Constitutional: She is oriented to person, place, and time. She appears well-developed and well-nourished. No distress.   HENT:   Head: Normocephalic and atraumatic.   Nose: Nose normal.   Mouth/Throat: Oropharynx is clear and moist.   Eyes: Conjunctivae and EOM are normal. Pupils are equal, round, and reactive to light. No scleral icterus.   Neck: Normal range of motion. Neck supple. No JVD present. No thyromegaly present.   Cardiovascular: Normal rate, regular rhythm and intact distal pulses. Exam reveals no gallop and no friction rub.   No murmur heard.  Pulmonary/Chest: Effort normal and breath sounds normal. No respiratory distress. She has no wheezes. She has no rales.   Abdominal: Soft. Bowel sounds are normal. She exhibits no distension. There is no tenderness. There is no rebound and no guarding.   Musculoskeletal: Normal range of motion. She exhibits no edema.   Lymphadenopathy:     She has no cervical adenopathy.   Neurological: She is alert and oriented to person, place, and time. No cranial nerve deficit or sensory deficit.   Skin: Skin is warm and dry. No rash noted. No erythema.   Psychiatric: She has a normal mood and affect. Her behavior is normal. Thought content normal.       Assessment:       1. Asymptomatic hypertensive urgency        Plan:   -Cards - HTN - on [facility] only.  Last outpatient BP reading was 140/76 in 5/2018 (Oncology visit).  She takes her BP in her calf given her bilateral mastectomy.  States her normal home readings in 120/75 range, but yesterday it was 208/121 and 206/120 and today was 197/113.  BP in her left leg was elevated at 224/121 with repeat of 223/107 and 230/106 in right and left legs.  BP in her left arm was 182/78, so this is not a hypertensive urgency, as LE Bps are often higher in the legs.    EKG in 8/2017 was NSR with LVH and repolarization abnormality.  EKG today with NSR and nonspecific ST abnormalities and  no significant change compared with EKG in 8/2017.    Given new asymptomatic elevated blood pressure, I will check a CMP, and start a secondary work-up with TSH, Cortisol, Plasma Metanephrines, Aldosterone/Renin ratio.  Consider FRED scan.  Her Arimidex (she has been on this for 15 months) can cause HTN, so need to consider alternative if BP cannot be well controlled.  Will add Amlodipine 5mg today and have her return Monday for BP reading.    Patient needs labs drawn from foot.

## 2018-12-21 NOTE — PATIENT INSTRUCTIONS
Your blood pressure is a very high today.  I will add Amlodipine 5mg once a day.  Please monitor at home with a digital blood pressure cuff when sitting and rested for at least 15 minutes or longer with legs elevated.  Record your values and bring these with you on your return.  I would like you to see the nurse next week for a blood pressure check.  Target blood pressure is less than 130/80 (although leg pressure are often higher than arms).  I will order some additional labs to make sure there is not a secondary reason for your elevated blood pressure.    Return on Monday for BP check

## 2018-12-22 ENCOUNTER — PATIENT MESSAGE (OUTPATIENT)
Dept: PRIMARY CARE CLINIC | Facility: CLINIC | Age: 68
End: 2018-12-22

## 2018-12-24 ENCOUNTER — CLINICAL SUPPORT (OUTPATIENT)
Dept: PRIMARY CARE CLINIC | Facility: CLINIC | Age: 68
End: 2018-12-24
Payer: COMMERCIAL

## 2018-12-24 VITALS — HEART RATE: 80 BPM | SYSTOLIC BLOOD PRESSURE: 150 MMHG | OXYGEN SATURATION: 100 % | DIASTOLIC BLOOD PRESSURE: 88 MMHG

## 2018-12-24 DIAGNOSIS — Z01.30 BP CHECK: Primary | ICD-10-CM

## 2018-12-24 PROCEDURE — 99999 PR PBB SHADOW E&M-EST. PATIENT-LVL II: CPT | Mod: PBBFAC,,,

## 2018-12-24 NOTE — PROGRESS NOTES
Pantera Posey 68 y.o. female is here today for Blood Pressure check.   History of HTN yes.    Review of patient's allergies indicates:   Allergen Reactions    Ciprofloxacin Anaphylaxis     Other reaction(s): Difficulty breathing    Floxin [ofloxacin] Anaphylaxis    Clindamycin Diarrhea and Rash    Amoxicillin      Other reaction(s): Rash    Indocin  [indomethacin sodium]      Other reaction(s): Headache    Bactrim [sulfamethoxazole-trimethoprim] Rash     Creatinine   Date Value Ref Range Status   12/21/2018 0.8 0.5 - 1.4 mg/dL Final     Sodium   Date Value Ref Range Status   12/21/2018 141 136 - 145 mmol/L Final     Potassium   Date Value Ref Range Status   12/21/2018 4.2 3.5 - 5.1 mmol/L Final   ]  Patient verifies taking blood pressure medications on a regular bases at the same time of the day.     Current Outpatient Medications:     amLODIPine (NORVASC) 5 MG tablet, Take 1 tablet (5 mg total) by mouth once daily., Disp: 30 tablet, Rfl: 3    anastrozole (ARIMIDEX) 1 mg Tab, Take 1 tablet (1 mg total) by mouth once daily., Disp: 90 tablet, Rfl: 2    calcium-vitamin D3 500 mg(1,250mg) -200 unit per tablet, Take 2 tablets by mouth 2 (two) times daily with meals., Disp: , Rfl:     diazePAM (VALIUM) 2 MG tablet, 1 po 30 min prior to procedure prn, Disp: 10 tablet, Rfl: 0    docusate sodium 100 mg capsule, Take 100 mg by mouth 2 (two) times daily., Disp: 60 tablet, Rfl: 0    hydroCHLOROthiazide (HYDRODIURIL) 25 MG tablet, TAKE 1 TABLET DAILY, Disp: 90 tablet, Rfl: 0    ondansetron (ZOFRAN) 4 MG tablet, Take 1 tablet (4 mg total) by mouth every 8 (eight) hours as needed for Nausea., Disp: 20 tablet, Rfl: 0    ondansetron (ZOFRAN) 8 MG tablet, Take 1 tablet (8 mg total) by mouth every 8 (eight) hours as needed for Nausea., Disp: 60 tablet, Rfl: 0    pneumococcal 23-kory ps vaccine 25 mcg/0.5 mL Syrg, Disp amount required, Disp: 1 Syringe, Rfl: 0  Does patient have record of home blood pressure readings  yes. Readings have been averaging 139/76.   Last dose of blood pressure medication was taken at 6:30AM.  Patient is asymptomatic.   Complains of n/a.    BP: (!) 150/88 , Pulse: 80.    Dr. Naqvi notified.     See BP log in media

## 2018-12-24 NOTE — PROGRESS NOTES
BP is much better today, but still a little high.  Her home BP readings (wrist) are better.  I will have her continue the current medications and follow up with Dr. Singh for further management.  Dr. Fritz does not feel that Arimidex is contributing to the elevated BP.

## 2018-12-26 ENCOUNTER — TELEPHONE (OUTPATIENT)
Dept: FAMILY MEDICINE | Facility: CLINIC | Age: 68
End: 2018-12-26

## 2018-12-26 ENCOUNTER — PATIENT MESSAGE (OUTPATIENT)
Dept: PRIMARY CARE CLINIC | Facility: CLINIC | Age: 68
End: 2018-12-26

## 2018-12-26 ENCOUNTER — TELEPHONE (OUTPATIENT)
Dept: PRIMARY CARE CLINIC | Facility: CLINIC | Age: 68
End: 2018-12-26

## 2018-12-26 ENCOUNTER — PATIENT MESSAGE (OUTPATIENT)
Dept: FAMILY MEDICINE | Facility: CLINIC | Age: 68
End: 2018-12-26

## 2018-12-26 DIAGNOSIS — R35.0 URINE FREQUENCY: Primary | ICD-10-CM

## 2018-12-26 LAB
ALDOST SERPL-MCNC: 19.5 NG/DL
ALDOST/RENIN PLAS-RTO: 12.2 RATIO
RENIN PLAS-CCNC: 1.6 NG/ML/HR

## 2018-12-26 NOTE — TELEPHONE ENCOUNTER
----- Message from Len Naqvi MD sent at 12/26/2018 11:58 AM CST -----      ----- Message -----  From: Kasie Clements MA  Sent: 12/26/2018  11:53 AM  To: Len Naqvi MD    Patient stated she is having some frequency urinating.

## 2018-12-26 NOTE — TELEPHONE ENCOUNTER
Spoke with pt and she had a verbal understanding of her lab results. Pt states she does have some frequency. She did also state that her BP this morning was 116/65 - back to normal per patient.    ----- Message from Contreras Morejon sent at 12/26/2018 11:46 AM CST -----  Contact: MIGUELITO DODGE [[id number]]  Name of Who is Calling: MIGUELITO DODGE [[id number]]       What is the request in detail: Patient returned miss call from staff. Patient request a call back from staff at earliest convenience. Please advise.  Thanks-       Can the clinic reply by [facility]: Yes       What Number to [facility] Back [facility]: [phone]

## 2018-12-28 ENCOUNTER — LAB VISIT (OUTPATIENT)
Dept: LAB | Facility: OTHER | Age: 68
End: 2018-12-28
Attending: INTERNAL MEDICINE
Payer: COMMERCIAL

## 2018-12-28 ENCOUNTER — PATIENT MESSAGE (OUTPATIENT)
Dept: PRIMARY CARE CLINIC | Facility: CLINIC | Age: 68
End: 2018-12-28

## 2018-12-28 DIAGNOSIS — R79.89 ELEVATED PLASMA METANEPHRINES: Primary | ICD-10-CM

## 2018-12-28 DIAGNOSIS — R35.0 URINE FREQUENCY: ICD-10-CM

## 2018-12-28 LAB
METANEPH FREE SERPL-MCNC: 62 PG/ML
METANEPHS SERPL-MCNC: 264 PG/ML
NORMETANEPH FREE SERPL-MCNC: 202 PG/ML

## 2018-12-28 PROCEDURE — 87086 URINE CULTURE/COLONY COUNT: CPT

## 2018-12-28 PROCEDURE — 87077 CULTURE AEROBIC IDENTIFY: CPT

## 2018-12-28 PROCEDURE — 87186 SC STD MICRODIL/AGAR DIL: CPT

## 2018-12-28 PROCEDURE — 87088 URINE BACTERIA CULTURE: CPT

## 2018-12-31 ENCOUNTER — PATIENT MESSAGE (OUTPATIENT)
Dept: PRIMARY CARE CLINIC | Facility: CLINIC | Age: 68
End: 2018-12-31

## 2018-12-31 LAB — BACTERIA UR CULT: NORMAL

## 2018-12-31 RX ORDER — NITROFURANTOIN 25; 75 MG/1; MG/1
100 CAPSULE ORAL 2 TIMES DAILY
Qty: 10 CAPSULE | Refills: 0 | Status: SHIPPED | OUTPATIENT
Start: 2018-12-31 | End: 2019-01-05

## 2019-01-01 ENCOUNTER — PATIENT MESSAGE (OUTPATIENT)
Dept: PRIMARY CARE CLINIC | Facility: CLINIC | Age: 69
End: 2019-01-01

## 2019-01-08 NOTE — PROGRESS NOTES
Subjective:       Patient ID: Pantera Posey is a 68 y.o. female.    Chief Complaint: Follow-up    HPI   Mrs. Posey returns today for follow up.  As of mid September 2017 she has been on anastrazole 1 mg daily. BP elevated about 2 weeks ago- Work up and Medication changes per Dr. Milian and now being monitored. Monitoring BP at home with wrist cuff.     Briefly, she  is a  68-year-old  female who has a remote history of breast cancer treated in [place] 20 years ago with mastectomy and five years of tamoxifen.      A recent routine mammogram on 06/27/2017 was read as BIRADS category 0 and showed an asymmetry in the upper region of the left breast   posteriorly.  A diagnostic mammogram two days later was read as BIRADS category 4 and a biopsy was performed on 07/11/2017 and showed a carcinoma that was ER strongly positive, IN positive, and HER-2 negative.    The patient was seen by Dr. Martin and eventually underwent a left-sided mastectomy and sentinel lymph node biopsy on 08/03/2017 with insertion of bilateral tissue expanders.    The pathology report from the procedure indicates that 4 left axillary lymph nodes were negative.  On the mastectomy sample, she had a 15 mm invasive ductal carcinoma; resection margins were clear.  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  There was no lymphovascular invasion.  Her Oncotype score was 10, suggesting a 9 % chance of recurrence at ten years with tamoxifen alone.    Her DXA scan showed mild osteopenia of the hip but normal density on the L spine.    The medical, surgical, as well as family history remain unchanged.        Review of Systems    Overall she feels well.  No hot flashes or joint pain. She does have stiffness in her hands but tolerable. Her ECOG PS is 0.   She denies any anxiety, depression, easy bruising, fevers, chills, night  sweats, weight loss, nausea, vomiting, diarrhea, constipation, diplopia, blurred vision, headache, chest  pain, palpitations, shortness of breath, breast pain, abdominal pain, extremity pain, or difficulty ambulating.  The remainder of the ten-point ROS, including general, skin, lymph, H/N, cardiorespiratory, GI, , Neuro, Endocrine, and psychiatric is negative.       Objective:      Physical Exam    She is alert, oriented to time, place, person, pleasant, well      nourished, in no acute physical distress.   She is here with her .                                 VITAL SIGNS:  Reviewed                                      HEENT:    There are no nasal, oral, lip, gingival, auricular, lid,    or conjunctival lesions.  Mucosae are moist and pink, and there is no        thrush.  Pupils are equal, reactive to light and accommodation.              Extraocular muscle movements are intact.  Dentition is fair. Maxillary teeth are missing and she has dentures.                                       NECK:  Supple without JVD, adenopathy, or thyromegaly.                       LUNGS:  Clear to auscultation without wheezing, rales, or rhonchi.           CARDIOVASCULAR:  Reveals an S1, S2, no murmurs, no rubs, no gallops.         ABDOMEN:  Soft, nontender, without organomegaly.  Bowel sounds are    present.                                                                     EXTREMITIES:  No cyanosis, clubbing, or edema.                               BREASTS:  She is status post bilateral mastectomies with reconstructions,.  Her nipples have been tattooed.                                        LYMPHATIC:  There is no cervical, axillary, or supraclavicular adenopathy.   SKIN:  Warm and moist, without petechiae, rashes, induration, or ecchymoses.           NEUROLOGIC:  DTRs are 0-1+ bilaterally, symmetrical, motor function is 5/5,  and cranial nerves are  within normal limits.    Assessment:       1. Malignant neoplasm of lower-outer quadrant of left breast of female, estrogen receptor positive    2. Use of aromatase inhibitors     3. HTN (hypertension), benign          Plan:           Doing well, KIKI.   She will take Anastrazole through September 2022.  RTC in 4 months to see Dr. Trotter.  Next BDS 10/2019. Taking calcium and Vit D.  Continue f/u with Dr. Naqvi for BP management and further work up.    Patient is in agreement with the proposed treatment plan. All questions were answered to the patient's satisfaction. Pt knows to call clinic for any new or worsening symptoms and if anything is needed before the next clinic visit.      ISABELLE Hanson-C  Hematology & Oncology  [facility] [place] [address]  ph. [phone]  Fax. [phone]     I spent 30 minutes (face to face) with the patient, more than 50% was in counseling and coordination of care as detailed above.         Distress Screening Results: Psychosocial Distress screening score of Distress Score: 0 noted and reviewed. No intervention indicated.

## 2019-01-09 ENCOUNTER — PATIENT OUTREACH (OUTPATIENT)
Dept: ADMINISTRATIVE | Facility: HOSPITAL | Age: 69
End: 2019-01-09

## 2019-01-09 ENCOUNTER — OFFICE VISIT (OUTPATIENT)
Dept: HEMATOLOGY/ONCOLOGY | Facility: CLINIC | Age: 69
End: 2019-01-09
Payer: COMMERCIAL

## 2019-01-09 VITALS
OXYGEN SATURATION: 98 % | DIASTOLIC BLOOD PRESSURE: 73 MMHG | WEIGHT: 148.81 LBS | BODY MASS INDEX: 24.76 KG/M2 | SYSTOLIC BLOOD PRESSURE: 125 MMHG | TEMPERATURE: 98 F | HEART RATE: 90 BPM | RESPIRATION RATE: 20 BRPM

## 2019-01-09 DIAGNOSIS — Z79.811 USE OF AROMATASE INHIBITORS: ICD-10-CM

## 2019-01-09 DIAGNOSIS — I10 HTN (HYPERTENSION), BENIGN: ICD-10-CM

## 2019-01-09 DIAGNOSIS — Z17.0 MALIGNANT NEOPLASM OF LOWER-OUTER QUADRANT OF LEFT BREAST OF FEMALE, ESTROGEN RECEPTOR POSITIVE: Primary | ICD-10-CM

## 2019-01-09 DIAGNOSIS — C50.512 MALIGNANT NEOPLASM OF LOWER-OUTER QUADRANT OF LEFT BREAST OF FEMALE, ESTROGEN RECEPTOR POSITIVE: Primary | ICD-10-CM

## 2019-01-09 PROBLEM — R55 VASOMOTOR INSTABILITY: Status: RESOLVED | Noted: 2018-02-19 | Resolved: 2019-01-09

## 2019-01-09 PROCEDURE — 99999 PR PBB SHADOW E&M-EST. PATIENT-LVL III: ICD-10-PCS | Mod: PBBFAC,,, | Performed by: NURSE PRACTITIONER

## 2019-01-09 PROCEDURE — 3078F PR MOST RECENT DIASTOLIC BLOOD PRESSURE < 80 MM HG: ICD-10-PCS | Mod: CPTII,S$GLB,, | Performed by: NURSE PRACTITIONER

## 2019-01-09 PROCEDURE — 99999 PR PBB SHADOW E&M-EST. PATIENT-LVL III: CPT | Mod: PBBFAC,,, | Performed by: NURSE PRACTITIONER

## 2019-01-09 PROCEDURE — 99213 PR OFFICE/OUTPT VISIT, EST, LEVL III, 20-29 MIN: ICD-10-PCS | Mod: S$GLB,,, | Performed by: NURSE PRACTITIONER

## 2019-01-09 PROCEDURE — 3074F SYST BP LT 130 MM HG: CPT | Mod: CPTII,S$GLB,, | Performed by: NURSE PRACTITIONER

## 2019-01-09 PROCEDURE — 3078F DIAST BP <80 MM HG: CPT | Mod: CPTII,S$GLB,, | Performed by: NURSE PRACTITIONER

## 2019-01-09 PROCEDURE — 1101F PT FALLS ASSESS-DOCD LE1/YR: CPT | Mod: CPTII,S$GLB,, | Performed by: NURSE PRACTITIONER

## 2019-01-09 PROCEDURE — 3074F PR MOST RECENT SYSTOLIC BLOOD PRESSURE < 130 MM HG: ICD-10-PCS | Mod: CPTII,S$GLB,, | Performed by: NURSE PRACTITIONER

## 2019-01-09 PROCEDURE — 99213 OFFICE O/P EST LOW 20 MIN: CPT | Mod: S$GLB,,, | Performed by: NURSE PRACTITIONER

## 2019-01-09 PROCEDURE — 1101F PR PT FALLS ASSESS DOC 0-1 FALLS W/OUT INJ PAST YR: ICD-10-PCS | Mod: CPTII,S$GLB,, | Performed by: NURSE PRACTITIONER

## 2019-01-11 ENCOUNTER — LAB VISIT (OUTPATIENT)
Dept: LAB | Facility: HOSPITAL | Age: 69
End: 2019-01-11
Attending: INTERNAL MEDICINE
Payer: COMMERCIAL

## 2019-01-11 DIAGNOSIS — R79.89 ELEVATED PLASMA METANEPHRINES: ICD-10-CM

## 2019-01-11 PROCEDURE — 83835 ASSAY OF METANEPHRINES: CPT

## 2019-01-15 ENCOUNTER — PATIENT MESSAGE (OUTPATIENT)
Dept: PRIMARY CARE CLINIC | Facility: CLINIC | Age: 69
End: 2019-01-15

## 2019-01-15 LAB
COLLECT DURATION TIME SPEC: 24 HR
CREAT 24H UR-MRATE: 770 MG/D (ref 500–1400)
CREAT UR-MCNC: 55 MG/DL
METANEPH 24H UR-MCNC: 96 UG/L
METANEPH 24H UR-MRATE: 134 UG/D (ref 39–143)
METANEPH+NORMETANEPH UR-IMP: NORMAL
METANEPH/CREAT 24H UR: 175 UG/G CRT (ref 0–300)
NORMETANEPHRINE 24H UR-MCNC: 203 UG/L
NORMETANEPHRINE 24H UR-MRATE: 284 UG/D (ref 109–393)
NORMETANEPHRINE/CREAT 24H UR: 369 UG/G CRT (ref 0–400)
SPECIMEN VOL ?TM UR: 1400 ML

## 2019-01-23 ENCOUNTER — LAB VISIT (OUTPATIENT)
Dept: LAB | Facility: HOSPITAL | Age: 69
End: 2019-01-23
Attending: FAMILY MEDICINE
Payer: COMMERCIAL

## 2019-01-23 ENCOUNTER — OFFICE VISIT (OUTPATIENT)
Dept: FAMILY MEDICINE | Facility: CLINIC | Age: 69
End: 2019-01-23
Attending: FAMILY MEDICINE
Payer: COMMERCIAL

## 2019-01-23 VITALS
BODY MASS INDEX: 25.22 KG/M2 | WEIGHT: 151.38 LBS | HEIGHT: 65 IN | DIASTOLIC BLOOD PRESSURE: 80 MMHG | SYSTOLIC BLOOD PRESSURE: 139 MMHG | OXYGEN SATURATION: 99 % | HEART RATE: 78 BPM

## 2019-01-23 DIAGNOSIS — N30.00 ACUTE CYSTITIS WITHOUT HEMATURIA: ICD-10-CM

## 2019-01-23 DIAGNOSIS — Z23 NEED FOR PNEUMOCOCCAL VACCINATION: ICD-10-CM

## 2019-01-23 DIAGNOSIS — I10 HTN (HYPERTENSION), BENIGN: ICD-10-CM

## 2019-01-23 DIAGNOSIS — I10 HTN (HYPERTENSION), BENIGN: Primary | ICD-10-CM

## 2019-01-23 LAB
ALBUMIN SERPL BCP-MCNC: 4.1 G/DL
ALP SERPL-CCNC: 85 U/L
ALT SERPL W/O P-5'-P-CCNC: 34 U/L
ANION GAP SERPL CALC-SCNC: 13 MMOL/L
AST SERPL-CCNC: 45 U/L
BILIRUB SERPL-MCNC: 0.9 MG/DL
BUN SERPL-MCNC: 12 MG/DL
CALCIUM SERPL-MCNC: 10 MG/DL
CHLORIDE SERPL-SCNC: 102 MMOL/L
CHOLEST SERPL-MCNC: 291 MG/DL
CHOLEST/HDLC SERPL: 2.3 {RATIO}
CO2 SERPL-SCNC: 26 MMOL/L
CREAT SERPL-MCNC: 0.7 MG/DL
EST. GFR  (AFRICAN AMERICAN): >60 ML/MIN/1.73 M^2
EST. GFR  (NON AFRICAN AMERICAN): >60 ML/MIN/1.73 M^2
GLUCOSE SERPL-MCNC: 119 MG/DL
HDLC SERPL-MCNC: 126 MG/DL
HDLC SERPL: 43.3 %
LDLC SERPL CALC-MCNC: 154.4 MG/DL
NONHDLC SERPL-MCNC: 165 MG/DL
POTASSIUM SERPL-SCNC: 3.6 MMOL/L
PROT SERPL-MCNC: 8.2 G/DL
SODIUM SERPL-SCNC: 141 MMOL/L
TRIGL SERPL-MCNC: 53 MG/DL

## 2019-01-23 PROCEDURE — 99213 PR OFFICE/OUTPT VISIT, EST, LEVL III, 20-29 MIN: ICD-10-PCS | Mod: S$GLB,,, | Performed by: FAMILY MEDICINE

## 2019-01-23 PROCEDURE — 3075F SYST BP GE 130 - 139MM HG: CPT | Mod: CPTII,S$GLB,, | Performed by: FAMILY MEDICINE

## 2019-01-23 PROCEDURE — 87086 URINE CULTURE/COLONY COUNT: CPT

## 2019-01-23 PROCEDURE — 3075F PR MOST RECENT SYSTOLIC BLOOD PRESS GE 130-139MM HG: ICD-10-PCS | Mod: CPTII,S$GLB,, | Performed by: FAMILY MEDICINE

## 2019-01-23 PROCEDURE — 87186 SC STD MICRODIL/AGAR DIL: CPT

## 2019-01-23 PROCEDURE — 87077 CULTURE AEROBIC IDENTIFY: CPT

## 2019-01-23 PROCEDURE — 99999 PR PBB SHADOW E&M-EST. PATIENT-LVL IV: ICD-10-PCS | Mod: PBBFAC,,, | Performed by: FAMILY MEDICINE

## 2019-01-23 PROCEDURE — 3079F PR MOST RECENT DIASTOLIC BLOOD PRESSURE 80-89 MM HG: ICD-10-PCS | Mod: CPTII,S$GLB,, | Performed by: FAMILY MEDICINE

## 2019-01-23 PROCEDURE — 3079F DIAST BP 80-89 MM HG: CPT | Mod: CPTII,S$GLB,, | Performed by: FAMILY MEDICINE

## 2019-01-23 PROCEDURE — 80061 LIPID PANEL: CPT

## 2019-01-23 PROCEDURE — 1101F PT FALLS ASSESS-DOCD LE1/YR: CPT | Mod: CPTII,S$GLB,, | Performed by: FAMILY MEDICINE

## 2019-01-23 PROCEDURE — 87088 URINE BACTERIA CULTURE: CPT

## 2019-01-23 PROCEDURE — 99213 OFFICE O/P EST LOW 20 MIN: CPT | Mod: S$GLB,,, | Performed by: FAMILY MEDICINE

## 2019-01-23 PROCEDURE — 80053 COMPREHEN METABOLIC PANEL: CPT

## 2019-01-23 PROCEDURE — 99999 PR PBB SHADOW E&M-EST. PATIENT-LVL IV: CPT | Mod: PBBFAC,,, | Performed by: FAMILY MEDICINE

## 2019-01-23 PROCEDURE — 1101F PR PT FALLS ASSESS DOC 0-1 FALLS W/OUT INJ PAST YR: ICD-10-PCS | Mod: CPTII,S$GLB,, | Performed by: FAMILY MEDICINE

## 2019-01-23 PROCEDURE — 36415 COLL VENOUS BLD VENIPUNCTURE: CPT | Mod: PO

## 2019-01-23 NOTE — PATIENT INSTRUCTIONS
Your test results will be communicated to you via : [facility], Telephone or Letter.   If you have not received your test results in one week, please contact the clinic at [phone].

## 2019-01-25 LAB — BACTERIA UR CULT: NORMAL

## 2019-01-28 RX ORDER — NITROFURANTOIN 25; 75 MG/1; MG/1
100 CAPSULE ORAL 2 TIMES DAILY
Qty: 10 CAPSULE | Refills: 0 | Status: SHIPPED | OUTPATIENT
Start: 2019-01-28 | End: 2019-03-07

## 2019-01-29 NOTE — PROGRESS NOTES
"Subjective:       Patient ID: Pantera Posey is a 68 y.o. female.    Chief Complaint: Hypertension    HPI   Pt is here for follow up of htn pt brings her bp log from home with bp's in the 120's to 130's over 70's to 80's she had markedly elevated bp upon initial bp check improved with repeat   Pt is on norvasc hctz no muscle cramps no sob/cp  no change in bowel habits no brbpr no vaginal bleeding   Review of Systems   Constitutional: Negative for activity change, chills, fatigue, fever and unexpected weight change.   HENT: Negative for hearing loss, rhinorrhea and trouble swallowing.    Eyes: Negative for discharge and visual disturbance.   Respiratory: Negative for cough, chest tightness, shortness of breath and wheezing.    Cardiovascular: Negative for chest pain and palpitations.   Gastrointestinal: Negative for abdominal distention, abdominal pain, blood in stool, constipation, diarrhea and vomiting.   Endocrine: Negative for polydipsia and polyuria.   Genitourinary: Negative for difficulty urinating, dysuria, hematuria and menstrual problem.   Musculoskeletal: Negative for arthralgias, joint swelling and neck pain.   Neurological: Negative for weakness and headaches.   Psychiatric/Behavioral: Negative for confusion and dysphoric mood.       Objective:      Physical Exam   Constitutional: She appears well-developed and well-nourished. No distress.   Cardiovascular: Normal rate and regular rhythm. Exam reveals no gallop.   Pulmonary/Chest: Effort normal and breath sounds normal. No stridor. No respiratory distress.   Abdominal: Soft. She exhibits no distension. There is no tenderness.     labs discussed with pt   Assessment:       1. HTN (hypertension), benign    2. Need for pneumococcal vaccination    3. Acute cystitis without hematuria        Plan:     orders cmp lipid  Cont meds  Low fat low salt diet  Graded exercise  rtc 1 month bp check bring bp cuff        "This note will not be shared with the patient." "

## 2019-01-30 ENCOUNTER — PATIENT MESSAGE (OUTPATIENT)
Dept: FAMILY MEDICINE | Facility: CLINIC | Age: 69
End: 2019-01-30

## 2019-02-10 RX ORDER — HYDROCHLOROTHIAZIDE 25 MG/1
TABLET ORAL
Qty: 90 TABLET | Refills: 0 | Status: SHIPPED | OUTPATIENT
Start: 2019-02-10 | End: 2019-05-09 | Stop reason: SDUPTHER

## 2019-02-24 DIAGNOSIS — Z17.0 MALIGNANT NEOPLASM OF LOWER-OUTER QUADRANT OF LEFT BREAST OF FEMALE, ESTROGEN RECEPTOR POSITIVE: ICD-10-CM

## 2019-02-24 DIAGNOSIS — C50.512 MALIGNANT NEOPLASM OF LOWER-OUTER QUADRANT OF LEFT BREAST OF FEMALE, ESTROGEN RECEPTOR POSITIVE: ICD-10-CM

## 2019-02-26 RX ORDER — ANASTROZOLE 1 MG/1
TABLET ORAL
Qty: 90 TABLET | Refills: 2 | Status: SHIPPED | OUTPATIENT
Start: 2019-02-26 | End: 2019-11-22 | Stop reason: SDUPTHER

## 2019-03-06 ENCOUNTER — PATIENT MESSAGE (OUTPATIENT)
Dept: FAMILY MEDICINE | Facility: CLINIC | Age: 69
End: 2019-03-06

## 2019-03-06 NOTE — TELEPHONE ENCOUNTER
Assisted patient schedule an appt with ISAI White since Dr Singh next availability is not until april

## 2019-03-06 NOTE — TELEPHONE ENCOUNTER
Would you like to send over a prescription to her pharmacy or would you like her to come in for an appt this week?  Please advise. thanks

## 2019-03-07 ENCOUNTER — OFFICE VISIT (OUTPATIENT)
Dept: FAMILY MEDICINE | Facility: CLINIC | Age: 69
End: 2019-03-07
Attending: FAMILY MEDICINE
Payer: COMMERCIAL

## 2019-03-07 VITALS
BODY MASS INDEX: 24.6 KG/M2 | SYSTOLIC BLOOD PRESSURE: 158 MMHG | HEART RATE: 88 BPM | HEIGHT: 65 IN | WEIGHT: 147.63 LBS | DIASTOLIC BLOOD PRESSURE: 78 MMHG | TEMPERATURE: 98 F

## 2019-03-07 DIAGNOSIS — J01.00 ACUTE NON-RECURRENT MAXILLARY SINUSITIS: Primary | ICD-10-CM

## 2019-03-07 DIAGNOSIS — I10 HTN (HYPERTENSION), BENIGN: ICD-10-CM

## 2019-03-07 PROCEDURE — 3078F DIAST BP <80 MM HG: CPT | Mod: CPTII,S$GLB,, | Performed by: FAMILY MEDICINE

## 2019-03-07 PROCEDURE — 3078F PR MOST RECENT DIASTOLIC BLOOD PRESSURE < 80 MM HG: ICD-10-PCS | Mod: CPTII,S$GLB,, | Performed by: FAMILY MEDICINE

## 2019-03-07 PROCEDURE — 96372 PR INJECTION,THERAP/PROPH/DIAG2ST, IM OR SUBCUT: ICD-10-PCS | Mod: S$GLB,,, | Performed by: FAMILY MEDICINE

## 2019-03-07 PROCEDURE — 99999 PR PBB SHADOW E&M-EST. PATIENT-LVL III: CPT | Mod: PBBFAC,,, | Performed by: FAMILY MEDICINE

## 2019-03-07 PROCEDURE — 3077F PR MOST RECENT SYSTOLIC BLOOD PRESSURE >= 140 MM HG: ICD-10-PCS | Mod: CPTII,S$GLB,, | Performed by: FAMILY MEDICINE

## 2019-03-07 PROCEDURE — 99214 PR OFFICE/OUTPT VISIT, EST, LEVL IV, 30-39 MIN: ICD-10-PCS | Mod: 25,S$GLB,, | Performed by: FAMILY MEDICINE

## 2019-03-07 PROCEDURE — 1101F PR PT FALLS ASSESS DOC 0-1 FALLS W/OUT INJ PAST YR: ICD-10-PCS | Mod: CPTII,S$GLB,, | Performed by: FAMILY MEDICINE

## 2019-03-07 PROCEDURE — 3077F SYST BP >= 140 MM HG: CPT | Mod: CPTII,S$GLB,, | Performed by: FAMILY MEDICINE

## 2019-03-07 PROCEDURE — 99999 PR PBB SHADOW E&M-EST. PATIENT-LVL III: ICD-10-PCS | Mod: PBBFAC,,, | Performed by: FAMILY MEDICINE

## 2019-03-07 PROCEDURE — 96372 THER/PROPH/DIAG INJ SC/IM: CPT | Mod: S$GLB,,, | Performed by: FAMILY MEDICINE

## 2019-03-07 PROCEDURE — 1101F PT FALLS ASSESS-DOCD LE1/YR: CPT | Mod: CPTII,S$GLB,, | Performed by: FAMILY MEDICINE

## 2019-03-07 PROCEDURE — 99214 OFFICE O/P EST MOD 30 MIN: CPT | Mod: 25,S$GLB,, | Performed by: FAMILY MEDICINE

## 2019-03-07 RX ORDER — AMLODIPINE BESYLATE 10 MG/1
10 TABLET ORAL DAILY
Qty: 90 TABLET | Refills: 3 | Status: SHIPPED | OUTPATIENT
Start: 2019-03-07 | End: 2019-06-12 | Stop reason: DRUGHIGH

## 2019-03-07 RX ORDER — AZITHROMYCIN 250 MG/1
TABLET, FILM COATED ORAL
Qty: 6 TABLET | Refills: 0 | Status: SHIPPED | OUTPATIENT
Start: 2019-03-07 | End: 2019-03-12

## 2019-03-07 RX ORDER — LEVOCETIRIZINE DIHYDROCHLORIDE 5 MG/1
5 TABLET, FILM COATED ORAL NIGHTLY
Qty: 30 TABLET | Refills: 3 | Status: SHIPPED | OUTPATIENT
Start: 2019-03-07 | End: 2023-11-13

## 2019-03-07 RX ORDER — PROMETHAZINE HYDROCHLORIDE AND DEXTROMETHORPHAN HYDROBROMIDE 6.25; 15 MG/5ML; MG/5ML
SYRUP ORAL
Qty: 180 ML | Refills: 0 | Status: SHIPPED | OUTPATIENT
Start: 2019-03-07 | End: 2019-04-01 | Stop reason: SDUPTHER

## 2019-03-07 RX ORDER — METHYLPREDNISOLONE ACETATE 40 MG/ML
40 INJECTION, SUSPENSION INTRA-ARTICULAR; INTRALESIONAL; INTRAMUSCULAR; SOFT TISSUE
Status: COMPLETED | OUTPATIENT
Start: 2019-03-07 | End: 2019-03-07

## 2019-03-07 RX ADMIN — METHYLPREDNISOLONE ACETATE 40 MG: 40 INJECTION, SUSPENSION INTRA-ARTICULAR; INTRALESIONAL; INTRAMUSCULAR; SOFT TISSUE at 09:03

## 2019-03-07 NOTE — PROGRESS NOTES
After attaining consent, in per orders of  , injection of Depo Medrol IM  LOT [id number] exp. 01/2020 given in Right Ventrogluteal  by Jocy Stone. Patient tolerated well and band aid applied. Pt. Instructed to remain in clinic for 15 mins. afterwards, and to report any adverse reactions to me immediately .

## 2019-03-07 NOTE — PATIENT INSTRUCTIONS
Your test results will be communicated to you via : [facility], Telephone or Letter.   If you have not received your test results in one week, please contact the clinic at [phone].

## 2019-03-19 NOTE — PROGRESS NOTES
"Subjective:       Patient ID: Pantera Posey is a 68 y.o. female.    Chief Complaint: URI    HPI   Pt is here for c/o nasal congestion with facial pressure over a week no fever/chills pos post nasal drip with cough nonproductive pt  takes otc with htn bp elevated today   Review of Systems   Constitutional: Negative for chills, fatigue and fever.   HENT: Positive for congestion, postnasal drip and sinus pressure. Negative for ear pain and sore throat.    Respiratory: Negative for cough, chest tightness and shortness of breath.    Cardiovascular: Negative for chest pain and palpitations.   Gastrointestinal: Negative for abdominal distention and abdominal pain.       Objective:      Physical Exam   Constitutional: She appears well-developed and well-nourished. No distress.   Cardiovascular: Normal rate and regular rhythm. Exam reveals no gallop.   Pulmonary/Chest: Effort normal and breath sounds normal. No respiratory distress. She has no wheezes.   Abdominal: Soft. Bowel sounds are normal. She exhibits no distension. There is no tenderness.     labs discussed with pt   Assessment:       1. Acute non-recurrent maxillary sinusitis    2. HTN (hypertension), benign        Plan:     orders depomedrol  Cont meds  Start zpack  Rest  Increase fluids  Low salt diet  Avoid decngestants   rtc prn and 6 months    "This note will not be shared with the patient."   "

## 2019-03-27 ENCOUNTER — PATIENT MESSAGE (OUTPATIENT)
Dept: FAMILY MEDICINE | Facility: CLINIC | Age: 69
End: 2019-03-27

## 2019-04-01 ENCOUNTER — PATIENT OUTREACH (OUTPATIENT)
Dept: ADMINISTRATIVE | Facility: HOSPITAL | Age: 69
End: 2019-04-01

## 2019-04-01 ENCOUNTER — OFFICE VISIT (OUTPATIENT)
Dept: FAMILY MEDICINE | Facility: CLINIC | Age: 69
End: 2019-04-01
Payer: COMMERCIAL

## 2019-04-01 VITALS
OXYGEN SATURATION: 99 % | HEIGHT: 65 IN | DIASTOLIC BLOOD PRESSURE: 84 MMHG | BODY MASS INDEX: 24.57 KG/M2 | SYSTOLIC BLOOD PRESSURE: 138 MMHG | HEART RATE: 98 BPM | WEIGHT: 147.5 LBS

## 2019-04-01 DIAGNOSIS — Z12.12 SCREENING FOR COLORECTAL CANCER: Primary | ICD-10-CM

## 2019-04-01 DIAGNOSIS — Z85.3 HISTORY OF RIGHT BREAST CANCER: ICD-10-CM

## 2019-04-01 DIAGNOSIS — Z12.11 SCREENING FOR COLORECTAL CANCER: Primary | ICD-10-CM

## 2019-04-01 DIAGNOSIS — R05.3 PERSISTENT COUGH FOR 3 WEEKS OR LONGER: Primary | ICD-10-CM

## 2019-04-01 PROCEDURE — 3075F PR MOST RECENT SYSTOLIC BLOOD PRESS GE 130-139MM HG: ICD-10-PCS | Mod: CPTII,S$GLB,, | Performed by: NURSE PRACTITIONER

## 2019-04-01 PROCEDURE — 99213 PR OFFICE/OUTPT VISIT, EST, LEVL III, 20-29 MIN: ICD-10-PCS | Mod: S$GLB,,, | Performed by: NURSE PRACTITIONER

## 2019-04-01 PROCEDURE — 3075F SYST BP GE 130 - 139MM HG: CPT | Mod: CPTII,S$GLB,, | Performed by: NURSE PRACTITIONER

## 2019-04-01 PROCEDURE — 3079F PR MOST RECENT DIASTOLIC BLOOD PRESSURE 80-89 MM HG: ICD-10-PCS | Mod: CPTII,S$GLB,, | Performed by: NURSE PRACTITIONER

## 2019-04-01 PROCEDURE — 71046 XR CHEST PA AND LATERAL: ICD-10-PCS | Mod: S$GLB,,, | Performed by: RADIOLOGY

## 2019-04-01 PROCEDURE — 3079F DIAST BP 80-89 MM HG: CPT | Mod: CPTII,S$GLB,, | Performed by: NURSE PRACTITIONER

## 2019-04-01 PROCEDURE — 1101F PR PT FALLS ASSESS DOC 0-1 FALLS W/OUT INJ PAST YR: ICD-10-PCS | Mod: CPTII,S$GLB,, | Performed by: NURSE PRACTITIONER

## 2019-04-01 PROCEDURE — 99999 PR PBB SHADOW E&M-EST. PATIENT-LVL IV: CPT | Mod: PBBFAC,,, | Performed by: NURSE PRACTITIONER

## 2019-04-01 PROCEDURE — 99999 PR PBB SHADOW E&M-EST. PATIENT-LVL IV: ICD-10-PCS | Mod: PBBFAC,,, | Performed by: NURSE PRACTITIONER

## 2019-04-01 PROCEDURE — 71046 X-RAY EXAM CHEST 2 VIEWS: CPT | Mod: S$GLB,,, | Performed by: RADIOLOGY

## 2019-04-01 PROCEDURE — 99213 OFFICE O/P EST LOW 20 MIN: CPT | Mod: S$GLB,,, | Performed by: NURSE PRACTITIONER

## 2019-04-01 PROCEDURE — 1101F PT FALLS ASSESS-DOCD LE1/YR: CPT | Mod: CPTII,S$GLB,, | Performed by: NURSE PRACTITIONER

## 2019-04-01 RX ORDER — PROMETHAZINE HYDROCHLORIDE AND DEXTROMETHORPHAN HYDROBROMIDE 6.25; 15 MG/5ML; MG/5ML
SYRUP ORAL
Qty: 180 ML | Refills: 0 | Status: SHIPPED | OUTPATIENT
Start: 2019-04-01 | End: 2019-04-06

## 2019-04-01 NOTE — PATIENT INSTRUCTIONS
Pantera,     We are always striving for excellence. Should you receive a patient experience survey in the mail, we would appreciate if you would take a few moments to give us your feedback. These surveys let us know our strengths as well as areas of opportunity for improvement to better serve you.    Thank you for your time,  Dominick Gracia MA    Your test results will be communicated to you via : [facility], Telephone or Letter.   If you have not received your test results in one week, please contact the clinic at [phone].

## 2019-04-01 NOTE — PROGRESS NOTES
Subjective:       Patient ID: Pantera Posey is a 68 y.o. female.    Chief Complaint: Cough     Patient presents today with complaints of a persistent cough and ear fullness.  She previous saw Dr. Singh on 3/7/19 and was treated with zpack and depomedrol 40mg.  She states that her congestion, sinus pressure and rhinorrhea have improved but still has a cough and her ears feel full and are popping. She feels like it is heard to hear. Her cough is at its worse in the morning but she is coughing throughout the day, she states that Dr. Singh mentioned a chest xray at their previous visit. She is taking Xyzal nightly and promethazine DM.      Review of Systems   Constitutional: Negative for chills, fatigue, fever and weight loss.   HENT: Positive for ear pain. Negative for congestion, postnasal drip, rhinorrhea, sinus pressure, sinus pain, sneezing and sore throat.    Respiratory: Positive for cough. Negative for hemoptysis, chest tightness, shortness of breath and wheezing.    Cardiovascular: Negative for chest pain.   Gastrointestinal: Negative for heartburn.   Musculoskeletal: Negative for myalgias.   Skin: Negative for rash.   Allergic/Immunologic: Positive for environmental allergies.   Neurological: Negative for dizziness, light-headedness and headaches.       Objective:      Vitals:    04/01/19 1057   BP: 138/84   Pulse: 98     Physical Exam   Constitutional: She is oriented to person, place, and time. She appears well-developed and well-nourished.   HENT:   Right Ear: No tenderness. Tympanic membrane is bulging. Tympanic membrane is not erythematous. A middle ear effusion is present.   Left Ear: No tenderness. Tympanic membrane is bulging. Tympanic membrane is not erythematous. A middle ear effusion is present.   Nose: No mucosal edema. Right sinus exhibits no maxillary sinus tenderness and no frontal sinus tenderness. Left sinus exhibits no maxillary sinus tenderness and no frontal sinus tenderness.    Mouth/Throat: No oropharyngeal exudate, posterior oropharyngeal edema or posterior oropharyngeal erythema.   Some clear postnasal drainage noted     Neck: No JVD present.   Cardiovascular: A regularly irregular rhythm present.   No murmur heard.  Pulmonary/Chest: Effort normal. No accessory muscle usage. No tachypnea. No respiratory distress. She has rales in the right upper field.   Musculoskeletal: She exhibits no edema.   Lymphadenopathy:     She has no cervical adenopathy.   Neurological: She is alert and oriented to person, place, and time.   Skin: Skin is warm and dry.   Psychiatric: She has a normal mood and affect. Her behavior is normal. Thought content normal.       Assessment:       1. Persistent cough for 3 weeks or longer    2. History of right breast cancer        Plan:         Chest X-ray  Continue Xyzal nightly  Start OTC Mucinex as directed x 7 days  Promethazine DM reordered - take as needed for cough at night  Increase water intake    Further recommendations to follow after above.  Call or RTC if symptoms worsen or do not begin to improve - possible referral to ENT

## 2019-04-06 DIAGNOSIS — R05.3 PERSISTENT COUGH FOR 3 WEEKS OR LONGER: Primary | ICD-10-CM

## 2019-04-06 RX ORDER — BENZONATATE 100 MG/1
100 CAPSULE ORAL 3 TIMES DAILY PRN
Qty: 30 CAPSULE | Refills: 0 | Status: SHIPPED | OUTPATIENT
Start: 2019-04-06 | End: 2019-04-16

## 2019-04-29 ENCOUNTER — OFFICE VISIT (OUTPATIENT)
Dept: FAMILY MEDICINE | Facility: CLINIC | Age: 69
End: 2019-04-29
Payer: COMMERCIAL

## 2019-04-29 VITALS
OXYGEN SATURATION: 98 % | HEIGHT: 65 IN | DIASTOLIC BLOOD PRESSURE: 78 MMHG | BODY MASS INDEX: 24.76 KG/M2 | WEIGHT: 148.63 LBS | HEART RATE: 53 BPM | SYSTOLIC BLOOD PRESSURE: 140 MMHG

## 2019-04-29 DIAGNOSIS — M25.471 EDEMA OF BOTH ANKLES: Primary | ICD-10-CM

## 2019-04-29 DIAGNOSIS — I10 HTN (HYPERTENSION), BENIGN: ICD-10-CM

## 2019-04-29 DIAGNOSIS — M25.472 EDEMA OF BOTH ANKLES: Primary | ICD-10-CM

## 2019-04-29 PROCEDURE — 1101F PT FALLS ASSESS-DOCD LE1/YR: CPT | Mod: CPTII,S$GLB,, | Performed by: NURSE PRACTITIONER

## 2019-04-29 PROCEDURE — 99999 PR PBB SHADOW E&M-EST. PATIENT-LVL III: CPT | Mod: PBBFAC,,, | Performed by: NURSE PRACTITIONER

## 2019-04-29 PROCEDURE — 99213 OFFICE O/P EST LOW 20 MIN: CPT | Mod: S$GLB,,, | Performed by: NURSE PRACTITIONER

## 2019-04-29 PROCEDURE — 3077F SYST BP >= 140 MM HG: CPT | Mod: CPTII,S$GLB,, | Performed by: NURSE PRACTITIONER

## 2019-04-29 PROCEDURE — 3078F PR MOST RECENT DIASTOLIC BLOOD PRESSURE < 80 MM HG: ICD-10-PCS | Mod: CPTII,S$GLB,, | Performed by: NURSE PRACTITIONER

## 2019-04-29 PROCEDURE — 99999 PR PBB SHADOW E&M-EST. PATIENT-LVL III: ICD-10-PCS | Mod: PBBFAC,,, | Performed by: NURSE PRACTITIONER

## 2019-04-29 PROCEDURE — 3078F DIAST BP <80 MM HG: CPT | Mod: CPTII,S$GLB,, | Performed by: NURSE PRACTITIONER

## 2019-04-29 PROCEDURE — 3077F PR MOST RECENT SYSTOLIC BLOOD PRESSURE >= 140 MM HG: ICD-10-PCS | Mod: CPTII,S$GLB,, | Performed by: NURSE PRACTITIONER

## 2019-04-29 PROCEDURE — 99213 PR OFFICE/OUTPT VISIT, EST, LEVL III, 20-29 MIN: ICD-10-PCS | Mod: S$GLB,,, | Performed by: NURSE PRACTITIONER

## 2019-04-29 PROCEDURE — 1101F PR PT FALLS ASSESS DOC 0-1 FALLS W/OUT INJ PAST YR: ICD-10-PCS | Mod: CPTII,S$GLB,, | Performed by: NURSE PRACTITIONER

## 2019-04-29 RX ORDER — LISINOPRIL 5 MG/1
5 TABLET ORAL DAILY
Qty: 30 TABLET | Refills: 1 | Status: SHIPPED | OUTPATIENT
Start: 2019-04-29 | End: 2019-06-12

## 2019-04-29 NOTE — PROGRESS NOTES
Subjective:       Patient ID: Pantera Posey is a 68 y.o. female.    Chief Complaint: Leg Swelling (Worsen on Easter Sunday. before that adjustments made better, but not since Earter Sunday)    HPI     Patient presents today with c/o edema to bilateral ankles and at times the swelling moves up to both of her calves. It does improve overnight but returns quickly in the mornings. She recently saw Dr. Francisco huntley increased her dosage of amlodipine from 5 mg to 10 mg.     Review of Systems   Constitutional: Negative for activity change and unexpected weight change.   HENT: Negative for hearing loss, rhinorrhea and trouble swallowing.    Eyes: Negative for discharge and visual disturbance.   Respiratory: Negative for chest tightness and wheezing.    Cardiovascular: Negative for chest pain and palpitations.   Gastrointestinal: Negative for blood in stool, constipation, diarrhea and vomiting.   Endocrine: Negative for polydipsia and polyuria.   Genitourinary: Negative for difficulty urinating, dysuria, hematuria and menstrual problem.   Musculoskeletal: Positive for joint swelling (bilateral ankles). Negative for arthralgias and neck pain.   Neurological: Negative for dizziness, syncope, weakness, light-headedness, numbness and headaches.   Psychiatric/Behavioral: Negative for confusion and dysphoric mood.         Objective:      Vitals:    04/29/19 1233   BP: (!) 140/78   Pulse: (!) 53     Physical Exam   Constitutional: She is oriented to person, place, and time. She appears well-developed and well-nourished.   HENT:   Head: Normocephalic and atraumatic.   Eyes: No scleral icterus.   Neck: No JVD present.   Cardiovascular: Normal rate, regular rhythm and normal heart sounds. Exam reveals no friction rub.   No murmur heard.  Pulses:       Dorsalis pedis pulses are 2+ on the right side, and 2+ on the left side.   Pulmonary/Chest: Effort normal and breath sounds normal. No stridor. No respiratory distress. She has no  rales.   Musculoskeletal: She exhibits edema.        Right ankle: She exhibits swelling (2+). She exhibits normal range of motion and normal pulse.        Left ankle: She exhibits swelling (2+). She exhibits normal range of motion and normal pulse.   Neurological: She is alert and oriented to person, place, and time.   Skin: Skin is warm and dry. Capillary refill takes less than 2 seconds.   Psychiatric: She has a normal mood and affect. Her behavior is normal. Thought content normal.       Assessment:       1. Edema of both ankles    2. HTN (hypertension), benign        Plan:         Decrease amlodipine to 5 mg daily - pt will cut current pill in half  ADD lisinopril 5 mg once daily  Continue HCTZ 25 mg daily  Low salt diet  Graded exercise    RTC 4-6 weeks for BP check and labs

## 2019-04-29 NOTE — PATIENT INSTRUCTIONS
Pantera,     We are always striving for excellence. Should you receive a patient experience survey in the mail, we would appreciate if you would take a few moments to give us your feedback. These surveys let us know our strengths as well as areas of opportunity for improvement to better serve you.    Thank you for your time,  Norman Rolon MA

## 2019-05-10 RX ORDER — HYDROCHLOROTHIAZIDE 25 MG/1
TABLET ORAL
Qty: 90 TABLET | Refills: 0 | Status: SHIPPED | OUTPATIENT
Start: 2019-05-10 | End: 2019-08-07 | Stop reason: SDUPTHER

## 2019-05-14 NOTE — PROGRESS NOTES
Subjective:       Patient ID: Pantera oPsey is a 68 y.o. female.    Chief Complaint: No chief complaint on file.    HPI   Mrs. Posey returns today for follow up.  As of mid September 2017 she has been on anastrazole 1 mg daily.  I had last seen her in September 2018.     Briefly, she  is a  68-year-old  female who has a remote history of breast cancer treated in [place] 20 years ago with mastectomy and five years of tamoxifen.      A recent routine mammogram on 06/27/2017 was read as BIRADS category 0 and showed an asymmetry in the upper region of the left breast   posteriorly.  A diagnostic mammogram two days later was read as BIRADS category 4 and a biopsy was performed on 07/11/2017 and showed a carcinoma that was ER strongly positive, IL positive, and HER-2 negative.    The patient was seen by Dr. Martin and eventually underwent a left-sided mastectomy and sentinel lymph node biopsy on 08/03/2017 with insertion of bilateral tissue expanders.    The pathology report from the procedure indicates that 4 left axillary lymph nodes were negative.  On the mastectomy sample, she had a 15 mm invasive ductal carcinoma; resection margins were clear.  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  There was no lymphovascular invasion.  Her Oncotype score was 10, suggesting a 9 % chance of recurrence at ten years with tamoxifen alone.    Her DXA scan showed mild osteopenia of the hip but normal density on the L spine.    The medical, surgical, as well as family history remain unchanged.        Review of Systems    Overall she feels well.  She states that her hot flashes intermittent joint stiffness have improved significantly. Her ECOG PS is 1.   She denies any anxiety, depression, easy bruising, fevers, chills, night  sweats, weight loss, nausea, vomiting, diarrhea, constipation, diplopia, blurred vision, headache, chest pain, palpitations, shortness of breath, breast pain, abdominal pain, extremity  pain, or difficulty ambulating.  The remainder of the ten-point ROS, including general, skin, lymph, H/N, cardiorespiratory, GI, , Neuro, Endocrine, and psychiatric is negative.       Objective:      Physical Exam    She is alert, oriented to time, place, person, pleasant, well      nourished, in no acute physical distress.   She is here with her .                                 VITAL SIGNS:  Reviewed                                      HEENT:    There are no nasal, oral, lip, gingival, auricular, lid,    or conjunctival lesions.  Mucosae are moist and pink, and there is no        thrush.  Pupils are equal, reactive to light and accommodation.              Extraocular muscle movements are intact.  Dentition is fair. Maxillary teeth are missing and she has dentures.                                       NECK:  Supple without JVD, adenopathy, or thyromegaly.                       LUNGS:  Clear to auscultation without wheezing, rales, or rhonchi.           CARDIOVASCULAR:  Reveals an S1, S2, no murmurs, no rubs, no gallops.         ABDOMEN:  Soft, nontender, without organomegaly.  Bowel sounds are    present.                                                                     EXTREMITIES:  No cyanosis, clubbing, or edema.                               BREASTS:  She is status post bilateral mastectomies with reconstructions,.  Her nipples have recently been tattooed.                                        LYMPHATIC:  There is no cervical, axillary, or supraclavicular adenopathy.   SKIN:  Warm and moist, without petechiae, rashes, induration, or ecchymoses.           NEUROLOGIC:  DTRs are 0-1+ bilaterally, symmetrical, motor function is 5/5,  and cranial nerves are  within normal limits.    Assessment:       1. Malignant neoplasm of lower-outer quadrant of left breast of female, estrogen receptor positive    2. Use of aromatase inhibitors      3.    Musculoskeletal pains secondary to AIs, improved.  Plan:         I have asked her to remain on anastrazole, which she will take through September 2022. I will see her again in 4 months with a DXA scan.  Her multiple questions were answered to her satisfaction.

## 2019-05-15 ENCOUNTER — OFFICE VISIT (OUTPATIENT)
Dept: HEMATOLOGY/ONCOLOGY | Facility: CLINIC | Age: 69
End: 2019-05-15
Payer: COMMERCIAL

## 2019-05-15 VITALS
DIASTOLIC BLOOD PRESSURE: 78 MMHG | SYSTOLIC BLOOD PRESSURE: 152 MMHG | TEMPERATURE: 98 F | WEIGHT: 152.31 LBS | HEIGHT: 65 IN | HEART RATE: 80 BPM | OXYGEN SATURATION: 99 % | RESPIRATION RATE: 16 BRPM | BODY MASS INDEX: 25.38 KG/M2

## 2019-05-15 DIAGNOSIS — Z79.811 USE OF AROMATASE INHIBITORS: ICD-10-CM

## 2019-05-15 DIAGNOSIS — C50.512 MALIGNANT NEOPLASM OF LOWER-OUTER QUADRANT OF LEFT BREAST OF FEMALE, ESTROGEN RECEPTOR POSITIVE: Primary | ICD-10-CM

## 2019-05-15 DIAGNOSIS — Z17.0 MALIGNANT NEOPLASM OF LOWER-OUTER QUADRANT OF LEFT BREAST OF FEMALE, ESTROGEN RECEPTOR POSITIVE: Primary | ICD-10-CM

## 2019-05-15 PROCEDURE — 3078F DIAST BP <80 MM HG: CPT | Mod: CPTII,S$GLB,, | Performed by: INTERNAL MEDICINE

## 2019-05-15 PROCEDURE — 1101F PR PT FALLS ASSESS DOC 0-1 FALLS W/OUT INJ PAST YR: ICD-10-PCS | Mod: CPTII,S$GLB,, | Performed by: INTERNAL MEDICINE

## 2019-05-15 PROCEDURE — 99999 PR PBB SHADOW E&M-EST. PATIENT-LVL III: ICD-10-PCS | Mod: PBBFAC,,, | Performed by: INTERNAL MEDICINE

## 2019-05-15 PROCEDURE — 99213 PR OFFICE/OUTPT VISIT, EST, LEVL III, 20-29 MIN: ICD-10-PCS | Mod: S$GLB,,, | Performed by: INTERNAL MEDICINE

## 2019-05-15 PROCEDURE — 3077F SYST BP >= 140 MM HG: CPT | Mod: CPTII,S$GLB,, | Performed by: INTERNAL MEDICINE

## 2019-05-15 PROCEDURE — 1101F PT FALLS ASSESS-DOCD LE1/YR: CPT | Mod: CPTII,S$GLB,, | Performed by: INTERNAL MEDICINE

## 2019-05-15 PROCEDURE — 99999 PR PBB SHADOW E&M-EST. PATIENT-LVL III: CPT | Mod: PBBFAC,,, | Performed by: INTERNAL MEDICINE

## 2019-05-15 PROCEDURE — 3077F PR MOST RECENT SYSTOLIC BLOOD PRESSURE >= 140 MM HG: ICD-10-PCS | Mod: CPTII,S$GLB,, | Performed by: INTERNAL MEDICINE

## 2019-05-15 PROCEDURE — 3078F PR MOST RECENT DIASTOLIC BLOOD PRESSURE < 80 MM HG: ICD-10-PCS | Mod: CPTII,S$GLB,, | Performed by: INTERNAL MEDICINE

## 2019-05-15 PROCEDURE — 99213 OFFICE O/P EST LOW 20 MIN: CPT | Mod: S$GLB,,, | Performed by: INTERNAL MEDICINE

## 2019-06-12 ENCOUNTER — OFFICE VISIT (OUTPATIENT)
Dept: FAMILY MEDICINE | Facility: CLINIC | Age: 69
End: 2019-06-12
Payer: COMMERCIAL

## 2019-06-12 VITALS
DIASTOLIC BLOOD PRESSURE: 82 MMHG | HEIGHT: 65 IN | BODY MASS INDEX: 24.89 KG/M2 | SYSTOLIC BLOOD PRESSURE: 142 MMHG | HEART RATE: 80 BPM | WEIGHT: 149.38 LBS | OXYGEN SATURATION: 98 %

## 2019-06-12 DIAGNOSIS — I10 HTN (HYPERTENSION), BENIGN: Primary | ICD-10-CM

## 2019-06-12 PROCEDURE — 1101F PT FALLS ASSESS-DOCD LE1/YR: CPT | Mod: CPTII,S$GLB,, | Performed by: NURSE PRACTITIONER

## 2019-06-12 PROCEDURE — 3079F PR MOST RECENT DIASTOLIC BLOOD PRESSURE 80-89 MM HG: ICD-10-PCS | Mod: CPTII,S$GLB,, | Performed by: NURSE PRACTITIONER

## 2019-06-12 PROCEDURE — 99212 OFFICE O/P EST SF 10 MIN: CPT | Mod: S$GLB,,, | Performed by: NURSE PRACTITIONER

## 2019-06-12 PROCEDURE — 99999 PR PBB SHADOW E&M-EST. PATIENT-LVL IV: CPT | Mod: PBBFAC,,, | Performed by: NURSE PRACTITIONER

## 2019-06-12 PROCEDURE — 3079F DIAST BP 80-89 MM HG: CPT | Mod: CPTII,S$GLB,, | Performed by: NURSE PRACTITIONER

## 2019-06-12 PROCEDURE — 3077F PR MOST RECENT SYSTOLIC BLOOD PRESSURE >= 140 MM HG: ICD-10-PCS | Mod: CPTII,S$GLB,, | Performed by: NURSE PRACTITIONER

## 2019-06-12 PROCEDURE — 99212 PR OFFICE/OUTPT VISIT, EST, LEVL II, 10-19 MIN: ICD-10-PCS | Mod: S$GLB,,, | Performed by: NURSE PRACTITIONER

## 2019-06-12 PROCEDURE — 99999 PR PBB SHADOW E&M-EST. PATIENT-LVL IV: ICD-10-PCS | Mod: PBBFAC,,, | Performed by: NURSE PRACTITIONER

## 2019-06-12 PROCEDURE — 1101F PR PT FALLS ASSESS DOC 0-1 FALLS W/OUT INJ PAST YR: ICD-10-PCS | Mod: CPTII,S$GLB,, | Performed by: NURSE PRACTITIONER

## 2019-06-12 PROCEDURE — 3077F SYST BP >= 140 MM HG: CPT | Mod: CPTII,S$GLB,, | Performed by: NURSE PRACTITIONER

## 2019-06-12 RX ORDER — AMLODIPINE BESYLATE 5 MG/1
5 TABLET ORAL DAILY
Qty: 90 TABLET | Refills: 3 | Status: SHIPPED | OUTPATIENT
Start: 2019-06-12 | End: 2020-04-06 | Stop reason: SDUPTHER

## 2019-06-12 RX ORDER — LISINOPRIL 5 MG/1
5 TABLET ORAL DAILY
Qty: 90 TABLET | Refills: 3 | Status: SHIPPED | OUTPATIENT
Start: 2019-06-12 | End: 2020-03-14 | Stop reason: SDUPTHER

## 2019-06-12 NOTE — PROGRESS NOTES
Subjective:       Patient ID: Pantera Posey is a 68 y.o. female.    Chief Complaint: Hypertension (HTN meds management and labs)    HPI   Patient presents today for BP follow up. She is stable on lisinopril, amlodipine and hctz. We recently decreased norvasc from 10 mg to 5 mg and added lisinopril 5 mg. Pt at home BPs range 89/53 - 129/89. No cp, cob or swelling noted. Occasional cough but not daily or bothersome. No HA, dizziness or lightheadedness noted.     Review of Systems    Pertinent information noted in HPI  Objective:      Vitals:    06/12/19 1623   BP: (!) 142/82   Pulse: 80     Physical Exam   Constitutional: She is oriented to person, place, and time. She appears well-developed and well-nourished.   HENT:   Head: Normocephalic and atraumatic.   Neck: No JVD present.   Cardiovascular: Normal rate, regular rhythm and normal heart sounds. Exam reveals no gallop and no friction rub.   No murmur heard.  Pulmonary/Chest: Effort normal and breath sounds normal. No stridor. No respiratory distress. She has no wheezes. She has no rales.   Musculoskeletal: She exhibits no edema.   Neurological: She is alert and oriented to person, place, and time.   Skin: Skin is warm and dry. Capillary refill takes less than 2 seconds.   Psychiatric: She has a normal mood and affect. Her behavior is normal. Thought content normal.       Assessment:       1. HTN (hypertension), benign        Plan:     Labs ordered: BMP (lab is closed pt will return)  Continue meds  Continue low salt diet  Graded exercise    RTC prn

## 2019-06-14 ENCOUNTER — LAB VISIT (OUTPATIENT)
Dept: LAB | Facility: HOSPITAL | Age: 69
End: 2019-06-14
Attending: NURSE PRACTITIONER
Payer: COMMERCIAL

## 2019-06-14 DIAGNOSIS — M25.471 EDEMA OF BOTH ANKLES: ICD-10-CM

## 2019-06-14 DIAGNOSIS — I10 HTN (HYPERTENSION), BENIGN: ICD-10-CM

## 2019-06-14 DIAGNOSIS — M25.472 EDEMA OF BOTH ANKLES: ICD-10-CM

## 2019-06-14 LAB
ALBUMIN SERPL BCP-MCNC: 4 G/DL (ref 3.5–5.2)
ALP SERPL-CCNC: 90 U/L (ref 55–135)
ALT SERPL W/O P-5'-P-CCNC: 49 U/L (ref 10–44)
ANION GAP SERPL CALC-SCNC: 14 MMOL/L (ref 8–16)
ANION GAP SERPL CALC-SCNC: 14 MMOL/L (ref 8–16)
AST SERPL-CCNC: 75 U/L (ref 10–40)
BILIRUB SERPL-MCNC: 0.4 MG/DL (ref 0.1–1)
BUN SERPL-MCNC: 11 MG/DL (ref 8–23)
BUN SERPL-MCNC: 11 MG/DL (ref 8–23)
CALCIUM SERPL-MCNC: 10.1 MG/DL (ref 8.7–10.5)
CALCIUM SERPL-MCNC: 10.1 MG/DL (ref 8.7–10.5)
CHLORIDE SERPL-SCNC: 103 MMOL/L (ref 95–110)
CHLORIDE SERPL-SCNC: 103 MMOL/L (ref 95–110)
CO2 SERPL-SCNC: 24 MMOL/L (ref 23–29)
CO2 SERPL-SCNC: 24 MMOL/L (ref 23–29)
CREAT SERPL-MCNC: 0.7 MG/DL (ref 0.5–1.4)
CREAT SERPL-MCNC: 0.7 MG/DL (ref 0.5–1.4)
EST. GFR  (AFRICAN AMERICAN): >60 ML/MIN/1.73 M^2
EST. GFR  (AFRICAN AMERICAN): >60 ML/MIN/1.73 M^2
EST. GFR  (NON AFRICAN AMERICAN): >60 ML/MIN/1.73 M^2
EST. GFR  (NON AFRICAN AMERICAN): >60 ML/MIN/1.73 M^2
GLUCOSE SERPL-MCNC: 92 MG/DL (ref 70–110)
GLUCOSE SERPL-MCNC: 92 MG/DL (ref 70–110)
POTASSIUM SERPL-SCNC: 3.9 MMOL/L (ref 3.5–5.1)
POTASSIUM SERPL-SCNC: 3.9 MMOL/L (ref 3.5–5.1)
PROT SERPL-MCNC: 7.9 G/DL (ref 6–8.4)
SODIUM SERPL-SCNC: 141 MMOL/L (ref 136–145)
SODIUM SERPL-SCNC: 141 MMOL/L (ref 136–145)

## 2019-06-14 PROCEDURE — 80053 COMPREHEN METABOLIC PANEL: CPT

## 2019-06-14 PROCEDURE — 36415 COLL VENOUS BLD VENIPUNCTURE: CPT | Mod: PO

## 2019-06-17 ENCOUNTER — TELEPHONE (OUTPATIENT)
Dept: FAMILY MEDICINE | Facility: CLINIC | Age: 69
End: 2019-06-17

## 2019-06-17 NOTE — TELEPHONE ENCOUNTER
----- Message from Lobito Sherwood sent at 6/17/2019  1:25 PM CDT -----  Contact: Pharmacy   Name of Who is Calling:Pharmacy       What is the request in detail:Clarity on Mg of medcation amLODIPine (NORVASC) 5 MG tablet.pt was taking 10      Can the clinic reply by [facility]:      What Number to Call Back if not in [facility]:[phone]

## 2019-06-18 ENCOUNTER — PATIENT MESSAGE (OUTPATIENT)
Dept: FAMILY MEDICINE | Facility: CLINIC | Age: 69
End: 2019-06-18

## 2019-06-18 ENCOUNTER — TELEPHONE (OUTPATIENT)
Dept: FAMILY MEDICINE | Facility: CLINIC | Age: 69
End: 2019-06-18

## 2019-06-18 NOTE — TELEPHONE ENCOUNTER
Called [facility] back and spoke with Carlos, whom I verified with that per previous notes from conversations on 6/17/19 regarding pt Tablets changes from 10 mg (being cut in half)  to just Amlodipine 5 mg tabs once a day.

## 2019-06-18 NOTE — TELEPHONE ENCOUNTER
----- Message from Shayy Rivera sent at 6/18/2019  1:48 PM CDT -----  Contact: Miguelito with [facility]   Name of Who is Calling: Miguelito with [facility]       What is the request in detail: Miguelito would like to verify medication lisinopril (PRINIVIL,ZESTRIL) 5 MG tablet dosage. Please call     Can the clinic reply by [facility]:no    [facility]: [phone] reference # [id number]

## 2019-08-08 RX ORDER — HYDROCHLOROTHIAZIDE 25 MG/1
TABLET ORAL
Qty: 90 TABLET | Refills: 0 | Status: SHIPPED | OUTPATIENT
Start: 2019-08-08 | End: 2019-11-05 | Stop reason: SDUPTHER

## 2019-10-28 ENCOUNTER — TELEPHONE (OUTPATIENT)
Dept: HEMATOLOGY/ONCOLOGY | Facility: CLINIC | Age: 69
End: 2019-10-28

## 2019-10-29 ENCOUNTER — OFFICE VISIT (OUTPATIENT)
Dept: HEMATOLOGY/ONCOLOGY | Facility: CLINIC | Age: 69
End: 2019-10-29
Payer: COMMERCIAL

## 2019-10-29 ENCOUNTER — HOSPITAL ENCOUNTER (OUTPATIENT)
Dept: RADIOLOGY | Facility: CLINIC | Age: 69
Discharge: HOME | End: 2019-10-29
Attending: INTERNAL MEDICINE
Payer: COMMERCIAL

## 2019-10-29 ENCOUNTER — HOSPITAL ENCOUNTER (OUTPATIENT)
Dept: RADIOLOGY | Facility: HOSPITAL | Age: 69
Discharge: HOME | End: 2019-10-29
Attending: NURSE PRACTITIONER
Payer: COMMERCIAL

## 2019-10-29 VITALS
HEIGHT: 65 IN | BODY MASS INDEX: 23.88 KG/M2 | SYSTOLIC BLOOD PRESSURE: 132 MMHG | RESPIRATION RATE: 18 BRPM | WEIGHT: 143.31 LBS | TEMPERATURE: 98 F | HEART RATE: 84 BPM | OXYGEN SATURATION: 99 % | DIASTOLIC BLOOD PRESSURE: 80 MMHG

## 2019-10-29 DIAGNOSIS — C50.512 MALIGNANT NEOPLASM OF LOWER-OUTER QUADRANT OF LEFT BREAST OF FEMALE, ESTROGEN RECEPTOR POSITIVE: ICD-10-CM

## 2019-10-29 DIAGNOSIS — Z85.3 HISTORY OF RIGHT BREAST CANCER: ICD-10-CM

## 2019-10-29 DIAGNOSIS — B37.9 CANDIDIASIS: ICD-10-CM

## 2019-10-29 DIAGNOSIS — Z17.0 MALIGNANT NEOPLASM OF LOWER-OUTER QUADRANT OF LEFT BREAST OF FEMALE, ESTROGEN RECEPTOR POSITIVE: ICD-10-CM

## 2019-10-29 DIAGNOSIS — M85.80 OSTEOPENIA, UNSPECIFIED LOCATION: ICD-10-CM

## 2019-10-29 DIAGNOSIS — Z98.890 S/P BREAST RECONSTRUCTION: ICD-10-CM

## 2019-10-29 DIAGNOSIS — N63.20 LEFT BREAST MASS: ICD-10-CM

## 2019-10-29 DIAGNOSIS — N63.20 LEFT BREAST MASS: Primary | ICD-10-CM

## 2019-10-29 DIAGNOSIS — Z79.811 USE OF AROMATASE INHIBITORS: ICD-10-CM

## 2019-10-29 PROCEDURE — 76642 ULTRASOUND BREAST LIMITED: CPT | Mod: 26,LT,, | Performed by: RADIOLOGY

## 2019-10-29 PROCEDURE — 77061 BREAST TOMOSYNTHESIS UNI: CPT | Mod: TC,PO,LT

## 2019-10-29 PROCEDURE — 1101F PR PT FALLS ASSESS DOC 0-1 FALLS W/OUT INJ PAST YR: ICD-10-PCS | Mod: CPTII,S$GLB,, | Performed by: NURSE PRACTITIONER

## 2019-10-29 PROCEDURE — 3075F SYST BP GE 130 - 139MM HG: CPT | Mod: CPTII,S$GLB,, | Performed by: NURSE PRACTITIONER

## 2019-10-29 PROCEDURE — 77080 DEXA BONE DENSITY SPINE HIP: ICD-10-PCS | Mod: 26,,, | Performed by: INTERNAL MEDICINE

## 2019-10-29 PROCEDURE — 99214 PR OFFICE/OUTPT VISIT, EST, LEVL IV, 30-39 MIN: ICD-10-PCS | Mod: S$GLB,,, | Performed by: NURSE PRACTITIONER

## 2019-10-29 PROCEDURE — 99999 PR PBB SHADOW E&M-EST. PATIENT-LVL III: CPT | Mod: PBBFAC,,, | Performed by: NURSE PRACTITIONER

## 2019-10-29 PROCEDURE — 77065 DX MAMMO INCL CAD UNI: CPT | Mod: 26,LT,, | Performed by: RADIOLOGY

## 2019-10-29 PROCEDURE — 76642 ULTRASOUND BREAST LIMITED: CPT | Mod: TC,PO,LT

## 2019-10-29 PROCEDURE — 77080 DXA BONE DENSITY AXIAL: CPT | Mod: 26,,, | Performed by: INTERNAL MEDICINE

## 2019-10-29 PROCEDURE — 3079F PR MOST RECENT DIASTOLIC BLOOD PRESSURE 80-89 MM HG: ICD-10-PCS | Mod: CPTII,S$GLB,, | Performed by: NURSE PRACTITIONER

## 2019-10-29 PROCEDURE — 77061 BREAST TOMOSYNTHESIS UNI: CPT | Mod: 26,LT,, | Performed by: RADIOLOGY

## 2019-10-29 PROCEDURE — 77061 MAMMO DIGITAL DIAGNOSTIC LEFT WITH TOMOSYNTHESIS_CAD: ICD-10-PCS | Mod: 26,LT,, | Performed by: RADIOLOGY

## 2019-10-29 PROCEDURE — 77065 DX MAMMO INCL CAD UNI: CPT | Mod: TC,PO,LT

## 2019-10-29 PROCEDURE — 77065 MAMMO DIGITAL DIAGNOSTIC LEFT WITH TOMOSYNTHESIS_CAD: ICD-10-PCS | Mod: 26,LT,, | Performed by: RADIOLOGY

## 2019-10-29 PROCEDURE — 77080 DXA BONE DENSITY AXIAL: CPT | Mod: TC

## 2019-10-29 PROCEDURE — 99999 PR PBB SHADOW E&M-EST. PATIENT-LVL III: ICD-10-PCS | Mod: PBBFAC,,, | Performed by: NURSE PRACTITIONER

## 2019-10-29 PROCEDURE — 1101F PT FALLS ASSESS-DOCD LE1/YR: CPT | Mod: CPTII,S$GLB,, | Performed by: NURSE PRACTITIONER

## 2019-10-29 PROCEDURE — 3079F DIAST BP 80-89 MM HG: CPT | Mod: CPTII,S$GLB,, | Performed by: NURSE PRACTITIONER

## 2019-10-29 PROCEDURE — 99214 OFFICE O/P EST MOD 30 MIN: CPT | Mod: S$GLB,,, | Performed by: NURSE PRACTITIONER

## 2019-10-29 PROCEDURE — 76642 US BREAST LEFT LIMITED: ICD-10-PCS | Mod: 26,LT,, | Performed by: RADIOLOGY

## 2019-10-29 PROCEDURE — 3075F PR MOST RECENT SYSTOLIC BLOOD PRESS GE 130-139MM HG: ICD-10-PCS | Mod: CPTII,S$GLB,, | Performed by: NURSE PRACTITIONER

## 2019-10-29 RX ORDER — NYSTATIN 100000 [USP'U]/G
POWDER TOPICAL 4 TIMES DAILY
Qty: 60 G | Refills: 0 | Status: SHIPPED | OUTPATIENT
Start: 2019-10-29 | End: 2022-08-15

## 2019-10-29 NOTE — PROGRESS NOTES
Subjective:       Patient ID: aPntera Posey is a 69 y.o. female.    Chief Complaint: Malignant neoplasm of lower-outer quadrant of left breast of    HPI   Mrs. Posey returns today for follow up.  As of mid September 2017 she has been on anastrazole 1 mg daily.      Briefly, she is a  69-year-old  female who has a remote history of breast cancer treated in [place] 20 years ago with mastectomy and five years of tamoxifen.      A recent routine mammogram on 06/27/2017 was read as BIRADS category 0 and showed an asymmetry in the upper region of the left breast   posteriorly.  A diagnostic mammogram two days later was read as BIRADS category 4 and a biopsy was performed on 07/11/2017 and showed a carcinoma that was ER strongly positive, DC positive, and HER-2 negative.    The patient was seen by Dr. Martin and eventually underwent a left-sided mastectomy and sentinel lymph node biopsy on 08/03/2017 with insertion of bilateral tissue expanders.    The pathology report from the procedure indicates that 4 left axillary lymph nodes were negative.  On the mastectomy sample, she had a 15 mm invasive ductal carcinoma; resection margins were clear.  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  There was no lymphovascular invasion.  Her Oncotype score was 10, suggesting a 9 % chance of recurrence at ten years with tamoxifen alone.  As of mid September 2017 she has been on anastrazole 1 mg daily.     Of note, right breast cancer in 1997; s/p mastectomy with lymph node removal.     Her BMD 2017 showed mild osteopenia of the hip but normal density on the L spine.    BMD today pending.       Review of Systems    Overall she feels well. Left implant feels different. Noticed a thickness in left breast 2 weeks ago while in shower. No pain. Lymphedema to LUE resolved- met with therapist for exercises and also using glove if needs to. Exercise: walks and uses a resistance bans.   Hot flashes intermittent joint  stiffness have improved significantly. Her ECOG PS is 1.   She denies any anxiety, depression, easy bruising, fevers, chills, night  sweats, weight loss, nausea, vomiting, diarrhea, constipation, diplopia, blurred vision, headache, chest pain, palpitations, shortness of breath, breast pain, abdominal pain, extremity pain, or difficulty ambulating.  The remainder of the ten-point ROS, including general, skin, lymph, H/N, cardiorespiratory, GI, , Neuro, Endocrine, and psychiatric is negative.       Objective:      Physical Exam    She is alert, oriented to time, place, person, pleasant, well      nourished, in no acute physical distress.   She is here with her .                                 VITAL SIGNS:  Reviewed                                      HEENT:    There are no nasal, oral, lip, gingival, auricular, lid,    or conjunctival lesions.  Mucosae are moist and pink, and there is no        thrush.  Pupils are equal, reactive to light and accommodation.              Extraocular muscle movements are intact.  Dentition is fair. Maxillary teeth are missing and she has dentures.                                       NECK:  Supple without JVD, adenopathy, or thyromegaly.                       LUNGS:  Clear to auscultation without wheezing, rales, or rhonchi.           CARDIOVASCULAR:  Reveals an S1, S2, no murmurs, no rubs, no gallops.         ABDOMEN:  Soft, nontender, without organomegaly.  Bowel sounds are    present.                                                                     EXTREMITIES:  No cyanosis, clubbing, or edema.                               BREASTS:  She is status post bilateral mastectomies with reconstructions.  Her nipples have  been tattooed.  Thickness noted to outer border of left implant.                                  LYMPHATIC:  There is no cervical, axillary, or supraclavicular adenopathy.   SKIN:  Warm and moist, without petechiae, rashes, induration, or ecchymoses.            NEUROLOGIC:  DTRs are 0-1+ bilaterally, symmetrical, motor function is 5/5,  and cranial nerves are  within normal limits.    Assessment:       1. Left breast mass    2. Malignant neoplasm of lower-outer quadrant of left breast of female, estrogen receptor positive    3. History of right breast cancer    4. S/P breast reconstruction    5. Use of aromatase inhibitors    6. Osteopenia, unspecified location    7. Candidiasis      Plan:           Doing well, KIKI clinically.   Await final BMD results -  Offered to Check Vit D level today. She is a foot stick- therefor will wait to get with other labs through PCP as due. She will message me when drawn.   Continue to follow up with PCP for other health maintenance. Reminded that she needs Lipid Panel and Vit D yearly.   Continue Anastrozole  through September 2022.  RTC in 4 months to see Dr. Trotter.   No imaging unless clinically warranted as bilateral mastectomies.   Will obtain Left MMG as above.     Patient is in agreement with the proposed treatment plan. All questions were answered to the patient's satisfaction. Pt knows to call clinic for any new or worsening symptoms and if anything is needed before the next clinic visit.      Arya Bryant, YANICKP-C  Hematology & Oncology  [facility] [place] [address]  ph. [phone]  Fax. [phone]     I spent 30 minutes (face to face) with the patient, more than 50% was in counseling and coordination of care as detailed above.

## 2019-11-01 ENCOUNTER — PATIENT MESSAGE (OUTPATIENT)
Dept: HEMATOLOGY/ONCOLOGY | Facility: CLINIC | Age: 69
End: 2019-11-01

## 2019-11-07 RX ORDER — HYDROCHLOROTHIAZIDE 25 MG/1
TABLET ORAL
Qty: 90 TABLET | Refills: 4 | Status: SHIPPED | OUTPATIENT
Start: 2019-11-07 | End: 2020-04-06 | Stop reason: SDUPTHER

## 2019-11-22 DIAGNOSIS — Z17.0 MALIGNANT NEOPLASM OF LOWER-OUTER QUADRANT OF LEFT BREAST OF FEMALE, ESTROGEN RECEPTOR POSITIVE: ICD-10-CM

## 2019-11-22 DIAGNOSIS — C50.512 MALIGNANT NEOPLASM OF LOWER-OUTER QUADRANT OF LEFT BREAST OF FEMALE, ESTROGEN RECEPTOR POSITIVE: ICD-10-CM

## 2019-11-25 RX ORDER — ANASTROZOLE 1 MG/1
TABLET ORAL
Qty: 90 TABLET | Refills: 4 | Status: SHIPPED | OUTPATIENT
Start: 2019-11-25 | End: 2020-03-11 | Stop reason: SDUPTHER

## 2019-12-07 ENCOUNTER — PATIENT OUTREACH (OUTPATIENT)
Dept: ADMINISTRATIVE | Facility: OTHER | Age: 69
End: 2019-12-07

## 2019-12-09 ENCOUNTER — OFFICE VISIT (OUTPATIENT)
Dept: DERMATOLOGY | Facility: CLINIC | Age: 69
End: 2019-12-09
Payer: COMMERCIAL

## 2019-12-09 DIAGNOSIS — L65.9 HAIR LOSS DISORDER: ICD-10-CM

## 2019-12-09 DIAGNOSIS — L71.9 ROSACEA: Primary | ICD-10-CM

## 2019-12-09 PROCEDURE — 1159F MED LIST DOCD IN RCRD: CPT | Mod: S$GLB,,, | Performed by: NURSE PRACTITIONER

## 2019-12-09 PROCEDURE — 1126F AMNT PAIN NOTED NONE PRSNT: CPT | Mod: S$GLB,,, | Performed by: NURSE PRACTITIONER

## 2019-12-09 PROCEDURE — 99202 PR OFFICE/OUTPT VISIT, NEW, LEVL II, 15-29 MIN: ICD-10-PCS | Mod: S$GLB,,, | Performed by: NURSE PRACTITIONER

## 2019-12-09 PROCEDURE — 1126F PR PAIN SEVERITY QUANTIFIED, NO PAIN PRESENT: ICD-10-PCS | Mod: S$GLB,,, | Performed by: NURSE PRACTITIONER

## 2019-12-09 PROCEDURE — 99999 PR PBB SHADOW E&M-EST. PATIENT-LVL II: CPT | Mod: PBBFAC,,, | Performed by: NURSE PRACTITIONER

## 2019-12-09 PROCEDURE — 99202 OFFICE O/P NEW SF 15 MIN: CPT | Mod: S$GLB,,, | Performed by: NURSE PRACTITIONER

## 2019-12-09 PROCEDURE — 1159F PR MEDICATION LIST DOCUMENTED IN MEDICAL RECORD: ICD-10-PCS | Mod: S$GLB,,, | Performed by: NURSE PRACTITIONER

## 2019-12-09 PROCEDURE — 99999 PR PBB SHADOW E&M-EST. PATIENT-LVL II: ICD-10-PCS | Mod: PBBFAC,,, | Performed by: NURSE PRACTITIONER

## 2019-12-09 PROCEDURE — 1101F PT FALLS ASSESS-DOCD LE1/YR: CPT | Mod: CPTII,S$GLB,, | Performed by: NURSE PRACTITIONER

## 2019-12-09 PROCEDURE — 1101F PR PT FALLS ASSESS DOC 0-1 FALLS W/OUT INJ PAST YR: ICD-10-PCS | Mod: CPTII,S$GLB,, | Performed by: NURSE PRACTITIONER

## 2019-12-09 RX ORDER — METRONIDAZOLE 7.5 MG/G
CREAM TOPICAL
Qty: 45 G | Refills: 3 | Status: SHIPPED | OUTPATIENT
Start: 2019-12-09

## 2019-12-09 RX ORDER — DOXYCYCLINE HYCLATE 50 MG/1
CAPSULE ORAL
Qty: 30 CAPSULE | Refills: 1 | Status: SHIPPED | OUTPATIENT
Start: 2019-12-09 | End: 2021-08-19

## 2019-12-09 NOTE — PROGRESS NOTES
Subjective:       Patient ID:  Pantera Posey is a 69 y.o. female who presents for   Chief Complaint   Patient presents with    Hair Loss     scalp X1mo, shedding, no tx     Acne     face, X1mo, breakout, spreading, otc tx      Hair Loss  - Initial  Affected locations: scalp  Duration: 1 month  Signs and Symptoms: increase in hair shedding.  Severity: mild  Timing: constant  Aggravated by: nothing  Relieving factors/Treatments tried: nothing  Improvement on treatment: no relief    Rash  - Initial  Affected locations: face  Duration: 1 month  Signs / symptoms: redness and itching  Severity: mild  Timing: constant  Aggravated by: nothing  Relieving factors/Treatments tried: nothing        Review of Systems   Breast:         H/o breast cancer in 1997 & 2017.           Genitourinary: Negative for irregular periods (post menopausal.).   Skin: Positive for rash. Negative for daily sunscreen use and activity-related sunscreen use.   Psychiatric/Behavioral: Negative for high stress.        Objective:    Physical Exam   Constitutional: She appears well-developed and well-nourished. No distress.   Neurological: She is alert and oriented to person, place, and time. She is not disoriented.   Psychiatric: She has a normal mood and affect.   Skin:   Areas Examined (abnormalities noted in diagram):   Scalp / Hair Palpated and Inspected  Head / Face Inspection Performed  Neck Inspection Performed  Chest / Axilla Inspection Performed  Back Inspection Performed  RUE Inspected  LUE Inspection Performed              Diagram Legend     Erythematous scaling macule/papule c/w actinic keratosis       Vascular papule c/w angioma      Pigmented verrucoid papule/plaque c/w seborrheic keratosis      Yellow umbilicated papule c/w sebaceous hyperplasia      Irregularly shaped tan macule c/w lentigo     1-2 mm smooth white papules consistent with Milia      Movable subcutaneous cyst with punctum c/w epidermal inclusion cyst      Subcutaneous  movable cyst c/w pilar cyst      Firm pink to brown papule c/w dermatofibroma      Pedunculated fleshy papule(s) c/w skin tag(s)      Evenly pigmented macule c/w junctional nevus     Mildly variegated pigmented, slightly irregular-bordered macule c/w mildly atypical nevus      Flesh colored to evenly pigmented papule c/w intradermal nevus       Pink pearly papule/plaque c/w basal cell carcinoma      Erythematous hyperkeratotic cursted plaque c/w SCC      Surgical scar with no sign of skin cancer recurrence      Open and closed comedones      Inflammatory papules and pustules      Verrucoid papule consistent consistent with wart     Erythematous eczematous patches and plaques     Dystrophic onycholytic nail with subungual debris c/w onychomycosis     Umbilicated papule    Erythematous-base heme-crusted tan verrucoid plaque consistent with inflamed seborrheic keratosis     Erythematous Silvery Scaling Plaque c/w Psoriasis     See annotation    Lab Results   Component Value Date    TSH 1.081 12/21/2018     BMP  Lab Results   Component Value Date     06/14/2019     06/14/2019    K 3.9 06/14/2019    K 3.9 06/14/2019     06/14/2019     06/14/2019    CO2 24 06/14/2019    CO2 24 06/14/2019    BUN 11 06/14/2019    BUN 11 06/14/2019    CREATININE 0.7 06/14/2019    CREATININE 0.7 06/14/2019    CALCIUM 10.1 06/14/2019    CALCIUM 10.1 06/14/2019    ANIONGAP 14 06/14/2019    ANIONGAP 14 06/14/2019    ESTGFRAFRICA >60.0 06/14/2019    ESTGFRAFRICA >60.0 06/14/2019    EGFRNONAA >60.0 06/14/2019    EGFRNONAA >60.0 06/14/2019         Assessment / Plan:        Rosacea  -     doxycycline (VIBRAMYCIN) 50 MG capsule; Take 1 tab po q day  Dispense: 30 capsule; Refill: 1  -     metronidazole 0.75% (METROCREAM) 0.75 % Crea; AAA face bid  Dispense: 45 g; Refill: 3    Discussed rosacea is chronic inflammatory disorder of the facial capillaries and facial pilosebaceous units resulting in flushing and telangiectasia.  Topicals prescribed will help with redness and papules but typically not the telangiectasia. Recurrences are common. Discussed trying to avoid triggers.    Discussed benefits and risks of doxycyline therapy including but not limited to GI discomfort, esophageal irritation/ulceration, and increased sun sensitivity. Patient was counseled to take medicine with meals and at least 1 hour before lying down.     Hair loss disorder  female pattern hair loss  Suggested otc hair supplements  Discussed with patient that there are multiple over the counter hair supplements, few of which have proven efficacy in controlled clinical trials. However, anecdotal reports have indicated a benefit for these vitamins.  Handout reviewing active ingredients, allergies, and proper use were provided for Nutrafol, Viviscal, AG Pro, and biotin. The patient can elect to take at his/her discretion.               Follow up if symptoms worsen or fail to improve.

## 2019-12-09 NOTE — PATIENT INSTRUCTIONS
Viviscal Professional Hair Growth Supplement  Cost: $43 for 60 tablets  Ingredients    Vitamin C (from Acerola Powder and as Ascorbic Acid), Biotin, AminoMar Marine Complex, Shark Powder, Mollusc Powder, Apple Extract Powder, Procyanidin B-2, L-Cystine, L-Methionine, Microcrystalline Cellulose, Maltodextrin, Hydroxypropyl Methyl Cellulose, Magnesium Stearate, Silicon Dioxide, Artificial Orange Flavoring, Modified Starch, Glycerol.    Allergy advice: Contains fish and shellfish, not recommended for those allergic to fish, shellfish, or seafood.    Direction for use  Recommended daily intake: 2 tablets  Use daily for a minimum of 3-6 months  Take 1 tablet in the morning and 1 tablet in the evening (with water, after eating)  Afterwards take 1-2 tablets daily as required to maintain healthy hair growth      Nutrafol Hair Growth Supplement  Cost: $68-88 for 120 capsules    Ingredients  Hydrolyzed fish collagen, ashwagandha, hyaluronic acid, bioperine, biotin, saw palmetto, amino acid blend    Allergy advice: Claims to be free from shellfish and wheat    Be cautious when using in patients on anti-platelet medications (Saw Palmetto is in ingredients)    Direction for use  Recommended daily intake: 4 capsules daily with a meal (2 per day is sufficient for most)  Use daily for a minimum of 3-6 months  Afterwards take 1-2 tablets daily as required to maintain healthy hair growth          A/G Pro Hair Growth Supplement  Cost: $18 for 180 capsules    IngredientsL-Lysine, L-Methionine, Copper, Iodine, Manganese, Potassium, Zinc, Magnesium, Vitamin-C, Vitamin B6, Amino Acid Complex    Direction for use  Recommended daily intake: 2 tablets, 3xs daily  Use daily for a minimum of 3-6 months  No comment on maintenance therapy     Biotin Hair Growth Supplement  Cost: $10 for 120 pills    Ingredients  Biotin    Allergy advice: None  Recent FDA report of falsely low/high results with intake of biotin supplements. May result in falsely  low troponin levels.    Direction for use  Recommended daily intake: 2.5 mg or 2500 mcg per day  Use daily for a minimum of 3-6 months  Continued use as maintenance therapy

## 2020-02-26 ENCOUNTER — OFFICE VISIT (OUTPATIENT)
Dept: HEMATOLOGY/ONCOLOGY | Facility: CLINIC | Age: 70
End: 2020-02-26
Payer: COMMERCIAL

## 2020-02-26 VITALS
SYSTOLIC BLOOD PRESSURE: 207 MMHG | BODY MASS INDEX: 25.2 KG/M2 | RESPIRATION RATE: 18 BRPM | HEIGHT: 65 IN | DIASTOLIC BLOOD PRESSURE: 95 MMHG | OXYGEN SATURATION: 98 % | WEIGHT: 151.25 LBS | TEMPERATURE: 98 F | HEART RATE: 111 BPM

## 2020-02-26 DIAGNOSIS — Z17.0 MALIGNANT NEOPLASM OF LOWER-OUTER QUADRANT OF LEFT BREAST OF FEMALE, ESTROGEN RECEPTOR POSITIVE: Primary | ICD-10-CM

## 2020-02-26 DIAGNOSIS — Z79.811 USE OF AROMATASE INHIBITORS: ICD-10-CM

## 2020-02-26 DIAGNOSIS — C50.512 MALIGNANT NEOPLASM OF LOWER-OUTER QUADRANT OF LEFT BREAST OF FEMALE, ESTROGEN RECEPTOR POSITIVE: Primary | ICD-10-CM

## 2020-02-26 PROCEDURE — 1159F PR MEDICATION LIST DOCUMENTED IN MEDICAL RECORD: ICD-10-PCS | Mod: S$GLB,,, | Performed by: INTERNAL MEDICINE

## 2020-02-26 PROCEDURE — 1101F PR PT FALLS ASSESS DOC 0-1 FALLS W/OUT INJ PAST YR: ICD-10-PCS | Mod: CPTII,S$GLB,, | Performed by: INTERNAL MEDICINE

## 2020-02-26 PROCEDURE — 1126F PR PAIN SEVERITY QUANTIFIED, NO PAIN PRESENT: ICD-10-PCS | Mod: S$GLB,,, | Performed by: INTERNAL MEDICINE

## 2020-02-26 PROCEDURE — 3077F PR MOST RECENT SYSTOLIC BLOOD PRESSURE >= 140 MM HG: ICD-10-PCS | Mod: CPTII,S$GLB,, | Performed by: INTERNAL MEDICINE

## 2020-02-26 PROCEDURE — 99999 PR PBB SHADOW E&M-EST. PATIENT-LVL III: CPT | Mod: PBBFAC,,, | Performed by: INTERNAL MEDICINE

## 2020-02-26 PROCEDURE — 1101F PT FALLS ASSESS-DOCD LE1/YR: CPT | Mod: CPTII,S$GLB,, | Performed by: INTERNAL MEDICINE

## 2020-02-26 PROCEDURE — 3077F SYST BP >= 140 MM HG: CPT | Mod: CPTII,S$GLB,, | Performed by: INTERNAL MEDICINE

## 2020-02-26 PROCEDURE — 99213 OFFICE O/P EST LOW 20 MIN: CPT | Mod: S$GLB,,, | Performed by: INTERNAL MEDICINE

## 2020-02-26 PROCEDURE — 1126F AMNT PAIN NOTED NONE PRSNT: CPT | Mod: S$GLB,,, | Performed by: INTERNAL MEDICINE

## 2020-02-26 PROCEDURE — 1159F MED LIST DOCD IN RCRD: CPT | Mod: S$GLB,,, | Performed by: INTERNAL MEDICINE

## 2020-02-26 PROCEDURE — 3080F DIAST BP >= 90 MM HG: CPT | Mod: CPTII,S$GLB,, | Performed by: INTERNAL MEDICINE

## 2020-02-26 PROCEDURE — 99213 PR OFFICE/OUTPT VISIT, EST, LEVL III, 20-29 MIN: ICD-10-PCS | Mod: S$GLB,,, | Performed by: INTERNAL MEDICINE

## 2020-02-26 PROCEDURE — 3080F PR MOST RECENT DIASTOLIC BLOOD PRESSURE >= 90 MM HG: ICD-10-PCS | Mod: CPTII,S$GLB,, | Performed by: INTERNAL MEDICINE

## 2020-02-26 PROCEDURE — 99999 PR PBB SHADOW E&M-EST. PATIENT-LVL III: ICD-10-PCS | Mod: PBBFAC,,, | Performed by: INTERNAL MEDICINE

## 2020-02-26 NOTE — PROGRESS NOTES
Subjective:       Patient ID: Pantera Posey is a 69 y.o. female.    Chief Complaint: No chief complaint on file.    HPI   Mrs. Posey returns today for follow up.  As of mid September 2017 she has been on anastrazole 1 mg daily.       Briefly, she  is a  69-year-old  female who has a remote history of breast cancer treated in [place] 20 years ago with mastectomy and five years of tamoxifen.      A routine mammogram on 06/27/2017 was read as BIRADS category 0 and showed an asymmetry in the upper region of the left breast posteriorly.  A diagnostic mammogram two days later was read as BIRADS category 4 and a biopsy was performed on 07/11/2017 and showed a carcinoma that was ER strongly positive, IA positive, and HER-2 negative.   The patient was seen by Dr. Martin and eventually underwent a left-sided mastectomy and sentinel lymph node biopsy on 08/03/2017 with insertion of bilateral tissue expanders.    The pathology report from the procedure indicates that 4 left axillary lymph nodes were negative.  On the mastectomy sample, she had a 15 mm invasive ductal carcinoma; resection margins were clear.  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  There was no lymphovascular invasion.  Her Oncotype score was 10, suggesting a 9 % chance of recurrence at ten years with tamoxifen alone.  She has been on anastrozole since September 2017.    The medical, surgical, as well as family history remain unchanged.        Review of Systems    Overall she feels well, and she has no complaints today. Her ECOG PS is 1.   She denies any anxiety, depression, easy bruising, fevers, chills, night  sweats, weight loss, nausea, vomiting, diarrhea, constipation, diplopia, blurred vision, headache, chest pain, palpitations, shortness of breath, breast pain, abdominal pain, extremity pain, or difficulty ambulating.  The remainder of the ten-point ROS, including general, skin, lymph, H/N, cardiorespiratory, GI, , Neuro,  Endocrine, and psychiatric is negative.       Objective:      Physical Exam    She is alert, oriented to time, place, person, pleasant, well      nourished, in no acute physical distress.                                   VITAL SIGNS:  Reviewed                                      HEENT:    There are no nasal, oral, lip, gingival, auricular, lid,    or conjunctival lesions.  Mucosae are moist and pink, and there is no        thrush.  Pupils are equal, reactive to light and accommodation.              Extraocular muscle movements are intact.  Dentition is fair. Maxillary teeth are missing and she has dentures.                                       NECK:  Supple without JVD, adenopathy, or thyromegaly.                       LUNGS:  Clear to auscultation without wheezing, rales, or rhonchi.           CARDIOVASCULAR:  Reveals an S1, S2, no murmurs, no rubs, no gallops.         ABDOMEN:  Soft, nontender, without organomegaly.  Bowel sounds are    present.                                                                     EXTREMITIES:  No cyanosis, clubbing, or edema.                               BREASTS:  She is status post bilateral mastectomies with reconstructions,.  Her nipples have been tattooed.                                        LYMPHATIC:  There is no cervical, axillary, or supraclavicular adenopathy.   SKIN:  Warm and moist, without petechiae, rashes, induration, or ecchymoses.           NEUROLOGIC:  DTRs are 0-1+ bilaterally, symmetrical, motor function is 5/5,  and cranial nerves are  within normal limits.    Assessment:       1. Malignant neoplasm of lower-outer quadrant of left breast of female, estrogen receptor positive    2. Use of aromatase inhibitors      3.    Remote history of contralateral (right sided) breast cancer.  Clinically KIKI  Plan:         Mrs Posey is doing well and remains clinically KIKI.  I have asked her to remain on anastrazole, which she will take through September 2022. I will  see her again in 4 months.  Her multiple questions were answered to her satisfaction.

## 2020-03-11 DIAGNOSIS — C50.512 MALIGNANT NEOPLASM OF LOWER-OUTER QUADRANT OF LEFT BREAST OF FEMALE, ESTROGEN RECEPTOR POSITIVE: ICD-10-CM

## 2020-03-11 DIAGNOSIS — Z17.0 MALIGNANT NEOPLASM OF LOWER-OUTER QUADRANT OF LEFT BREAST OF FEMALE, ESTROGEN RECEPTOR POSITIVE: ICD-10-CM

## 2020-03-11 RX ORDER — ANASTROZOLE 1 MG/1
1 TABLET ORAL DAILY
Qty: 90 TABLET | Refills: 0 | Status: SHIPPED | OUTPATIENT
Start: 2020-03-11 | End: 2020-05-29

## 2020-03-14 DIAGNOSIS — I10 HTN (HYPERTENSION), BENIGN: ICD-10-CM

## 2020-03-14 RX ORDER — LISINOPRIL 5 MG/1
5 TABLET ORAL DAILY
Qty: 90 TABLET | Refills: 0 | Status: SHIPPED | OUTPATIENT
Start: 2020-03-14 | End: 2020-03-14 | Stop reason: SDUPTHER

## 2020-03-16 RX ORDER — LISINOPRIL 5 MG/1
5 TABLET ORAL DAILY
Qty: 90 TABLET | Refills: 0 | Status: SHIPPED | OUTPATIENT
Start: 2020-03-16 | End: 2020-04-06 | Stop reason: SDUPTHER

## 2020-03-23 NOTE — TELEPHONE ENCOUNTER
----- Message from Dick Marie sent at 11/24/2017  1:47 PM CST -----  Patient states that (s)he needs to speak with nurse in ref to scheduling her 1 wk po appt//please call back at [phone]//thank you  
Scheduled pts p/o appt w/ the pt.  appt reminder mailed to the pts home.  
Imaging Studies/Labs

## 2020-04-06 ENCOUNTER — PATIENT MESSAGE (OUTPATIENT)
Dept: FAMILY MEDICINE | Facility: CLINIC | Age: 70
End: 2020-04-06

## 2020-04-06 DIAGNOSIS — I10 HTN (HYPERTENSION), BENIGN: ICD-10-CM

## 2020-04-07 RX ORDER — HYDROCHLOROTHIAZIDE 25 MG/1
25 TABLET ORAL DAILY
Qty: 90 TABLET | Refills: 3 | Status: SHIPPED | OUTPATIENT
Start: 2020-04-07 | End: 2020-04-16 | Stop reason: SDUPTHER

## 2020-04-07 RX ORDER — LISINOPRIL 5 MG/1
5 TABLET ORAL DAILY
Qty: 90 TABLET | Refills: 0 | Status: SHIPPED | OUTPATIENT
Start: 2020-04-07 | End: 2020-04-08 | Stop reason: SDUPTHER

## 2020-04-07 RX ORDER — AMLODIPINE BESYLATE 5 MG/1
5 TABLET ORAL DAILY
Qty: 90 TABLET | Refills: 3 | Status: SHIPPED | OUTPATIENT
Start: 2020-04-07 | End: 2020-04-16 | Stop reason: SDUPTHER

## 2020-04-08 ENCOUNTER — LAB VISIT (OUTPATIENT)
Dept: LAB | Facility: HOSPITAL | Age: 70
End: 2020-04-08
Attending: FAMILY MEDICINE
Payer: COMMERCIAL

## 2020-04-08 ENCOUNTER — OFFICE VISIT (OUTPATIENT)
Dept: FAMILY MEDICINE | Facility: CLINIC | Age: 70
End: 2020-04-08
Attending: FAMILY MEDICINE
Payer: COMMERCIAL

## 2020-04-08 VITALS
HEART RATE: 89 BPM | HEIGHT: 65 IN | SYSTOLIC BLOOD PRESSURE: 136 MMHG | BODY MASS INDEX: 24.66 KG/M2 | DIASTOLIC BLOOD PRESSURE: 75 MMHG | WEIGHT: 148 LBS

## 2020-04-08 DIAGNOSIS — Z00.00 ANNUAL PHYSICAL EXAM: Primary | ICD-10-CM

## 2020-04-08 DIAGNOSIS — D05.92 CARCINOMA IN SITU OF LEFT BREAST, UNSPECIFIED TYPE: ICD-10-CM

## 2020-04-08 DIAGNOSIS — I10 ESSENTIAL HYPERTENSION: ICD-10-CM

## 2020-04-08 LAB
25(OH)D3+25(OH)D2 SERPL-MCNC: 36 NG/ML (ref 30–96)
ALBUMIN SERPL BCP-MCNC: 4.1 G/DL (ref 3.5–5.2)
ALP SERPL-CCNC: 93 U/L (ref 55–135)
ALT SERPL W/O P-5'-P-CCNC: 61 U/L (ref 10–44)
ANION GAP SERPL CALC-SCNC: 17 MMOL/L (ref 8–16)
AST SERPL-CCNC: 81 U/L (ref 10–40)
BASOPHILS # BLD AUTO: 0.11 K/UL (ref 0–0.2)
BASOPHILS NFR BLD: 1.4 % (ref 0–1.9)
BILIRUB SERPL-MCNC: 0.8 MG/DL (ref 0.1–1)
BILIRUB UR QL STRIP: NEGATIVE
BUN SERPL-MCNC: 10 MG/DL (ref 8–23)
CALCIUM SERPL-MCNC: 9.9 MG/DL (ref 8.7–10.5)
CHLORIDE SERPL-SCNC: 94 MMOL/L (ref 95–110)
CHOLEST SERPL-MCNC: 243 MG/DL (ref 120–199)
CHOLEST/HDLC SERPL: 2.4 {RATIO} (ref 2–5)
CLARITY UR REFRACT.AUTO: CLEAR
CO2 SERPL-SCNC: 25 MMOL/L (ref 23–29)
COLOR UR AUTO: YELLOW
CREAT SERPL-MCNC: 1 MG/DL (ref 0.5–1.4)
DIFFERENTIAL METHOD: ABNORMAL
EOSINOPHIL # BLD AUTO: 0 K/UL (ref 0–0.5)
EOSINOPHIL NFR BLD: 0.4 % (ref 0–8)
ERYTHROCYTE [DISTWIDTH] IN BLOOD BY AUTOMATED COUNT: 13.6 % (ref 11.5–14.5)
EST. GFR  (AFRICAN AMERICAN): >60 ML/MIN/1.73 M^2
EST. GFR  (NON AFRICAN AMERICAN): 57.6 ML/MIN/1.73 M^2
GLUCOSE SERPL-MCNC: 143 MG/DL (ref 70–110)
GLUCOSE UR QL STRIP: NEGATIVE
HCT VFR BLD AUTO: 40 % (ref 37–48.5)
HDLC SERPL-MCNC: 102 MG/DL (ref 40–75)
HDLC SERPL: 42 % (ref 20–50)
HGB BLD-MCNC: 13 G/DL (ref 12–16)
HGB UR QL STRIP: NEGATIVE
IMM GRANULOCYTES # BLD AUTO: 0.06 K/UL (ref 0–0.04)
IMM GRANULOCYTES NFR BLD AUTO: 0.7 % (ref 0–0.5)
KETONES UR QL STRIP: ABNORMAL
LDLC SERPL CALC-MCNC: 130.2 MG/DL (ref 63–159)
LEUKOCYTE ESTERASE UR QL STRIP: NEGATIVE
LYMPHOCYTES # BLD AUTO: 1 K/UL (ref 1–4.8)
LYMPHOCYTES NFR BLD: 12 % (ref 18–48)
MCH RBC QN AUTO: 36.8 PG (ref 27–31)
MCHC RBC AUTO-ENTMCNC: 32.5 G/DL (ref 32–36)
MCV RBC AUTO: 113 FL (ref 82–98)
MONOCYTES # BLD AUTO: 1.2 K/UL (ref 0.3–1)
MONOCYTES NFR BLD: 14.3 % (ref 4–15)
NEUTROPHILS # BLD AUTO: 5.8 K/UL (ref 1.8–7.7)
NEUTROPHILS NFR BLD: 71.2 % (ref 38–73)
NITRITE UR QL STRIP: NEGATIVE
NONHDLC SERPL-MCNC: 141 MG/DL
NRBC BLD-RTO: 0 /100 WBC
PH UR STRIP: 6 [PH] (ref 5–8)
PLATELET # BLD AUTO: 235 K/UL (ref 150–350)
PMV BLD AUTO: 11.7 FL (ref 9.2–12.9)
POTASSIUM SERPL-SCNC: 3.6 MMOL/L (ref 3.5–5.1)
PROT SERPL-MCNC: 8.2 G/DL (ref 6–8.4)
PROT UR QL STRIP: NEGATIVE
RBC # BLD AUTO: 3.53 M/UL (ref 4–5.4)
SODIUM SERPL-SCNC: 136 MMOL/L (ref 136–145)
SP GR UR STRIP: 1.01 (ref 1–1.03)
TRIGL SERPL-MCNC: 54 MG/DL (ref 30–150)
TSH SERPL DL<=0.005 MIU/L-ACNC: 1.64 UIU/ML (ref 0.4–4)
URN SPEC COLLECT METH UR: ABNORMAL
WBC # BLD AUTO: 8.07 K/UL (ref 3.9–12.7)

## 2020-04-08 PROCEDURE — 36415 COLL VENOUS BLD VENIPUNCTURE: CPT | Mod: PO

## 2020-04-08 PROCEDURE — 3075F PR MOST RECENT SYSTOLIC BLOOD PRESS GE 130-139MM HG: ICD-10-PCS | Mod: CPTII,S$GLB,, | Performed by: FAMILY MEDICINE

## 2020-04-08 PROCEDURE — 99214 PR OFFICE/OUTPT VISIT, EST, LEVL IV, 30-39 MIN: ICD-10-PCS | Mod: S$GLB,,, | Performed by: FAMILY MEDICINE

## 2020-04-08 PROCEDURE — 3078F PR MOST RECENT DIASTOLIC BLOOD PRESSURE < 80 MM HG: ICD-10-PCS | Mod: CPTII,S$GLB,, | Performed by: FAMILY MEDICINE

## 2020-04-08 PROCEDURE — 1126F PR PAIN SEVERITY QUANTIFIED, NO PAIN PRESENT: ICD-10-PCS | Mod: S$GLB,,, | Performed by: FAMILY MEDICINE

## 2020-04-08 PROCEDURE — 81003 URINALYSIS AUTO W/O SCOPE: CPT

## 2020-04-08 PROCEDURE — 1101F PR PT FALLS ASSESS DOC 0-1 FALLS W/OUT INJ PAST YR: ICD-10-PCS | Mod: CPTII,S$GLB,, | Performed by: FAMILY MEDICINE

## 2020-04-08 PROCEDURE — 84443 ASSAY THYROID STIM HORMONE: CPT

## 2020-04-08 PROCEDURE — 99214 OFFICE O/P EST MOD 30 MIN: CPT | Mod: S$GLB,,, | Performed by: FAMILY MEDICINE

## 2020-04-08 PROCEDURE — 3075F SYST BP GE 130 - 139MM HG: CPT | Mod: CPTII,S$GLB,, | Performed by: FAMILY MEDICINE

## 2020-04-08 PROCEDURE — 3078F DIAST BP <80 MM HG: CPT | Mod: CPTII,S$GLB,, | Performed by: FAMILY MEDICINE

## 2020-04-08 PROCEDURE — 1126F AMNT PAIN NOTED NONE PRSNT: CPT | Mod: S$GLB,,, | Performed by: FAMILY MEDICINE

## 2020-04-08 PROCEDURE — 1101F PT FALLS ASSESS-DOCD LE1/YR: CPT | Mod: CPTII,S$GLB,, | Performed by: FAMILY MEDICINE

## 2020-04-08 PROCEDURE — 82306 VITAMIN D 25 HYDROXY: CPT

## 2020-04-08 PROCEDURE — 1159F MED LIST DOCD IN RCRD: CPT | Mod: S$GLB,,, | Performed by: FAMILY MEDICINE

## 2020-04-08 PROCEDURE — 99999 PR PBB SHADOW E&M-EST. PATIENT-LVL III: ICD-10-PCS | Mod: PBBFAC,,, | Performed by: FAMILY MEDICINE

## 2020-04-08 PROCEDURE — 80053 COMPREHEN METABOLIC PANEL: CPT

## 2020-04-08 PROCEDURE — 85025 COMPLETE CBC W/AUTO DIFF WBC: CPT

## 2020-04-08 PROCEDURE — 99999 PR PBB SHADOW E&M-EST. PATIENT-LVL III: CPT | Mod: PBBFAC,,, | Performed by: FAMILY MEDICINE

## 2020-04-08 PROCEDURE — 1159F PR MEDICATION LIST DOCUMENTED IN MEDICAL RECORD: ICD-10-PCS | Mod: S$GLB,,, | Performed by: FAMILY MEDICINE

## 2020-04-08 PROCEDURE — 80061 LIPID PANEL: CPT

## 2020-04-08 RX ORDER — LISINOPRIL 5 MG/1
5 TABLET ORAL DAILY
Qty: 90 TABLET | Refills: 3 | Status: SHIPPED | OUTPATIENT
Start: 2020-04-08 | End: 2020-04-16 | Stop reason: SDUPTHER

## 2020-04-09 NOTE — PROGRESS NOTES
Subjective:       Patient ID: Pantera Posey is a 69 y.o. female.    Chief Complaint: Annual Exam and Hypertension    HPI   Pt with breast ca followed in hem/onc is here for annual exam pt is generally well no sob/cp no uri symptoms  No n/v/f/c/d/c   Pt has htn on norvasc bp fine today   Review of Systems   Constitutional: Negative for activity change, chills, diaphoresis, fatigue and fever.   HENT: Negative for congestion, ear discharge, ear pain, hearing loss, postnasal drip, rhinorrhea, sinus pressure, sneezing, sore throat, trouble swallowing and voice change.    Eyes: Negative for photophobia, discharge, redness, itching and visual disturbance.   Respiratory: Negative for cough, chest tightness, shortness of breath and wheezing.    Cardiovascular: Negative for chest pain, palpitations and leg swelling.   Gastrointestinal: Negative for abdominal pain, anal bleeding, blood in stool, constipation, diarrhea, nausea, rectal pain and vomiting.   Genitourinary: Negative for dyspareunia, dysuria, flank pain, frequency, hematuria, menstrual problem, pelvic pain, urgency, vaginal bleeding and vaginal discharge.   Musculoskeletal: Negative for arthralgias, back pain, joint swelling and neck pain.   Skin: Negative for color change and rash.   Neurological: Negative for dizziness, speech difficulty, weakness, light-headedness, numbness and headaches.   Hematological: Does not bruise/bleed easily.   Psychiatric/Behavioral: Negative for agitation, confusion, decreased concentration, sleep disturbance and suicidal ideas. The patient is not nervous/anxious.        Objective:      Physical Exam   Constitutional: She appears well-developed and well-nourished.   HENT:   Head: Normocephalic and atraumatic.   Right Ear: External ear normal.   Left Ear: External ear normal.   Nose: Nose normal.   Mouth/Throat: Oropharynx is clear and moist.   Eyes: Pupils are equal, round, and reactive to light. Conjunctivae and EOM are normal.  "Right eye exhibits no discharge. Left eye exhibits no discharge.   Neck: Normal range of motion. Neck supple. No thyromegaly present.   Cardiovascular: Normal rate and regular rhythm. Exam reveals no gallop.   Pulmonary/Chest: Effort normal and breath sounds normal. She has no wheezes. She has no rales.   Abdominal: Soft. Bowel sounds are normal. She exhibits no distension. There is no tenderness. There is no rebound and no guarding.   Musculoskeletal: Normal range of motion. She exhibits no edema or tenderness.   Lymphadenopathy:     She has no cervical adenopathy.   Neurological: She is alert. No cranial nerve deficit. She exhibits normal muscle tone. Coordination normal.   Skin: Skin is warm and dry. No rash noted. No erythema.   Psychiatric: She has a normal mood and affect. Her behavior is normal. Judgment and thought content normal.       Assessment:       1. Annual physical exam    2. Essential hypertension    3. Carcinoma in situ of left breast, unspecified type        Plan:     orders cmp cbc lipid tsh urine  Cont meds  Low salt diet  Graded exercise  rtc 6 months    Health maintenance  pnuemonia discussed  Shingles discussed  Flu in fall  Tetanus q 10 years   Colonoscopy discussed  Mammogram per hem/onc         "This note will not be shared with the patient."   "

## 2020-04-13 ENCOUNTER — PATIENT MESSAGE (OUTPATIENT)
Dept: FAMILY MEDICINE | Facility: CLINIC | Age: 70
End: 2020-04-13

## 2020-04-13 ENCOUNTER — TELEPHONE (OUTPATIENT)
Dept: FAMILY MEDICINE | Facility: CLINIC | Age: 70
End: 2020-04-13

## 2020-04-13 NOTE — TELEPHONE ENCOUNTER
Contacted patient's pharmacy in regards to the patient's HCTZ. The pharmacy states patient has a sulfa drug listed as her allergies and wanted to know if it was ok for them to fill the HCTZ?

## 2020-04-13 NOTE — TELEPHONE ENCOUNTER
----- Message from John Orozco sent at 4/13/2020 10:12 AM CDT -----  Contact: derrell Saul   Name of Who is Calling: derrell Saul     What is the request in detail:derrell Saul  is requesting a call back in regards to RX ... hydroCHLOROthiazide (HYDRODIURIL) 25 MG tablet Allergy alert ...  Please contact to further discuss and advise      Can the clinic reply by [facility]: no     [facility] Back [facility] derrell Saul  [phone] ref       [id number]

## 2020-04-14 ENCOUNTER — TELEPHONE (OUTPATIENT)
Dept: FAMILY MEDICINE | Facility: CLINIC | Age: 70
End: 2020-04-14

## 2020-04-14 ENCOUNTER — PATIENT MESSAGE (OUTPATIENT)
Dept: FAMILY MEDICINE | Facility: CLINIC | Age: 70
End: 2020-04-14

## 2020-04-14 NOTE — TELEPHONE ENCOUNTER
----- Message from Jazmin Singh MD sent at 4/14/2020  5:51 AM CDT -----  Please help pt set up an appt

## 2020-04-14 NOTE — TELEPHONE ENCOUNTER
----- Message from Jazmin Singh MD sent at 4/9/2020  3:18 AM CDT -----  Please call pt to set up virtual visit for lab follow up

## 2020-04-16 ENCOUNTER — OFFICE VISIT (OUTPATIENT)
Dept: FAMILY MEDICINE | Facility: CLINIC | Age: 70
End: 2020-04-16
Attending: FAMILY MEDICINE
Payer: COMMERCIAL

## 2020-04-16 ENCOUNTER — LAB VISIT (OUTPATIENT)
Dept: LAB | Facility: HOSPITAL | Age: 70
End: 2020-04-16
Attending: FAMILY MEDICINE
Payer: COMMERCIAL

## 2020-04-16 VITALS
HEART RATE: 64 BPM | HEIGHT: 65 IN | SYSTOLIC BLOOD PRESSURE: 128 MMHG | WEIGHT: 148 LBS | DIASTOLIC BLOOD PRESSURE: 78 MMHG | BODY MASS INDEX: 24.66 KG/M2

## 2020-04-16 DIAGNOSIS — I10 ESSENTIAL HYPERTENSION: Primary | ICD-10-CM

## 2020-04-16 DIAGNOSIS — Z85.3 HISTORY OF BREAST CANCER: ICD-10-CM

## 2020-04-16 DIAGNOSIS — R73.9 HYPERGLYCEMIA: ICD-10-CM

## 2020-04-16 LAB
ESTIMATED AVG GLUCOSE: 105 MG/DL (ref 68–131)
HBA1C MFR BLD HPLC: 5.3 % (ref 4–5.6)

## 2020-04-16 PROCEDURE — 3078F DIAST BP <80 MM HG: CPT | Mod: CPTII,S$GLB,, | Performed by: FAMILY MEDICINE

## 2020-04-16 PROCEDURE — 36415 COLL VENOUS BLD VENIPUNCTURE: CPT | Mod: PO

## 2020-04-16 PROCEDURE — 99999 PR PBB SHADOW E&M-EST. PATIENT-LVL III: ICD-10-PCS | Mod: PBBFAC,,, | Performed by: FAMILY MEDICINE

## 2020-04-16 PROCEDURE — 99214 PR OFFICE/OUTPT VISIT, EST, LEVL IV, 30-39 MIN: ICD-10-PCS | Mod: S$GLB,,, | Performed by: FAMILY MEDICINE

## 2020-04-16 PROCEDURE — 99999 PR PBB SHADOW E&M-EST. PATIENT-LVL III: CPT | Mod: PBBFAC,,, | Performed by: FAMILY MEDICINE

## 2020-04-16 PROCEDURE — 1101F PR PT FALLS ASSESS DOC 0-1 FALLS W/OUT INJ PAST YR: ICD-10-PCS | Mod: CPTII,S$GLB,, | Performed by: FAMILY MEDICINE

## 2020-04-16 PROCEDURE — 1126F PR PAIN SEVERITY QUANTIFIED, NO PAIN PRESENT: ICD-10-PCS | Mod: S$GLB,,, | Performed by: FAMILY MEDICINE

## 2020-04-16 PROCEDURE — 83036 HEMOGLOBIN GLYCOSYLATED A1C: CPT

## 2020-04-16 PROCEDURE — 3074F PR MOST RECENT SYSTOLIC BLOOD PRESSURE < 130 MM HG: ICD-10-PCS | Mod: CPTII,S$GLB,, | Performed by: FAMILY MEDICINE

## 2020-04-16 PROCEDURE — 1126F AMNT PAIN NOTED NONE PRSNT: CPT | Mod: S$GLB,,, | Performed by: FAMILY MEDICINE

## 2020-04-16 PROCEDURE — 99214 OFFICE O/P EST MOD 30 MIN: CPT | Mod: S$GLB,,, | Performed by: FAMILY MEDICINE

## 2020-04-16 PROCEDURE — 3078F PR MOST RECENT DIASTOLIC BLOOD PRESSURE < 80 MM HG: ICD-10-PCS | Mod: CPTII,S$GLB,, | Performed by: FAMILY MEDICINE

## 2020-04-16 PROCEDURE — 3074F SYST BP LT 130 MM HG: CPT | Mod: CPTII,S$GLB,, | Performed by: FAMILY MEDICINE

## 2020-04-16 PROCEDURE — 1159F PR MEDICATION LIST DOCUMENTED IN MEDICAL RECORD: ICD-10-PCS | Mod: S$GLB,,, | Performed by: FAMILY MEDICINE

## 2020-04-16 PROCEDURE — 1101F PT FALLS ASSESS-DOCD LE1/YR: CPT | Mod: CPTII,S$GLB,, | Performed by: FAMILY MEDICINE

## 2020-04-16 PROCEDURE — 1159F MED LIST DOCD IN RCRD: CPT | Mod: S$GLB,,, | Performed by: FAMILY MEDICINE

## 2020-04-16 RX ORDER — HYDROCHLOROTHIAZIDE 25 MG/1
25 TABLET ORAL DAILY
Qty: 90 TABLET | Refills: 3 | Status: SHIPPED | OUTPATIENT
Start: 2020-04-16 | End: 2020-04-22 | Stop reason: ALTCHOICE

## 2020-04-16 RX ORDER — HYDROCHLOROTHIAZIDE 25 MG/1
25 TABLET ORAL DAILY
Qty: 90 TABLET | Refills: 3 | Status: SHIPPED | OUTPATIENT
Start: 2020-04-16 | End: 2020-04-16 | Stop reason: SDUPTHER

## 2020-04-16 RX ORDER — LISINOPRIL 5 MG/1
5 TABLET ORAL DAILY
Qty: 90 TABLET | Refills: 3 | Status: SHIPPED | OUTPATIENT
Start: 2020-04-16 | End: 2021-03-31

## 2020-04-16 RX ORDER — AMLODIPINE BESYLATE 5 MG/1
5 TABLET ORAL DAILY
Qty: 90 TABLET | Refills: 3 | Status: SHIPPED | OUTPATIENT
Start: 2020-04-16 | End: 2020-04-22

## 2020-04-16 NOTE — PROGRESS NOTES
"Subjective:       Patient ID: Pantera Posey is a 69 y.o. female.    Chief Complaint: Follow-up    HPI   Pt is here for follow up of htn bp fine today on norvasc no sob/cp   Pt has elevated bs she is not on a low sugar diet no regular exercise but is willing to start  Pt has breast cancer she is s/p mastectomy on armidex with elevated lft she has an appt with heme/onc in june  Review of Systems   Constitutional: Negative for activity change, chills, fatigue, fever and unexpected weight change.   HENT: Negative for congestion, ear pain, rhinorrhea and sinus pressure.    Eyes: Negative for discharge and visual disturbance.   Respiratory: Negative for cough, chest tightness and wheezing.    Cardiovascular: Negative for chest pain and palpitations.   Gastrointestinal: Negative for blood in stool, constipation, diarrhea and vomiting.   Endocrine: Positive for polydipsia. Negative for polyuria.   Musculoskeletal: Negative for neck pain.       Objective:      Physical Exam   Constitutional: She appears well-developed and well-nourished. No distress.   Cardiovascular: Normal rate and regular rhythm. Exam reveals no gallop.   Pulmonary/Chest: Effort normal and breath sounds normal. No stridor. No respiratory distress.   Abdominal: Soft. Bowel sounds are normal. She exhibits no distension. There is no tenderness.      fbs 143,  ast/alt 81/61  Assessment:       1. Essential hypertension    2. Hyperglycemia    3. History of breast cancer        Plan:     orders hgb a1c today , cmp pta next visit  Low fat low salt low sugar diet  Cont meds  Graded exercise  F/u oncology  rtc July           "This note will not be shared with the patient."   "

## 2020-04-21 ENCOUNTER — TELEPHONE (OUTPATIENT)
Dept: FAMILY MEDICINE | Facility: CLINIC | Age: 70
End: 2020-04-21

## 2020-04-21 DIAGNOSIS — I10 ESSENTIAL HYPERTENSION: ICD-10-CM

## 2020-04-21 NOTE — TELEPHONE ENCOUNTER
----- Message from Joyce Roper sent at 4/21/2020  3:44 PM CDT -----  Contact: [facility] [phone]  You Prescribed Hydroclorothiazide for pt  Who  has a sulfur allergy.  Pt is new to this Pharmacy Please call to clarify if it's ok to fill ref# [id number]

## 2020-04-22 ENCOUNTER — PATIENT MESSAGE (OUTPATIENT)
Dept: FAMILY MEDICINE | Facility: CLINIC | Age: 70
End: 2020-04-22

## 2020-04-22 RX ORDER — AMLODIPINE BESYLATE 10 MG/1
10 TABLET ORAL DAILY
Qty: 90 TABLET | Refills: 3 | Status: SHIPPED | OUTPATIENT
Start: 2020-04-22 | End: 2021-05-09

## 2020-04-22 NOTE — TELEPHONE ENCOUNTER
I increased her norvasc from 5 mg daily to 10 mg daily instead of adding the hctz to make this remotely possible problem go away.

## 2020-05-29 DIAGNOSIS — C50.512 MALIGNANT NEOPLASM OF LOWER-OUTER QUADRANT OF LEFT BREAST OF FEMALE, ESTROGEN RECEPTOR POSITIVE: ICD-10-CM

## 2020-05-29 DIAGNOSIS — Z17.0 MALIGNANT NEOPLASM OF LOWER-OUTER QUADRANT OF LEFT BREAST OF FEMALE, ESTROGEN RECEPTOR POSITIVE: ICD-10-CM

## 2020-05-29 RX ORDER — ANASTROZOLE 1 MG/1
TABLET ORAL
Qty: 90 TABLET | Refills: 3 | Status: SHIPPED | OUTPATIENT
Start: 2020-05-29 | End: 2022-08-15

## 2020-06-25 ENCOUNTER — OFFICE VISIT (OUTPATIENT)
Dept: HEMATOLOGY/ONCOLOGY | Facility: CLINIC | Age: 70
End: 2020-06-25
Payer: COMMERCIAL

## 2020-06-25 VITALS
RESPIRATION RATE: 18 BRPM | SYSTOLIC BLOOD PRESSURE: 112 MMHG | DIASTOLIC BLOOD PRESSURE: 68 MMHG | HEIGHT: 65 IN | HEART RATE: 98 BPM | BODY MASS INDEX: 24.98 KG/M2 | TEMPERATURE: 98 F | OXYGEN SATURATION: 99 % | WEIGHT: 149.94 LBS

## 2020-06-25 DIAGNOSIS — C50.512 MALIGNANT NEOPLASM OF LOWER-OUTER QUADRANT OF LEFT BREAST OF FEMALE, ESTROGEN RECEPTOR POSITIVE: Primary | ICD-10-CM

## 2020-06-25 DIAGNOSIS — Z17.0 MALIGNANT NEOPLASM OF LOWER-OUTER QUADRANT OF LEFT BREAST OF FEMALE, ESTROGEN RECEPTOR POSITIVE: Primary | ICD-10-CM

## 2020-06-25 DIAGNOSIS — Z79.811 USE OF AROMATASE INHIBITORS: ICD-10-CM

## 2020-06-25 PROCEDURE — 1101F PR PT FALLS ASSESS DOC 0-1 FALLS W/OUT INJ PAST YR: ICD-10-PCS | Mod: CPTII,S$GLB,, | Performed by: INTERNAL MEDICINE

## 2020-06-25 PROCEDURE — 99213 PR OFFICE/OUTPT VISIT, EST, LEVL III, 20-29 MIN: ICD-10-PCS | Mod: S$GLB,,, | Performed by: INTERNAL MEDICINE

## 2020-06-25 PROCEDURE — 1101F PT FALLS ASSESS-DOCD LE1/YR: CPT | Mod: CPTII,S$GLB,, | Performed by: INTERNAL MEDICINE

## 2020-06-25 PROCEDURE — 3078F PR MOST RECENT DIASTOLIC BLOOD PRESSURE < 80 MM HG: ICD-10-PCS | Mod: CPTII,S$GLB,, | Performed by: INTERNAL MEDICINE

## 2020-06-25 PROCEDURE — 1126F AMNT PAIN NOTED NONE PRSNT: CPT | Mod: S$GLB,,, | Performed by: INTERNAL MEDICINE

## 2020-06-25 PROCEDURE — 99999 PR PBB SHADOW E&M-EST. PATIENT-LVL IV: CPT | Mod: PBBFAC,,, | Performed by: INTERNAL MEDICINE

## 2020-06-25 PROCEDURE — 3008F BODY MASS INDEX DOCD: CPT | Mod: CPTII,S$GLB,, | Performed by: INTERNAL MEDICINE

## 2020-06-25 PROCEDURE — 1159F MED LIST DOCD IN RCRD: CPT | Mod: S$GLB,,, | Performed by: INTERNAL MEDICINE

## 2020-06-25 PROCEDURE — 1126F PR PAIN SEVERITY QUANTIFIED, NO PAIN PRESENT: ICD-10-PCS | Mod: S$GLB,,, | Performed by: INTERNAL MEDICINE

## 2020-06-25 PROCEDURE — 99999 PR PBB SHADOW E&M-EST. PATIENT-LVL IV: ICD-10-PCS | Mod: PBBFAC,,, | Performed by: INTERNAL MEDICINE

## 2020-06-25 PROCEDURE — 99213 OFFICE O/P EST LOW 20 MIN: CPT | Mod: S$GLB,,, | Performed by: INTERNAL MEDICINE

## 2020-06-25 PROCEDURE — 1159F PR MEDICATION LIST DOCUMENTED IN MEDICAL RECORD: ICD-10-PCS | Mod: S$GLB,,, | Performed by: INTERNAL MEDICINE

## 2020-06-25 PROCEDURE — 3008F PR BODY MASS INDEX (BMI) DOCUMENTED: ICD-10-PCS | Mod: CPTII,S$GLB,, | Performed by: INTERNAL MEDICINE

## 2020-06-25 PROCEDURE — 3078F DIAST BP <80 MM HG: CPT | Mod: CPTII,S$GLB,, | Performed by: INTERNAL MEDICINE

## 2020-06-25 PROCEDURE — 3074F PR MOST RECENT SYSTOLIC BLOOD PRESSURE < 130 MM HG: ICD-10-PCS | Mod: CPTII,S$GLB,, | Performed by: INTERNAL MEDICINE

## 2020-06-25 PROCEDURE — 3074F SYST BP LT 130 MM HG: CPT | Mod: CPTII,S$GLB,, | Performed by: INTERNAL MEDICINE

## 2020-06-25 NOTE — PROGRESS NOTES
Subjective:       Patient ID: Pantera Posey is a 70 y.o. female.    Chief Complaint: No chief complaint on file.    HPI   Mrs. Posey returns today for follow up.  As of mid September 2017 she has been on anastrazole 1 mg daily.       Briefly, she  is a  70-year-old  female who has a remote history of breast cancer treated in [place] 20 years ago with mastectomy and five years of tamoxifen.      A routine mammogram on 06/27/2017 was read as BIRADS category 0 and showed an asymmetry in the upper region of the left breast posteriorly.  A diagnostic mammogram two days later was read as BIRADS category 4 and a biopsy was performed on 07/11/2017 and showed a carcinoma that was ER strongly positive, KS positive, and HER-2 negative.   The patient was seen by Dr. Martin and eventually underwent a left-sided mastectomy and sentinel lymph node biopsy on 08/03/2017 with insertion of bilateral tissue expanders.    The pathology report from the procedure indicates that 4 left axillary lymph nodes were negative.  On the mastectomy sample, she had a 15 mm invasive ductal carcinoma; resection margins were clear.  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  There was no lymphovascular invasion.  Her Oncotype score was 10, suggesting a 9 % chance of recurrence at ten years with tamoxifen alone.  She has been on anastrozole since September 2017.    The medical, surgical, as well as family history remain unchanged.        Review of Systems    Overall she feels well, and she has no complaints today. Her ECOG PS is 1.   She denies any anxiety, depression, easy bruising, fevers, chills, night  sweats, weight loss, nausea, vomiting, diarrhea, constipation, diplopia, blurred vision, headache, chest pain, palpitations, shortness of breath, breast pain, abdominal pain, extremity pain, or difficulty ambulating.  The remainder of the ten-point ROS, including general, skin, lymph, H/N, cardiorespiratory, GI, , Neuro,  Endocrine, and psychiatric is negative.       Objective:      Physical Exam    She is alert, oriented to time, place, person, pleasant, well      nourished, in no acute physical distress.                                   VITAL SIGNS:  Reviewed                                      HEENT:    There are no nasal, oral, lip, gingival, auricular, lid,    or conjunctival lesions.  Mucosae are moist and pink, and there is no        thrush.  Pupils are equal, reactive to light and accommodation.              Extraocular muscle movements are intact.  Dentition is fair. Maxillary teeth are missing and she has dentures.                                       NECK:  Supple without JVD, adenopathy, or thyromegaly.                       LUNGS:  Clear to auscultation without wheezing, rales, or rhonchi.           CARDIOVASCULAR:  Reveals an S1, S2, no murmurs, no rubs, no gallops.         ABDOMEN:  Soft, nontender, without organomegaly.  Bowel sounds are    present.                                                                     EXTREMITIES:  No cyanosis, clubbing, or edema.                               BREASTS:  She is status post bilateral mastectomies with reconstructions,.  Her nipples have been tattooed.                                        LYMPHATIC:  There is no cervical, axillary, or supraclavicular adenopathy.   SKIN:  Warm and moist, without petechiae, rashes, induration, or ecchymoses.           NEUROLOGIC:  DTRs are 0-1+ bilaterally, symmetrical, motor function is 5/5,  and cranial nerves are  within normal limits.    Assessment:       1. Malignant neoplasm of lower-outer quadrant of left breast of female, estrogen receptor positive    2. Use of aromatase inhibitors      3.    Remote history of contralateral (right sided) breast cancer.  Clinically KIKI  Plan:         Mrs Posey is doing well and remains clinically KIKI.  I have asked her to remain on anastrazole, which she will take through September 2022. I will  see her again in late December 2020.  Her DEXA scan will be repeated in October 2021.  Her multiple questions were answered to her satisfaction.

## 2020-08-14 ENCOUNTER — TELEPHONE (OUTPATIENT)
Dept: ALLERGY | Facility: CLINIC | Age: 70
End: 2020-08-14

## 2020-08-14 NOTE — TELEPHONE ENCOUNTER
Spoke with patient to cancel appointment due to allergy not treating rashes.  Sent a staff message to dermatology to call patient to schedule for a rash evaluation.  Gave patient dermatology's number to call if she does not receive to schedule an appointment.

## 2020-08-17 ENCOUNTER — PATIENT OUTREACH (OUTPATIENT)
Dept: ADMINISTRATIVE | Facility: OTHER | Age: 70
End: 2020-08-17

## 2020-08-17 NOTE — PROGRESS NOTES
Patient's chart was reviewed for overdue ALIVIA topics.  Immunizations reconciled.    Orders placed:n/a  Tasked appts:n/a  Labs Linked:n/a

## 2020-08-18 ENCOUNTER — OFFICE VISIT (OUTPATIENT)
Dept: DERMATOLOGY | Facility: CLINIC | Age: 70
End: 2020-08-18
Payer: COMMERCIAL

## 2020-08-18 DIAGNOSIS — L23.9 ALLERGIC CONTACT DERMATITIS, UNSPECIFIED TRIGGER: Primary | ICD-10-CM

## 2020-08-18 PROCEDURE — 1101F PR PT FALLS ASSESS DOC 0-1 FALLS W/OUT INJ PAST YR: ICD-10-PCS | Mod: CPTII,S$GLB,, | Performed by: PATHOLOGY

## 2020-08-18 PROCEDURE — 1101F PT FALLS ASSESS-DOCD LE1/YR: CPT | Mod: CPTII,S$GLB,, | Performed by: PATHOLOGY

## 2020-08-18 PROCEDURE — 99203 PR OFFICE/OUTPT VISIT, NEW, LEVL III, 30-44 MIN: ICD-10-PCS | Mod: S$GLB,,, | Performed by: PATHOLOGY

## 2020-08-18 PROCEDURE — 99999 PR PBB SHADOW E&M-EST. PATIENT-LVL III: ICD-10-PCS | Mod: PBBFAC,,, | Performed by: PATHOLOGY

## 2020-08-18 PROCEDURE — 1125F PR PAIN SEVERITY QUANTIFIED, PAIN PRESENT: ICD-10-PCS | Mod: S$GLB,,, | Performed by: PATHOLOGY

## 2020-08-18 PROCEDURE — 99203 OFFICE O/P NEW LOW 30 MIN: CPT | Mod: S$GLB,,, | Performed by: PATHOLOGY

## 2020-08-18 PROCEDURE — 1159F PR MEDICATION LIST DOCUMENTED IN MEDICAL RECORD: ICD-10-PCS | Mod: S$GLB,,, | Performed by: PATHOLOGY

## 2020-08-18 PROCEDURE — 99999 PR PBB SHADOW E&M-EST. PATIENT-LVL III: CPT | Mod: PBBFAC,,, | Performed by: PATHOLOGY

## 2020-08-18 PROCEDURE — 1159F MED LIST DOCD IN RCRD: CPT | Mod: S$GLB,,, | Performed by: PATHOLOGY

## 2020-08-18 PROCEDURE — 1125F AMNT PAIN NOTED PAIN PRSNT: CPT | Mod: S$GLB,,, | Performed by: PATHOLOGY

## 2020-08-18 RX ORDER — PREDNISONE 20 MG/1
TABLET ORAL
Qty: 42 TABLET | Refills: 0 | Status: SHIPPED | OUTPATIENT
Start: 2020-08-18 | End: 2024-03-07

## 2020-08-18 RX ORDER — HYDROXYZINE HYDROCHLORIDE 25 MG/1
25 TABLET, FILM COATED ORAL NIGHTLY
Qty: 30 TABLET | Refills: 1 | Status: SHIPPED | OUTPATIENT
Start: 2020-08-18 | End: 2020-09-17

## 2020-08-18 RX ORDER — CLOBETASOL PROPIONATE 0.46 MG/ML
SOLUTION TOPICAL
Qty: 50 ML | Refills: 3 | Status: SHIPPED | OUTPATIENT
Start: 2020-08-18 | End: 2021-11-16 | Stop reason: SDUPTHER

## 2020-08-18 RX ORDER — TRIAMCINOLONE ACETONIDE 1 MG/G
CREAM TOPICAL
Qty: 454 G | Refills: 3 | Status: SHIPPED | OUTPATIENT
Start: 2020-08-18 | End: 2022-03-11 | Stop reason: SDUPTHER

## 2020-08-18 NOTE — PROGRESS NOTES
Subjective:       Patient ID:  Pantera Posey is a 70 y.o. female who presents for   Chief Complaint   Patient presents with    Rash     Pt presents today for rash, chest, back, groin legs, x 10 days, Itching, redness, Tx. Apple cidar Vinegar     HPI  Pt had been gardening prior to onset of rash.  Started to right thigh - she thought it was a spider bite.  Has continued to develop new pink, pruritic, scaly, some weepy patches to bilateral upper and lower extremities, neck, chest, upper back, scalp, buttocks.  Continues to get new lesions.  Using apple cider vinegar without improvement.  Denies any new medications, hair dye, or other personal care products.  No h/o allergic contact dermatitis.      Review of Systems   Constitutional: Negative for fever, chills, weight loss, weight gain, fatigue, night sweats and malaise.   Skin: Positive for itching, rash and dry skin. Negative for daily sunscreen use, activity-related sunscreen use and wears hat.        Objective:    Physical Exam   Constitutional: She appears well-developed and well-nourished.   Neurological: She is alert and oriented to person, place, and time.   Psychiatric: She has a normal mood and affect.   Skin:   Areas Examined (abnormalities noted in diagram):   Scalp / Hair Palpated and Inspected  Head / Face Inspection Performed  Neck Inspection Performed  Chest / Axilla Inspection Performed  Abdomen Inspection Performed  Genitals / Buttocks / Groin Inspection Performed  Back Inspection Performed  RUE Inspected  LUE Inspection Performed  RLE Inspected  LLE Inspection Performed  Nails and Digits Inspection Performed                   Diagram Legend     Erythematous scaling macule/papule c/w actinic keratosis       Vascular papule c/w angioma      Pigmented verrucoid papule/plaque c/w seborrheic keratosis      Yellow umbilicated papule c/w sebaceous hyperplasia      Irregularly shaped tan macule c/w lentigo     1-2 mm smooth white papules consistent  with Milia      Movable subcutaneous cyst with punctum c/w epidermal inclusion cyst      Subcutaneous movable cyst c/w pilar cyst      Firm pink to brown papule c/w dermatofibroma      Pedunculated fleshy papule(s) c/w skin tag(s)      Evenly pigmented macule c/w junctional nevus     Mildly variegated pigmented, slightly irregular-bordered macule c/w mildly atypical nevus      Flesh colored to evenly pigmented papule c/w intradermal nevus       Pink pearly papule/plaque c/w basal cell carcinoma      Erythematous hyperkeratotic cursted plaque c/w SCC      Surgical scar with no sign of skin cancer recurrence      Open and closed comedones      Inflammatory papules and pustules      Verrucoid papule consistent consistent with wart     Erythematous eczematous patches and plaques     Dystrophic onycholytic nail with subungual debris c/w onychomycosis     Umbilicated papule    Erythematous-base heme-crusted tan verrucoid plaque consistent with inflamed seborrheic keratosis     Erythematous Silvery Scaling Plaque c/w Psoriasis     See annotation      Assessment / Plan:        Allergic contact dermatitis, unspecified trigger - suspected; onset after gardening  -     hydrOXYzine HCL (ATARAX) 25 MG tablet; Take 1 tablet (25 mg total) by mouth every evening. Prn pruritus. Do not drive or operate machinery while taking this medicine.  Dispense: 30 tablet; Refill: 1  -     predniSONE (DELTASONE) 20 MG tablet; Take 3 pills po qam with breakfast x 1 wk then take 2 po qam with breakfast x 1 wk then take 1 po qam with breakfast x 1 wk  Dispense: 42 tablet; Refill: 0  -     clobetasoL (TEMOVATE) 0.05 % external solution; Use on scalp one - two times daily as needed for scaling or itching  Dispense: 50 mL; Refill: 3  -     triamcinolone acetonide 0.1% (KENALOG) 0.1 % cream; AAA bid to affected areas of body  Dispense: 454 g; Refill: 3    If rash recurs s/p prednisone taper, will plan to biopsy and if path supportive of ACD, will  pursue patch testing.             No follow-ups on file.

## 2020-09-23 ENCOUNTER — PATIENT OUTREACH (OUTPATIENT)
Dept: ADMINISTRATIVE | Facility: OTHER | Age: 70
End: 2020-09-23

## 2020-09-24 ENCOUNTER — OFFICE VISIT (OUTPATIENT)
Dept: DERMATOLOGY | Facility: CLINIC | Age: 70
End: 2020-09-24
Payer: COMMERCIAL

## 2020-09-24 DIAGNOSIS — L82.1 SK (SEBORRHEIC KERATOSIS): ICD-10-CM

## 2020-09-24 DIAGNOSIS — L23.9 ALLERGIC CONTACT DERMATITIS, UNSPECIFIED TRIGGER: Primary | ICD-10-CM

## 2020-09-24 DIAGNOSIS — L85.3 XEROSIS CUTIS: ICD-10-CM

## 2020-09-24 PROCEDURE — 99999 PR PBB SHADOW E&M-EST. PATIENT-LVL III: ICD-10-PCS | Mod: PBBFAC,,, | Performed by: PATHOLOGY

## 2020-09-24 PROCEDURE — 1159F PR MEDICATION LIST DOCUMENTED IN MEDICAL RECORD: ICD-10-PCS | Mod: S$GLB,,, | Performed by: PATHOLOGY

## 2020-09-24 PROCEDURE — 99213 OFFICE O/P EST LOW 20 MIN: CPT | Mod: S$GLB,,, | Performed by: PATHOLOGY

## 2020-09-24 PROCEDURE — 1101F PR PT FALLS ASSESS DOC 0-1 FALLS W/OUT INJ PAST YR: ICD-10-PCS | Mod: CPTII,S$GLB,, | Performed by: PATHOLOGY

## 2020-09-24 PROCEDURE — 99999 PR PBB SHADOW E&M-EST. PATIENT-LVL III: CPT | Mod: PBBFAC,,, | Performed by: PATHOLOGY

## 2020-09-24 PROCEDURE — 1126F AMNT PAIN NOTED NONE PRSNT: CPT | Mod: S$GLB,,, | Performed by: PATHOLOGY

## 2020-09-24 PROCEDURE — 1101F PT FALLS ASSESS-DOCD LE1/YR: CPT | Mod: CPTII,S$GLB,, | Performed by: PATHOLOGY

## 2020-09-24 PROCEDURE — 99213 PR OFFICE/OUTPT VISIT, EST, LEVL III, 20-29 MIN: ICD-10-PCS | Mod: S$GLB,,, | Performed by: PATHOLOGY

## 2020-09-24 PROCEDURE — 1159F MED LIST DOCD IN RCRD: CPT | Mod: S$GLB,,, | Performed by: PATHOLOGY

## 2020-09-24 PROCEDURE — 1126F PR PAIN SEVERITY QUANTIFIED, NO PAIN PRESENT: ICD-10-PCS | Mod: S$GLB,,, | Performed by: PATHOLOGY

## 2020-09-24 NOTE — PROGRESS NOTES
Subjective:       Patient ID:  Pantera Posey is a 70 y.o. female who presents for   Chief Complaint   Patient presents with    Rash     follow up      HPI   Here today for f/u of rash, Started to right thigh, to bilateral upper and lower extremities, neck, chest, upper back, scalp, buttocks after gardening. No new lesions today,  overral she states her rash is essentially resolved.  Few residual scaly patches to right thigh, lower abdomen.  Complains of dry skin, especially arms - using Cetaphil wash and Cetaphil ceram.  Also has raised scaly spot to left lateral cheek.    Review of Systems   Constitutional: Negative for fever, chills, weight loss, weight gain, fatigue, night sweats and malaise.   Respiratory: Negative for cough and shortness of breath.    Skin: Positive for daily sunscreen use and activity-related sunscreen use. Negative for itching, rash and wears hat.   Hematologic/Lymphatic: Does not bruise/bleed easily.        Objective:    Physical Exam   Constitutional: She appears well-developed and well-nourished.   Neurological: She is alert and oriented to person, place, and time. She is not disoriented.   Psychiatric: She has a normal mood and affect. She is not agitated.   Skin:   Areas Examined (abnormalities noted in diagram):   Head / Face Inspection Performed  Neck Inspection Performed  Abdomen Inspection Performed  RUE Inspected  LUE Inspection Performed  LLE Inspection Performed                   Diagram Legend     Erythematous scaling macule/papule c/w actinic keratosis       Vascular papule c/w angioma      Pigmented verrucoid papule/plaque c/w seborrheic keratosis      Yellow umbilicated papule c/w sebaceous hyperplasia      Irregularly shaped tan macule c/w lentigo     1-2 mm smooth white papules consistent with Milia      Movable subcutaneous cyst with punctum c/w epidermal inclusion cyst      Subcutaneous movable cyst c/w pilar cyst      Firm pink to brown papule c/w dermatofibroma       Pedunculated fleshy papule(s) c/w skin tag(s)      Evenly pigmented macule c/w junctional nevus     Mildly variegated pigmented, slightly irregular-bordered macule c/w mildly atypical nevus      Flesh colored to evenly pigmented papule c/w intradermal nevus       Pink pearly papule/plaque c/w basal cell carcinoma      Erythematous hyperkeratotic cursted plaque c/w SCC      Surgical scar with no sign of skin cancer recurrence      Open and closed comedones      Inflammatory papules and pustules      Verrucoid papule consistent consistent with wart     Erythematous eczematous patches and plaques     Dystrophic onycholytic nail with subungual debris c/w onychomycosis     Umbilicated papule    Erythematous-base heme-crusted tan verrucoid plaque consistent with inflamed seborrheic keratosis     Erythematous Silvery Scaling Plaque c/w Psoriasis     See annotation      Assessment / Plan:        Allergic contact dermatitis, unspecified trigger - suspected; occurred after gardening.  Almost entirely resolved with prednisone taper and topical TAC cream.  Pt told to call if rash recurs and to wear protective clothing and gloves while gardening.  If rash recurs, consider biopsy and/ or patch testing.    SK (seborrheic keratosis) - These are benign inherited growths without a malignant potential. Reassurance given to patient. No treatment is necessary.       Xerosis cutis - Good skin care regimen discussed including limiting to one bath or shower/day, using lukewarm water with mild soap and moisturizing cream to skin 1 - 2x/day. Brochure was provided and reviewed with patient.  Suggest La Roche Posay Lipikar AP+ cream.               No follow-ups on file.

## 2020-12-09 ENCOUNTER — PATIENT OUTREACH (OUTPATIENT)
Dept: ADMINISTRATIVE | Facility: OTHER | Age: 70
End: 2020-12-09

## 2020-12-09 ENCOUNTER — PATIENT MESSAGE (OUTPATIENT)
Dept: DERMATOLOGY | Facility: CLINIC | Age: 70
End: 2020-12-09

## 2020-12-10 ENCOUNTER — OFFICE VISIT (OUTPATIENT)
Dept: DERMATOLOGY | Facility: CLINIC | Age: 70
End: 2020-12-10
Payer: MEDICARE

## 2020-12-10 DIAGNOSIS — L23.9 ALLERGIC CONTACT DERMATITIS, UNSPECIFIED TRIGGER: Primary | ICD-10-CM

## 2020-12-10 PROCEDURE — 1101F PR PT FALLS ASSESS DOC 0-1 FALLS W/OUT INJ PAST YR: ICD-10-PCS | Mod: CPTII,S$GLB,, | Performed by: PATHOLOGY

## 2020-12-10 PROCEDURE — 1157F PR ADVANCE CARE PLAN OR EQUIV PRESENT IN MEDICAL RECORD: ICD-10-PCS | Mod: S$GLB,,, | Performed by: PATHOLOGY

## 2020-12-10 PROCEDURE — 3288F FALL RISK ASSESSMENT DOCD: CPT | Mod: CPTII,S$GLB,, | Performed by: PATHOLOGY

## 2020-12-10 PROCEDURE — 1126F PR PAIN SEVERITY QUANTIFIED, NO PAIN PRESENT: ICD-10-PCS | Mod: S$GLB,,, | Performed by: PATHOLOGY

## 2020-12-10 PROCEDURE — 99999 PR PBB SHADOW E&M-EST. PATIENT-LVL I: ICD-10-PCS | Mod: PBBFAC,,, | Performed by: PATHOLOGY

## 2020-12-10 PROCEDURE — 99213 PR OFFICE/OUTPT VISIT, EST, LEVL III, 20-29 MIN: ICD-10-PCS | Mod: S$GLB,,, | Performed by: PATHOLOGY

## 2020-12-10 PROCEDURE — 99213 OFFICE O/P EST LOW 20 MIN: CPT | Mod: S$GLB,,, | Performed by: PATHOLOGY

## 2020-12-10 PROCEDURE — 99999 PR PBB SHADOW E&M-EST. PATIENT-LVL I: CPT | Mod: PBBFAC,,, | Performed by: PATHOLOGY

## 2020-12-10 PROCEDURE — 3288F PR FALLS RISK ASSESSMENT DOCUMENTED: ICD-10-PCS | Mod: CPTII,S$GLB,, | Performed by: PATHOLOGY

## 2020-12-10 PROCEDURE — 1159F MED LIST DOCD IN RCRD: CPT | Mod: S$GLB,,, | Performed by: PATHOLOGY

## 2020-12-10 PROCEDURE — 1159F PR MEDICATION LIST DOCUMENTED IN MEDICAL RECORD: ICD-10-PCS | Mod: S$GLB,,, | Performed by: PATHOLOGY

## 2020-12-10 PROCEDURE — 1101F PT FALLS ASSESS-DOCD LE1/YR: CPT | Mod: CPTII,S$GLB,, | Performed by: PATHOLOGY

## 2020-12-10 PROCEDURE — 1157F ADVNC CARE PLAN IN RCRD: CPT | Mod: S$GLB,,, | Performed by: PATHOLOGY

## 2020-12-10 PROCEDURE — 1126F AMNT PAIN NOTED NONE PRSNT: CPT | Mod: S$GLB,,, | Performed by: PATHOLOGY

## 2020-12-10 RX ORDER — TRIAMCINOLONE ACETONIDE 1 MG/G
CREAM TOPICAL
Qty: 454 G | Refills: 3 | Status: SHIPPED | OUTPATIENT
Start: 2020-12-10 | End: 2021-08-19 | Stop reason: SDUPTHER

## 2020-12-10 RX ORDER — PREDNISONE 20 MG/1
TABLET ORAL
Qty: 42 TABLET | Refills: 0 | Status: SHIPPED | OUTPATIENT
Start: 2020-12-10 | End: 2021-08-19 | Stop reason: SDUPTHER

## 2020-12-10 NOTE — PROGRESS NOTES
Subjective:       Patient ID:  Pantera Posey is a 70 y.o. female who presents for   Chief Complaint   Patient presents with    Rash     Pt presents today for chest, abdomen, groin, back, arm, legs, itching, sting at times, x 2 weeks, Tx. Omar Posay     HPI  Pt with widespread recurrent pruritic erythematous edematous scaly rash to above locations.  Denies gardening prior to onset.  No new medications or exposures.  Similar rash in August/ September completely cleared with 3 week prednisone taper.  Stayed clear for abut a month, and rash recurred last week.    Review of Systems   Constitutional: Negative for fever, chills, fatigue and malaise.   Skin: Positive for itching and rash. Negative for recent sunburn.        Objective:    Physical Exam   Constitutional: She appears well-developed and well-nourished.   Neurological: She is alert.   Skin:   Areas Examined (abnormalities noted in diagram):   Scalp / Hair Palpated and Inspected  Head / Face Inspection Performed  Neck Inspection Performed  Chest / Axilla Inspection Performed  Abdomen Inspection Performed  Genitals / Buttocks / Groin Inspection Performed  Back Inspection Performed  RUE Inspected  LUE Inspection Performed  RLE Inspected  LLE Inspection Performed  Nails and Digits Inspection Performed              Diagram Legend     Erythematous scaling macule/papule c/w actinic keratosis       Vascular papule c/w angioma      Pigmented verrucoid papule/plaque c/w seborrheic keratosis      Yellow umbilicated papule c/w sebaceous hyperplasia      Irregularly shaped tan macule c/w lentigo     1-2 mm smooth white papules consistent with Milia      Movable subcutaneous cyst with punctum c/w epidermal inclusion cyst      Subcutaneous movable cyst c/w pilar cyst      Firm pink to brown papule c/w dermatofibroma      Pedunculated fleshy papule(s) c/w skin tag(s)      Evenly pigmented macule c/w junctional nevus     Mildly variegated pigmented, slightly  irregular-bordered macule c/w mildly atypical nevus      Flesh colored to evenly pigmented papule c/w intradermal nevus       Pink pearly papule/plaque c/w basal cell carcinoma      Erythematous hyperkeratotic cursted plaque c/w SCC      Surgical scar with no sign of skin cancer recurrence      Open and closed comedones      Inflammatory papules and pustules      Verrucoid papule consistent consistent with wart     Erythematous eczematous patches and plaques     Dystrophic onycholytic nail with subungual debris c/w onychomycosis     Umbilicated papule    Erythematous-base heme-crusted tan verrucoid plaque consistent with inflamed seborrheic keratosis     Erythematous Silvery Scaling Plaque c/w Psoriasis     See annotation      Assessment / Plan:        Allergic contact dermatitis, unspecified trigger  -     predniSONE (DELTASONE) 20 MG tablet; Take 3 pills po qam with breakfast x 1 wk then take 2 po qam with breakfast x 1 wk then take 1 po qam with breakfast x 1 wk  Dispense: 42 tablet; Refill: 0  -     triamcinolone acetonide 0.1% (KENALOG) 0.1 % cream; AAA bid  Dispense: 454 g; Refill: 3  -     Patch Testing; Future    Adhere to Safe List of products given in clinic today.             No follow-ups on file.

## 2021-01-04 ENCOUNTER — CLINICAL SUPPORT (OUTPATIENT)
Dept: DERMATOLOGY | Facility: CLINIC | Age: 71
End: 2021-01-04
Payer: MEDICARE

## 2021-01-04 DIAGNOSIS — L23.9 ALLERGIC CONTACT DERMATITIS, UNSPECIFIED TRIGGER: ICD-10-CM

## 2021-01-04 PROCEDURE — 95044 PR ALLERGY PATCH TESTS: ICD-10-PCS | Mod: S$GLB,,, | Performed by: PATHOLOGY

## 2021-01-04 PROCEDURE — 95044 PATCH/APPLICATION TESTS: CPT | Mod: S$GLB,,, | Performed by: PATHOLOGY

## 2021-01-04 PROCEDURE — 99999 PR PBB SHADOW E&M-EST. PATIENT-LVL III: ICD-10-PCS | Mod: PBBFAC,,,

## 2021-01-04 PROCEDURE — 99999 PR PBB SHADOW E&M-EST. PATIENT-LVL III: CPT | Mod: PBBFAC,,,

## 2021-01-06 ENCOUNTER — CLINICAL SUPPORT (OUTPATIENT)
Dept: DERMATOLOGY | Facility: CLINIC | Age: 71
End: 2021-01-06
Payer: MEDICARE

## 2021-01-06 DIAGNOSIS — L23.9 ALLERGIC CONTACT DERMATITIS, UNSPECIFIED TRIGGER: Primary | ICD-10-CM

## 2021-01-06 PROCEDURE — 99212 OFFICE O/P EST SF 10 MIN: CPT | Mod: S$GLB,,, | Performed by: PATHOLOGY

## 2021-01-06 PROCEDURE — 99212 PR OFFICE/OUTPT VISIT, EST, LEVL II, 10-19 MIN: ICD-10-PCS | Mod: S$GLB,,, | Performed by: PATHOLOGY

## 2021-01-08 ENCOUNTER — CLINICAL SUPPORT (OUTPATIENT)
Dept: DERMATOLOGY | Facility: CLINIC | Age: 71
End: 2021-01-08
Payer: MEDICARE

## 2021-01-08 ENCOUNTER — PATIENT MESSAGE (OUTPATIENT)
Dept: DERMATOLOGY | Facility: CLINIC | Age: 71
End: 2021-01-08

## 2021-01-08 DIAGNOSIS — L23.9 ALLERGIC CONTACT DERMATITIS, UNSPECIFIED TRIGGER: Primary | ICD-10-CM

## 2021-01-08 PROCEDURE — 99212 PR OFFICE/OUTPT VISIT, EST, LEVL II, 10-19 MIN: ICD-10-PCS | Mod: S$GLB,,, | Performed by: PATHOLOGY

## 2021-01-08 PROCEDURE — 99212 OFFICE O/P EST SF 10 MIN: CPT | Mod: S$GLB,,, | Performed by: PATHOLOGY

## 2021-01-10 ENCOUNTER — IMMUNIZATION (OUTPATIENT)
Dept: INTERNAL MEDICINE | Facility: CLINIC | Age: 71
End: 2021-01-10
Payer: MEDICARE

## 2021-01-10 DIAGNOSIS — Z23 NEED FOR VACCINATION: ICD-10-CM

## 2021-01-10 PROCEDURE — 91300 COVID-19, MRNA, LNP-S, PF, 30 MCG/0.3 ML DOSE VACCINE: CPT | Mod: PBBFAC | Performed by: INTERNAL MEDICINE

## 2021-01-31 ENCOUNTER — IMMUNIZATION (OUTPATIENT)
Dept: INTERNAL MEDICINE | Facility: CLINIC | Age: 71
End: 2021-01-31
Payer: MEDICARE

## 2021-01-31 DIAGNOSIS — Z23 NEED FOR VACCINATION: Primary | ICD-10-CM

## 2021-01-31 PROCEDURE — 0002A COVID-19, MRNA, LNP-S, PF, 30 MCG/0.3 ML DOSE VACCINE: CPT | Mod: PBBFAC | Performed by: INTERNAL MEDICINE

## 2021-01-31 PROCEDURE — 91300 COVID-19, MRNA, LNP-S, PF, 30 MCG/0.3 ML DOSE VACCINE: CPT | Mod: PBBFAC | Performed by: INTERNAL MEDICINE

## 2021-03-18 ENCOUNTER — PATIENT MESSAGE (OUTPATIENT)
Dept: RESEARCH | Facility: HOSPITAL | Age: 71
End: 2021-03-18

## 2021-03-26 ENCOUNTER — PATIENT MESSAGE (OUTPATIENT)
Dept: RESEARCH | Facility: HOSPITAL | Age: 71
End: 2021-03-26

## 2021-06-09 ENCOUNTER — PATIENT MESSAGE (OUTPATIENT)
Dept: RESEARCH | Facility: HOSPITAL | Age: 71
End: 2021-06-09

## 2021-07-07 ENCOUNTER — PATIENT MESSAGE (OUTPATIENT)
Dept: ADMINISTRATIVE | Facility: HOSPITAL | Age: 71
End: 2021-07-07

## 2021-08-19 ENCOUNTER — OFFICE VISIT (OUTPATIENT)
Dept: FAMILY MEDICINE | Facility: CLINIC | Age: 71
End: 2021-08-19
Attending: FAMILY MEDICINE
Payer: MEDICARE

## 2021-08-19 ENCOUNTER — LAB VISIT (OUTPATIENT)
Dept: LAB | Facility: HOSPITAL | Age: 71
End: 2021-08-19
Attending: FAMILY MEDICINE
Payer: MEDICARE

## 2021-08-19 VITALS
OXYGEN SATURATION: 97 % | HEART RATE: 90 BPM | HEIGHT: 65 IN | BODY MASS INDEX: 23.32 KG/M2 | DIASTOLIC BLOOD PRESSURE: 72 MMHG | WEIGHT: 140 LBS | SYSTOLIC BLOOD PRESSURE: 130 MMHG

## 2021-08-19 DIAGNOSIS — Z00.00 ANNUAL PHYSICAL EXAM: ICD-10-CM

## 2021-08-19 DIAGNOSIS — I10 ESSENTIAL HYPERTENSION: ICD-10-CM

## 2021-08-19 DIAGNOSIS — Z00.00 ANNUAL PHYSICAL EXAM: Primary | ICD-10-CM

## 2021-08-19 LAB
BASOPHILS # BLD AUTO: 0.07 K/UL (ref 0–0.2)
BASOPHILS NFR BLD: 0.8 % (ref 0–1.9)
DIFFERENTIAL METHOD: ABNORMAL
EOSINOPHIL # BLD AUTO: 0 K/UL (ref 0–0.5)
EOSINOPHIL NFR BLD: 0.2 % (ref 0–8)
ERYTHROCYTE [DISTWIDTH] IN BLOOD BY AUTOMATED COUNT: 13.2 % (ref 11.5–14.5)
HCT VFR BLD AUTO: 40.9 % (ref 37–48.5)
HGB BLD-MCNC: 13.5 G/DL (ref 12–16)
IMM GRANULOCYTES # BLD AUTO: 0.07 K/UL (ref 0–0.04)
IMM GRANULOCYTES NFR BLD AUTO: 0.8 % (ref 0–0.5)
LYMPHOCYTES # BLD AUTO: 0.8 K/UL (ref 1–4.8)
LYMPHOCYTES NFR BLD: 9.2 % (ref 18–48)
MCH RBC QN AUTO: 37.4 PG (ref 27–31)
MCHC RBC AUTO-ENTMCNC: 33 G/DL (ref 32–36)
MCV RBC AUTO: 113 FL (ref 82–98)
MONOCYTES # BLD AUTO: 0.9 K/UL (ref 0.3–1)
MONOCYTES NFR BLD: 10.4 % (ref 4–15)
NEUTROPHILS # BLD AUTO: 7 K/UL (ref 1.8–7.7)
NEUTROPHILS NFR BLD: 78.6 % (ref 38–73)
NRBC BLD-RTO: 0 /100 WBC
PLATELET # BLD AUTO: 206 K/UL (ref 150–450)
PMV BLD AUTO: 12 FL (ref 9.2–12.9)
RBC # BLD AUTO: 3.61 M/UL (ref 4–5.4)
WBC # BLD AUTO: 8.87 K/UL (ref 3.9–12.7)

## 2021-08-19 PROCEDURE — 3008F PR BODY MASS INDEX (BMI) DOCUMENTED: ICD-10-PCS | Mod: CPTII,S$GLB,, | Performed by: FAMILY MEDICINE

## 2021-08-19 PROCEDURE — 1159F MED LIST DOCD IN RCRD: CPT | Mod: CPTII,S$GLB,, | Performed by: FAMILY MEDICINE

## 2021-08-19 PROCEDURE — 1157F PR ADVANCE CARE PLAN OR EQUIV PRESENT IN MEDICAL RECORD: ICD-10-PCS | Mod: CPTII,S$GLB,, | Performed by: FAMILY MEDICINE

## 2021-08-19 PROCEDURE — 90732 PPSV23 VACC 2 YRS+ SUBQ/IM: CPT | Mod: S$GLB,,, | Performed by: FAMILY MEDICINE

## 2021-08-19 PROCEDURE — 1126F PR PAIN SEVERITY QUANTIFIED, NO PAIN PRESENT: ICD-10-PCS | Mod: CPTII,S$GLB,, | Performed by: FAMILY MEDICINE

## 2021-08-19 PROCEDURE — 3078F DIAST BP <80 MM HG: CPT | Mod: CPTII,S$GLB,, | Performed by: FAMILY MEDICINE

## 2021-08-19 PROCEDURE — 1101F PT FALLS ASSESS-DOCD LE1/YR: CPT | Mod: CPTII,S$GLB,, | Performed by: FAMILY MEDICINE

## 2021-08-19 PROCEDURE — 3288F PR FALLS RISK ASSESSMENT DOCUMENTED: ICD-10-PCS | Mod: CPTII,S$GLB,, | Performed by: FAMILY MEDICINE

## 2021-08-19 PROCEDURE — 99999 PR PBB SHADOW E&M-EST. PATIENT-LVL IV: CPT | Mod: PBBFAC,,, | Performed by: FAMILY MEDICINE

## 2021-08-19 PROCEDURE — 3075F PR MOST RECENT SYSTOLIC BLOOD PRESS GE 130-139MM HG: ICD-10-PCS | Mod: CPTII,S$GLB,, | Performed by: FAMILY MEDICINE

## 2021-08-19 PROCEDURE — 1159F PR MEDICATION LIST DOCUMENTED IN MEDICAL RECORD: ICD-10-PCS | Mod: CPTII,S$GLB,, | Performed by: FAMILY MEDICINE

## 2021-08-19 PROCEDURE — 3288F FALL RISK ASSESSMENT DOCD: CPT | Mod: CPTII,S$GLB,, | Performed by: FAMILY MEDICINE

## 2021-08-19 PROCEDURE — 1101F PR PT FALLS ASSESS DOC 0-1 FALLS W/OUT INJ PAST YR: ICD-10-PCS | Mod: CPTII,S$GLB,, | Performed by: FAMILY MEDICINE

## 2021-08-19 PROCEDURE — 99214 PR OFFICE/OUTPT VISIT, EST, LEVL IV, 30-39 MIN: ICD-10-PCS | Mod: S$GLB,,, | Performed by: FAMILY MEDICINE

## 2021-08-19 PROCEDURE — 84443 ASSAY THYROID STIM HORMONE: CPT | Performed by: FAMILY MEDICINE

## 2021-08-19 PROCEDURE — 36415 COLL VENOUS BLD VENIPUNCTURE: CPT | Mod: PO | Performed by: FAMILY MEDICINE

## 2021-08-19 PROCEDURE — G0009 ADMIN PNEUMOCOCCAL VACCINE: HCPCS | Mod: S$GLB,,, | Performed by: FAMILY MEDICINE

## 2021-08-19 PROCEDURE — 99499 RISK ADDL DX/OHS AUDIT: ICD-10-PCS | Mod: S$GLB,,, | Performed by: FAMILY MEDICINE

## 2021-08-19 PROCEDURE — 90732 PNEUMOCOCCAL POLYSACCHARIDE VACCINE 23-VALENT =>2YO SQ IM: ICD-10-PCS | Mod: S$GLB,,, | Performed by: FAMILY MEDICINE

## 2021-08-19 PROCEDURE — 80053 COMPREHEN METABOLIC PANEL: CPT | Performed by: FAMILY MEDICINE

## 2021-08-19 PROCEDURE — 1126F AMNT PAIN NOTED NONE PRSNT: CPT | Mod: CPTII,S$GLB,, | Performed by: FAMILY MEDICINE

## 2021-08-19 PROCEDURE — 99499 UNLISTED E&M SERVICE: CPT | Mod: S$GLB,,, | Performed by: FAMILY MEDICINE

## 2021-08-19 PROCEDURE — 1157F ADVNC CARE PLAN IN RCRD: CPT | Mod: CPTII,S$GLB,, | Performed by: FAMILY MEDICINE

## 2021-08-19 PROCEDURE — 80061 LIPID PANEL: CPT | Performed by: FAMILY MEDICINE

## 2021-08-19 PROCEDURE — G0009 PNEUMOCOCCAL POLYSACCHARIDE VACCINE 23-VALENT =>2YO SQ IM: ICD-10-PCS | Mod: S$GLB,,, | Performed by: FAMILY MEDICINE

## 2021-08-19 PROCEDURE — 3075F SYST BP GE 130 - 139MM HG: CPT | Mod: CPTII,S$GLB,, | Performed by: FAMILY MEDICINE

## 2021-08-19 PROCEDURE — 99214 OFFICE O/P EST MOD 30 MIN: CPT | Mod: S$GLB,,, | Performed by: FAMILY MEDICINE

## 2021-08-19 PROCEDURE — 85025 COMPLETE CBC W/AUTO DIFF WBC: CPT | Performed by: FAMILY MEDICINE

## 2021-08-19 PROCEDURE — 3008F BODY MASS INDEX DOCD: CPT | Mod: CPTII,S$GLB,, | Performed by: FAMILY MEDICINE

## 2021-08-19 PROCEDURE — 99999 PR PBB SHADOW E&M-EST. PATIENT-LVL IV: ICD-10-PCS | Mod: PBBFAC,,, | Performed by: FAMILY MEDICINE

## 2021-08-19 PROCEDURE — 3078F PR MOST RECENT DIASTOLIC BLOOD PRESSURE < 80 MM HG: ICD-10-PCS | Mod: CPTII,S$GLB,, | Performed by: FAMILY MEDICINE

## 2021-08-19 RX ORDER — HYDROXYZINE HYDROCHLORIDE 25 MG/1
TABLET, FILM COATED ORAL
COMMUNITY
Start: 2021-05-26 | End: 2022-03-07

## 2021-08-20 LAB
ALBUMIN SERPL BCP-MCNC: 3.4 G/DL (ref 3.5–5.2)
ALP SERPL-CCNC: 95 U/L (ref 55–135)
ALT SERPL W/O P-5'-P-CCNC: 49 U/L (ref 10–44)
ANION GAP SERPL CALC-SCNC: 14 MMOL/L (ref 8–16)
AST SERPL-CCNC: 81 U/L (ref 10–40)
BILIRUB SERPL-MCNC: 0.7 MG/DL (ref 0.1–1)
BUN SERPL-MCNC: 7 MG/DL (ref 8–23)
CALCIUM SERPL-MCNC: 9.5 MG/DL (ref 8.7–10.5)
CHLORIDE SERPL-SCNC: 100 MMOL/L (ref 95–110)
CHOLEST SERPL-MCNC: 238 MG/DL (ref 120–199)
CHOLEST/HDLC SERPL: 2.8 {RATIO} (ref 2–5)
CO2 SERPL-SCNC: 20 MMOL/L (ref 23–29)
CREAT SERPL-MCNC: 0.8 MG/DL (ref 0.5–1.4)
EST. GFR  (AFRICAN AMERICAN): >60 ML/MIN/1.73 M^2
EST. GFR  (NON AFRICAN AMERICAN): >60 ML/MIN/1.73 M^2
GLUCOSE SERPL-MCNC: 129 MG/DL (ref 70–110)
HDLC SERPL-MCNC: 86 MG/DL (ref 40–75)
HDLC SERPL: 36.1 % (ref 20–50)
LDLC SERPL CALC-MCNC: 140 MG/DL (ref 63–159)
NONHDLC SERPL-MCNC: 152 MG/DL
POTASSIUM SERPL-SCNC: 4.6 MMOL/L (ref 3.5–5.1)
PROT SERPL-MCNC: 7.3 G/DL (ref 6–8.4)
SODIUM SERPL-SCNC: 134 MMOL/L (ref 136–145)
TRIGL SERPL-MCNC: 60 MG/DL (ref 30–150)
TSH SERPL DL<=0.005 MIU/L-ACNC: 1.75 UIU/ML (ref 0.4–4)

## 2021-09-30 NOTE — PROGRESS NOTES
[facility] - PODIATRY    Today's Date: 09/30/21    Patient Name: George Hunter  MRN: [id number]  CSN: [id number]  PCP: Jak Colindres APRN  Referring Provider: No ref. provider found    SUBJECTIVE     Chief Complaint   Patient presents with   • Left Foot - Annual Exam, Diabetes, Follow-up   • Right Foot - Annual Exam, Diabetes, Follow-up     HPI: George Hunter, a 42 y.o.male, presents to clinic for a diabetic foot evaluation.    New, Established, New Problem:  new    Location:      Duration:  2000    Onset:  insidious    Nature:  IDDM    Stable, worsening, improving:  improving    Patient controlling diabetes via:  insulin    Patient denies any fevers, chills, nausea, vomiting, shortness of breath, nor any other constitutional signs nor symptoms.    No other pedal complaints at this time.    Past Medical History:   Diagnosis Date   • Allergies    • Anxiety    • Broken bones    • Deafness    • Diabetes (CMS/[facility])    • Diabetes mellitus type I (CMS/[facility])    • Foot pain, bilateral 2018   • Fracture    • Hammertoe    • Head injury    • Hernia cerebri (CMS/[facility])    • Hypothyroidism    • Plantar fascial fibromatosis of both feet 2018   • Toe pain, right      Past Surgical History:   Procedure Laterality Date   • HERNIA REPAIR       Family History   Family history unknown: Yes     Social History     Socioeconomic History   • Marital status:      Spouse name: Not on file   • Number of children: Not on file   • Years of education: Not on file   • Highest education level: Not on file   Tobacco Use   • Smoking status: Never Smoker   • Smokeless tobacco: Never Used   Vaping Use   • Vaping Use: Never used   Substance and Sexual Activity   • Alcohol use: Not Currently   • Drug use: Never   • Sexual activity: Defer     Allergies   Allergen Reactions   • Penicillins Anaphylaxis     Childhood allergy     • Codeine Itching     Current Outpatient Medications   Medication Sig Dispense Refill   •  Pantera Posey [facility] for a follow up visit status post B 3D nipple tattoo for breast reconstruction on 05/18/2018. She is doing well today with no issues since her last visit    Review of patient's allergies indicates:   Allergen Reactions    Ciprofloxacin Anaphylaxis     Other reaction(s): Difficulty breathing    Floxin [ofloxacin] Anaphylaxis    Clindamycin Diarrhea and Rash    Amoxicillin      Other reaction(s): Rash    Indocin  [indomethacin sodium]      Other reaction(s): Headache    Bactrim [sulfamethoxazole-trimethoprim] Rash     Current Outpatient Prescriptions on File Prior to Visit   Medication Sig Dispense Refill    anastrozole (ARIMIDEX) 1 mg Tab Take 1 tablet (1 mg total) by mouth once daily. 90 tablet 2    calcium-vitamin D3 500 mg(1,250mg) -200 unit per tablet Take 2 tablets by mouth 2 (two) times daily with meals.      diazePAM (VALIUM) 2 MG tablet 1 po 30 min prior to procedure prn 10 tablet 0    docusate sodium 100 mg capsule Take 100 mg by mouth 2 (two) times daily. 60 tablet 0    hydroCHLOROthiazide (HYDRODIURIL) 25 MG tablet TAKE 1 TABLET DAILY 90 tablet 0    ondansetron (ZOFRAN) 4 MG tablet Take 1 tablet (4 mg total) by mouth every 8 (eight) hours as needed for Nausea. 20 tablet 0    ondansetron (ZOFRAN) 8 MG tablet Take 1 tablet (8 mg total) by mouth every 8 (eight) hours as needed for Nausea. 60 tablet 0    pneumococcal 23-kory ps vaccine 25 mcg/0.5 mL Syrg Disp amount required 1 Syringe 0     No current facility-administered medications on file prior to visit.      Patient Active Problem List   Diagnosis    HTN (hypertension), benign    Spondylosis without myelopathy    Spinal stenosis, lumbar region, with neurogenic claudication    Thoracic or lumbosacral neuritis or radiculitis, unspecified    Acquired spondylolisthesis    Lumbago    Degeneration of lumbar or lumbosacral intervertebral disc    Back pain    Spinal stenosis     Fever    Malignant neoplasm of lower-outer quadrant of left breast of female, estrogen receptor positive    Breast cancer, left    History of right breast cancer    Use of aromatase inhibitors    Breast cancer    Osteopenia    Vasomotor instability- secondary to AI's    S/P breast reconstruction    Swelling of hand     Past Surgical History:   Procedure Laterality Date    BACK SURGERY      Spinal fusion    BREAST BIOPSY      RIGHT    BREAST BIOPSY Left     BREAST SURGERY      CARDIAC ELECTROPHYSIOLOGY STUDY AND ABLATION      MASTECTOMY      RIGHT/ RADIATION AND CHEMO    MASTECTOMY      left    TUBAL LIGATION         PHYSICAL EXAMINATION    WD WN NAD  Normal resp effort  R breast - incisions well healed, 3D nipple tattoo with good take  L breast - incisions well healed, 3D nipple tattoo with good take    ASSESSMENT/PLAN  67 y.o. F s/p B 3D nipple tattoo  - Doing well, no issues. Very pleased with her outcome from nipple tattoo  - Will not need 3D nipple tattoo touch up at this time  - RTC x 2 months    All questions were answered. The patient was advised to call the clinic with any questions or concerns prior to their next visit.      brompheniramine-pseudoephedrine-DM 30-2-10 MG/5ML syrup Take 10 mL by mouth 4 (Four) Times a Day As Needed for Allergies. 118 mL 0   • cetirizine (ZyrTEC Allergy) 10 MG tablet Zyrtec 10 mg oral tablet take 1 tablet (10 mg) by oral route once daily   Active     • Euthyrox 25 MCG tablet Take 25 mcg by mouth Daily.     • insulin aspart (NovoLOG FlexPen) 100 UNIT/ML solution pen-injector sc pen Inject 30 Units under the skin into the appropriate area as directed 3 (Three) Times a Day With Meals. 9 pen 5   • Insulin Glargine (BASAGLAR KWIKPEN) 100 UNIT/ML injection pen Inject 40 Units under the skin into the appropriate area as directed Daily. 4 pen 5   • omeprazole (priLOSEC) 40 MG capsule TAKE 1 CAPSULE BY MOUTH ONCE DAILY BEFORE A MEAL 30 capsule 4   • ondansetron ODT (ZOFRAN-ODT) 4 MG disintegrating tablet Place 1 tablet under the tongue 4 (Four) Times a Day As Needed for Nausea or Vomiting. 12 tablet 0   • simvastatin (ZOCOR) 10 MG tablet simvastatin 10 mg oral tablet take 1 tablet (10 mg) by oral route once daily in the evening   Active       No current facility-administered medications for this visit.     Review of Systems   Constitutional: Negative.    All other systems reviewed and are negative.      OBJECTIVE     Vitals:    09/30/21 1436   BP: 116/81   Pulse: 64   Temp: 97.8 °F (36.6 °C)   SpO2: 95%       Body mass index is 27.97 kg/m².    Lab Results   Component Value Date    HGBA1C 7.1 08/05/2021       Lab Results   Component Value Date    CALCIUM 9.3 05/20/2021     05/20/2021    K 3.9 05/20/2021    CO2 25 05/20/2021     05/20/2021    BUN 9 05/20/2021    CREATININE 0.92 05/20/2021    BCR 10 05/20/2021    ANIONGAP 15 05/20/2021       Patient seen in no apparent distress.      PHYSICAL EXAM:     Foot/Ankle Exam:       General:   Diabetic Foot Exam Performed    Appearance: appears stated age and healthy    Orientation: AAOx3    Affect: appropriate    Gait: unimpaired    Shoe Gear:  Casual  shoes    VASCULAR      Right Foot Vascularity   Normal vascular exam    Dorsalis pedis:  2+  Posterior tibial:  2+  Skin Temperature: warm    Edema Grading:  None  CFT:  < 3 seconds  Pedal Hair Growth:  Present  Varicosities: none       Left Foot Vascularity   Normal vascular exam    Dorsalis pedis:  2+  Posterior tibial:  2+  Skin Temperature: warm    Edema Grading:  None  CFT:  < 3 seconds  Pedal Hair Growth:  Present  Varicosities: none        NEUROLOGIC     Right Foot Neurologic   Normal sensation    Light touch sensation:  Normal  Vibratory sensation:  Normal  Hot/Cold sensation: normal    Protective Sensation using [place] Monofilament:  10     Left Foot Neurologic   Normal sensation    Light touch sensation:  Normal  Vibratory sensation:  Normal  Hot/cold sensation: normal    Protective Sensation using [place] Monofilament:  10     MUSCLE STRENGTH     Right Foot Muscle Strength   Normal strength    Foot dorsiflexion:  5  Foot plantar flexion:  5  Foot inversion:  5  Foot eversion:  5     Left Foot Muscle Strength   Foot dorsiflexion:  5  Foot plantar flexion:  5  Foot inversion:  5  Foot eversion:  5     RANGE OF MOTION      Right Foot Range of Motion   Foot and ankle ROM within normal limits       Left Foot Range of Motion   Foot and ankle ROM within normal limits       DERMATOLOGIC     Right Foot Dermatologic   Skin: skin intact    Nails: normal       Left Foot Dermatologic   Skin: skin intact    Nails: normal        Diabetic Foot Exam Performed      ASSESSMENT/PLAN     Diagnoses and all orders for this visit:    1. Type 2 diabetes mellitus without complication, with long-term current use of insulin (HCC) (Primary)    2. Diabetic foot (HCC)        Comprehensive lower extremity examination and evaluation was performed.    Discussed findings and treatment plan including risks, benefits, and treatment options with patient in detail. Patient agreed with treatment plan.    Diabetic foot exam  performed and documented this date, compliant with CQM required standards. Detail of findings as noted in physical exam.  Lower extremity Neurologic exam for diabetic patient performed and documented this date, compliant with PQRS required standards. Detail of findings as noted in physical exam.  Advised patient importance of good routine lower extremity hygiene. Advised patient importance of evaluating for intact skin and pain free nail borders.  Advised patient to use mirror to evaluate plantar/ soles of feet for better visualization. Advised patient monitor and phone office to be seen if any cracking to skin, open lesions, painful nail borders or if nails become elongated prior to next visit. Advised patient importance of daily cleansing of lower extremities, followed by good skin cream to maintain normal hydration of skin. Also advised patient importance of close daily monitoring of blood sugar. Advised to regulate diet and medications to maintain control of blood sugar in optimal range. Contact primary care provider if difficulties maintaining blood sugar levels.  Advised Patient of presence of Diabetes Mellitus condition.  Advised Patient risk of progression and worsening or improvement, then return of condition.  Will monitor condition for any change in future. Treat with most appropriate treatment pending status of condition.  Counseled and advised patient extensively on nature and ramifications of diabetes. Standard instructions given to patient for good diabetic foot care and maintenance. Advised importance of careful monitoring to avoid break down and complications secondary to diabetes. Advised patient importance of strict maintenance of blood sugar control. Advised patient of possible ominous results from neglect of condition, i.e.: amputation/ loss of digits, feet and legs, or even death.  Patient states understands counseling, will monitor closely, continue good hygiene and routine diabetic foot care.  Patient will contact office is questions or problems.      An After Visit Summary was printed and given to the patient at discharge, including (if requested) any available informative/educational handouts regarding diagnosis, treatment, or medications. All questions were answered to patient/family satisfaction. Should symptoms fail to improve or worsen they agree to call or return to clinic or to go to the Emergency Department. Discussed the importance of following up with any needed screening tests/labs/specialist appointments and any requested follow-up recommended by me today. Importance of maintaining follow-up discussed and patient accepts that missed appointments can delay diagnosis and potentially lead to worsening of conditions.    Return in about 1 year (around 9/30/2022) for Podiatry Diabetic Foot Exam., or sooner if acute issues arise.    This document has been electronically signed by Jayy Buitrago DPM on September 30, 2021 15:22 EDT

## 2021-11-16 ENCOUNTER — OFFICE VISIT (OUTPATIENT)
Dept: DERMATOLOGY | Facility: CLINIC | Age: 71
End: 2021-11-16
Payer: MEDICARE

## 2021-11-16 DIAGNOSIS — L23.9 ALLERGIC CONTACT DERMATITIS, UNSPECIFIED TRIGGER: Primary | ICD-10-CM

## 2021-11-16 DIAGNOSIS — L71.9 ACNE ROSACEA: ICD-10-CM

## 2021-11-16 DIAGNOSIS — L82.0 INFLAMED SEBORRHEIC KERATOSIS: ICD-10-CM

## 2021-11-16 PROCEDURE — 4010F ACE/ARB THERAPY RXD/TAKEN: CPT | Mod: CPTII,S$GLB,, | Performed by: PATHOLOGY

## 2021-11-16 PROCEDURE — 1157F ADVNC CARE PLAN IN RCRD: CPT | Mod: CPTII,S$GLB,, | Performed by: PATHOLOGY

## 2021-11-16 PROCEDURE — 1159F MED LIST DOCD IN RCRD: CPT | Mod: CPTII,S$GLB,, | Performed by: PATHOLOGY

## 2021-11-16 PROCEDURE — 1159F PR MEDICATION LIST DOCUMENTED IN MEDICAL RECORD: ICD-10-PCS | Mod: CPTII,S$GLB,, | Performed by: PATHOLOGY

## 2021-11-16 PROCEDURE — 17110 DESTRUCTION B9 LES UP TO 14: CPT | Mod: S$GLB,,, | Performed by: PATHOLOGY

## 2021-11-16 PROCEDURE — 1157F PR ADVANCE CARE PLAN OR EQUIV PRESENT IN MEDICAL RECORD: ICD-10-PCS | Mod: CPTII,S$GLB,, | Performed by: PATHOLOGY

## 2021-11-16 PROCEDURE — 99214 OFFICE O/P EST MOD 30 MIN: CPT | Mod: 25,S$GLB,, | Performed by: PATHOLOGY

## 2021-11-16 PROCEDURE — 17110 PR DESTRUCTION BENIGN LESIONS UP TO 14: ICD-10-PCS | Mod: S$GLB,,, | Performed by: PATHOLOGY

## 2021-11-16 PROCEDURE — 4010F PR ACE/ARB THEARPY RXD/TAKEN: ICD-10-PCS | Mod: CPTII,S$GLB,, | Performed by: PATHOLOGY

## 2021-11-16 PROCEDURE — 99214 PR OFFICE/OUTPT VISIT, EST, LEVL IV, 30-39 MIN: ICD-10-PCS | Mod: 25,S$GLB,, | Performed by: PATHOLOGY

## 2021-11-16 PROCEDURE — 99999 PR PBB SHADOW E&M-EST. PATIENT-LVL III: ICD-10-PCS | Mod: PBBFAC,,, | Performed by: PATHOLOGY

## 2021-11-16 PROCEDURE — 1160F PR REVIEW ALL MEDS BY PRESCRIBER/CLIN PHARMACIST DOCUMENTED: ICD-10-PCS | Mod: CPTII,S$GLB,, | Performed by: PATHOLOGY

## 2021-11-16 PROCEDURE — 1160F RVW MEDS BY RX/DR IN RCRD: CPT | Mod: CPTII,S$GLB,, | Performed by: PATHOLOGY

## 2021-11-16 PROCEDURE — 99999 PR PBB SHADOW E&M-EST. PATIENT-LVL III: CPT | Mod: PBBFAC,,, | Performed by: PATHOLOGY

## 2021-11-16 RX ORDER — CLOBETASOL PROPIONATE 0.46 MG/ML
SOLUTION TOPICAL
Qty: 50 ML | Refills: 3 | Status: SHIPPED | OUTPATIENT
Start: 2021-11-16

## 2021-11-16 RX ORDER — CLOBETASOL PROPIONATE 0.5 MG/G
CREAM TOPICAL
Qty: 60 G | Refills: 3 | Status: SHIPPED | OUTPATIENT
Start: 2021-11-16

## 2021-11-16 RX ORDER — DOXYCYCLINE 40 MG/1
40 CAPSULE ORAL DAILY
Qty: 30 CAPSULE | Refills: 5 | Status: SHIPPED | OUTPATIENT
Start: 2021-11-16 | End: 2021-11-18

## 2021-11-18 ENCOUNTER — PATIENT MESSAGE (OUTPATIENT)
Dept: DERMATOLOGY | Facility: CLINIC | Age: 71
End: 2021-11-18
Payer: MEDICARE

## 2021-11-18 DIAGNOSIS — L71.9 ACNE ROSACEA: Primary | ICD-10-CM

## 2021-11-18 RX ORDER — DOXYCYCLINE 40 MG/1
CAPSULE ORAL
Qty: 30 CAPSULE | Refills: 5 | Status: SHIPPED | OUTPATIENT
Start: 2021-11-18 | End: 2021-11-22 | Stop reason: SDUPTHER

## 2021-11-19 ENCOUNTER — TELEPHONE (OUTPATIENT)
Dept: DERMATOLOGY | Facility: CLINIC | Age: 71
End: 2021-11-19
Payer: MEDICARE

## 2021-11-19 ENCOUNTER — PATIENT MESSAGE (OUTPATIENT)
Dept: DERMATOLOGY | Facility: CLINIC | Age: 71
End: 2021-11-19
Payer: MEDICARE

## 2021-11-22 ENCOUNTER — TELEPHONE (OUTPATIENT)
Dept: DERMATOLOGY | Facility: CLINIC | Age: 71
End: 2021-11-22
Payer: MEDICARE

## 2021-11-22 DIAGNOSIS — L71.9 ACNE ROSACEA: ICD-10-CM

## 2021-11-22 RX ORDER — DOXYCYCLINE 40 MG/1
CAPSULE ORAL
Qty: 30 CAPSULE | Refills: 5 | Status: SHIPPED | OUTPATIENT
Start: 2021-11-22

## 2021-11-23 ENCOUNTER — PATIENT MESSAGE (OUTPATIENT)
Dept: DERMATOLOGY | Facility: CLINIC | Age: 71
End: 2021-11-23
Payer: MEDICARE

## 2021-11-23 DIAGNOSIS — L71.9 ACNE ROSACEA: Primary | ICD-10-CM

## 2021-11-23 RX ORDER — DOXYCYCLINE HYCLATE 50 MG/1
50 CAPSULE ORAL DAILY
Qty: 30 CAPSULE | Refills: 5 | Status: SHIPPED | OUTPATIENT
Start: 2021-11-23 | End: 2022-08-15

## 2022-01-19 ENCOUNTER — PES CALL (OUTPATIENT)
Dept: ADMINISTRATIVE | Facility: CLINIC | Age: 72
End: 2022-01-19
Payer: MEDICARE

## 2022-02-22 ENCOUNTER — OFFICE VISIT (OUTPATIENT)
Dept: FAMILY MEDICINE | Facility: CLINIC | Age: 72
End: 2022-02-22
Attending: FAMILY MEDICINE
Payer: MEDICARE

## 2022-02-22 ENCOUNTER — PATIENT MESSAGE (OUTPATIENT)
Dept: FAMILY MEDICINE | Facility: CLINIC | Age: 72
End: 2022-02-22

## 2022-02-22 ENCOUNTER — TELEPHONE (OUTPATIENT)
Dept: FAMILY MEDICINE | Facility: CLINIC | Age: 72
End: 2022-02-22

## 2022-02-22 ENCOUNTER — LAB VISIT (OUTPATIENT)
Dept: LAB | Facility: HOSPITAL | Age: 72
End: 2022-02-22
Attending: FAMILY MEDICINE
Payer: MEDICARE

## 2022-02-22 VITALS
SYSTOLIC BLOOD PRESSURE: 136 MMHG | HEART RATE: 84 BPM | WEIGHT: 137 LBS | OXYGEN SATURATION: 98 % | HEIGHT: 65 IN | BODY MASS INDEX: 22.82 KG/M2 | DIASTOLIC BLOOD PRESSURE: 84 MMHG

## 2022-02-22 DIAGNOSIS — Z78.0 ASYMPTOMATIC MENOPAUSE: ICD-10-CM

## 2022-02-22 DIAGNOSIS — I10 ESSENTIAL HYPERTENSION: ICD-10-CM

## 2022-02-22 DIAGNOSIS — R73.9 HYPERGLYCEMIA: ICD-10-CM

## 2022-02-22 DIAGNOSIS — H90.0 HEARING LOSS, CONDUCTIVE, BILATERAL: ICD-10-CM

## 2022-02-22 DIAGNOSIS — R79.89 ABNORMAL CBC: ICD-10-CM

## 2022-02-22 DIAGNOSIS — I10 ESSENTIAL HYPERTENSION: Primary | ICD-10-CM

## 2022-02-22 LAB
ALBUMIN SERPL BCP-MCNC: 3.7 G/DL (ref 3.5–5.2)
ALP SERPL-CCNC: 88 U/L (ref 55–135)
ALT SERPL W/O P-5'-P-CCNC: 38 U/L (ref 10–44)
ANION GAP SERPL CALC-SCNC: 12 MMOL/L (ref 8–16)
AST SERPL-CCNC: 58 U/L (ref 10–40)
BILIRUB SERPL-MCNC: 0.8 MG/DL (ref 0.1–1)
BUN SERPL-MCNC: 5 MG/DL (ref 8–23)
CALCIUM SERPL-MCNC: 9.7 MG/DL (ref 8.7–10.5)
CHLORIDE SERPL-SCNC: 101 MMOL/L (ref 95–110)
CO2 SERPL-SCNC: 23 MMOL/L (ref 23–29)
CREAT SERPL-MCNC: 0.8 MG/DL (ref 0.5–1.4)
EST. GFR  (AFRICAN AMERICAN): >60 ML/MIN/1.73 M^2
EST. GFR  (NON AFRICAN AMERICAN): >60 ML/MIN/1.73 M^2
GLUCOSE SERPL-MCNC: 120 MG/DL (ref 70–110)
POTASSIUM SERPL-SCNC: 4.1 MMOL/L (ref 3.5–5.1)
PROT SERPL-MCNC: 7.9 G/DL (ref 6–8.4)
SODIUM SERPL-SCNC: 136 MMOL/L (ref 136–145)

## 2022-02-22 PROCEDURE — 1126F PR PAIN SEVERITY QUANTIFIED, NO PAIN PRESENT: ICD-10-PCS | Mod: CPTII,S$GLB,, | Performed by: FAMILY MEDICINE

## 2022-02-22 PROCEDURE — 1159F PR MEDICATION LIST DOCUMENTED IN MEDICAL RECORD: ICD-10-PCS | Mod: CPTII,S$GLB,, | Performed by: FAMILY MEDICINE

## 2022-02-22 PROCEDURE — 3079F PR MOST RECENT DIASTOLIC BLOOD PRESSURE 80-89 MM HG: ICD-10-PCS | Mod: CPTII,S$GLB,, | Performed by: FAMILY MEDICINE

## 2022-02-22 PROCEDURE — 1159F MED LIST DOCD IN RCRD: CPT | Mod: CPTII,S$GLB,, | Performed by: FAMILY MEDICINE

## 2022-02-22 PROCEDURE — 80053 COMPREHEN METABOLIC PANEL: CPT | Performed by: FAMILY MEDICINE

## 2022-02-22 PROCEDURE — 3079F DIAST BP 80-89 MM HG: CPT | Mod: CPTII,S$GLB,, | Performed by: FAMILY MEDICINE

## 2022-02-22 PROCEDURE — 3288F FALL RISK ASSESSMENT DOCD: CPT | Mod: CPTII,S$GLB,, | Performed by: FAMILY MEDICINE

## 2022-02-22 PROCEDURE — 99214 OFFICE O/P EST MOD 30 MIN: CPT | Mod: S$GLB,,, | Performed by: FAMILY MEDICINE

## 2022-02-22 PROCEDURE — 36415 COLL VENOUS BLD VENIPUNCTURE: CPT | Mod: PO | Performed by: FAMILY MEDICINE

## 2022-02-22 PROCEDURE — 99999 PR PBB SHADOW E&M-EST. PATIENT-LVL V: ICD-10-PCS | Mod: PBBFAC,,, | Performed by: FAMILY MEDICINE

## 2022-02-22 PROCEDURE — 3075F SYST BP GE 130 - 139MM HG: CPT | Mod: CPTII,S$GLB,, | Performed by: FAMILY MEDICINE

## 2022-02-22 PROCEDURE — 3288F PR FALLS RISK ASSESSMENT DOCUMENTED: ICD-10-PCS | Mod: CPTII,S$GLB,, | Performed by: FAMILY MEDICINE

## 2022-02-22 PROCEDURE — 1157F ADVNC CARE PLAN IN RCRD: CPT | Mod: CPTII,S$GLB,, | Performed by: FAMILY MEDICINE

## 2022-02-22 PROCEDURE — 1157F PR ADVANCE CARE PLAN OR EQUIV PRESENT IN MEDICAL RECORD: ICD-10-PCS | Mod: CPTII,S$GLB,, | Performed by: FAMILY MEDICINE

## 2022-02-22 PROCEDURE — 3008F BODY MASS INDEX DOCD: CPT | Mod: CPTII,S$GLB,, | Performed by: FAMILY MEDICINE

## 2022-02-22 PROCEDURE — 3075F PR MOST RECENT SYSTOLIC BLOOD PRESS GE 130-139MM HG: ICD-10-PCS | Mod: CPTII,S$GLB,, | Performed by: FAMILY MEDICINE

## 2022-02-22 PROCEDURE — 1126F AMNT PAIN NOTED NONE PRSNT: CPT | Mod: CPTII,S$GLB,, | Performed by: FAMILY MEDICINE

## 2022-02-22 PROCEDURE — 99999 PR PBB SHADOW E&M-EST. PATIENT-LVL V: CPT | Mod: PBBFAC,,, | Performed by: FAMILY MEDICINE

## 2022-02-22 PROCEDURE — 1101F PT FALLS ASSESS-DOCD LE1/YR: CPT | Mod: CPTII,S$GLB,, | Performed by: FAMILY MEDICINE

## 2022-02-22 PROCEDURE — 99214 PR OFFICE/OUTPT VISIT, EST, LEVL IV, 30-39 MIN: ICD-10-PCS | Mod: S$GLB,,, | Performed by: FAMILY MEDICINE

## 2022-02-22 PROCEDURE — 3008F PR BODY MASS INDEX (BMI) DOCUMENTED: ICD-10-PCS | Mod: CPTII,S$GLB,, | Performed by: FAMILY MEDICINE

## 2022-02-22 PROCEDURE — 1101F PR PT FALLS ASSESS DOC 0-1 FALLS W/OUT INJ PAST YR: ICD-10-PCS | Mod: CPTII,S$GLB,, | Performed by: FAMILY MEDICINE

## 2022-02-22 RX ORDER — INFLUENZA VACCINE, ADJUVANTED 15; 15; 15; 15 UG/.5ML; UG/.5ML; UG/.5ML; UG/.5ML
INJECTION, SUSPENSION INTRAMUSCULAR
COMMUNITY
Start: 2021-09-21 | End: 2023-10-16

## 2022-02-22 NOTE — PROGRESS NOTES
"Subjective:       Patient ID: Pantera Posey is a 71 y.o. female.    Chief Complaint: Hypertension    HPI   Pt is here for follow up of htn stable on ace no cough no sob/cp bp fine today in the 110's over 70's at home  Pt has elevated mcv she had been drinking etoh quite a bit however she has decreased over the past few months  Pt has h/o hearing loss she would like to have new hearing test no acute change   Pt had elevated bs on lab she denies polyuria/dipsia   Review of Systems   Constitutional: Negative for chills, fatigue and fever.   Respiratory: Negative for cough, chest tightness and shortness of breath.    Cardiovascular: Negative for chest pain and palpitations.   Gastrointestinal: Negative for abdominal distention, abdominal pain and blood in stool.       Objective:     /84   Pulse 84   Ht 5' 5" (1.651 m)   Wt 62.1 kg (137 lb)   LMP  (LMP Unknown)   SpO2 98%   BMI 22.80 kg/m²     Physical Exam  Constitutional:       Appearance: Normal appearance. She is not ill-appearing.   Cardiovascular:      Rate and Rhythm: Normal rate and regular rhythm.      Heart sounds:     No gallop.   Pulmonary:      Effort: Pulmonary effort is normal.      Breath sounds: Normal breath sounds. No rales.   Abdominal:      General: There is no distension.      Palpations: Abdomen is soft.      Tenderness: There is no abdominal tenderness.   Neurological:      General: No focal deficit present.      Mental Status: She is alert and oriented to person, place, and time.      Cranial Nerves: No cranial nerve deficit.      Coordination: Coordination normal.       mcv 113 on 8/19/2021  Assessment:       1. Essential hypertension    2. Abnormal CBC    3. Asymptomatic menopause    4. Hearing loss, conductive, bilateral        Plan:        orders cbc cmp hgb a1c  Cont meds  Low fat low salt ada diet  Avoid etoh  Graded exercise  Increase water intake  rtc 3-6 months   F/u audiology    "This note will not be shared with the " "patient."     "

## 2022-02-22 NOTE — TELEPHONE ENCOUNTER
----- Message from Jzamin Singh MD sent at 2/22/2022  1:48 PM CST -----  Regarding: labs  Please let pt know I forgot to order the cbc and hgb a1c please see if she can come back by the office to draw her labs she does not need to be fasting.

## 2022-02-23 ENCOUNTER — LAB VISIT (OUTPATIENT)
Dept: LAB | Facility: HOSPITAL | Age: 72
End: 2022-02-23
Attending: FAMILY MEDICINE
Payer: MEDICARE

## 2022-02-23 DIAGNOSIS — R73.9 HYPERGLYCEMIA: ICD-10-CM

## 2022-02-23 DIAGNOSIS — I10 ESSENTIAL HYPERTENSION: ICD-10-CM

## 2022-02-23 LAB
BASOPHILS # BLD AUTO: 0.07 K/UL (ref 0–0.2)
BASOPHILS NFR BLD: 0.9 % (ref 0–1.9)
DIFFERENTIAL METHOD: ABNORMAL
EOSINOPHIL # BLD AUTO: 0.2 K/UL (ref 0–0.5)
EOSINOPHIL NFR BLD: 2.6 % (ref 0–8)
ERYTHROCYTE [DISTWIDTH] IN BLOOD BY AUTOMATED COUNT: 13.3 % (ref 11.5–14.5)
ESTIMATED AVG GLUCOSE: 91 MG/DL (ref 68–131)
HBA1C MFR BLD: 4.8 % (ref 4–5.6)
HCT VFR BLD AUTO: 41 % (ref 37–48.5)
HGB BLD-MCNC: 13.9 G/DL (ref 12–16)
IMM GRANULOCYTES # BLD AUTO: 0.07 K/UL (ref 0–0.04)
IMM GRANULOCYTES NFR BLD AUTO: 0.9 % (ref 0–0.5)
LYMPHOCYTES # BLD AUTO: 1.1 K/UL (ref 1–4.8)
LYMPHOCYTES NFR BLD: 14.2 % (ref 18–48)
MCH RBC QN AUTO: 38.8 PG (ref 27–31)
MCHC RBC AUTO-ENTMCNC: 33.9 G/DL (ref 32–36)
MCV RBC AUTO: 115 FL (ref 82–98)
MONOCYTES # BLD AUTO: 0.9 K/UL (ref 0.3–1)
MONOCYTES NFR BLD: 12.1 % (ref 4–15)
NEUTROPHILS # BLD AUTO: 5.3 K/UL (ref 1.8–7.7)
NEUTROPHILS NFR BLD: 69.3 % (ref 38–73)
NRBC BLD-RTO: 0 /100 WBC
PLATELET # BLD AUTO: 237 K/UL (ref 150–450)
PMV BLD AUTO: 11.4 FL (ref 9.2–12.9)
RBC # BLD AUTO: 3.58 M/UL (ref 4–5.4)
WBC # BLD AUTO: 7.66 K/UL (ref 3.9–12.7)

## 2022-02-23 PROCEDURE — 83036 HEMOGLOBIN GLYCOSYLATED A1C: CPT | Performed by: FAMILY MEDICINE

## 2022-02-23 PROCEDURE — 36415 COLL VENOUS BLD VENIPUNCTURE: CPT | Mod: PO | Performed by: FAMILY MEDICINE

## 2022-02-23 PROCEDURE — 85025 COMPLETE CBC W/AUTO DIFF WBC: CPT | Performed by: FAMILY MEDICINE

## 2022-03-05 ENCOUNTER — PATIENT MESSAGE (OUTPATIENT)
Dept: FAMILY MEDICINE | Facility: CLINIC | Age: 72
End: 2022-03-05
Payer: MEDICARE

## 2022-03-22 ENCOUNTER — HOSPITAL ENCOUNTER (OUTPATIENT)
Dept: RADIOLOGY | Facility: CLINIC | Age: 72
Discharge: HOME | End: 2022-03-22
Attending: FAMILY MEDICINE
Payer: MEDICARE

## 2022-03-22 ENCOUNTER — CLINICAL SUPPORT (OUTPATIENT)
Dept: AUDIOLOGY | Facility: CLINIC | Age: 72
End: 2022-03-22
Payer: MEDICARE

## 2022-03-22 DIAGNOSIS — H90.3 SENSORINEURAL HEARING LOSS, BILATERAL: Primary | ICD-10-CM

## 2022-03-22 DIAGNOSIS — H90.0 HEARING LOSS, CONDUCTIVE, BILATERAL: ICD-10-CM

## 2022-03-22 DIAGNOSIS — Z78.0 ASYMPTOMATIC MENOPAUSE: ICD-10-CM

## 2022-03-22 PROCEDURE — 92567 TYMPANOMETRY: CPT | Mod: S$GLB,,, | Performed by: AUDIOLOGIST

## 2022-03-22 PROCEDURE — 77080 DXA BONE DENSITY AXIAL: CPT | Mod: TC

## 2022-03-22 PROCEDURE — 77080 DEXA BONE DENSITY SPINE HIP: ICD-10-PCS | Mod: 26,,, | Performed by: INTERNAL MEDICINE

## 2022-03-22 PROCEDURE — 99999 PR PBB SHADOW E&M-EST. PATIENT-LVL I: ICD-10-PCS | Mod: PBBFAC,,, | Performed by: AUDIOLOGIST

## 2022-03-22 PROCEDURE — 92557 PR COMPREHENSIVE HEARING TEST: ICD-10-PCS | Mod: S$GLB,,, | Performed by: AUDIOLOGIST

## 2022-03-22 PROCEDURE — 77080 DXA BONE DENSITY AXIAL: CPT | Mod: 26,,, | Performed by: INTERNAL MEDICINE

## 2022-03-22 PROCEDURE — 92567 PR TYMPA2METRY: ICD-10-PCS | Mod: S$GLB,,, | Performed by: AUDIOLOGIST

## 2022-03-22 PROCEDURE — 92557 COMPREHENSIVE HEARING TEST: CPT | Mod: S$GLB,,, | Performed by: AUDIOLOGIST

## 2022-03-22 PROCEDURE — 99999 PR PBB SHADOW E&M-EST. PATIENT-LVL I: CPT | Mod: PBBFAC,,, | Performed by: AUDIOLOGIST

## 2022-03-22 NOTE — PROGRESS NOTES
Pantera Posey was seen today in the clinic for an audiologic evaluation.  Patient's main complaint was difficulty hearing conversation especially with background noise.  Mrs. Posey reported she has noticed it has gotten worse with time and wanted to have her hearing checked.  She denied any otalgia, aural pressure, or tinnitus.    Tympanometry revealed Type A in the right ear and Type A in the left ear.     Audiogram results revealed a moderate sensorineural hearing loss (SNHL) 0168-8421 Hz in the right ear and a mild to moderate SNHL 3822-9790 Hz in the left ear.      Speech reception thresholds were noted at 20 dB in the right ear and 20 dB in the left ear.    Speech discrimination scores were 96% in the right ear and 88% in the left ear.    Recommendations:  1. Otologic evaluation  2. Annual audiogram  3. Hearing protection when in noise  4. Hearing aid consultation

## 2022-03-23 ENCOUNTER — PES CALL (OUTPATIENT)
Dept: ADMINISTRATIVE | Facility: CLINIC | Age: 72
End: 2022-03-23
Payer: MEDICARE

## 2022-03-25 ENCOUNTER — PES CALL (OUTPATIENT)
Dept: ADMINISTRATIVE | Facility: CLINIC | Age: 72
End: 2022-03-25
Payer: MEDICARE

## 2022-03-31 ENCOUNTER — PATIENT OUTREACH (OUTPATIENT)
Dept: INTERNAL MEDICINE | Facility: CLINIC | Age: 72
End: 2022-03-31
Payer: MEDICARE

## 2022-05-04 ENCOUNTER — PES CALL (OUTPATIENT)
Dept: ADMINISTRATIVE | Facility: CLINIC | Age: 72
End: 2022-05-04
Payer: MEDICARE

## 2022-05-05 ENCOUNTER — PATIENT MESSAGE (OUTPATIENT)
Dept: FAMILY MEDICINE | Facility: CLINIC | Age: 72
End: 2022-05-05
Payer: MEDICARE

## 2022-05-23 ENCOUNTER — PATIENT MESSAGE (OUTPATIENT)
Dept: FAMILY MEDICINE | Facility: CLINIC | Age: 72
End: 2022-05-23
Payer: MEDICARE

## 2022-05-23 RX ORDER — ALENDRONATE SODIUM 70 MG/1
70 TABLET ORAL
Qty: 4 TABLET | Refills: 11 | Status: SHIPPED | OUTPATIENT
Start: 2022-05-23 | End: 2022-08-15

## 2022-06-20 DIAGNOSIS — I10 ESSENTIAL HYPERTENSION: ICD-10-CM

## 2022-06-20 NOTE — TELEPHONE ENCOUNTER
No new care gaps identified.  [facility] Embedded Care Gaps. Reference number: [id number]. 6/20/2022   10:48:27 AM CAMDENT

## 2022-06-21 ENCOUNTER — PES CALL (OUTPATIENT)
Dept: ADMINISTRATIVE | Facility: CLINIC | Age: 72
End: 2022-06-21
Payer: MEDICARE

## 2022-06-21 RX ORDER — LISINOPRIL 5 MG/1
TABLET ORAL
Qty: 90 TABLET | Refills: 2 | Status: SHIPPED | OUTPATIENT
Start: 2022-06-21 | End: 2022-08-15 | Stop reason: SDUPTHER

## 2022-06-21 NOTE — TELEPHONE ENCOUNTER
Refill Decision Note   Pantera Posey  is requesting a refill authorization.  Brief Assessment and Rationale for Refill:  Approve     Medication Therapy Plan:       Medication Reconciliation Completed: No   Comments:     No Care Gaps recommended.     Note composed:5:40 PM 06/21/2022

## 2022-07-13 ENCOUNTER — PES CALL (OUTPATIENT)
Dept: ADMINISTRATIVE | Facility: CLINIC | Age: 72
End: 2022-07-13
Payer: MEDICARE

## 2022-07-27 ENCOUNTER — PATIENT MESSAGE (OUTPATIENT)
Dept: FAMILY MEDICINE | Facility: CLINIC | Age: 72
End: 2022-07-27
Payer: MEDICARE

## 2022-08-15 ENCOUNTER — HOSPITAL ENCOUNTER (OUTPATIENT)
Dept: CARDIOLOGY | Facility: OTHER | Age: 72
Discharge: HOME | End: 2022-08-15
Attending: FAMILY MEDICINE
Payer: MEDICARE

## 2022-08-15 ENCOUNTER — HOSPITAL ENCOUNTER (OUTPATIENT)
Dept: RADIOLOGY | Facility: OTHER | Age: 72
Discharge: HOME | End: 2022-08-15
Attending: FAMILY MEDICINE
Payer: MEDICARE

## 2022-08-15 ENCOUNTER — OFFICE VISIT (OUTPATIENT)
Dept: FAMILY MEDICINE | Facility: CLINIC | Age: 72
End: 2022-08-15
Attending: FAMILY MEDICINE
Payer: MEDICARE

## 2022-08-15 VITALS
BODY MASS INDEX: 22.99 KG/M2 | OXYGEN SATURATION: 98 % | WEIGHT: 138 LBS | DIASTOLIC BLOOD PRESSURE: 72 MMHG | SYSTOLIC BLOOD PRESSURE: 122 MMHG | HEART RATE: 105 BPM | HEIGHT: 65 IN

## 2022-08-15 DIAGNOSIS — I10 ESSENTIAL HYPERTENSION: ICD-10-CM

## 2022-08-15 DIAGNOSIS — Z00.00 ANNUAL PHYSICAL EXAM: Primary | ICD-10-CM

## 2022-08-15 DIAGNOSIS — Z00.00 ANNUAL PHYSICAL EXAM: ICD-10-CM

## 2022-08-15 LAB
BILIRUB UR QL STRIP: NEGATIVE
CLARITY UR REFRACT.AUTO: CLEAR
COLOR UR AUTO: COLORLESS
GLUCOSE UR QL STRIP: NEGATIVE
HGB UR QL STRIP: NEGATIVE
KETONES UR QL STRIP: ABNORMAL
LEUKOCYTE ESTERASE UR QL STRIP: NEGATIVE
NITRITE UR QL STRIP: NEGATIVE
PH UR STRIP: 6 [PH] (ref 5–8)
PROT UR QL STRIP: NEGATIVE
SP GR UR STRIP: 1 (ref 1–1.03)
URN SPEC COLLECT METH UR: ABNORMAL

## 2022-08-15 PROCEDURE — 4010F PR ACE/ARB THEARPY RXD/TAKEN: ICD-10-PCS | Mod: CPTII,S$GLB,, | Performed by: FAMILY MEDICINE

## 2022-08-15 PROCEDURE — 93010 EKG 12-LEAD: ICD-10-PCS | Mod: ,,, | Performed by: INTERNAL MEDICINE

## 2022-08-15 PROCEDURE — 3074F SYST BP LT 130 MM HG: CPT | Mod: CPTII,S$GLB,, | Performed by: FAMILY MEDICINE

## 2022-08-15 PROCEDURE — 3074F PR MOST RECENT SYSTOLIC BLOOD PRESSURE < 130 MM HG: ICD-10-PCS | Mod: CPTII,S$GLB,, | Performed by: FAMILY MEDICINE

## 2022-08-15 PROCEDURE — 99214 OFFICE O/P EST MOD 30 MIN: CPT | Mod: S$GLB,,, | Performed by: FAMILY MEDICINE

## 2022-08-15 PROCEDURE — 71046 X-RAY EXAM CHEST 2 VIEWS: CPT | Mod: TC,FY

## 2022-08-15 PROCEDURE — 3288F FALL RISK ASSESSMENT DOCD: CPT | Mod: CPTII,S$GLB,, | Performed by: FAMILY MEDICINE

## 2022-08-15 PROCEDURE — 3078F PR MOST RECENT DIASTOLIC BLOOD PRESSURE < 80 MM HG: ICD-10-PCS | Mod: CPTII,S$GLB,, | Performed by: FAMILY MEDICINE

## 2022-08-15 PROCEDURE — 1101F PR PT FALLS ASSESS DOC 0-1 FALLS W/OUT INJ PAST YR: ICD-10-PCS | Mod: CPTII,S$GLB,, | Performed by: FAMILY MEDICINE

## 2022-08-15 PROCEDURE — 3044F HG A1C LEVEL LT 7.0%: CPT | Mod: CPTII,S$GLB,, | Performed by: FAMILY MEDICINE

## 2022-08-15 PROCEDURE — 1159F MED LIST DOCD IN RCRD: CPT | Mod: CPTII,S$GLB,, | Performed by: FAMILY MEDICINE

## 2022-08-15 PROCEDURE — 3288F PR FALLS RISK ASSESSMENT DOCUMENTED: ICD-10-PCS | Mod: CPTII,S$GLB,, | Performed by: FAMILY MEDICINE

## 2022-08-15 PROCEDURE — 4010F ACE/ARB THERAPY RXD/TAKEN: CPT | Mod: CPTII,S$GLB,, | Performed by: FAMILY MEDICINE

## 2022-08-15 PROCEDURE — 1157F PR ADVANCE CARE PLAN OR EQUIV PRESENT IN MEDICAL RECORD: ICD-10-PCS | Mod: CPTII,S$GLB,, | Performed by: FAMILY MEDICINE

## 2022-08-15 PROCEDURE — 1157F ADVNC CARE PLAN IN RCRD: CPT | Mod: CPTII,S$GLB,, | Performed by: FAMILY MEDICINE

## 2022-08-15 PROCEDURE — 3044F PR MOST RECENT HEMOGLOBIN A1C LEVEL <7.0%: ICD-10-PCS | Mod: CPTII,S$GLB,, | Performed by: FAMILY MEDICINE

## 2022-08-15 PROCEDURE — 1159F PR MEDICATION LIST DOCUMENTED IN MEDICAL RECORD: ICD-10-PCS | Mod: CPTII,S$GLB,, | Performed by: FAMILY MEDICINE

## 2022-08-15 PROCEDURE — 99214 PR OFFICE/OUTPT VISIT, EST, LEVL IV, 30-39 MIN: ICD-10-PCS | Mod: S$GLB,,, | Performed by: FAMILY MEDICINE

## 2022-08-15 PROCEDURE — 1126F PR PAIN SEVERITY QUANTIFIED, NO PAIN PRESENT: ICD-10-PCS | Mod: CPTII,S$GLB,, | Performed by: FAMILY MEDICINE

## 2022-08-15 PROCEDURE — 71046 XR CHEST PA AND LATERAL: ICD-10-PCS | Mod: 26,,, | Performed by: RADIOLOGY

## 2022-08-15 PROCEDURE — 71046 X-RAY EXAM CHEST 2 VIEWS: CPT | Mod: 26,,, | Performed by: RADIOLOGY

## 2022-08-15 PROCEDURE — 93005 ELECTROCARDIOGRAM TRACING: CPT

## 2022-08-15 PROCEDURE — 99999 PR PBB SHADOW E&M-EST. PATIENT-LVL IV: ICD-10-PCS | Mod: PBBFAC,,, | Performed by: FAMILY MEDICINE

## 2022-08-15 PROCEDURE — 3078F DIAST BP <80 MM HG: CPT | Mod: CPTII,S$GLB,, | Performed by: FAMILY MEDICINE

## 2022-08-15 PROCEDURE — 3008F PR BODY MASS INDEX (BMI) DOCUMENTED: ICD-10-PCS | Mod: CPTII,S$GLB,, | Performed by: FAMILY MEDICINE

## 2022-08-15 PROCEDURE — 99999 PR PBB SHADOW E&M-EST. PATIENT-LVL IV: CPT | Mod: PBBFAC,,, | Performed by: FAMILY MEDICINE

## 2022-08-15 PROCEDURE — 81003 URINALYSIS AUTO W/O SCOPE: CPT | Performed by: FAMILY MEDICINE

## 2022-08-15 PROCEDURE — 1126F AMNT PAIN NOTED NONE PRSNT: CPT | Mod: CPTII,S$GLB,, | Performed by: FAMILY MEDICINE

## 2022-08-15 PROCEDURE — 3008F BODY MASS INDEX DOCD: CPT | Mod: CPTII,S$GLB,, | Performed by: FAMILY MEDICINE

## 2022-08-15 PROCEDURE — 93010 ELECTROCARDIOGRAM REPORT: CPT | Mod: ,,, | Performed by: INTERNAL MEDICINE

## 2022-08-15 PROCEDURE — 1101F PT FALLS ASSESS-DOCD LE1/YR: CPT | Mod: CPTII,S$GLB,, | Performed by: FAMILY MEDICINE

## 2022-08-15 RX ORDER — LISINOPRIL 5 MG/1
5 TABLET ORAL DAILY
Qty: 90 TABLET | Refills: 3 | Status: SHIPPED | OUTPATIENT
Start: 2022-08-15 | End: 2023-09-14

## 2022-08-15 RX ORDER — AMLODIPINE BESYLATE 10 MG/1
10 TABLET ORAL DAILY
Qty: 90 TABLET | Refills: 3 | Status: SHIPPED | OUTPATIENT
Start: 2022-08-15 | End: 2023-09-22

## 2022-08-15 NOTE — PROGRESS NOTES
"Subjective:       Patient ID: Pantera Posey is a 72 y.o. female.    Chief Complaint: Annual Exam    HPI   Pt is here for annual exam pt is well no sob/cp no change in bowel habits no brbpr  Pt denies n/v/f/c/d/c no cough chest congestion no sore throa  Pt denies dysuria hematuria no vaginal bleeding  Pt has htn bp fine today on ace norvasc no cough no sob/cp  Pt has thin bones she did not tolerate the fosamax she stopped taking it. Nausea vomiting   Review of Systems   Constitutional: Negative for activity change, chills, diaphoresis, fatigue and fever.   HENT: Negative for congestion, ear discharge, ear pain, hearing loss, postnasal drip, rhinorrhea, sinus pressure, sneezing, sore throat, trouble swallowing and voice change.    Eyes: Negative for photophobia, discharge, redness, itching and visual disturbance.   Respiratory: Negative for cough, chest tightness, shortness of breath and wheezing.    Cardiovascular: Negative for chest pain, palpitations and leg swelling.   Gastrointestinal: Negative for abdominal pain, anal bleeding, blood in stool, constipation, diarrhea, nausea, rectal pain and vomiting.   Genitourinary: Negative for dyspareunia, dysuria, flank pain, frequency, hematuria, menstrual problem, pelvic pain, urgency, vaginal bleeding and vaginal discharge.   Musculoskeletal: Negative for arthralgias, back pain, joint swelling and neck pain.   Skin: Negative for color change and rash.   Neurological: Negative for dizziness, speech difficulty, weakness, light-headedness, numbness and headaches.   Hematological: Does not bruise/bleed easily.   Psychiatric/Behavioral: Negative for agitation, confusion, decreased concentration, sleep disturbance and suicidal ideas. The patient is not nervous/anxious.        Objective:     /72   Pulse 105   Ht 5' 5" (1.651 m)   Wt 62.6 kg (138 lb)   LMP  (LMP Unknown)   SpO2 98%   BMI 22.96 kg/m²     Physical Exam  Constitutional:       Appearance: Normal " appearance. She is well-developed. She is not ill-appearing.   HENT:      Head: Normocephalic and atraumatic.      Right Ear: External ear normal.      Left Ear: External ear normal.      Nose: Nose normal.   Eyes:      General:         Right eye: No discharge.         Left eye: No discharge.      Extraocular Movements: Extraocular movements intact.      Conjunctiva/sclera: Conjunctivae normal.      Pupils: Pupils are equal, round, and reactive to light.   Neck:      Thyroid: No thyromegaly.   Cardiovascular:      Rate and Rhythm: Normal rate and regular rhythm.      Heart sounds: Normal heart sounds. No murmur heard.    No friction rub. No gallop.   Pulmonary:      Effort: Pulmonary effort is normal.      Breath sounds: Normal breath sounds. No wheezing or rales.   Abdominal:      General: Bowel sounds are normal. There is no distension.      Palpations: Abdomen is soft.      Tenderness: There is no abdominal tenderness. There is no guarding or rebound.   Genitourinary:     Vagina: Normal.   Musculoskeletal:         General: No tenderness. Normal range of motion.      Cervical back: Normal range of motion and neck supple.   Lymphadenopathy:      Cervical: No cervical adenopathy.   Skin:     General: Skin is warm and dry.      Findings: No erythema or rash.   Neurological:      General: No focal deficit present.      Mental Status: She is alert and oriented to person, place, and time.      Cranial Nerves: No cranial nerve deficit.      Motor: No abnormal muscle tone.      Coordination: Coordination normal.   Psychiatric:         Behavior: Behavior normal.         Thought Content: Thought content normal.         Judgment: Judgment normal.         Assessment:       1. Annual physical exam    2. Essential hypertension        Plan:     orders cmp lipid tsh urine cbc  Cont meds  Low salt diet  D/c fosamax   Graded exercise  rtc 6 months      health maintenance   Colonoscopy discussed  Flu in fall  Tetanus q 10  "years  Lipid ordered  Mammogram discussed  covid discussed  bmd discussed        "This note will not be shared with the patient."     "

## 2022-09-06 ENCOUNTER — PATIENT MESSAGE (OUTPATIENT)
Dept: FAMILY MEDICINE | Facility: CLINIC | Age: 72
End: 2022-09-06
Payer: MEDICARE

## 2022-09-06 ENCOUNTER — TELEPHONE (OUTPATIENT)
Dept: FAMILY MEDICINE | Facility: CLINIC | Age: 72
End: 2022-09-06
Payer: MEDICARE

## 2022-09-06 DIAGNOSIS — D75.89 MACROCYTOSIS: Primary | ICD-10-CM

## 2022-09-23 ENCOUNTER — LAB VISIT (OUTPATIENT)
Dept: LAB | Facility: HOSPITAL | Age: 72
End: 2022-09-23
Attending: INTERNAL MEDICINE
Payer: MEDICARE

## 2022-09-23 ENCOUNTER — OFFICE VISIT (OUTPATIENT)
Dept: HEMATOLOGY/ONCOLOGY | Facility: CLINIC | Age: 72
End: 2022-09-23
Payer: MEDICARE

## 2022-09-23 VITALS — RESPIRATION RATE: 16 BRPM | WEIGHT: 140.75 LBS | BODY MASS INDEX: 23.45 KG/M2 | HEIGHT: 65 IN

## 2022-09-23 DIAGNOSIS — Z17.0 MALIGNANT NEOPLASM OF LOWER-OUTER QUADRANT OF LEFT BREAST OF FEMALE, ESTROGEN RECEPTOR POSITIVE: ICD-10-CM

## 2022-09-23 DIAGNOSIS — D75.89 MACROCYTOSIS: Primary | ICD-10-CM

## 2022-09-23 DIAGNOSIS — C50.512 MALIGNANT NEOPLASM OF LOWER-OUTER QUADRANT OF LEFT BREAST OF FEMALE, ESTROGEN RECEPTOR POSITIVE: ICD-10-CM

## 2022-09-23 DIAGNOSIS — D75.89 MACROCYTOSIS: ICD-10-CM

## 2022-09-23 DIAGNOSIS — I10 HTN (HYPERTENSION), BENIGN: ICD-10-CM

## 2022-09-23 LAB
BASOPHILS # BLD AUTO: 0.06 K/UL (ref 0–0.2)
BASOPHILS NFR BLD: 0.8 % (ref 0–1.9)
DIFFERENTIAL METHOD: ABNORMAL
EOSINOPHIL # BLD AUTO: 0.1 K/UL (ref 0–0.5)
EOSINOPHIL NFR BLD: 1.8 % (ref 0–8)
ERYTHROCYTE [DISTWIDTH] IN BLOOD BY AUTOMATED COUNT: 12.7 % (ref 11.5–14.5)
FOLATE SERPL-MCNC: 4.1 NG/ML (ref 4–24)
GGT SERPL-CCNC: 113 U/L (ref 8–55)
HCT VFR BLD AUTO: 41.3 % (ref 37–48.5)
HCYS SERPL-SCNC: 27.5 UMOL/L (ref 4–15.5)
HGB BLD-MCNC: 14 G/DL (ref 12–16)
IMM GRANULOCYTES # BLD AUTO: 0.04 K/UL (ref 0–0.04)
IMM GRANULOCYTES NFR BLD AUTO: 0.5 % (ref 0–0.5)
LYMPHOCYTES # BLD AUTO: 0.8 K/UL (ref 1–4.8)
LYMPHOCYTES NFR BLD: 10.6 % (ref 18–48)
MCH RBC QN AUTO: 39 PG (ref 27–31)
MCHC RBC AUTO-ENTMCNC: 33.9 G/DL (ref 32–36)
MCV RBC AUTO: 115 FL (ref 82–98)
MONOCYTES # BLD AUTO: 0.7 K/UL (ref 0.3–1)
MONOCYTES NFR BLD: 9.8 % (ref 4–15)
NEUTROPHILS # BLD AUTO: 5.7 K/UL (ref 1.8–7.7)
NEUTROPHILS NFR BLD: 76.5 % (ref 38–73)
NRBC BLD-RTO: 0 /100 WBC
PATH REV BLD -IMP: NORMAL
PLATELET # BLD AUTO: 198 K/UL (ref 150–450)
PMV BLD AUTO: 10.5 FL (ref 9.2–12.9)
RBC # BLD AUTO: 3.59 M/UL (ref 4–5.4)
VIT B12 SERPL-MCNC: 226 PG/ML (ref 210–950)
WBC # BLD AUTO: 7.38 K/UL (ref 3.9–12.7)

## 2022-09-23 PROCEDURE — 1101F PT FALLS ASSESS-DOCD LE1/YR: CPT | Mod: CPTII,S$GLB,, | Performed by: INTERNAL MEDICINE

## 2022-09-23 PROCEDURE — 85025 COMPLETE CBC W/AUTO DIFF WBC: CPT | Performed by: INTERNAL MEDICINE

## 2022-09-23 PROCEDURE — 3288F FALL RISK ASSESSMENT DOCD: CPT | Mod: CPTII,S$GLB,, | Performed by: INTERNAL MEDICINE

## 2022-09-23 PROCEDURE — 1159F PR MEDICATION LIST DOCUMENTED IN MEDICAL RECORD: ICD-10-PCS | Mod: CPTII,S$GLB,, | Performed by: INTERNAL MEDICINE

## 2022-09-23 PROCEDURE — 1159F MED LIST DOCD IN RCRD: CPT | Mod: CPTII,S$GLB,, | Performed by: INTERNAL MEDICINE

## 2022-09-23 PROCEDURE — 3008F BODY MASS INDEX DOCD: CPT | Mod: CPTII,S$GLB,, | Performed by: INTERNAL MEDICINE

## 2022-09-23 PROCEDURE — 88271 CYTOGENETICS DNA PROBE: CPT | Mod: 59 | Performed by: INTERNAL MEDICINE

## 2022-09-23 PROCEDURE — 99999 PR PBB SHADOW E&M-EST. PATIENT-LVL III: CPT | Mod: PBBFAC,,, | Performed by: INTERNAL MEDICINE

## 2022-09-23 PROCEDURE — 3044F HG A1C LEVEL LT 7.0%: CPT | Mod: CPTII,S$GLB,, | Performed by: INTERNAL MEDICINE

## 2022-09-23 PROCEDURE — 3008F PR BODY MASS INDEX (BMI) DOCUMENTED: ICD-10-PCS | Mod: CPTII,S$GLB,, | Performed by: INTERNAL MEDICINE

## 2022-09-23 PROCEDURE — 83921 ORGANIC ACID SINGLE QUANT: CPT | Performed by: INTERNAL MEDICINE

## 2022-09-23 PROCEDURE — 82746 ASSAY OF FOLIC ACID SERUM: CPT | Mod: GA | Performed by: INTERNAL MEDICINE

## 2022-09-23 PROCEDURE — 82977 ASSAY OF GGT: CPT | Mod: GA | Performed by: INTERNAL MEDICINE

## 2022-09-23 PROCEDURE — 3288F PR FALLS RISK ASSESSMENT DOCUMENTED: ICD-10-PCS | Mod: CPTII,S$GLB,, | Performed by: INTERNAL MEDICINE

## 2022-09-23 PROCEDURE — 88275 CYTOGENETICS 100-300: CPT | Mod: 59 | Performed by: INTERNAL MEDICINE

## 2022-09-23 PROCEDURE — 99215 PR OFFICE/OUTPT VISIT, EST, LEVL V, 40-54 MIN: ICD-10-PCS | Mod: S$GLB,,, | Performed by: INTERNAL MEDICINE

## 2022-09-23 PROCEDURE — 83090 ASSAY OF HOMOCYSTEINE: CPT | Mod: GA | Performed by: INTERNAL MEDICINE

## 2022-09-23 PROCEDURE — 1157F ADVNC CARE PLAN IN RCRD: CPT | Mod: CPTII,S$GLB,, | Performed by: INTERNAL MEDICINE

## 2022-09-23 PROCEDURE — 85060 BLOOD SMEAR INTERPRETATION: CPT | Mod: ,,, | Performed by: PATHOLOGY

## 2022-09-23 PROCEDURE — 82607 VITAMIN B-12: CPT | Mod: GA | Performed by: INTERNAL MEDICINE

## 2022-09-23 PROCEDURE — 99215 OFFICE O/P EST HI 40 MIN: CPT | Mod: S$GLB,,, | Performed by: INTERNAL MEDICINE

## 2022-09-23 PROCEDURE — 4010F PR ACE/ARB THEARPY RXD/TAKEN: ICD-10-PCS | Mod: CPTII,S$GLB,, | Performed by: INTERNAL MEDICINE

## 2022-09-23 PROCEDURE — 1157F PR ADVANCE CARE PLAN OR EQUIV PRESENT IN MEDICAL RECORD: ICD-10-PCS | Mod: CPTII,S$GLB,, | Performed by: INTERNAL MEDICINE

## 2022-09-23 PROCEDURE — 1126F PR PAIN SEVERITY QUANTIFIED, NO PAIN PRESENT: ICD-10-PCS | Mod: CPTII,S$GLB,, | Performed by: INTERNAL MEDICINE

## 2022-09-23 PROCEDURE — 85060 PATHOLOGIST REVIEW: ICD-10-PCS | Mod: ,,, | Performed by: PATHOLOGY

## 2022-09-23 PROCEDURE — 1126F AMNT PAIN NOTED NONE PRSNT: CPT | Mod: CPTII,S$GLB,, | Performed by: INTERNAL MEDICINE

## 2022-09-23 PROCEDURE — 4010F ACE/ARB THERAPY RXD/TAKEN: CPT | Mod: CPTII,S$GLB,, | Performed by: INTERNAL MEDICINE

## 2022-09-23 PROCEDURE — 3044F PR MOST RECENT HEMOGLOBIN A1C LEVEL <7.0%: ICD-10-PCS | Mod: CPTII,S$GLB,, | Performed by: INTERNAL MEDICINE

## 2022-09-23 PROCEDURE — 1101F PR PT FALLS ASSESS DOC 0-1 FALLS W/OUT INJ PAST YR: ICD-10-PCS | Mod: CPTII,S$GLB,, | Performed by: INTERNAL MEDICINE

## 2022-09-23 PROCEDURE — 99999 PR PBB SHADOW E&M-EST. PATIENT-LVL III: ICD-10-PCS | Mod: PBBFAC,,, | Performed by: INTERNAL MEDICINE

## 2022-09-23 NOTE — PROGRESS NOTES
Hematology and Medical Oncology   New Patient Consult     09/23/2022  Referred by:  Dr. Jazmin Singh    Reason For Referral Macrocytosis [D75.89]      History of Present Ilness:   Pantera Posey (Pantera) is a pleasant 72 y.o.female previous patient of Dr. Fritz, has since been discharged from his clinic.     On chart review MCV has always been above average but since 2016 has steadily risen to from 106 to almost 120. There is no associated anemia.     Has recently made a notable effort of decrease alcohol consumption. During early pandemic had a high alcohol consumption rate. Has greatly reduced intake and is down to 2 glasses of wine an evening.         PAST MEDICAL HISTORY:   Past Medical History:   Diagnosis Date    Breast cancer 1997    RIGHT    Breast cancer 2017    Left    Cancer     breast cancer    Hypertension     Renea-Parkinson-White syndrome        PAST SURGICAL HISTORY:   Past Surgical History:   Procedure Laterality Date    AUGMENTATION OF BREAST Bilateral 2018    BACK SURGERY      Spinal fusion    BREAST BIOPSY Right 1997    core bx, +    BREAST BIOPSY Left 2017    Core bx, +    BREAST RECONSTRUCTION Bilateral 2018    with implants    BREAST SURGERY      CARDIAC ELECTROPHYSIOLOGY STUDY AND ABLATION      COLONOSCOPY N/A 7/20/2018    Procedure: COLONOSCOPY;  Surgeon: David Mcdonald MD;  Location: [facility] ([address] [place] [address]);  Service: Endoscopy;  Laterality: N/A;    MASTECTOMY Right 1997    RIGHT/ RADIATION AND CHEMO    MASTECTOMY Left 2017    left    TUBAL LIGATION         PAST SOCIAL HISTORY:   reports that she quit smoking about 33 years ago. She smoked an average of 1 pack per day. She has never used smokeless tobacco. She reports current alcohol use of about 5.0 standard drinks per week. She reports that she does not use drugs.    FAMILY HISTORY:  Family History   Problem Relation Age of Onset    Cancer Mother     Hypertension Father     Breast cancer Maternal Aunt     Ovarian cancer Paternal  Aunt     Melanoma Neg Hx        CURRENT MEDICATIONS:   Current Outpatient Medications   Medication Sig    amLODIPine (NORVASC) 10 MG tablet Take 1 tablet (10 mg total) by mouth once daily.    calcium-vitamin D3 500 mg(1,250mg) -200 unit per tablet Take 2 tablets by mouth 2 (two) times daily with meals.    clobetasoL (TEMOVATE) 0.05 % cream AAA bid    clobetasoL (TEMOVATE) 0.05 % external solution Use on scalp one - two times daily as needed for scaling or itching    doxycycline (ORACEA) 40 mg capsule Take 1 po qday    FLUAD 8632-7091, 65 YR UP,,PF, 45 mcg (15 mcg x 3)/0.5 mL Syrg ADM 0.5ML IM UTD    FLUAD QUAD 2021-22,65Y UP,,PF, 60 mcg (15 mcg x 4)/0.5 mL Syrg     hydrOXYzine HCL (ATARAX) 25 MG tablet TAKE 1 TABLET BY MOUTH EVERY EVENING AS NEEDED FOR PRURITIS. DO NOT DRIVE OR OPERATE MACHINERY WHILE TAKE MEDICATION    lisinopriL (PRINIVIL,ZESTRIL) 5 MG tablet Take 1 tablet (5 mg total) by mouth once daily.    metronidazole 0.75% (METROCREAM) 0.75 % Crea AAA face bid    predniSONE (DELTASONE) 20 MG tablet Take 3 pills po qam with breakfast x 1 wk then take 2 po qam with breakfast x 1 wk then take 1 po qam with breakfast x 1 wk    triamcinolone acetonide 0.1% (KENALOG) 0.1 % cream AAA bid to affected areas of body    levocetirizine (XYZAL) 5 MG tablet Take 1 tablet (5 mg total) by mouth every evening.     No current facility-administered medications for this visit.     ALLERGIES:   Review of patient's allergies indicates:   Allergen Reactions    Ciprofloxacin Anaphylaxis     Other reaction(s): Difficulty breathing    Floxin [ofloxacin] Anaphylaxis    Clindamycin Diarrhea and Rash    Amoxicillin      Other reaction(s): Rash    Indocin  [indomethacin sodium]      Other reaction(s): Headache    Bactrim [sulfamethoxazole-trimethoprim] Rash         Review of Systems:     Review of Systems   Constitutional:  Negative for appetite change, chills, diaphoresis, fatigue, fever and unexpected weight change.   HENT:   Negative  for hearing loss, mouth sores, nosebleeds, sore throat, trouble swallowing and voice change.    Eyes:  Negative for eye problems and icterus.   Respiratory:  Negative for chest tightness, cough, hemoptysis, shortness of breath and wheezing.    Cardiovascular:  Negative for chest pain, leg swelling and palpitations.   Gastrointestinal:  Negative for abdominal distention, abdominal pain, blood in stool, diarrhea, nausea and vomiting.   Endocrine: Negative for hot flashes.   Genitourinary:  Negative for bladder incontinence, difficulty urinating, dysuria, frequency and hematuria.    Musculoskeletal:  Negative for arthralgias, back pain, flank pain, gait problem, myalgias, neck pain and neck stiffness.   Skin:  Negative for itching, rash and wound.   Neurological:  Negative for dizziness, extremity weakness, gait problem, headaches, numbness, seizures and speech difficulty.   Hematological:  Negative for adenopathy. Does not bruise/bleed easily.   Psychiatric/Behavioral:  Negative for confusion, depression and sleep disturbance. The patient is not nervous/anxious.         Physical Exam:     Vitals:    09/23/22 1129   Resp: 16     Physical Exam  Constitutional:       General: She is not in acute distress.     Appearance: She is well-developed. She is not diaphoretic.   HENT:      Head: Normocephalic and atraumatic.      Mouth/Throat:      Pharynx: No oropharyngeal exudate.   Eyes:      Conjunctiva/sclera: Conjunctivae normal.      Pupils: Pupils are equal, round, and reactive to light.   Neck:      Thyroid: No thyromegaly.      Vascular: No JVD.      Trachea: No tracheal deviation.   Cardiovascular:      Rate and Rhythm: Normal rate and regular rhythm.      Heart sounds: Normal heart sounds. No murmur heard.    No friction rub.   Pulmonary:      Effort: Pulmonary effort is normal. No respiratory distress.      Breath sounds: Normal breath sounds. No stridor. No wheezing or rales.   Chest:      Chest wall: No tenderness.    Abdominal:      General: Bowel sounds are normal. There is no distension.      Palpations: Abdomen is soft.      Tenderness: There is no abdominal tenderness. There is no guarding or rebound.   Musculoskeletal:         General: No tenderness or deformity. Normal range of motion.      Cervical back: Normal range of motion and neck supple.   Skin:     General: Skin is warm and dry.      Capillary Refill: Capillary refill takes less than 2 seconds.      Coloration: Skin is not pale.      Findings: No erythema or rash.   Neurological:      Mental Status: She is alert and oriented to person, place, and time.      Cranial Nerves: No cranial nerve deficit.      Sensory: No sensory deficit.      Motor: No abnormal muscle tone.      Coordination: Coordination normal.      Deep Tendon Reflexes: Reflexes normal.   Psychiatric:         Behavior: Behavior normal.         Thought Content: Thought content normal.         Judgment: Judgment normal.       ECOG Performance Status: (foot note - ECOG PS provided by Eastern Cooperative Oncology Group) 0 - Asymptomatic    Karnofsky Performance Score:  100%- Normal, No Complaints, No Evidence of Disease    Labs:   Lab Results   Component Value Date    WBC 7.38 09/23/2022    HGB 14.0 09/23/2022    HCT 41.3 09/23/2022     09/23/2022    CHOL 236 (H) 08/15/2022    TRIG 61 08/15/2022    HDL 83 (H) 08/15/2022    ALT 33 08/15/2022    AST 51 (H) 08/15/2022     08/15/2022    K 4.5 08/15/2022     08/15/2022    CREATININE 0.8 08/15/2022    BUN 7 (L) 08/15/2022    CO2 21 (L) 08/15/2022    TSH 2.063 08/15/2022    INR 1.0 10/25/2013    HGBA1C 4.8 02/23/2022         B12: 226  Folate: 4.1  Methylmalonic Acid pending  Homocysteine: 27.5 [elevated]   Gamma GGT: 113  Path interpretation:       Imaging: Previous imaging has been reviewed     Assessment and Plan:   Ms. Posey is a pleasant 72 year old woman with progressive macrocytosis.     Macrocytosis  --MCV is now approaching  120  --No signs of anemia  --full lab workup is pending. No nutritional deficiencies have been identified to date  --MDS FISH pending  --Will defer a bone marrow at this time    ER+ Breast Cancer  --Previous managed by Dr. Fritz  --Completed 5 years of oral hormone therapy    40 minutes were spent face to face with the patient  to discuss the disease, natural history, treatment options. I have provided the patient with an opportunity to ask questions and have all questions answered to her satisfaction.       she will return to clinic in 1 month, but knows to call in the interim if symptoms change or should a problem arise.        Shayy Carpenter MD  Hematology and Medical Oncology  Bone Marrow Transplant  [facility]      BMT Chart Routing      Follow up with physician 1 month. see me in 1 month with a cbc [okay to overbook if need be]   Follow up with NIKOLE    Infusion scheduling note    Injection scheduling note    Labs    Imaging    Pharmacy appointment    Other referrals

## 2022-09-26 LAB — PATH REV BLD -IMP: NORMAL

## 2022-09-27 PROBLEM — D75.89 MACROCYTOSIS: Status: ACTIVE | Noted: 2022-09-27

## 2022-09-27 LAB — METHYLMALONATE SERPL-SCNC: 0.22 UMOL/L

## 2022-10-02 ENCOUNTER — PATIENT MESSAGE (OUTPATIENT)
Dept: HEMATOLOGY/ONCOLOGY | Facility: CLINIC | Age: 72
End: 2022-10-02
Payer: MEDICARE

## 2022-10-03 LAB
FMDSB INTERPRETATION: NORMAL
FMDSB METHOD: NORMAL
FMDSB SPECIMEN: NORMAL
MDS FISH ADDITIONAL INFORMATION (BL): NORMAL
MDS FISH DISCLAIMER (BL): NORMAL
MDS FISH REASON FOR REFERRAL (BLOOD): NORMAL
MDS FISH RELEASED BY (BL): NORMAL
MDS FISH RESULT (BLOOD): NORMAL
MDS FISH RESULT SUMMARY (BL): NORMAL
MDS FISH RESULT TABLE (BL): NORMAL
SPECIMEN SOURCE: NORMAL

## 2022-10-12 ENCOUNTER — PES CALL (OUTPATIENT)
Dept: ADMINISTRATIVE | Facility: CLINIC | Age: 72
End: 2022-10-12
Payer: MEDICARE

## 2022-10-28 ENCOUNTER — LAB VISIT (OUTPATIENT)
Dept: LAB | Facility: HOSPITAL | Age: 72
End: 2022-10-28
Payer: MEDICARE

## 2022-10-28 ENCOUNTER — OFFICE VISIT (OUTPATIENT)
Dept: HEMATOLOGY/ONCOLOGY | Facility: CLINIC | Age: 72
End: 2022-10-28
Payer: MEDICARE

## 2022-10-28 VITALS
HEART RATE: 101 BPM | HEIGHT: 64 IN | OXYGEN SATURATION: 99 % | WEIGHT: 140.31 LBS | RESPIRATION RATE: 16 BRPM | BODY MASS INDEX: 23.95 KG/M2

## 2022-10-28 DIAGNOSIS — E53.8 VITAMIN B 12 DEFICIENCY: ICD-10-CM

## 2022-10-28 DIAGNOSIS — D75.89 MACROCYTOSIS: ICD-10-CM

## 2022-10-28 DIAGNOSIS — C50.512 MALIGNANT NEOPLASM OF LOWER-OUTER QUADRANT OF LEFT BREAST OF FEMALE, ESTROGEN RECEPTOR POSITIVE: Primary | ICD-10-CM

## 2022-10-28 DIAGNOSIS — Z17.0 MALIGNANT NEOPLASM OF LOWER-OUTER QUADRANT OF LEFT BREAST OF FEMALE, ESTROGEN RECEPTOR POSITIVE: Primary | ICD-10-CM

## 2022-10-28 LAB
BASOPHILS # BLD AUTO: 0.06 K/UL (ref 0–0.2)
BASOPHILS NFR BLD: 0.9 % (ref 0–1.9)
DIFFERENTIAL METHOD: ABNORMAL
EOSINOPHIL # BLD AUTO: 0.1 K/UL (ref 0–0.5)
EOSINOPHIL NFR BLD: 1.9 % (ref 0–8)
ERYTHROCYTE [DISTWIDTH] IN BLOOD BY AUTOMATED COUNT: 13.4 % (ref 11.5–14.5)
HCT VFR BLD AUTO: 38.8 % (ref 37–48.5)
HGB BLD-MCNC: 13.4 G/DL (ref 12–16)
IMM GRANULOCYTES # BLD AUTO: 0.05 K/UL (ref 0–0.04)
IMM GRANULOCYTES NFR BLD AUTO: 0.8 % (ref 0–0.5)
LYMPHOCYTES # BLD AUTO: 1.1 K/UL (ref 1–4.8)
LYMPHOCYTES NFR BLD: 17.5 % (ref 18–48)
MCH RBC QN AUTO: 39.9 PG (ref 27–31)
MCHC RBC AUTO-ENTMCNC: 34.5 G/DL (ref 32–36)
MCV RBC AUTO: 116 FL (ref 82–98)
MONOCYTES # BLD AUTO: 1 K/UL (ref 0.3–1)
MONOCYTES NFR BLD: 15 % (ref 4–15)
NEUTROPHILS # BLD AUTO: 4.1 K/UL (ref 1.8–7.7)
NEUTROPHILS NFR BLD: 63.9 % (ref 38–73)
NRBC BLD-RTO: 0 /100 WBC
PLATELET # BLD AUTO: 175 K/UL (ref 150–450)
PMV BLD AUTO: 10 FL (ref 9.2–12.9)
RBC # BLD AUTO: 3.36 M/UL (ref 4–5.4)
WBC # BLD AUTO: 6.47 K/UL (ref 3.9–12.7)

## 2022-10-28 PROCEDURE — 1126F AMNT PAIN NOTED NONE PRSNT: CPT | Mod: CPTII,S$GLB,, | Performed by: INTERNAL MEDICINE

## 2022-10-28 PROCEDURE — 1101F PR PT FALLS ASSESS DOC 0-1 FALLS W/OUT INJ PAST YR: ICD-10-PCS | Mod: CPTII,S$GLB,, | Performed by: INTERNAL MEDICINE

## 2022-10-28 PROCEDURE — 4010F PR ACE/ARB THEARPY RXD/TAKEN: ICD-10-PCS | Mod: CPTII,S$GLB,, | Performed by: INTERNAL MEDICINE

## 2022-10-28 PROCEDURE — 36415 COLL VENOUS BLD VENIPUNCTURE: CPT | Performed by: INTERNAL MEDICINE

## 2022-10-28 PROCEDURE — 3044F PR MOST RECENT HEMOGLOBIN A1C LEVEL <7.0%: ICD-10-PCS | Mod: CPTII,S$GLB,, | Performed by: INTERNAL MEDICINE

## 2022-10-28 PROCEDURE — 99215 OFFICE O/P EST HI 40 MIN: CPT | Mod: S$GLB,,, | Performed by: INTERNAL MEDICINE

## 2022-10-28 PROCEDURE — 1157F ADVNC CARE PLAN IN RCRD: CPT | Mod: CPTII,S$GLB,, | Performed by: INTERNAL MEDICINE

## 2022-10-28 PROCEDURE — 3044F HG A1C LEVEL LT 7.0%: CPT | Mod: CPTII,S$GLB,, | Performed by: INTERNAL MEDICINE

## 2022-10-28 PROCEDURE — 99215 PR OFFICE/OUTPT VISIT, EST, LEVL V, 40-54 MIN: ICD-10-PCS | Mod: S$GLB,,, | Performed by: INTERNAL MEDICINE

## 2022-10-28 PROCEDURE — 85025 COMPLETE CBC W/AUTO DIFF WBC: CPT | Performed by: INTERNAL MEDICINE

## 2022-10-28 PROCEDURE — 3288F PR FALLS RISK ASSESSMENT DOCUMENTED: ICD-10-PCS | Mod: CPTII,S$GLB,, | Performed by: INTERNAL MEDICINE

## 2022-10-28 PROCEDURE — 99999 PR PBB SHADOW E&M-EST. PATIENT-LVL III: ICD-10-PCS | Mod: PBBFAC,,, | Performed by: INTERNAL MEDICINE

## 2022-10-28 PROCEDURE — 1159F MED LIST DOCD IN RCRD: CPT | Mod: CPTII,S$GLB,, | Performed by: INTERNAL MEDICINE

## 2022-10-28 PROCEDURE — 1126F PR PAIN SEVERITY QUANTIFIED, NO PAIN PRESENT: ICD-10-PCS | Mod: CPTII,S$GLB,, | Performed by: INTERNAL MEDICINE

## 2022-10-28 PROCEDURE — 1101F PT FALLS ASSESS-DOCD LE1/YR: CPT | Mod: CPTII,S$GLB,, | Performed by: INTERNAL MEDICINE

## 2022-10-28 PROCEDURE — 1157F PR ADVANCE CARE PLAN OR EQUIV PRESENT IN MEDICAL RECORD: ICD-10-PCS | Mod: CPTII,S$GLB,, | Performed by: INTERNAL MEDICINE

## 2022-10-28 PROCEDURE — 4010F ACE/ARB THERAPY RXD/TAKEN: CPT | Mod: CPTII,S$GLB,, | Performed by: INTERNAL MEDICINE

## 2022-10-28 PROCEDURE — 99999 PR PBB SHADOW E&M-EST. PATIENT-LVL III: CPT | Mod: PBBFAC,,, | Performed by: INTERNAL MEDICINE

## 2022-10-28 PROCEDURE — 3288F FALL RISK ASSESSMENT DOCD: CPT | Mod: CPTII,S$GLB,, | Performed by: INTERNAL MEDICINE

## 2022-10-28 PROCEDURE — 1159F PR MEDICATION LIST DOCUMENTED IN MEDICAL RECORD: ICD-10-PCS | Mod: CPTII,S$GLB,, | Performed by: INTERNAL MEDICINE

## 2022-10-28 NOTE — PROGRESS NOTES
Hematology and Medical Oncology   Follow Up     10/28/2022      Reason For Referral Macrocytosis      History of Present Ilness:   Pantera Posey (Pantera) is a pleasant 72 y.o.female previous patient of Dr. Fritz, has since been discharged from his clinic.     On chart review MCV has always been above average but since 2016 has steadily risen to from 106 to almost 120. There is no associated anemia.     Has recently made a notable effort of decrease alcohol consumption. During early pandemic had a high alcohol consumption rate. Has greatly reduced intake and is down to 2 glasses of wine an evening.     Interval History:   Continues to do well. Has completely cut out alcohol. Stays busy working in the garden. Feels that energy is good. Able to do all of the things she wishes to.    PAST MEDICAL HISTORY:   Past Medical History:   Diagnosis Date    Breast cancer 1997    RIGHT    Breast cancer 2017    Left    Cancer     breast cancer    Hypertension     Renea-Parkinson-White syndrome        PAST SURGICAL HISTORY:   Past Surgical History:   Procedure Laterality Date    AUGMENTATION OF BREAST Bilateral 2018    BACK SURGERY      Spinal fusion    BREAST BIOPSY Right 1997    core bx, +    BREAST BIOPSY Left 2017    Core bx, +    BREAST RECONSTRUCTION Bilateral 2018    with implants    BREAST SURGERY      CARDIAC ELECTROPHYSIOLOGY STUDY AND ABLATION      COLONOSCOPY N/A 7/20/2018    Procedure: COLONOSCOPY;  Surgeon: David Mcdonald MD;  Location: [facility] ([address] [place] [address]);  Service: Endoscopy;  Laterality: N/A;    MASTECTOMY Right 1997    RIGHT/ RADIATION AND CHEMO    MASTECTOMY Left 2017    left    TUBAL LIGATION         PAST SOCIAL HISTORY:   reports that she quit smoking about 33 years ago. She smoked an average of 1 pack per day. She has never used smokeless tobacco. She reports current alcohol use of about 5.0 standard drinks per week. She reports that she does not use drugs.    FAMILY HISTORY:  Family History   Problem  Relation Age of Onset    Cancer Mother     Hypertension Father     Breast cancer Maternal Aunt     Ovarian cancer Paternal Aunt     Melanoma Neg Hx        CURRENT MEDICATIONS:   Current Outpatient Medications   Medication Sig    amLODIPine (NORVASC) 10 MG tablet Take 1 tablet (10 mg total) by mouth once daily.    calcium-vitamin D3 500 mg(1,250mg) -200 unit per tablet Take 2 tablets by mouth 2 (two) times daily with meals.    clobetasoL (TEMOVATE) 0.05 % cream AAA bid    clobetasoL (TEMOVATE) 0.05 % external solution Use on scalp one - two times daily as needed for scaling or itching    doxycycline (ORACEA) 40 mg capsule Take 1 po qday    FLUAD [id number], 65 YR UP,,PF, 45 mcg (15 mcg x 3)/0.5 mL Syrg ADM 0.5ML IM UTD    FLUAD QUAD 2021-22,65Y UP,,PF, 60 mcg (15 mcg x 4)/0.5 mL Syrg     hydrOXYzine HCL (ATARAX) 25 MG tablet TAKE 1 TABLET BY MOUTH EVERY EVENING AS NEEDED FOR PRURITIS. DO NOT DRIVE OR OPERATE MACHINERY WHILE TAKE MEDICATION    levocetirizine (XYZAL) 5 MG tablet Take 1 tablet (5 mg total) by mouth every evening.    lisinopriL (PRINIVIL,ZESTRIL) 5 MG tablet Take 1 tablet (5 mg total) by mouth once daily.    metronidazole 0.75% (METROCREAM) 0.75 % Crea AAA face bid    predniSONE (DELTASONE) 20 MG tablet Take 3 pills po qam with breakfast x 1 wk then take 2 po qam with breakfast x 1 wk then take 1 po qam with breakfast x 1 wk    triamcinolone acetonide 0.1% (KENALOG) 0.1 % cream AAA bid to affected areas of body     No current facility-administered medications for this visit.     ALLERGIES:   Review of patient's allergies indicates:   Allergen Reactions    Ciprofloxacin Anaphylaxis     Other reaction(s): Difficulty breathing    Floxin [ofloxacin] Anaphylaxis    Clindamycin Diarrhea and Rash    Amoxicillin      Other reaction(s): Rash    Indocin  [indomethacin sodium]      Other reaction(s): Headache    Bactrim [sulfamethoxazole-trimethoprim] Rash         Review of Systems:     Review of Systems    Constitutional:  Negative for appetite change, chills, diaphoresis, fatigue, fever and unexpected weight change.   HENT:   Negative for hearing loss, mouth sores, nosebleeds, sore throat, trouble swallowing and voice change.    Eyes:  Negative for eye problems and icterus.   Respiratory:  Negative for chest tightness, cough, hemoptysis, shortness of breath and wheezing.    Cardiovascular:  Negative for chest pain, leg swelling and palpitations.   Gastrointestinal:  Negative for abdominal distention, abdominal pain, blood in stool, diarrhea, nausea and vomiting.   Endocrine: Negative for hot flashes.   Genitourinary:  Negative for bladder incontinence, difficulty urinating, dysuria, frequency and hematuria.    Musculoskeletal:  Negative for arthralgias, back pain, flank pain, gait problem, myalgias, neck pain and neck stiffness.   Skin:  Negative for itching, rash and wound.   Neurological:  Negative for dizziness, extremity weakness, gait problem, headaches, numbness, seizures and speech difficulty.   Hematological:  Negative for adenopathy. Does not bruise/bleed easily.   Psychiatric/Behavioral:  Negative for confusion, depression and sleep disturbance. The patient is not nervous/anxious.         Physical Exam:     Vitals:    10/28/22 1508   Pulse: 101   Resp: 16       Physical Exam  Constitutional:       General: She is not in acute distress.     Appearance: She is well-developed. She is not diaphoretic.   HENT:      Head: Normocephalic and atraumatic.      Mouth/Throat:      Pharynx: No oropharyngeal exudate.   Eyes:      Conjunctiva/sclera: Conjunctivae normal.      Pupils: Pupils are equal, round, and reactive to light.   Neck:      Thyroid: No thyromegaly.      Vascular: No JVD.      Trachea: No tracheal deviation.   Cardiovascular:      Rate and Rhythm: Normal rate and regular rhythm.      Heart sounds: Normal heart sounds. No murmur heard.    No friction rub.   Pulmonary:      Effort: Pulmonary effort is  normal. No respiratory distress.      Breath sounds: Normal breath sounds. No stridor. No wheezing or rales.   Chest:      Chest wall: No tenderness.   Abdominal:      General: Bowel sounds are normal. There is no distension.      Palpations: Abdomen is soft.      Tenderness: There is no abdominal tenderness. There is no guarding or rebound.   Musculoskeletal:         General: No tenderness or deformity. Normal range of motion.      Cervical back: Normal range of motion and neck supple.   Skin:     General: Skin is warm and dry.      Capillary Refill: Capillary refill takes less than 2 seconds.      Coloration: Skin is not pale.      Findings: No erythema or rash.   Neurological:      Mental Status: She is alert and oriented to person, place, and time.      Cranial Nerves: No cranial nerve deficit.      Sensory: No sensory deficit.      Motor: No abnormal muscle tone.      Coordination: Coordination normal.      Deep Tendon Reflexes: Reflexes normal.   Psychiatric:         Behavior: Behavior normal.         Thought Content: Thought content normal.         Judgment: Judgment normal.       ECOG Performance Status: (foot note - ECOG PS provided by Eastern Cooperative Oncology Group) 0 - Asymptomatic    Karnofsky Performance Score:  100%- Normal, No Complaints, No Evidence of Disease    Labs:   Lab Results   Component Value Date    WBC 6.47 10/28/2022    HGB 13.4 10/28/2022    HCT 38.8 10/28/2022     10/28/2022    CHOL 236 (H) 08/15/2022    TRIG 61 08/15/2022    HDL 83 (H) 08/15/2022    ALT 33 08/15/2022    AST 51 (H) 08/15/2022     08/15/2022    K 4.5 08/15/2022     08/15/2022    CREATININE 0.8 08/15/2022    BUN 7 (L) 08/15/2022    CO2 21 (L) 08/15/2022    TSH 2.063 08/15/2022    INR 1.0 10/25/2013    HGBA1C 4.8 02/23/2022         B12: 226  Folate: 4.1  Methylmalonic Acid pending  Homocysteine: 27.5 [elevated]   Gamma GGT: 113  Path interpretation:   RBC- Macrocytosis with no other significant  morphologic   abnormalities.   WBC- Granulocytic predominance with normal morphology. No dysplastic   cells or circulating blasts seen.   PLT- Normal in number and morphology.     MDS Fish:   within normal limits for the MDS FISH panel.    Imaging: Previous imaging has been reviewed       Homocytein  Vitamin B12:  Assessment and Plan:   Ms. Posey is a pleasant 72 year old woman with progressive macrocytosis.     B 12 Deficiency  --MCV is now approaching 120  --Will buy over the counter supplement  --MDS FISH negative  --Plan to repeat labs in 6-8 weeks  --Will defer a bone marrow at this time    ER+ Breast Cancer  --Previous managed by Dr. Fritz  --Completed 5 years of oral hormone therapy    30 minutes were spent face to face with the patient  to discuss the disease, natural history, treatment options. I have provided the patient with an opportunity to ask questions and have all questions answered to her satisfaction.       she will return to clinic in 6-8 weeks, but knows to call in the interim if symptoms change or should a problem arise.        Shayy Carpenter MD  Hematology and Medical Oncology  Bone Marrow Transplant  [facility]      BMT Chart Routing      Follow up with physician . 1. see me end of decmber with a cbc [okay to over book me if needed]   Follow up with NIKOLE    Provider visit type    Infusion scheduling note    Injection scheduling note    Labs    Imaging    Pharmacy appointment    Other referrals

## 2022-10-31 PROBLEM — E53.8 VITAMIN B 12 DEFICIENCY: Status: ACTIVE | Noted: 2022-10-31

## 2022-11-03 ENCOUNTER — PES CALL (OUTPATIENT)
Dept: ADMINISTRATIVE | Facility: CLINIC | Age: 72
End: 2022-11-03
Payer: MEDICARE

## 2022-11-11 DIAGNOSIS — D75.89 MACROCYTOSIS: Primary | ICD-10-CM

## 2022-11-23 ENCOUNTER — OFFICE VISIT (OUTPATIENT)
Dept: HEMATOLOGY/ONCOLOGY | Facility: CLINIC | Age: 72
End: 2022-11-23
Payer: MEDICARE

## 2022-11-23 ENCOUNTER — LAB VISIT (OUTPATIENT)
Dept: LAB | Facility: HOSPITAL | Age: 72
End: 2022-11-23
Payer: MEDICARE

## 2022-11-23 VITALS
HEART RATE: 81 BPM | DIASTOLIC BLOOD PRESSURE: 80 MMHG | WEIGHT: 141.31 LBS | BODY MASS INDEX: 24.13 KG/M2 | RESPIRATION RATE: 16 BRPM | HEIGHT: 64 IN | TEMPERATURE: 98 F | OXYGEN SATURATION: 99 % | SYSTOLIC BLOOD PRESSURE: 140 MMHG

## 2022-11-23 DIAGNOSIS — E53.8 VITAMIN B 12 DEFICIENCY: ICD-10-CM

## 2022-11-23 DIAGNOSIS — Z17.0 MALIGNANT NEOPLASM OF LOWER-OUTER QUADRANT OF LEFT BREAST OF FEMALE, ESTROGEN RECEPTOR POSITIVE: ICD-10-CM

## 2022-11-23 DIAGNOSIS — D75.89 MACROCYTOSIS: ICD-10-CM

## 2022-11-23 DIAGNOSIS — C50.512 MALIGNANT NEOPLASM OF LOWER-OUTER QUADRANT OF LEFT BREAST OF FEMALE, ESTROGEN RECEPTOR POSITIVE: ICD-10-CM

## 2022-11-23 DIAGNOSIS — D50.8 IRON DEFICIENCY ANEMIA SECONDARY TO INADEQUATE DIETARY IRON INTAKE: Primary | ICD-10-CM

## 2022-11-23 LAB
BASOPHILS # BLD AUTO: 0.06 K/UL (ref 0–0.2)
BASOPHILS NFR BLD: 0.9 % (ref 0–1.9)
DIFFERENTIAL METHOD: ABNORMAL
EOSINOPHIL # BLD AUTO: 0.2 K/UL (ref 0–0.5)
EOSINOPHIL NFR BLD: 2.2 % (ref 0–8)
ERYTHROCYTE [DISTWIDTH] IN BLOOD BY AUTOMATED COUNT: 13.3 % (ref 11.5–14.5)
HCT VFR BLD AUTO: 40.1 % (ref 37–48.5)
HGB BLD-MCNC: 13.5 G/DL (ref 12–16)
IMM GRANULOCYTES # BLD AUTO: 0.06 K/UL (ref 0–0.04)
IMM GRANULOCYTES NFR BLD AUTO: 0.9 % (ref 0–0.5)
LYMPHOCYTES # BLD AUTO: 1.3 K/UL (ref 1–4.8)
LYMPHOCYTES NFR BLD: 18.4 % (ref 18–48)
MCH RBC QN AUTO: 38.8 PG (ref 27–31)
MCHC RBC AUTO-ENTMCNC: 33.7 G/DL (ref 32–36)
MCV RBC AUTO: 115 FL (ref 82–98)
MONOCYTES # BLD AUTO: 1.1 K/UL (ref 0.3–1)
MONOCYTES NFR BLD: 15.8 % (ref 4–15)
NEUTROPHILS # BLD AUTO: 4.2 K/UL (ref 1.8–7.7)
NEUTROPHILS NFR BLD: 61.8 % (ref 38–73)
NRBC BLD-RTO: 0 /100 WBC
PLATELET # BLD AUTO: 215 K/UL (ref 150–450)
PMV BLD AUTO: 10.4 FL (ref 9.2–12.9)
RBC # BLD AUTO: 3.48 M/UL (ref 4–5.4)
WBC # BLD AUTO: 6.79 K/UL (ref 3.9–12.7)

## 2022-11-23 PROCEDURE — 4010F ACE/ARB THERAPY RXD/TAKEN: CPT | Mod: CPTII,S$GLB,, | Performed by: INTERNAL MEDICINE

## 2022-11-23 PROCEDURE — 3077F SYST BP >= 140 MM HG: CPT | Mod: CPTII,S$GLB,, | Performed by: INTERNAL MEDICINE

## 2022-11-23 PROCEDURE — 3008F BODY MASS INDEX DOCD: CPT | Mod: CPTII,S$GLB,, | Performed by: INTERNAL MEDICINE

## 2022-11-23 PROCEDURE — 3079F DIAST BP 80-89 MM HG: CPT | Mod: CPTII,S$GLB,, | Performed by: INTERNAL MEDICINE

## 2022-11-23 PROCEDURE — 1159F MED LIST DOCD IN RCRD: CPT | Mod: CPTII,S$GLB,, | Performed by: INTERNAL MEDICINE

## 2022-11-23 PROCEDURE — 99215 PR OFFICE/OUTPT VISIT, EST, LEVL V, 40-54 MIN: ICD-10-PCS | Mod: S$GLB,,, | Performed by: INTERNAL MEDICINE

## 2022-11-23 PROCEDURE — 1126F PR PAIN SEVERITY QUANTIFIED, NO PAIN PRESENT: ICD-10-PCS | Mod: CPTII,S$GLB,, | Performed by: INTERNAL MEDICINE

## 2022-11-23 PROCEDURE — 3288F FALL RISK ASSESSMENT DOCD: CPT | Mod: CPTII,S$GLB,, | Performed by: INTERNAL MEDICINE

## 2022-11-23 PROCEDURE — 4010F PR ACE/ARB THEARPY RXD/TAKEN: ICD-10-PCS | Mod: CPTII,S$GLB,, | Performed by: INTERNAL MEDICINE

## 2022-11-23 PROCEDURE — 3079F PR MOST RECENT DIASTOLIC BLOOD PRESSURE 80-89 MM HG: ICD-10-PCS | Mod: CPTII,S$GLB,, | Performed by: INTERNAL MEDICINE

## 2022-11-23 PROCEDURE — 36415 COLL VENOUS BLD VENIPUNCTURE: CPT | Performed by: PHYSICIAN ASSISTANT

## 2022-11-23 PROCEDURE — 99999 PR PBB SHADOW E&M-EST. PATIENT-LVL IV: ICD-10-PCS | Mod: PBBFAC,,, | Performed by: INTERNAL MEDICINE

## 2022-11-23 PROCEDURE — 1101F PR PT FALLS ASSESS DOC 0-1 FALLS W/OUT INJ PAST YR: ICD-10-PCS | Mod: CPTII,S$GLB,, | Performed by: INTERNAL MEDICINE

## 2022-11-23 PROCEDURE — 1157F PR ADVANCE CARE PLAN OR EQUIV PRESENT IN MEDICAL RECORD: ICD-10-PCS | Mod: CPTII,S$GLB,, | Performed by: INTERNAL MEDICINE

## 2022-11-23 PROCEDURE — 3008F PR BODY MASS INDEX (BMI) DOCUMENTED: ICD-10-PCS | Mod: CPTII,S$GLB,, | Performed by: INTERNAL MEDICINE

## 2022-11-23 PROCEDURE — 99215 OFFICE O/P EST HI 40 MIN: CPT | Mod: S$GLB,,, | Performed by: INTERNAL MEDICINE

## 2022-11-23 PROCEDURE — 3044F HG A1C LEVEL LT 7.0%: CPT | Mod: CPTII,S$GLB,, | Performed by: INTERNAL MEDICINE

## 2022-11-23 PROCEDURE — 3077F PR MOST RECENT SYSTOLIC BLOOD PRESSURE >= 140 MM HG: ICD-10-PCS | Mod: CPTII,S$GLB,, | Performed by: INTERNAL MEDICINE

## 2022-11-23 PROCEDURE — 1101F PT FALLS ASSESS-DOCD LE1/YR: CPT | Mod: CPTII,S$GLB,, | Performed by: INTERNAL MEDICINE

## 2022-11-23 PROCEDURE — 85025 COMPLETE CBC W/AUTO DIFF WBC: CPT | Performed by: PHYSICIAN ASSISTANT

## 2022-11-23 PROCEDURE — 3044F PR MOST RECENT HEMOGLOBIN A1C LEVEL <7.0%: ICD-10-PCS | Mod: CPTII,S$GLB,, | Performed by: INTERNAL MEDICINE

## 2022-11-23 PROCEDURE — 1126F AMNT PAIN NOTED NONE PRSNT: CPT | Mod: CPTII,S$GLB,, | Performed by: INTERNAL MEDICINE

## 2022-11-23 PROCEDURE — 3288F PR FALLS RISK ASSESSMENT DOCUMENTED: ICD-10-PCS | Mod: CPTII,S$GLB,, | Performed by: INTERNAL MEDICINE

## 2022-11-23 PROCEDURE — 99999 PR PBB SHADOW E&M-EST. PATIENT-LVL IV: CPT | Mod: PBBFAC,,, | Performed by: INTERNAL MEDICINE

## 2022-11-23 PROCEDURE — 1159F PR MEDICATION LIST DOCUMENTED IN MEDICAL RECORD: ICD-10-PCS | Mod: CPTII,S$GLB,, | Performed by: INTERNAL MEDICINE

## 2022-11-23 PROCEDURE — 1157F ADVNC CARE PLAN IN RCRD: CPT | Mod: CPTII,S$GLB,, | Performed by: INTERNAL MEDICINE

## 2022-11-23 NOTE — PROGRESS NOTES
Hematology and Medical Oncology   Follow Up     11/23/2022      Reason For Referral Macrocytosis      History of Present Ilness:   Pantera Posey (Pantera) is a pleasant 72 y.o.female previous patient of Dr. Fritz, has since been discharged from his clinic.     On chart review MCV has always been above average but since 2016 has steadily risen to from 106 to almost 120. There is no associated anemia.     Has recently made a notable effort of decrease alcohol consumption. During early pandemic had a high alcohol consumption rate. Has greatly reduced intake and is down to 2 glasses of wine an evening.     Interval History:   Continues to do well. Has completely cut out alcohol. Stays busy working in the garden, has winter flowers planted. Feels that energy is good. Able to do all of the things she wishes to.    Taking 1000mcg B-12 daily with an improvement in energy     PAST MEDICAL HISTORY:   Past Medical History:   Diagnosis Date    Breast cancer 1997    RIGHT    Breast cancer 2017    Left    Cancer     breast cancer    Hypertension     Renea-Parkinson-White syndrome        PAST SURGICAL HISTORY:   Past Surgical History:   Procedure Laterality Date    AUGMENTATION OF BREAST Bilateral 2018    BACK SURGERY      Spinal fusion    BREAST BIOPSY Right 1997    core bx, +    BREAST BIOPSY Left 2017    Core bx, +    BREAST RECONSTRUCTION Bilateral 2018    with implants    BREAST SURGERY      CARDIAC ELECTROPHYSIOLOGY STUDY AND ABLATION      COLONOSCOPY N/A 7/20/2018    Procedure: COLONOSCOPY;  Surgeon: David Mcdonald MD;  Location: [facility] ([address] [place] [address]);  Service: Endoscopy;  Laterality: N/A;    MASTECTOMY Right 1997    RIGHT/ RADIATION AND CHEMO    MASTECTOMY Left 2017    left    TUBAL LIGATION         PAST SOCIAL HISTORY:   reports that she quit smoking about 33 years ago. She smoked an average of 1 pack per day. She has never used smokeless tobacco. She reports current alcohol use of about 5.0 standard drinks per week.  She reports that she does not use drugs.    FAMILY HISTORY:  Family History   Problem Relation Age of Onset    Cancer Mother     Hypertension Father     Breast cancer Maternal Aunt     Ovarian cancer Paternal Aunt     Melanoma Neg Hx        CURRENT MEDICATIONS:   Current Outpatient Medications   Medication Sig    amLODIPine (NORVASC) 10 MG tablet Take 1 tablet (10 mg total) by mouth once daily.    calcium-vitamin D3 500 mg(1,250mg) -200 unit per tablet Take 2 tablets by mouth 2 (two) times daily with meals.    clobetasoL (TEMOVATE) 0.05 % cream AAA bid    clobetasoL (TEMOVATE) 0.05 % external solution Use on scalp one - two times daily as needed for scaling or itching    hydrOXYzine HCL (ATARAX) 25 MG tablet TAKE 1 TABLET BY MOUTH EVERY EVENING AS NEEDED FOR PRURITIS. DO NOT DRIVE OR OPERATE MACHINERY WHILE TAKE MEDICATION    lisinopriL (PRINIVIL,ZESTRIL) 5 MG tablet Take 1 tablet (5 mg total) by mouth once daily.    doxycycline (ORACEA) 40 mg capsule Take 1 po qday (Patient not taking: Reported on 11/23/2022)    FLUAD 0204-7157, 65 YR UP,,PF, 45 mcg (15 mcg x 3)/0.5 mL Syrg ADM 0.5ML IM UTD    FLUAD QUAD 2021-22,65Y UP,,PF, 60 mcg (15 mcg x 4)/0.5 mL Syrg     levocetirizine (XYZAL) 5 MG tablet Take 1 tablet (5 mg total) by mouth every evening. (Patient not taking: Reported on 11/23/2022)    metronidazole 0.75% (METROCREAM) 0.75 % Crea AAA face bid (Patient not taking: Reported on 11/23/2022)    predniSONE (DELTASONE) 20 MG tablet Take 3 pills po qam with breakfast x 1 wk then take 2 po qam with breakfast x 1 wk then take 1 po qam with breakfast x 1 wk (Patient not taking: Reported on 11/23/2022)    triamcinolone acetonide 0.1% (KENALOG) 0.1 % cream AAA bid to affected areas of body (Patient not taking: Reported on 11/23/2022)     No current facility-administered medications for this visit.     ALLERGIES:   Review of patient's allergies indicates:   Allergen Reactions    Ciprofloxacin Anaphylaxis     Other  reaction(s): Difficulty breathing    Floxin [ofloxacin] Anaphylaxis    Clindamycin Diarrhea and Rash    Amoxicillin      Other reaction(s): Rash    Indocin  [indomethacin sodium]      Other reaction(s): Headache    Bactrim [sulfamethoxazole-trimethoprim] Rash         Review of Systems:     Review of Systems   Constitutional:  Negative for appetite change, chills, diaphoresis, fatigue, fever and unexpected weight change.   HENT:   Negative for hearing loss, mouth sores, nosebleeds, sore throat, trouble swallowing and voice change.    Eyes:  Negative for eye problems and icterus.   Respiratory:  Negative for chest tightness, cough, hemoptysis, shortness of breath and wheezing.    Cardiovascular:  Negative for chest pain, leg swelling and palpitations.   Gastrointestinal:  Negative for abdominal distention, abdominal pain, blood in stool, diarrhea, nausea and vomiting.   Endocrine: Negative for hot flashes.   Genitourinary:  Negative for bladder incontinence, difficulty urinating, dysuria, frequency and hematuria.    Musculoskeletal:  Negative for arthralgias, back pain, flank pain, gait problem, myalgias, neck pain and neck stiffness.   Skin:  Negative for itching, rash and wound.   Neurological:  Negative for dizziness, extremity weakness, gait problem, headaches, numbness, seizures and speech difficulty.   Hematological:  Negative for adenopathy. Does not bruise/bleed easily.   Psychiatric/Behavioral:  Negative for confusion, depression and sleep disturbance. The patient is not nervous/anxious.         Physical Exam:     Vitals:    11/23/22 0946   BP: (!) 140/80   Pulse: 81   Resp: 16   Temp: 98.1 °F (36.7 °C)       Physical Exam  Constitutional:       General: She is not in acute distress.     Appearance: She is well-developed. She is not diaphoretic.   HENT:      Head: Normocephalic and atraumatic.      Mouth/Throat:      Pharynx: No oropharyngeal exudate.   Eyes:      Conjunctiva/sclera: Conjunctivae normal.       Pupils: Pupils are equal, round, and reactive to light.   Neck:      Thyroid: No thyromegaly.      Vascular: No JVD.      Trachea: No tracheal deviation.   Cardiovascular:      Rate and Rhythm: Normal rate and regular rhythm.      Heart sounds: Normal heart sounds. No murmur heard.    No friction rub.   Pulmonary:      Effort: Pulmonary effort is normal. No respiratory distress.      Breath sounds: Normal breath sounds. No stridor. No wheezing or rales.   Chest:      Chest wall: No tenderness.   Abdominal:      General: Bowel sounds are normal. There is no distension.      Palpations: Abdomen is soft.      Tenderness: There is no abdominal tenderness. There is no guarding or rebound.   Musculoskeletal:         General: No tenderness or deformity. Normal range of motion.      Cervical back: Normal range of motion and neck supple.   Skin:     General: Skin is warm and dry.      Capillary Refill: Capillary refill takes less than 2 seconds.      Coloration: Skin is not pale.      Findings: No erythema or rash.   Neurological:      Mental Status: She is alert and oriented to person, place, and time.      Cranial Nerves: No cranial nerve deficit.      Sensory: No sensory deficit.      Motor: No abnormal muscle tone.      Coordination: Coordination normal.      Deep Tendon Reflexes: Reflexes normal.   Psychiatric:         Behavior: Behavior normal.         Thought Content: Thought content normal.         Judgment: Judgment normal.       ECOG Performance Status: (foot note - ECOG PS provided by Eastern Cooperative Oncology Group) 0 - Asymptomatic    Karnofsky Performance Score:  100%- Normal, No Complaints, No Evidence of Disease    Labs:   Lab Results   Component Value Date    WBC 6.79 11/23/2022    HGB 13.5 11/23/2022    HCT 40.1 11/23/2022     11/23/2022    CHOL 236 (H) 08/15/2022    TRIG 61 08/15/2022    HDL 83 (H) 08/15/2022    ALT 33 08/15/2022    AST 51 (H) 08/15/2022     08/15/2022    K 4.5 08/15/2022      08/15/2022    CREATININE 0.8 08/15/2022    BUN 7 (L) 08/15/2022    CO2 21 (L) 08/15/2022    TSH 2.063 08/15/2022    INR 1.0 10/25/2013    HGBA1C 4.8 02/23/2022         B12: 226  Folate: 4.1  Methylmalonic Acid pending  Homocysteine: 27.5 [elevated]   Gamma GGT: 113  Path interpretation:   RBC- Macrocytosis with no other significant morphologic   abnormalities.   WBC- Granulocytic predominance with normal morphology. No dysplastic   cells or circulating blasts seen.   PLT- Normal in number and morphology.     MDS Fish:   within normal limits for the MDS FISH panel.    Imaging: Previous imaging has been reviewed       Homocytein  Vitamin B12:  Assessment and Plan:   Ms. Posey is a pleasant 72 year old woman with progressive macrocytosis.     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 Fritz  --Completed 5 years of oral hormone therapy    30 minutes were spent face to face with the patient  to discuss the disease, natural history, treatment options. I have provided the patient with an opportunity to ask questions and have all questions answered to her satisfaction.       she will return to clinic in 3-4 months, but knows to call in the interim if symptoms change or should a problem arise.        Shayy Carpenter MD  Hematology and Medical Oncology  Bone Marrow Transplant  [facility]      BMT Chart Routing      Follow up with physician 3 months. 1. labs and see me in 3-4 months   Follow up with NIKOLE    Provider visit type    Infusion scheduling note    Injection scheduling note    Labs CBC, CMP, iron and TIBC and ferritin   Lab interval:  b12   Imaging    Pharmacy appointment    Other referrals

## 2022-11-30 ENCOUNTER — TELEPHONE (OUTPATIENT)
Dept: DERMATOLOGY | Facility: CLINIC | Age: 72
End: 2022-11-30
Payer: MEDICARE

## 2022-12-13 ENCOUNTER — PES CALL (OUTPATIENT)
Dept: ADMINISTRATIVE | Facility: CLINIC | Age: 72
End: 2022-12-13
Payer: MEDICARE

## 2022-12-15 ENCOUNTER — OFFICE VISIT (OUTPATIENT)
Dept: DERMATOLOGY | Facility: CLINIC | Age: 72
End: 2022-12-15
Payer: MEDICARE

## 2022-12-15 DIAGNOSIS — L23.9 ALLERGIC CONTACT DERMATITIS, UNSPECIFIED TRIGGER: ICD-10-CM

## 2022-12-15 DIAGNOSIS — D48.5 NEOPLASM OF UNCERTAIN BEHAVIOR OF SKIN: Primary | ICD-10-CM

## 2022-12-15 DIAGNOSIS — R21 RASH AND NONSPECIFIC SKIN ERUPTION: ICD-10-CM

## 2022-12-15 DIAGNOSIS — L71.9 ACNE ROSACEA: ICD-10-CM

## 2022-12-15 PROCEDURE — 99214 OFFICE O/P EST MOD 30 MIN: CPT | Mod: 25,S$GLB,, | Performed by: PATHOLOGY

## 2022-12-15 PROCEDURE — 99999 PR PBB SHADOW E&M-EST. PATIENT-LVL II: CPT | Mod: PBBFAC,,, | Performed by: PATHOLOGY

## 2022-12-15 PROCEDURE — 88305 TISSUE EXAM BY PATHOLOGIST: CPT | Mod: 26,,, | Performed by: PATHOLOGY

## 2022-12-15 PROCEDURE — 11103 TANGNTL BX SKIN EA SEP/ADDL: CPT | Mod: S$GLB,,, | Performed by: PATHOLOGY

## 2022-12-15 PROCEDURE — 1160F PR REVIEW ALL MEDS BY PRESCRIBER/CLIN PHARMACIST DOCUMENTED: ICD-10-PCS | Mod: CPTII,S$GLB,, | Performed by: PATHOLOGY

## 2022-12-15 PROCEDURE — 88305 TISSUE EXAM BY PATHOLOGIST: ICD-10-PCS | Mod: 26,,, | Performed by: PATHOLOGY

## 2022-12-15 PROCEDURE — 4010F PR ACE/ARB THEARPY RXD/TAKEN: ICD-10-PCS | Mod: CPTII,S$GLB,, | Performed by: PATHOLOGY

## 2022-12-15 PROCEDURE — 11104 PUNCH BX SKIN SINGLE LESION: CPT | Mod: S$GLB,,, | Performed by: PATHOLOGY

## 2022-12-15 PROCEDURE — 99999 PR PBB SHADOW E&M-EST. PATIENT-LVL II: ICD-10-PCS | Mod: PBBFAC,,, | Performed by: PATHOLOGY

## 2022-12-15 PROCEDURE — 3044F HG A1C LEVEL LT 7.0%: CPT | Mod: CPTII,S$GLB,, | Performed by: PATHOLOGY

## 2022-12-15 PROCEDURE — 4010F ACE/ARB THERAPY RXD/TAKEN: CPT | Mod: CPTII,S$GLB,, | Performed by: PATHOLOGY

## 2022-12-15 PROCEDURE — 1159F MED LIST DOCD IN RCRD: CPT | Mod: CPTII,S$GLB,, | Performed by: PATHOLOGY

## 2022-12-15 PROCEDURE — 1157F PR ADVANCE CARE PLAN OR EQUIV PRESENT IN MEDICAL RECORD: ICD-10-PCS | Mod: CPTII,S$GLB,, | Performed by: PATHOLOGY

## 2022-12-15 PROCEDURE — 1159F PR MEDICATION LIST DOCUMENTED IN MEDICAL RECORD: ICD-10-PCS | Mod: CPTII,S$GLB,, | Performed by: PATHOLOGY

## 2022-12-15 PROCEDURE — 99214 PR OFFICE/OUTPT VISIT, EST, LEVL IV, 30-39 MIN: ICD-10-PCS | Mod: 25,S$GLB,, | Performed by: PATHOLOGY

## 2022-12-15 PROCEDURE — 1157F ADVNC CARE PLAN IN RCRD: CPT | Mod: CPTII,S$GLB,, | Performed by: PATHOLOGY

## 2022-12-15 PROCEDURE — 1160F RVW MEDS BY RX/DR IN RCRD: CPT | Mod: CPTII,S$GLB,, | Performed by: PATHOLOGY

## 2022-12-15 PROCEDURE — 88305 TISSUE EXAM BY PATHOLOGIST: CPT | Mod: 59 | Performed by: PATHOLOGY

## 2022-12-15 PROCEDURE — 11103 PR TANGENTIAL BIOPSY, SKIN, EA ADDTL LESION: ICD-10-PCS | Mod: S$GLB,,, | Performed by: PATHOLOGY

## 2022-12-15 PROCEDURE — 3044F PR MOST RECENT HEMOGLOBIN A1C LEVEL <7.0%: ICD-10-PCS | Mod: CPTII,S$GLB,, | Performed by: PATHOLOGY

## 2022-12-15 PROCEDURE — 11104 PR PUNCH BIOPSY, SKIN, SINGLE LESION: ICD-10-PCS | Mod: S$GLB,,, | Performed by: PATHOLOGY

## 2022-12-15 RX ORDER — DOXYCYCLINE HYCLATE 100 MG
100 TABLET ORAL EVERY 12 HOURS
Qty: 28 TABLET | Refills: 0 | Status: SHIPPED | OUTPATIENT
Start: 2022-12-15 | End: 2022-12-29

## 2022-12-15 NOTE — PROGRESS NOTES
Subjective:       Patient ID:  Pantera Posey is a 72 y.o. female who presents for No chief complaint on file.    HPI  Patch test history:  2+ reactions to 2-hydroxyethyl methacrylate, iodopropynyl butylcarbamate, ethyl acrylate and methyl methacrylate; 1+ reactions to carba mix, thiuram mix, balsam of peru, methylchloroisothiazolinone/ methylisotiazolinone, fragrance mix, methylisothiazolinone, methyldibromo glutaronitrile/ phenoxyethanol, 1,3-diphenylguanidine, cobalt (II) chloride hexahydrate, benzisothiozolinone, textile dye mix, cocamidopropyl betaine, formaldehyde, oleamidopropyl dimethylamine, carvone, octylisothiozolinone     Has done fairly well with strict avoidance.      Now with a few week history of minimally pruritic ovoid erythematous plaque with thick scale.  Used neosporin without improvmement.  Also with several week h/o irritated papule to right lower face.  Rosacea flaring with inflammatory papules and pustules to nose and medial cheeks - using metrocream but stopped doxycycline.    Review of Systems   Constitutional:  Negative for fever, chills, fatigue and malaise.   Skin:  Positive for itching, rash and dry skin.      Objective:    Physical Exam   Constitutional: She appears well-developed and well-nourished.   Neurological: She is alert.   Skin:   Areas Examined (abnormalities noted in diagram):   Head / Face Inspection Performed  RLE Inspected                 Diagram Legend     Erythematous scaling macule/papule c/w actinic keratosis       Vascular papule c/w angioma      Pigmented verrucoid papule/plaque c/w seborrheic keratosis      Yellow umbilicated papule c/w sebaceous hyperplasia      Irregularly shaped tan macule c/w lentigo     1-2 mm smooth white papules consistent with Milia      Movable subcutaneous cyst with punctum c/w epidermal inclusion cyst      Subcutaneous movable cyst c/w pilar cyst      Firm pink to brown papule c/w dermatofibroma      Pedunculated fleshy papule(s) c/w  skin tag(s)      Evenly pigmented macule c/w junctional nevus     Mildly variegated pigmented, slightly irregular-bordered macule c/w mildly atypical nevus      Flesh colored to evenly pigmented papule c/w intradermal nevus       Pink pearly papule/plaque c/w basal cell carcinoma      Erythematous hyperkeratotic cursted plaque c/w SCC      Surgical scar with no sign of skin cancer recurrence      Open and closed comedones      Inflammatory papules and pustules      Verrucoid papule consistent consistent with wart     Erythematous eczematous patches and plaques     Dystrophic onycholytic nail with subungual debris c/w onychomycosis     Umbilicated papule    Erythematous-base heme-crusted tan verrucoid plaque consistent with inflamed seborrheic keratosis     Erythematous Silvery Scaling Plaque c/w Psoriasis     See annotation      Assessment / Plan:      Pathology Orders:       Normal Orders This Visit    Specimen to Pathology, Dermatology     Questions:    Procedure Type: Dermatology and skin neoplasms    Number of Specimens: 2    ------------------------: -------------------------    Spec 1 Procedure: Biopsy    Spec 1 Clinical Impression: r/o IDN vs hidrocystoma vs BCC    Spec 1 Source: right lower cutaneous lip    ------------------------: -------------------------    Spec 2 Procedure: Biopsy    Spec 2 Clinical Impression: favor psoriasis, r/o nummular eczema or tinea vs other    Spec 2 Source: right shin    Release to patient: Immediate          Neoplasm of uncertain behavior of skin - right lower cutaneous lip  -     Specimen to Pathology, Dermatology    Shave biopsy procedure note:    Shave biopsy performed after verbal consent including risk of infection, scar, recurrence, need for additional treatment of site. Area prepped with alcohol, anesthetized with approximately 1.0cc of 1% lidocaine with epinephrine. Lesional tissue shaved with razor blade. Hemostasis achieved with application of aluminum chloride  followed by hyfrecation. No complications. Dressing applied. Wound care explained.      Acne rosacea - resume doxycycline 50 mg daily; if not improved over the next 2 weeks, plan to temporarily increase doxy to 100 mg daily;  Azelaic Acid: 15%  Ivermectin: 1%  Metronidazole: 1%  Vehicle: Cream - bid    Allergic contact dermatitis, unspecified trigger - well controlled; continue strict avoidance of known allergens    Rash and nonspecific skin eruption - psoriasiform clinical appearance.  Punch biopsy procedure note:  Punch biopsy performed after verbal consent obtained. Area marked and prepped with alcohol. Approximately 1cc of 1% lidocaine with epinephrine injected. 4 mm disposable punch used to remove lesion. Hemostasis obtained and biopsy site closed with 1 - 2 Prolene sutures. Wound care instructions reviewed with patient and handout given.               No follow-ups on file.

## 2022-12-23 LAB
FINAL PATHOLOGIC DIAGNOSIS: NORMAL
Lab: NORMAL

## 2022-12-27 ENCOUNTER — PATIENT MESSAGE (OUTPATIENT)
Dept: DERMATOLOGY | Facility: CLINIC | Age: 72
End: 2022-12-27
Payer: MEDICARE

## 2023-01-10 ENCOUNTER — PATIENT MESSAGE (OUTPATIENT)
Dept: DERMATOLOGY | Facility: CLINIC | Age: 73
End: 2023-01-10
Payer: MEDICARE

## 2023-03-03 ENCOUNTER — LAB VISIT (OUTPATIENT)
Dept: LAB | Facility: HOSPITAL | Age: 73
End: 2023-03-03
Payer: MEDICARE

## 2023-03-03 ENCOUNTER — OFFICE VISIT (OUTPATIENT)
Dept: HEMATOLOGY/ONCOLOGY | Facility: CLINIC | Age: 73
End: 2023-03-03
Payer: MEDICARE

## 2023-03-03 VITALS
TEMPERATURE: 98 F | HEART RATE: 88 BPM | OXYGEN SATURATION: 99 % | BODY MASS INDEX: 23.39 KG/M2 | HEIGHT: 64 IN | SYSTOLIC BLOOD PRESSURE: 116 MMHG | DIASTOLIC BLOOD PRESSURE: 65 MMHG | RESPIRATION RATE: 17 BRPM | WEIGHT: 137 LBS

## 2023-03-03 DIAGNOSIS — C50.512 MALIGNANT NEOPLASM OF LOWER-OUTER QUADRANT OF LEFT BREAST OF FEMALE, ESTROGEN RECEPTOR POSITIVE: ICD-10-CM

## 2023-03-03 DIAGNOSIS — Z17.0 MALIGNANT NEOPLASM OF LOWER-OUTER QUADRANT OF LEFT BREAST OF FEMALE, ESTROGEN RECEPTOR POSITIVE: ICD-10-CM

## 2023-03-03 DIAGNOSIS — D50.8 IRON DEFICIENCY ANEMIA SECONDARY TO INADEQUATE DIETARY IRON INTAKE: ICD-10-CM

## 2023-03-03 DIAGNOSIS — E53.8 VITAMIN B 12 DEFICIENCY: ICD-10-CM

## 2023-03-03 DIAGNOSIS — D75.89 MACROCYTOSIS: Primary | ICD-10-CM

## 2023-03-03 LAB
ALBUMIN SERPL BCP-MCNC: 3.5 G/DL (ref 3.5–5.2)
ALP SERPL-CCNC: 93 U/L (ref 55–135)
ALT SERPL W/O P-5'-P-CCNC: 39 U/L (ref 10–44)
ANION GAP SERPL CALC-SCNC: 10 MMOL/L (ref 8–16)
AST SERPL-CCNC: 58 U/L (ref 10–40)
BASOPHILS # BLD AUTO: 0.1 K/UL (ref 0–0.2)
BASOPHILS NFR BLD: 1.3 % (ref 0–1.9)
BILIRUB SERPL-MCNC: 0.8 MG/DL (ref 0.1–1)
BUN SERPL-MCNC: 8 MG/DL (ref 8–23)
CALCIUM SERPL-MCNC: 9.7 MG/DL (ref 8.7–10.5)
CHLORIDE SERPL-SCNC: 105 MMOL/L (ref 95–110)
CO2 SERPL-SCNC: 23 MMOL/L (ref 23–29)
CREAT SERPL-MCNC: 0.7 MG/DL (ref 0.5–1.4)
DIFFERENTIAL METHOD: ABNORMAL
EOSINOPHIL # BLD AUTO: 0.1 K/UL (ref 0–0.5)
EOSINOPHIL NFR BLD: 1.5 % (ref 0–8)
ERYTHROCYTE [DISTWIDTH] IN BLOOD BY AUTOMATED COUNT: 13.8 % (ref 11.5–14.5)
EST. GFR  (NO RACE VARIABLE): >60 ML/MIN/1.73 M^2
FERRITIN SERPL-MCNC: 800 NG/ML (ref 20–300)
GLUCOSE SERPL-MCNC: 106 MG/DL (ref 70–110)
HCT VFR BLD AUTO: 40.6 % (ref 37–48.5)
HGB BLD-MCNC: 13.4 G/DL (ref 12–16)
IMM GRANULOCYTES # BLD AUTO: 0.11 K/UL (ref 0–0.04)
IMM GRANULOCYTES NFR BLD AUTO: 1.4 % (ref 0–0.5)
IRON SERPL-MCNC: 76 UG/DL (ref 30–160)
LYMPHOCYTES # BLD AUTO: 1.4 K/UL (ref 1–4.8)
LYMPHOCYTES NFR BLD: 17.3 % (ref 18–48)
MCH RBC QN AUTO: 38.3 PG (ref 27–31)
MCHC RBC AUTO-ENTMCNC: 33 G/DL (ref 32–36)
MCV RBC AUTO: 116 FL (ref 82–98)
MONOCYTES # BLD AUTO: 1.1 K/UL (ref 0.3–1)
MONOCYTES NFR BLD: 13.7 % (ref 4–15)
NEUTROPHILS # BLD AUTO: 5.1 K/UL (ref 1.8–7.7)
NEUTROPHILS NFR BLD: 64.8 % (ref 38–73)
NRBC BLD-RTO: 0 /100 WBC
PLATELET # BLD AUTO: 207 K/UL (ref 150–450)
PMV BLD AUTO: 10.6 FL (ref 9.2–12.9)
POTASSIUM SERPL-SCNC: 4.3 MMOL/L (ref 3.5–5.1)
PROT SERPL-MCNC: 7.7 G/DL (ref 6–8.4)
RBC # BLD AUTO: 3.5 M/UL (ref 4–5.4)
SATURATED IRON: 25 % (ref 20–50)
SODIUM SERPL-SCNC: 138 MMOL/L (ref 136–145)
TOTAL IRON BINDING CAPACITY: 300 UG/DL (ref 250–450)
TRANSFERRIN SERPL-MCNC: 203 MG/DL (ref 200–375)
VIT B12 SERPL-MCNC: 972 PG/ML (ref 210–950)
WBC # BLD AUTO: 7.81 K/UL (ref 3.9–12.7)

## 2023-03-03 PROCEDURE — 1159F PR MEDICATION LIST DOCUMENTED IN MEDICAL RECORD: ICD-10-PCS | Mod: CPTII,S$GLB,, | Performed by: INTERNAL MEDICINE

## 2023-03-03 PROCEDURE — 99215 PR OFFICE/OUTPT VISIT, EST, LEVL V, 40-54 MIN: ICD-10-PCS | Mod: S$GLB,,, | Performed by: INTERNAL MEDICINE

## 2023-03-03 PROCEDURE — 36415 COLL VENOUS BLD VENIPUNCTURE: CPT | Performed by: INTERNAL MEDICINE

## 2023-03-03 PROCEDURE — 1101F PR PT FALLS ASSESS DOC 0-1 FALLS W/OUT INJ PAST YR: ICD-10-PCS | Mod: CPTII,S$GLB,, | Performed by: INTERNAL MEDICINE

## 2023-03-03 PROCEDURE — 82607 VITAMIN B-12: CPT | Mod: GA | Performed by: INTERNAL MEDICINE

## 2023-03-03 PROCEDURE — 3074F PR MOST RECENT SYSTOLIC BLOOD PRESSURE < 130 MM HG: ICD-10-PCS | Mod: CPTII,S$GLB,, | Performed by: INTERNAL MEDICINE

## 2023-03-03 PROCEDURE — 99215 OFFICE O/P EST HI 40 MIN: CPT | Mod: S$GLB,,, | Performed by: INTERNAL MEDICINE

## 2023-03-03 PROCEDURE — 99999 PR PBB SHADOW E&M-EST. PATIENT-LVL IV: ICD-10-PCS | Mod: PBBFAC,,, | Performed by: INTERNAL MEDICINE

## 2023-03-03 PROCEDURE — 3078F DIAST BP <80 MM HG: CPT | Mod: CPTII,S$GLB,, | Performed by: INTERNAL MEDICINE

## 2023-03-03 PROCEDURE — 3074F SYST BP LT 130 MM HG: CPT | Mod: CPTII,S$GLB,, | Performed by: INTERNAL MEDICINE

## 2023-03-03 PROCEDURE — 1157F PR ADVANCE CARE PLAN OR EQUIV PRESENT IN MEDICAL RECORD: ICD-10-PCS | Mod: CPTII,S$GLB,, | Performed by: INTERNAL MEDICINE

## 2023-03-03 PROCEDURE — 3078F PR MOST RECENT DIASTOLIC BLOOD PRESSURE < 80 MM HG: ICD-10-PCS | Mod: CPTII,S$GLB,, | Performed by: INTERNAL MEDICINE

## 2023-03-03 PROCEDURE — 3008F BODY MASS INDEX DOCD: CPT | Mod: CPTII,S$GLB,, | Performed by: INTERNAL MEDICINE

## 2023-03-03 PROCEDURE — 1101F PT FALLS ASSESS-DOCD LE1/YR: CPT | Mod: CPTII,S$GLB,, | Performed by: INTERNAL MEDICINE

## 2023-03-03 PROCEDURE — 1159F MED LIST DOCD IN RCRD: CPT | Mod: CPTII,S$GLB,, | Performed by: INTERNAL MEDICINE

## 2023-03-03 PROCEDURE — 80053 COMPREHEN METABOLIC PANEL: CPT | Performed by: INTERNAL MEDICINE

## 2023-03-03 PROCEDURE — 82728 ASSAY OF FERRITIN: CPT | Performed by: INTERNAL MEDICINE

## 2023-03-03 PROCEDURE — 84466 ASSAY OF TRANSFERRIN: CPT | Performed by: INTERNAL MEDICINE

## 2023-03-03 PROCEDURE — 3008F PR BODY MASS INDEX (BMI) DOCUMENTED: ICD-10-PCS | Mod: CPTII,S$GLB,, | Performed by: INTERNAL MEDICINE

## 2023-03-03 PROCEDURE — 99999 PR PBB SHADOW E&M-EST. PATIENT-LVL IV: CPT | Mod: PBBFAC,,, | Performed by: INTERNAL MEDICINE

## 2023-03-03 PROCEDURE — 3288F PR FALLS RISK ASSESSMENT DOCUMENTED: ICD-10-PCS | Mod: CPTII,S$GLB,, | Performed by: INTERNAL MEDICINE

## 2023-03-03 PROCEDURE — 85025 COMPLETE CBC W/AUTO DIFF WBC: CPT | Performed by: INTERNAL MEDICINE

## 2023-03-03 PROCEDURE — 1126F AMNT PAIN NOTED NONE PRSNT: CPT | Mod: CPTII,S$GLB,, | Performed by: INTERNAL MEDICINE

## 2023-03-03 PROCEDURE — 3288F FALL RISK ASSESSMENT DOCD: CPT | Mod: CPTII,S$GLB,, | Performed by: INTERNAL MEDICINE

## 2023-03-03 PROCEDURE — 1157F ADVNC CARE PLAN IN RCRD: CPT | Mod: CPTII,S$GLB,, | Performed by: INTERNAL MEDICINE

## 2023-03-03 PROCEDURE — 1126F PR PAIN SEVERITY QUANTIFIED, NO PAIN PRESENT: ICD-10-PCS | Mod: CPTII,S$GLB,, | Performed by: INTERNAL MEDICINE

## 2023-03-03 NOTE — PROGRESS NOTES
Hematology and Medical Oncology   Follow Up     03/03/2023      Reason For Referral Macrocytosis      History of Present Ilness:   Pantera Posey (Pantera) is a pleasant 72 y.o.female previous patient of Dr. Fritz, has since been discharged from his clinic.     On chart review MCV has always been above average but since 2016 has steadily risen to from 106 to almost 120. There is no associated anemia.         Interval History:   Continues to do well.     Started walking 8 blocks at a time. [place] with her .    Taking 1000mcg B-12 daily with an improvement in energy and size of cells.     PAST MEDICAL HISTORY:   Past Medical History:   Diagnosis Date    Breast cancer 1997    RIGHT    Breast cancer 2017    Left    Cancer     breast cancer    Hypertension     Renea-Parkinson-White syndrome        PAST SURGICAL HISTORY:   Past Surgical History:   Procedure Laterality Date    AUGMENTATION OF BREAST Bilateral 2018    BACK SURGERY      Spinal fusion    BREAST BIOPSY Right 1997    core bx, +    BREAST BIOPSY Left 2017    Core bx, +    BREAST RECONSTRUCTION Bilateral 2018    with implants    BREAST SURGERY      CARDIAC ELECTROPHYSIOLOGY STUDY AND ABLATION      COLONOSCOPY N/A 7/20/2018    Procedure: COLONOSCOPY;  Surgeon: David Mcdonald MD;  Location: [facility] ([address] [place] [address]);  Service: Endoscopy;  Laterality: N/A;    MASTECTOMY Right 1997    RIGHT/ RADIATION AND CHEMO    MASTECTOMY Left 2017    left    TUBAL LIGATION         PAST SOCIAL HISTORY:   reports that she quit smoking about 34 years ago. Her smoking use included cigarettes. She smoked an average of 1 pack per day. She has never used smokeless tobacco. She reports current alcohol use of about 5.0 standard drinks per week. She reports that she does not use drugs.    FAMILY HISTORY:  Family History   Problem Relation Age of Onset    Cancer Mother     Hypertension Father     Breast cancer Maternal Aunt     Ovarian  cancer Paternal Aunt     Melanoma Neg Hx        CURRENT MEDICATIONS:   Current Outpatient Medications   Medication Sig    amLODIPine (NORVASC) 10 MG tablet Take 1 tablet (10 mg total) by mouth once daily.    calcium-vitamin D3 500 mg(1,250mg) -200 unit per tablet Take 2 tablets by mouth 2 (two) times daily with meals.    clobetasoL (TEMOVATE) 0.05 % cream AAA bid    clobetasoL (TEMOVATE) 0.05 % external solution Use on scalp one - two times daily as needed for scaling or itching    doxycycline (ORACEA) 40 mg capsule Take 1 po qday    doxycycline 50 MG capsule TAKE 1 CAPSULE(50 MG) BY MOUTH EVERY DAY    FLUAD 2578-3183, 65 YR UP,,PF, 45 mcg (15 mcg x 3)/0.5 mL Syrg ADM 0.5ML IM UTD    FLUAD QUAD 2021-22,65Y UP,,PF, 60 mcg (15 mcg x 4)/0.5 mL Syrg     hydrOXYzine HCL (ATARAX) 25 MG tablet TAKE 1 TABLET BY MOUTH EVERY EVENING AS NEEDED FOR PRURITIS. DO NOT DRIVE OR OPERATE MACHINERY WHILE TAKE MEDICATION    lisinopriL (PRINIVIL,ZESTRIL) 5 MG tablet Take 1 tablet (5 mg total) by mouth once daily.    metronidazole 0.75% (METROCREAM) 0.75 % Crea AAA face bid    triamcinolone acetonide 0.1% (KENALOG) 0.1 % cream AAA bid to affected areas of body    levocetirizine (XYZAL) 5 MG tablet Take 1 tablet (5 mg total) by mouth every evening. (Patient not taking: Reported on 11/23/2022)    predniSONE (DELTASONE) 20 MG tablet Take 3 pills po qam with breakfast x 1 wk then take 2 po qam with breakfast x 1 wk then take 1 po qam with breakfast x 1 wk (Patient not taking: Reported on 11/23/2022)     No current facility-administered medications for this visit.     ALLERGIES:   Review of patient's allergies indicates:   Allergen Reactions    Ciprofloxacin Anaphylaxis     Other reaction(s): Difficulty breathing    Floxin [ofloxacin] Anaphylaxis    Clindamycin Diarrhea and Rash    Amoxicillin      Other reaction(s): Rash    Indocin  [indomethacin sodium]      Other reaction(s): Headache    Bactrim  [sulfamethoxazole-trimethoprim] Rash         Review of Systems:     Review of Systems   Constitutional:  Negative for appetite change, chills, diaphoresis, fatigue, fever and unexpected weight change.   HENT:   Negative for hearing loss, mouth sores, nosebleeds, sore throat, trouble swallowing and voice change.    Eyes:  Negative for eye problems and icterus.   Respiratory:  Negative for chest tightness, cough, hemoptysis, shortness of breath and wheezing.    Cardiovascular:  Negative for chest pain, leg swelling and palpitations.   Gastrointestinal:  Negative for abdominal distention, abdominal pain, blood in stool, diarrhea, nausea and vomiting.   Endocrine: Negative for hot flashes.   Genitourinary:  Negative for bladder incontinence, difficulty urinating, dysuria, frequency and hematuria.    Musculoskeletal:  Negative for arthralgias, back pain, flank pain, gait problem, myalgias, neck pain and neck stiffness.   Skin:  Negative for itching, rash and wound.   Neurological:  Negative for dizziness, extremity weakness, gait problem, headaches, numbness, seizures and speech difficulty.   Hematological:  Negative for adenopathy. Does not bruise/bleed easily.   Psychiatric/Behavioral:  Negative for confusion, depression and sleep disturbance. The patient is not nervous/anxious.         Physical Exam:     Vitals:    03/03/23 1417   BP: 116/65   Pulse: 88   Resp: 17   Temp: 98.3 °F (36.8 °C)       Physical Exam  Constitutional:       General: She is not in acute distress.     Appearance: She is well-developed. She is not diaphoretic.   HENT:      Head: Normocephalic and atraumatic.      Mouth/Throat:      Pharynx: No oropharyngeal exudate.   Eyes:      Conjunctiva/sclera: Conjunctivae normal.      Pupils: Pupils are equal, round, and reactive to light.   Neck:      Thyroid: No thyromegaly.      Vascular: No JVD.      Trachea: No tracheal deviation.   Cardiovascular:      Rate and Rhythm: Normal rate and regular rhythm.       Heart sounds: Normal heart sounds. No murmur heard.    No friction rub.   Pulmonary:      Effort: Pulmonary effort is normal. No respiratory distress.      Breath sounds: Normal breath sounds. No stridor. No wheezing or rales.   Chest:      Chest wall: No tenderness.   Abdominal:      General: Bowel sounds are normal. There is no distension.      Palpations: Abdomen is soft.      Tenderness: There is no abdominal tenderness. There is no guarding or rebound.   Musculoskeletal:         General: No tenderness or deformity. Normal range of motion.      Cervical back: Normal range of motion and neck supple.   Skin:     General: Skin is warm and dry.      Capillary Refill: Capillary refill takes less than 2 seconds.      Coloration: Skin is not pale.      Findings: No erythema or rash.   Neurological:      Mental Status: She is alert and oriented to person, place, and time.      Cranial Nerves: No cranial nerve deficit.      Sensory: No sensory deficit.      Motor: No abnormal muscle tone.      Coordination: Coordination normal.      Deep Tendon Reflexes: Reflexes normal.   Psychiatric:         Behavior: Behavior normal.         Thought Content: Thought content normal.         Judgment: Judgment normal.       ECOG Performance Status: (foot note - ECOG PS provided by Eastern Cooperative Oncology Group) 0 - Asymptomatic    Karnofsky Performance Score:  100%- Normal, No Complaints, No Evidence of Disease    Labs:   Lab Results   Component Value Date    WBC 7.81 03/03/2023    HGB 13.4 03/03/2023    HCT 40.6 03/03/2023     03/03/2023    CHOL 236 (H) 08/15/2022    TRIG 61 08/15/2022    HDL 83 (H) 08/15/2022    ALT 39 03/03/2023    AST 58 (H) 03/03/2023     03/03/2023    K 4.3 03/03/2023     03/03/2023    CREATININE 0.7 03/03/2023    BUN 8 03/03/2023    CO2 23 03/03/2023    TSH 2.063 08/15/2022    INR 1.0 10/25/2013    HGBA1C 4.8 02/23/2022         B12: 226  Folate: 4.1  Methylmalonic Acid  pending  Homocysteine: 27.5 [elevated]   Gamma GGT: 113  Path interpretation:   RBC- Macrocytosis with no other significant morphologic   abnormalities.   WBC- Granulocytic predominance with normal morphology. No dysplastic   cells or circulating blasts seen.   PLT- Normal in number and morphology.     MDS Fish:   within normal limits for the MDS FISH panel.    Imaging: Previous imaging has been reviewed       Homocytein  Vitamin B12:  Assessment and Plan:   Ms. Posey is a pleasant 72 year old woman with progressive macrocytosis.     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 Fritz  --Completed 5 years of oral hormone therapy    30 minutes were spent face to face with the patient  to discuss the disease, natural history, treatment options. I have provided the patient with an opportunity to ask questions and have all questions answered to her satisfaction.       she will return to clinic in 3-4 months, but knows to call in the interim if symptoms change or should a problem arise.        Shayy Carpenter MD  Hematology and Medical Oncology  Bone Marrow Transplant  [facility]      BMT Chart Routing      Follow up with physician 3 months. 1. see me in 3 months with labs: cbc,cmp, b12, folate   Follow up with NIKOLE    Provider visit type    Infusion scheduling note    Injection scheduling note    Labs    Imaging    Pharmacy appointment    Other referrals

## 2023-04-09 DIAGNOSIS — D53.9 MACROCYTIC ANEMIA: Primary | ICD-10-CM

## 2023-05-02 ENCOUNTER — PES CALL (OUTPATIENT)
Dept: ADMINISTRATIVE | Facility: CLINIC | Age: 73
End: 2023-05-02
Payer: MEDICARE

## 2023-07-26 ENCOUNTER — TELEPHONE (OUTPATIENT)
Dept: DERMATOLOGY | Facility: CLINIC | Age: 73
End: 2023-07-26
Payer: MEDICARE

## 2023-08-10 ENCOUNTER — TELEPHONE (OUTPATIENT)
Dept: DERMATOLOGY | Facility: CLINIC | Age: 73
End: 2023-08-10
Payer: MEDICARE

## 2023-08-15 ENCOUNTER — OFFICE VISIT (OUTPATIENT)
Dept: DERMATOLOGY | Facility: CLINIC | Age: 73
End: 2023-08-15
Payer: MEDICARE

## 2023-08-15 DIAGNOSIS — L23.9 ALLERGIC CONTACT DERMATITIS, UNSPECIFIED TRIGGER: ICD-10-CM

## 2023-08-15 DIAGNOSIS — D48.5 NEOPLASM OF UNCERTAIN BEHAVIOR OF SKIN: Primary | ICD-10-CM

## 2023-08-15 DIAGNOSIS — L71.9 ACNE ROSACEA: ICD-10-CM

## 2023-08-15 PROCEDURE — 1126F AMNT PAIN NOTED NONE PRSNT: CPT | Mod: CPTII,S$GLB,, | Performed by: PATHOLOGY

## 2023-08-15 PROCEDURE — 88304 TISSUE EXAM BY PATHOLOGIST: CPT | Performed by: PATHOLOGY

## 2023-08-15 PROCEDURE — 1157F PR ADVANCE CARE PLAN OR EQUIV PRESENT IN MEDICAL RECORD: ICD-10-PCS | Mod: CPTII,S$GLB,, | Performed by: PATHOLOGY

## 2023-08-15 PROCEDURE — 99213 PR OFFICE/OUTPT VISIT, EST, LEVL III, 20-29 MIN: ICD-10-PCS | Mod: 25,S$GLB,, | Performed by: PATHOLOGY

## 2023-08-15 PROCEDURE — 99999 PR PBB SHADOW E&M-EST. PATIENT-LVL III: ICD-10-PCS | Mod: PBBFAC,,, | Performed by: PATHOLOGY

## 2023-08-15 PROCEDURE — 4010F ACE/ARB THERAPY RXD/TAKEN: CPT | Mod: CPTII,S$GLB,, | Performed by: PATHOLOGY

## 2023-08-15 PROCEDURE — 99999 PR PBB SHADOW E&M-EST. PATIENT-LVL III: CPT | Mod: PBBFAC,,, | Performed by: PATHOLOGY

## 2023-08-15 PROCEDURE — 4010F PR ACE/ARB THEARPY RXD/TAKEN: ICD-10-PCS | Mod: CPTII,S$GLB,, | Performed by: PATHOLOGY

## 2023-08-15 PROCEDURE — 1159F PR MEDICATION LIST DOCUMENTED IN MEDICAL RECORD: ICD-10-PCS | Mod: CPTII,S$GLB,, | Performed by: PATHOLOGY

## 2023-08-15 PROCEDURE — 3288F FALL RISK ASSESSMENT DOCD: CPT | Mod: CPTII,S$GLB,, | Performed by: PATHOLOGY

## 2023-08-15 PROCEDURE — 1160F PR REVIEW ALL MEDS BY PRESCRIBER/CLIN PHARMACIST DOCUMENTED: ICD-10-PCS | Mod: CPTII,S$GLB,, | Performed by: PATHOLOGY

## 2023-08-15 PROCEDURE — 88304 TISSUE EXAM BY PATHOLOGIST: CPT | Mod: 26,,, | Performed by: PATHOLOGY

## 2023-08-15 PROCEDURE — 11102 TANGNTL BX SKIN SINGLE LES: CPT | Mod: S$GLB,,, | Performed by: PATHOLOGY

## 2023-08-15 PROCEDURE — 88304 PR  SURG PATH,LEVEL III: ICD-10-PCS | Mod: 26,,, | Performed by: PATHOLOGY

## 2023-08-15 PROCEDURE — 1126F PR PAIN SEVERITY QUANTIFIED, NO PAIN PRESENT: ICD-10-PCS | Mod: CPTII,S$GLB,, | Performed by: PATHOLOGY

## 2023-08-15 PROCEDURE — 1101F PT FALLS ASSESS-DOCD LE1/YR: CPT | Mod: CPTII,S$GLB,, | Performed by: PATHOLOGY

## 2023-08-15 PROCEDURE — 1159F MED LIST DOCD IN RCRD: CPT | Mod: CPTII,S$GLB,, | Performed by: PATHOLOGY

## 2023-08-15 PROCEDURE — 1160F RVW MEDS BY RX/DR IN RCRD: CPT | Mod: CPTII,S$GLB,, | Performed by: PATHOLOGY

## 2023-08-15 PROCEDURE — 3288F PR FALLS RISK ASSESSMENT DOCUMENTED: ICD-10-PCS | Mod: CPTII,S$GLB,, | Performed by: PATHOLOGY

## 2023-08-15 PROCEDURE — 99213 OFFICE O/P EST LOW 20 MIN: CPT | Mod: 25,S$GLB,, | Performed by: PATHOLOGY

## 2023-08-15 PROCEDURE — 1157F ADVNC CARE PLAN IN RCRD: CPT | Mod: CPTII,S$GLB,, | Performed by: PATHOLOGY

## 2023-08-15 PROCEDURE — 1101F PR PT FALLS ASSESS DOC 0-1 FALLS W/OUT INJ PAST YR: ICD-10-PCS | Mod: CPTII,S$GLB,, | Performed by: PATHOLOGY

## 2023-08-15 PROCEDURE — 11102 PR TANGENTIAL BIOPSY, SKIN, SINGLE LESION: ICD-10-PCS | Mod: S$GLB,,, | Performed by: PATHOLOGY

## 2023-08-15 RX ORDER — DOXYCYCLINE HYCLATE 50 MG/1
CAPSULE ORAL
Qty: 30 CAPSULE | Refills: 5 | Status: SHIPPED | OUTPATIENT
Start: 2023-08-15 | End: 2024-02-29 | Stop reason: SDUPTHER

## 2023-08-15 RX ORDER — HYDROXYZINE HYDROCHLORIDE 25 MG/1
TABLET, FILM COATED ORAL
Qty: 30 TABLET | Refills: 5 | Status: SHIPPED | OUTPATIENT
Start: 2023-08-15

## 2023-08-15 NOTE — PATIENT INSTRUCTIONS
Shave Biopsy Wound Care    Your doctor has performed a shave biopsy today.  A band aid and vaseline ointment has been placed over the site.  This should remain in place for NO LONGER THAN 48 hours.  It is fine to remove the bandaid after 24 hours, if the area is no longer bleeding. It is recommended that you keep the area dry (do not wet)) for the first 24 hours.  After 24 hours, wash the area with warm soap and water and apply Vaseline jelly.  Many patients prefer to use Neosporin or Bacitracin ointment.  This is acceptable; however, know that you can develop an allergy to this medication even if you have used it safely for years.  It is important to keep the area moist.  Letting it dry out and get air slows healing time, and will worsen the scar.        If you notice increasing redness, tenderness, pain, or yellow drainage at the biopsy site, please notify your doctor.  These are signs of an infection.    If your biopsy site is bleeding, apply firm pressure for 15 minutes straight.  Repeat for another 15 minutes, if it is still bleeding.   If the surgical site continues to bleed, then please contact your doctor.      For [facility] users:   You will receive your biopsy results in [facility] as soon as they are available. Please be assured that your physician/provider will review your results and will then determine what further treatment, evaluation, or planning is required. You should be contacted by your physician's/provider's office within 5 business days of receiving your results; If not, please reach out to directly. This is one more way [facility] is putting you first.     [facility] [place] [address]/ [phone] FAX/ [email or web address]

## 2023-08-15 NOTE — PROGRESS NOTES
Subjective:      Patient ID:  Pantera Posey is a 73 y.o. female who presents for   Chief Complaint   Patient presents with    Cyst     nose     Cyst - Initial  Affected locations: nose  Duration: 3 weeks  Signs / symptoms: growing  Severity: mild to moderate  Timing: constant  Aggravated by: nothing  Relieving factors/Treatments tried: nothing  Improvement on treatment: no relief     Pt with enlarging cyst to right nose over the past few weeks.  Non-tender but enlarging.  Has not drained.  Rosacea well controlled on Doxycycline 50 mg daily and topical Azelaic Acid: 15%, Ivermectin: 1%, Metronidazole: 1%, Vehicle: Cream - bid.  Occasional mild papular flare to nose and medial cheeks.  Patch test history:  2+ reactions to 2-hydroxyethyl methacrylate, iodopropynyl butylcarbamate, ethyl acrylate and methyl methacrylate; 1+ reactions to carba mix, thiuram mix, balsam of peru, methylchloroisothiazolinone/ methylisotiazolinone, fragrance mix, methylisothiazolinone, methyldibromo glutaronitrile/ phenoxyethanol, 1,3-diphenylguanidine, cobalt (II) chloride hexahydrate, benzisothiozolinone, textile dye mix, cocamidopropyl betaine, formaldehyde, oleamidopropyl dimethylamine, carvone, octylisothiozolinone     Has done fairly well with strict avoidance.      Review of Systems   Constitutional:  Negative for fever, chills, fatigue and malaise.   Skin:  Negative for daily sunscreen use and recent sunburn.       Objective:   Physical Exam   Constitutional: She appears well-developed and well-nourished. No distress.   Neurological: She is alert and oriented to person, place, and time. She is not disoriented.   Psychiatric: She has a normal mood and affect.   Skin:   Areas Examined (abnormalities noted in diagram):   Head / Face Inspection Performed            Diagram Legend     Erythematous scaling macule/papule c/w actinic keratosis       Vascular papule c/w angioma      Pigmented verrucoid papule/plaque c/w seborrheic keratosis       Yellow umbilicated papule c/w sebaceous hyperplasia      Irregularly shaped tan macule c/w lentigo     1-2 mm smooth white papules consistent with Milia      Movable subcutaneous cyst with punctum c/w epidermal inclusion cyst      Subcutaneous movable cyst c/w pilar cyst      Firm pink to brown papule c/w dermatofibroma      Pedunculated fleshy papule(s) c/w skin tag(s)      Evenly pigmented macule c/w junctional nevus     Mildly variegated pigmented, slightly irregular-bordered macule c/w mildly atypical nevus      Flesh colored to evenly pigmented papule c/w intradermal nevus       Pink pearly papule/plaque c/w basal cell carcinoma      Erythematous hyperkeratotic cursted plaque c/w SCC      Surgical scar with no sign of skin cancer recurrence      Open and closed comedones      Inflammatory papules and pustules      Verrucoid papule consistent consistent with wart     Erythematous eczematous patches and plaques     Dystrophic onycholytic nail with subungual debris c/w onychomycosis     Umbilicated papule    Erythematous-base heme-crusted tan verrucoid plaque consistent with inflamed seborrheic keratosis     Erythematous Silvery Scaling Plaque c/w Psoriasis     See annotation      Assessment / Plan:      Pathology Orders:       Normal Orders This Visit    Specimen to Pathology, Dermatology     Questions:    Procedure Type: Dermatology and skin neoplasms    Number of Specimens: 1    ------------------------: -------------------------    Spec 1 Procedure: Biopsy    Spec 1 Clinical Impression: EIC    Spec 1 Source: right nose    Release to patient: Immediate          Neoplasm of uncertain behavior of skin  -     Specimen to Pathology, Dermatology    Shave biopsy procedure note:    Shave biopsy performed after verbal consent including risk of infection, scar, recurrence, need for additional treatment of site. Area prepped with alcohol, anesthetized with approximately 1.0cc of 1% lidocaine with epinephrine.  Lesional tissue shaved with razor blade. Hemostasis achieved with application of aluminum chloride followed by hyfrecation. No complications. Dressing applied. Wound care explained.      Acne rosacea  -     doxycycline 50 MG capsule; TAKE 1 CAPSULE(50 MG) BY MOUTH EVERY DAY  Dispense: 30 capsule; Refill: 5  - continue Azelaic Acid: 15%, Ivermectin: 1%, Metronidazole: 1%, Vehicle: Cream - bid    Allergic contact dermatitis, unspecified trigger - see list of allergens above.  Continue strict avoidance.  Topical steroids for flares; call for pred taper if more severe.  -     hydrOXYzine HCL (ATARAX) 25 MG tablet; TAKE 1 TABLET BY MOUTH EVERY EVENING AS NEEDED FOR PRURITIS. DO NOT DRIVE OR OPERATE MACHINERY WHILE TAKE MEDICATION  Dispense: 30 tablet; Refill: 5             Follow up in about 6 months (around 2/15/2024), or if symptoms worsen or fail to improve.

## 2023-08-19 NOTE — PATIENT INSTRUCTIONS
Punch Biopsy Wound Care    Your doctor has performed a punch biopsy today.  A band aid and antibiotic ointment has been placed over the site.  This should remain in place for 24 hours.  It is recommended that you keep the area dry for the first 24 hours.  After 24 hours, you may remove the band aid and wash the area with warm soap and water and apply Vaseline jelly.  Many patients prefer to use Neosporin or Bacitracin ointment.  This is acceptable; however know that you can develop an allergy to this medication even if you have used it safely for years.  It is important to keep the area moist.  Letting it dry out and get air slows healing time, will worsen the scar, and make it more difficult to remove the stitches if they were placed.  Band aid is optional after first 24 hours.      If you notice increasing redness, tenderness, pain, or yellow drainage at the biopsy or surgical site, please notify your doctor.  These are signs of an infection.    If your biopsy/surgical site is bleeding, apply firm pressure for 15 minutes straight.  Repeat for another 15 minutes, if it is still bleeding.   If the surgical site continues to bleed, then please contact your doctor.      For [facility] users:   You will receive your biopsy results in [facility] as soon as they are available. Please be assured that your physician/provider will review your results and will then determine what further treatment, evaluation, or planning is required. You should be contacted by your physician's/provider's office within 5 business days of receiving your results; If not, please reach out to directly. This is one more way [facility] is putting you first.       [facility] [place] [address]/ [phone] FAX/ [email or web address]       Shave Biopsy Wound Care    Your doctor has performed a shave biopsy today.  A band aid and vaseline ointment has been placed over the site.  This should remain in place for NO LONGER THAN 48  hours.  It is fine to remove the bandaid after 24 hours, if the area is no longer bleeding. It is recommended that you keep the area dry (do not wet)) for the first 24 hours.  After 24 hours, wash the area with warm soap and water and apply Vaseline jelly.  Many patients prefer to use Neosporin or Bacitracin ointment.  This is acceptable; however, know that you can develop an allergy to this medication even if you have used it safely for years.  It is important to keep the area moist.  Letting it dry out and get air slows healing time, and will worsen the scar.        If you notice increasing redness, tenderness, pain, or yellow drainage at the biopsy site, please notify your doctor.  These are signs of an infection.    If your biopsy site is bleeding, apply firm pressure for 15 minutes straight.  Repeat for another 15 minutes, if it is still bleeding.   If the surgical site continues to bleed, then please contact your doctor.      For [facility] users:   You will receive your biopsy results in [facility] as soon as they are available. Please be assured that your physician/provider will review your results and will then determine what further treatment, evaluation, or planning is required. You should be contacted by your physician's/provider's office within 5 business days of receiving your results; If not, please reach out to directly. This is one more way [facility] is putting you first.     [address] [place] [id number]/ [phone] FAX/ [email or web address]        No

## 2023-08-24 LAB
FINAL PATHOLOGIC DIAGNOSIS: NORMAL
GROSS: NORMAL
Lab: NORMAL
MICROSCOPIC EXAM: NORMAL

## 2023-09-22 DIAGNOSIS — I10 ESSENTIAL HYPERTENSION: ICD-10-CM

## 2023-09-22 RX ORDER — AMLODIPINE BESYLATE 10 MG/1
10 TABLET ORAL
Qty: 90 TABLET | Refills: 0 | Status: SHIPPED | OUTPATIENT
Start: 2023-09-22 | End: 2023-11-09 | Stop reason: SDUPTHER

## 2023-09-22 NOTE — TELEPHONE ENCOUNTER
No care due was identified.  [facility] Embedded Care Due Messages. Reference number: [id number].   9/22/2023 3:21:35 AM CDT

## 2023-09-22 NOTE — TELEPHONE ENCOUNTER
Refill Decision Note   Pantera Kalpesh  is requesting a refill authorization.  Brief Assessment and Rationale for Refill:  Approve     Medication Therapy Plan:         Comments:     Note composed:3:11 PM 09/22/2023

## 2023-09-25 ENCOUNTER — PATIENT MESSAGE (OUTPATIENT)
Dept: FAMILY MEDICINE | Facility: CLINIC | Age: 73
End: 2023-09-25
Payer: MEDICARE

## 2023-10-03 ENCOUNTER — PATIENT MESSAGE (OUTPATIENT)
Dept: HEMATOLOGY/ONCOLOGY | Facility: CLINIC | Age: 73
End: 2023-10-03
Payer: MEDICARE

## 2023-10-16 ENCOUNTER — OFFICE VISIT (OUTPATIENT)
Dept: HEMATOLOGY/ONCOLOGY | Facility: CLINIC | Age: 73
End: 2023-10-16
Payer: MEDICARE

## 2023-10-16 ENCOUNTER — LAB VISIT (OUTPATIENT)
Dept: LAB | Facility: HOSPITAL | Age: 73
End: 2023-10-16
Payer: MEDICARE

## 2023-10-16 ENCOUNTER — PATIENT MESSAGE (OUTPATIENT)
Dept: HEMATOLOGY/ONCOLOGY | Facility: CLINIC | Age: 73
End: 2023-10-16

## 2023-10-16 VITALS
HEART RATE: 90 BPM | OXYGEN SATURATION: 98 % | WEIGHT: 137.25 LBS | SYSTOLIC BLOOD PRESSURE: 153 MMHG | BODY MASS INDEX: 23.43 KG/M2 | TEMPERATURE: 99 F | HEIGHT: 64 IN | DIASTOLIC BLOOD PRESSURE: 69 MMHG

## 2023-10-16 DIAGNOSIS — Z85.3 HISTORY OF RIGHT BREAST CANCER: ICD-10-CM

## 2023-10-16 DIAGNOSIS — D75.89 MACROCYTOSIS: ICD-10-CM

## 2023-10-16 DIAGNOSIS — E53.8 VITAMIN B 12 DEFICIENCY: ICD-10-CM

## 2023-10-16 DIAGNOSIS — D53.9 MACROCYTIC ANEMIA: ICD-10-CM

## 2023-10-16 DIAGNOSIS — D51.3 OTHER DIETARY VITAMIN B12 DEFICIENCY ANEMIA: Primary | ICD-10-CM

## 2023-10-16 LAB
ALBUMIN SERPL BCP-MCNC: 3.5 G/DL (ref 3.5–5.2)
ALP SERPL-CCNC: 113 U/L (ref 55–135)
ALT SERPL W/O P-5'-P-CCNC: 75 U/L (ref 10–44)
ANION GAP SERPL CALC-SCNC: 15 MMOL/L (ref 8–16)
AST SERPL-CCNC: 118 U/L (ref 10–40)
BASOPHILS # BLD AUTO: 0.06 K/UL (ref 0–0.2)
BASOPHILS NFR BLD: 0.9 % (ref 0–1.9)
BILIRUB SERPL-MCNC: 1.6 MG/DL (ref 0.1–1)
BUN SERPL-MCNC: 9 MG/DL (ref 8–23)
CALCIUM SERPL-MCNC: 9.4 MG/DL (ref 8.7–10.5)
CHLORIDE SERPL-SCNC: 101 MMOL/L (ref 95–110)
CO2 SERPL-SCNC: 20 MMOL/L (ref 23–29)
CREAT SERPL-MCNC: 0.8 MG/DL (ref 0.5–1.4)
DIFFERENTIAL METHOD: ABNORMAL
EOSINOPHIL # BLD AUTO: 0.1 K/UL (ref 0–0.5)
EOSINOPHIL NFR BLD: 1.3 % (ref 0–8)
ERYTHROCYTE [DISTWIDTH] IN BLOOD BY AUTOMATED COUNT: 13.8 % (ref 11.5–14.5)
EST. GFR  (NO RACE VARIABLE): >60 ML/MIN/1.73 M^2
FOLATE SERPL-MCNC: 2.7 NG/ML (ref 4–24)
GLUCOSE SERPL-MCNC: 106 MG/DL (ref 70–110)
HCT VFR BLD AUTO: 41.9 % (ref 37–48.5)
HGB BLD-MCNC: 14 G/DL (ref 12–16)
IMM GRANULOCYTES # BLD AUTO: 0.07 K/UL (ref 0–0.04)
IMM GRANULOCYTES NFR BLD AUTO: 1 % (ref 0–0.5)
LYMPHOCYTES # BLD AUTO: 1.2 K/UL (ref 1–4.8)
LYMPHOCYTES NFR BLD: 17 % (ref 18–48)
MCH RBC QN AUTO: 37.8 PG (ref 27–31)
MCHC RBC AUTO-ENTMCNC: 33.4 G/DL (ref 32–36)
MCV RBC AUTO: 113 FL (ref 82–98)
MONOCYTES # BLD AUTO: 0.9 K/UL (ref 0.3–1)
MONOCYTES NFR BLD: 13 % (ref 4–15)
NEUTROPHILS # BLD AUTO: 4.7 K/UL (ref 1.8–7.7)
NEUTROPHILS NFR BLD: 66.8 % (ref 38–73)
NRBC BLD-RTO: 0 /100 WBC
PLATELET # BLD AUTO: 147 K/UL (ref 150–450)
PMV BLD AUTO: 10.3 FL (ref 9.2–12.9)
POTASSIUM SERPL-SCNC: 4.2 MMOL/L (ref 3.5–5.1)
PROT SERPL-MCNC: 8 G/DL (ref 6–8.4)
RBC # BLD AUTO: 3.7 M/UL (ref 4–5.4)
SODIUM SERPL-SCNC: 136 MMOL/L (ref 136–145)
VIT B12 SERPL-MCNC: 1027 PG/ML (ref 210–950)
WBC # BLD AUTO: 7.02 K/UL (ref 3.9–12.7)

## 2023-10-16 PROCEDURE — 82607 VITAMIN B-12: CPT | Performed by: INTERNAL MEDICINE

## 2023-10-16 PROCEDURE — 1157F PR ADVANCE CARE PLAN OR EQUIV PRESENT IN MEDICAL RECORD: ICD-10-PCS | Mod: CPTII,S$GLB,, | Performed by: INTERNAL MEDICINE

## 2023-10-16 PROCEDURE — 80053 COMPREHEN METABOLIC PANEL: CPT | Performed by: INTERNAL MEDICINE

## 2023-10-16 PROCEDURE — 4010F PR ACE/ARB THEARPY RXD/TAKEN: ICD-10-PCS | Mod: CPTII,S$GLB,, | Performed by: INTERNAL MEDICINE

## 2023-10-16 PROCEDURE — 99215 OFFICE O/P EST HI 40 MIN: CPT | Mod: S$GLB,,, | Performed by: INTERNAL MEDICINE

## 2023-10-16 PROCEDURE — 82746 ASSAY OF FOLIC ACID SERUM: CPT | Performed by: INTERNAL MEDICINE

## 2023-10-16 PROCEDURE — 1126F PR PAIN SEVERITY QUANTIFIED, NO PAIN PRESENT: ICD-10-PCS | Mod: CPTII,S$GLB,, | Performed by: INTERNAL MEDICINE

## 2023-10-16 PROCEDURE — 3288F FALL RISK ASSESSMENT DOCD: CPT | Mod: CPTII,S$GLB,, | Performed by: INTERNAL MEDICINE

## 2023-10-16 PROCEDURE — 1126F AMNT PAIN NOTED NONE PRSNT: CPT | Mod: CPTII,S$GLB,, | Performed by: INTERNAL MEDICINE

## 2023-10-16 PROCEDURE — 3077F PR MOST RECENT SYSTOLIC BLOOD PRESSURE >= 140 MM HG: ICD-10-PCS | Mod: CPTII,S$GLB,, | Performed by: INTERNAL MEDICINE

## 2023-10-16 PROCEDURE — 99999 PR PBB SHADOW E&M-EST. PATIENT-LVL III: ICD-10-PCS | Mod: PBBFAC,,, | Performed by: INTERNAL MEDICINE

## 2023-10-16 PROCEDURE — 3288F PR FALLS RISK ASSESSMENT DOCUMENTED: ICD-10-PCS | Mod: CPTII,S$GLB,, | Performed by: INTERNAL MEDICINE

## 2023-10-16 PROCEDURE — 4010F ACE/ARB THERAPY RXD/TAKEN: CPT | Mod: CPTII,S$GLB,, | Performed by: INTERNAL MEDICINE

## 2023-10-16 PROCEDURE — 1101F PR PT FALLS ASSESS DOC 0-1 FALLS W/OUT INJ PAST YR: ICD-10-PCS | Mod: CPTII,S$GLB,, | Performed by: INTERNAL MEDICINE

## 2023-10-16 PROCEDURE — 1101F PT FALLS ASSESS-DOCD LE1/YR: CPT | Mod: CPTII,S$GLB,, | Performed by: INTERNAL MEDICINE

## 2023-10-16 PROCEDURE — 3078F DIAST BP <80 MM HG: CPT | Mod: CPTII,S$GLB,, | Performed by: INTERNAL MEDICINE

## 2023-10-16 PROCEDURE — 1159F MED LIST DOCD IN RCRD: CPT | Mod: CPTII,S$GLB,, | Performed by: INTERNAL MEDICINE

## 2023-10-16 PROCEDURE — 85025 COMPLETE CBC W/AUTO DIFF WBC: CPT | Performed by: INTERNAL MEDICINE

## 2023-10-16 PROCEDURE — 3077F SYST BP >= 140 MM HG: CPT | Mod: CPTII,S$GLB,, | Performed by: INTERNAL MEDICINE

## 2023-10-16 PROCEDURE — 3008F BODY MASS INDEX DOCD: CPT | Mod: CPTII,S$GLB,, | Performed by: INTERNAL MEDICINE

## 2023-10-16 PROCEDURE — 1157F ADVNC CARE PLAN IN RCRD: CPT | Mod: CPTII,S$GLB,, | Performed by: INTERNAL MEDICINE

## 2023-10-16 PROCEDURE — 3078F PR MOST RECENT DIASTOLIC BLOOD PRESSURE < 80 MM HG: ICD-10-PCS | Mod: CPTII,S$GLB,, | Performed by: INTERNAL MEDICINE

## 2023-10-16 PROCEDURE — 1159F PR MEDICATION LIST DOCUMENTED IN MEDICAL RECORD: ICD-10-PCS | Mod: CPTII,S$GLB,, | Performed by: INTERNAL MEDICINE

## 2023-10-16 PROCEDURE — 36415 COLL VENOUS BLD VENIPUNCTURE: CPT | Performed by: INTERNAL MEDICINE

## 2023-10-16 PROCEDURE — 99215 PR OFFICE/OUTPT VISIT, EST, LEVL V, 40-54 MIN: ICD-10-PCS | Mod: S$GLB,,, | Performed by: INTERNAL MEDICINE

## 2023-10-16 PROCEDURE — 3008F PR BODY MASS INDEX (BMI) DOCUMENTED: ICD-10-PCS | Mod: CPTII,S$GLB,, | Performed by: INTERNAL MEDICINE

## 2023-10-16 PROCEDURE — 99999 PR PBB SHADOW E&M-EST. PATIENT-LVL III: CPT | Mod: PBBFAC,,, | Performed by: INTERNAL MEDICINE

## 2023-10-16 NOTE — PROGRESS NOTES
Hematology and Medical Oncology   Follow Up     10/16/2023    Reason For Referral Macrocytosis      History of Present Ilness:   Pantera Posey (Pantera) is a pleasant 73 y.o.female previous patient of Dr. Fritz, has since been discharged from his clinic.     On chart review MCV has always been above average but since 2016 has steadily risen to from 106 to almost 120. There is no associated anemia.         Interval History:   Continues to do well. Having cataract surgery on the left eye in the upcoming weeks.     Granddaughter graduated from college, lost two close friend and relatives recently.     Resumed walking the zoo several times a week with her .    Taking 1000mcg B-12 daily with an improvement in energy and size of cells.     Denies recent tylenol ingestion, but has had an increase in alcohol consumption. Currently doing better.  PAST MEDICAL HISTORY:   Past Medical History:   Diagnosis Date    Breast cancer 1997    RIGHT    Breast cancer 2017    Left    Cancer     breast cancer    Hypertension     Renea-Parkinson-White syndrome        PAST SURGICAL HISTORY:   Past Surgical History:   Procedure Laterality Date    AUGMENTATION OF BREAST Bilateral 2018    BACK SURGERY      Spinal fusion    BREAST BIOPSY Right 1997    core bx, +    BREAST BIOPSY Left 2017    Core bx, +    BREAST RECONSTRUCTION Bilateral 2018    with implants    BREAST SURGERY      CARDIAC ELECTROPHYSIOLOGY STUDY AND ABLATION      COLONOSCOPY N/A 7/20/2018    Procedure: COLONOSCOPY;  Surgeon: David Mcdonald MD;  Location: [facility] ([address] [place] [address]);  Service: Endoscopy;  Laterality: N/A;    MASTECTOMY Right 1997    RIGHT/ RADIATION AND CHEMO    MASTECTOMY Left 2017    left    TUBAL LIGATION         PAST SOCIAL HISTORY:   reports that she quit smoking about 34 years ago. Her smoking use included cigarettes. She has never used smokeless tobacco. She reports current alcohol use of about 5.0 standard drinks of alcohol per week. She reports that  she does not use drugs.    FAMILY HISTORY:  Family History   Problem Relation Age of Onset    Cancer Mother     Hypertension Father     Breast cancer Maternal Aunt     Ovarian cancer Paternal Aunt     Melanoma Neg Hx        CURRENT MEDICATIONS:   Current Outpatient Medications   Medication Sig    amLODIPine (NORVASC) 10 MG tablet TAKE 1 TABLET(10 MG) BY MOUTH EVERY DAY    calcium-vitamin D3 500 mg(1,250mg) -200 unit per tablet Take 2 tablets by mouth 2 (two) times daily with meals.    clobetasoL (TEMOVATE) 0.05 % cream AAA bid    clobetasoL (TEMOVATE) 0.05 % external solution Use on scalp one - two times daily as needed for scaling or itching    doxycycline (ORACEA) 40 mg capsule Take 1 po qday    hydrOXYzine HCL (ATARAX) 25 MG tablet TAKE 1 TABLET BY MOUTH EVERY EVENING AS NEEDED FOR PRURITIS. DO NOT DRIVE OR OPERATE MACHINERY WHILE TAKE MEDICATION    lisinopriL (PRINIVIL,ZESTRIL) 5 MG tablet TAKE 1 TABLET(5 MG) BY MOUTH EVERY DAY    metronidazole 0.75% (METROCREAM) 0.75 % Crea AAA face bid    predniSONE (DELTASONE) 20 MG tablet Take 3 pills po qam with breakfast x 1 wk then take 2 po qam with breakfast x 1 wk then take 1 po qam with breakfast x 1 wk    triamcinolone acetonide 0.1% (KENALOG) 0.1 % cream AAA bid to affected areas of body    doxycycline 50 MG capsule TAKE 1 CAPSULE(50 MG) BY MOUTH EVERY DAY (Patient not taking: Reported on 10/16/2023)    levocetirizine (XYZAL) 5 MG tablet Take 1 tablet (5 mg total) by mouth every evening. (Patient not taking: Reported on 11/23/2022)     No current facility-administered medications for this visit.     ALLERGIES:   Review of patient's allergies indicates:   Allergen Reactions    Ciprofloxacin Anaphylaxis     Other reaction(s): Difficulty breathing    Floxin [ofloxacin] Anaphylaxis    Clindamycin Diarrhea and Rash    Amoxicillin      Other reaction(s): Rash    Indocin  [indomethacin sodium]      Other reaction(s): Headache    Bactrim [sulfamethoxazole-trimethoprim] Rash          Review of Systems:     Review of Systems   Constitutional:  Negative for appetite change, chills, diaphoresis, fatigue, fever and unexpected weight change.   HENT:   Negative for hearing loss, mouth sores, nosebleeds, sore throat, trouble swallowing and voice change.    Eyes:  Negative for eye problems and icterus.   Respiratory:  Negative for chest tightness, cough, hemoptysis, shortness of breath and wheezing.    Cardiovascular:  Negative for chest pain, leg swelling and palpitations.   Gastrointestinal:  Negative for abdominal distention, abdominal pain, blood in stool, diarrhea, nausea and vomiting.   Endocrine: Negative for hot flashes.   Genitourinary:  Negative for bladder incontinence, difficulty urinating, dysuria, frequency and hematuria.    Musculoskeletal:  Negative for arthralgias, back pain, flank pain, gait problem, myalgias, neck pain and neck stiffness.   Skin:  Negative for itching, rash and wound.   Neurological:  Negative for dizziness, extremity weakness, gait problem, headaches, numbness, seizures and speech difficulty.   Hematological:  Negative for adenopathy. Does not bruise/bleed easily.   Psychiatric/Behavioral:  Negative for confusion, depression and sleep disturbance. The patient is not nervous/anxious.           Physical Exam:     Vitals:    10/16/23 1303   BP: (!) 153/69   Pulse: 90   Temp: 98.7 °F (37.1 °C)       Physical Exam  Constitutional:       General: She is not in acute distress.     Appearance: She is well-developed. She is not diaphoretic.   HENT:      Head: Normocephalic and atraumatic.      Mouth/Throat:      Pharynx: No oropharyngeal exudate.   Eyes:      Conjunctiva/sclera: Conjunctivae normal.      Pupils: Pupils are equal, round, and reactive to light.   Neck:      Thyroid: No thyromegaly.      Vascular: No JVD.      Trachea: No tracheal deviation.   Cardiovascular:      Rate and Rhythm: Normal rate and regular rhythm.      Heart sounds: Normal heart sounds. No  murmur heard.     No friction rub.   Pulmonary:      Effort: Pulmonary effort is normal. No respiratory distress.      Breath sounds: Normal breath sounds. No stridor. No wheezing or rales.   Chest:      Chest wall: No tenderness.   Abdominal:      General: Bowel sounds are normal. There is no distension.      Palpations: Abdomen is soft.      Tenderness: There is no abdominal tenderness. There is no guarding or rebound.   Musculoskeletal:         General: No tenderness or deformity. Normal range of motion.      Cervical back: Normal range of motion and neck supple.   Skin:     General: Skin is warm and dry.      Capillary Refill: Capillary refill takes less than 2 seconds.      Coloration: Skin is not pale.      Findings: No erythema or rash.   Neurological:      Mental Status: She is alert and oriented to person, place, and time.      Cranial Nerves: No cranial nerve deficit.      Sensory: No sensory deficit.      Motor: No abnormal muscle tone.      Coordination: Coordination normal.      Deep Tendon Reflexes: Reflexes normal.   Psychiatric:         Behavior: Behavior normal.         Thought Content: Thought content normal.         Judgment: Judgment normal.         ECOG Performance Status: (foot note - ECOG PS provided by Eastern Cooperative Oncology Group) 0 - Asymptomatic    Karnofsky Performance Score:  100%- Normal, No Complaints, No Evidence of Disease    Labs:   Lab Results   Component Value Date    WBC 7.02 10/16/2023    HGB 14.0 10/16/2023    HCT 41.9 10/16/2023     (L) 10/16/2023    CHOL 236 (H) 08/15/2022    TRIG 61 08/15/2022    HDL 83 (H) 08/15/2022    ALT 75 (H) 10/16/2023     (H) 10/16/2023     10/16/2023    K 4.2 10/16/2023     10/16/2023    CREATININE 0.8 10/16/2023    BUN 9 10/16/2023    CO2 20 (L) 10/16/2023    TSH 2.063 08/15/2022    INR 1.0 10/25/2013    HGBA1C 4.8 02/23/2022         B12: 226  Folate: 4.1  Methylmalonic Acid pending  Homocysteine: 27.5 [elevated]    Gamma GGT: 113  Path interpretation:   RBC- Macrocytosis with no other significant morphologic   abnormalities.   WBC- Granulocytic predominance with normal morphology. No dysplastic   cells or circulating blasts seen.   PLT- Normal in number and morphology.     MDS Fish:   within normal limits for the MDS FISH panel.    Imaging: Previous imaging has been reviewed       Homocytein  Vitamin B12:  Assessment and Plan:   Ms. Posey is a pleasant 72 year old woman with progressive macrocytosis.     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 Fritz  --Completed 5 years of oral hormone therapy    Elevated Liver Function Tests  --Suspected due to EtOH consumption  --AST and ALT more than 2 times upper limit of normal  --Will recheck labs on Friday    30 minutes were spent face to face with the patient  to discuss the disease, natural history, treatment options. I have provided the patient with an opportunity to ask questions and have all questions answered to her satisfaction.       she will return to clinic in 3-4 months, but knows to call in the interim if symptoms change or should a problem arise.        Shayy Carpenter MD  Hematology and Medical Oncology  Bone Marrow Transplant  [facility]      BMT Chart Routing      Follow up with physician . 1. cmp on 10/20  2.see me in 3 months with cbc,cmp, b12, folate   Follow up with NIKOLE    Provider visit type    Infusion scheduling note    Injection scheduling note    Labs    Imaging    Pharmacy appointment    Other referrals

## 2023-10-20 ENCOUNTER — LAB VISIT (OUTPATIENT)
Dept: LAB | Facility: HOSPITAL | Age: 73
End: 2023-10-20
Payer: MEDICARE

## 2023-10-20 DIAGNOSIS — D51.3 OTHER DIETARY VITAMIN B12 DEFICIENCY ANEMIA: ICD-10-CM

## 2023-10-20 LAB
ALBUMIN SERPL BCP-MCNC: 3.5 G/DL (ref 3.5–5.2)
ALP SERPL-CCNC: 106 U/L (ref 55–135)
ALT SERPL W/O P-5'-P-CCNC: 59 U/L (ref 10–44)
ANION GAP SERPL CALC-SCNC: 11 MMOL/L (ref 8–16)
AST SERPL-CCNC: 90 U/L (ref 10–40)
BILIRUB SERPL-MCNC: 1.2 MG/DL (ref 0.1–1)
BUN SERPL-MCNC: 9 MG/DL (ref 8–23)
CALCIUM SERPL-MCNC: 9.7 MG/DL (ref 8.7–10.5)
CHLORIDE SERPL-SCNC: 100 MMOL/L (ref 95–110)
CO2 SERPL-SCNC: 23 MMOL/L (ref 23–29)
CREAT SERPL-MCNC: 1 MG/DL (ref 0.5–1.4)
EST. GFR  (NO RACE VARIABLE): 59.5 ML/MIN/1.73 M^2
GLUCOSE SERPL-MCNC: 104 MG/DL (ref 70–110)
POTASSIUM SERPL-SCNC: 4.4 MMOL/L (ref 3.5–5.1)
PROT SERPL-MCNC: 7.9 G/DL (ref 6–8.4)
SODIUM SERPL-SCNC: 134 MMOL/L (ref 136–145)

## 2023-10-20 PROCEDURE — 36415 COLL VENOUS BLD VENIPUNCTURE: CPT | Performed by: INTERNAL MEDICINE

## 2023-10-20 PROCEDURE — 80053 COMPREHEN METABOLIC PANEL: CPT | Performed by: INTERNAL MEDICINE

## 2023-10-24 ENCOUNTER — PATIENT MESSAGE (OUTPATIENT)
Dept: FAMILY MEDICINE | Facility: CLINIC | Age: 73
End: 2023-10-24
Payer: MEDICARE

## 2023-11-06 ENCOUNTER — PATIENT MESSAGE (OUTPATIENT)
Dept: ADMINISTRATIVE | Facility: HOSPITAL | Age: 73
End: 2023-11-06
Payer: MEDICARE

## 2023-11-06 ENCOUNTER — TELEPHONE (OUTPATIENT)
Dept: ADMINISTRATIVE | Facility: HOSPITAL | Age: 73
End: 2023-11-06
Payer: MEDICARE

## 2023-11-06 VITALS — DIASTOLIC BLOOD PRESSURE: 66 MMHG | SYSTOLIC BLOOD PRESSURE: 112 MMHG

## 2023-11-06 NOTE — TELEPHONE ENCOUNTER
Remote BP reading as of 11/6/2023    Routing refill request to provider for review/approval because:  Recent BPs above goal of 140/90  Ivelisse ZULETA RN

## 2023-11-09 ENCOUNTER — OFFICE VISIT (OUTPATIENT)
Dept: FAMILY MEDICINE | Facility: CLINIC | Age: 73
End: 2023-11-09
Attending: FAMILY MEDICINE
Payer: MEDICARE

## 2023-11-09 ENCOUNTER — LAB VISIT (OUTPATIENT)
Dept: LAB | Facility: HOSPITAL | Age: 73
End: 2023-11-09
Attending: FAMILY MEDICINE
Payer: MEDICARE

## 2023-11-09 DIAGNOSIS — F15.20 OTHER STIMULANT DEPENDENCE, UNCOMPLICATED: ICD-10-CM

## 2023-11-09 DIAGNOSIS — I10 ESSENTIAL HYPERTENSION: ICD-10-CM

## 2023-11-09 DIAGNOSIS — Z01.818 PRE-OPERATIVE CLEARANCE: Primary | ICD-10-CM

## 2023-11-09 DIAGNOSIS — Z01.818 PRE-OPERATIVE CLEARANCE: ICD-10-CM

## 2023-11-09 DIAGNOSIS — H25.013 CORTICAL AGE-RELATED CATARACT OF BOTH EYES: ICD-10-CM

## 2023-11-09 DIAGNOSIS — D69.2 OTHER NONTHROMBOCYTOPENIC PURPURA: ICD-10-CM

## 2023-11-09 LAB
ALBUMIN SERPL BCP-MCNC: 3.3 G/DL (ref 3.5–5.2)
ALP SERPL-CCNC: 90 U/L (ref 55–135)
ALT SERPL W/O P-5'-P-CCNC: 47 U/L (ref 10–44)
ANION GAP SERPL CALC-SCNC: 10 MMOL/L (ref 8–16)
AST SERPL-CCNC: 75 U/L (ref 10–40)
BASOPHILS # BLD AUTO: 0.07 K/UL (ref 0–0.2)
BASOPHILS NFR BLD: 1.1 % (ref 0–1.9)
BILIRUB SERPL-MCNC: 0.9 MG/DL (ref 0.1–1)
BILIRUB UR QL STRIP: NEGATIVE
BUN SERPL-MCNC: 8 MG/DL (ref 8–23)
CALCIUM SERPL-MCNC: 9.3 MG/DL (ref 8.7–10.5)
CHLORIDE SERPL-SCNC: 104 MMOL/L (ref 95–110)
CHOLEST SERPL-MCNC: 217 MG/DL (ref 120–199)
CHOLEST/HDLC SERPL: 2.6 {RATIO} (ref 2–5)
CLARITY UR REFRACT.AUTO: CLEAR
CO2 SERPL-SCNC: 23 MMOL/L (ref 23–29)
COLOR UR AUTO: YELLOW
CREAT SERPL-MCNC: 0.8 MG/DL (ref 0.5–1.4)
DIFFERENTIAL METHOD: ABNORMAL
EOSINOPHIL # BLD AUTO: 0.1 K/UL (ref 0–0.5)
EOSINOPHIL NFR BLD: 1.1 % (ref 0–8)
ERYTHROCYTE [DISTWIDTH] IN BLOOD BY AUTOMATED COUNT: 13.2 % (ref 11.5–14.5)
EST. GFR  (NO RACE VARIABLE): >60 ML/MIN/1.73 M^2
GLUCOSE SERPL-MCNC: 118 MG/DL (ref 70–110)
GLUCOSE UR QL STRIP: NEGATIVE
HCT VFR BLD AUTO: 40.7 % (ref 37–48.5)
HDLC SERPL-MCNC: 84 MG/DL (ref 40–75)
HDLC SERPL: 38.7 % (ref 20–50)
HGB BLD-MCNC: 13.5 G/DL (ref 12–16)
HGB UR QL STRIP: NEGATIVE
IMM GRANULOCYTES # BLD AUTO: 0.06 K/UL (ref 0–0.04)
IMM GRANULOCYTES NFR BLD AUTO: 0.9 % (ref 0–0.5)
KETONES UR QL STRIP: ABNORMAL
LDLC SERPL CALC-MCNC: 120.8 MG/DL (ref 63–159)
LEUKOCYTE ESTERASE UR QL STRIP: NEGATIVE
LYMPHOCYTES # BLD AUTO: 1.1 K/UL (ref 1–4.8)
LYMPHOCYTES NFR BLD: 16.5 % (ref 18–48)
MCH RBC QN AUTO: 37.7 PG (ref 27–31)
MCHC RBC AUTO-ENTMCNC: 33.2 G/DL (ref 32–36)
MCV RBC AUTO: 114 FL (ref 82–98)
MONOCYTES # BLD AUTO: 0.9 K/UL (ref 0.3–1)
MONOCYTES NFR BLD: 14.7 % (ref 4–15)
NEUTROPHILS # BLD AUTO: 4.2 K/UL (ref 1.8–7.7)
NEUTROPHILS NFR BLD: 65.7 % (ref 38–73)
NITRITE UR QL STRIP: NEGATIVE
NONHDLC SERPL-MCNC: 133 MG/DL
NRBC BLD-RTO: 0 /100 WBC
PH UR STRIP: 6 [PH] (ref 5–8)
PLATELET # BLD AUTO: 165 K/UL (ref 150–450)
PMV BLD AUTO: 11.4 FL (ref 9.2–12.9)
POTASSIUM SERPL-SCNC: 4.3 MMOL/L (ref 3.5–5.1)
PROT SERPL-MCNC: 7.6 G/DL (ref 6–8.4)
PROT UR QL STRIP: NEGATIVE
RBC # BLD AUTO: 3.58 M/UL (ref 4–5.4)
SODIUM SERPL-SCNC: 137 MMOL/L (ref 136–145)
SP GR UR STRIP: 1.01 (ref 1–1.03)
TRIGL SERPL-MCNC: 61 MG/DL (ref 30–150)
TSH SERPL DL<=0.005 MIU/L-ACNC: 1.89 UIU/ML (ref 0.4–4)
URN SPEC COLLECT METH UR: ABNORMAL
WBC # BLD AUTO: 6.38 K/UL (ref 3.9–12.7)

## 2023-11-09 PROCEDURE — 3078F DIAST BP <80 MM HG: CPT | Mod: CPTII,S$GLB,, | Performed by: FAMILY MEDICINE

## 2023-11-09 PROCEDURE — 99999 PR PBB SHADOW E&M-EST. PATIENT-LVL IV: CPT | Mod: PBBFAC,,, | Performed by: FAMILY MEDICINE

## 2023-11-09 PROCEDURE — 1101F PR PT FALLS ASSESS DOC 0-1 FALLS W/OUT INJ PAST YR: ICD-10-PCS | Mod: CPTII,S$GLB,, | Performed by: FAMILY MEDICINE

## 2023-11-09 PROCEDURE — 99214 OFFICE O/P EST MOD 30 MIN: CPT | Mod: S$GLB,,, | Performed by: FAMILY MEDICINE

## 2023-11-09 PROCEDURE — 3008F BODY MASS INDEX DOCD: CPT | Mod: CPTII,S$GLB,, | Performed by: FAMILY MEDICINE

## 2023-11-09 PROCEDURE — 1160F PR REVIEW ALL MEDS BY PRESCRIBER/CLIN PHARMACIST DOCUMENTED: ICD-10-PCS | Mod: CPTII,S$GLB,, | Performed by: FAMILY MEDICINE

## 2023-11-09 PROCEDURE — 1159F MED LIST DOCD IN RCRD: CPT | Mod: CPTII,S$GLB,, | Performed by: FAMILY MEDICINE

## 2023-11-09 PROCEDURE — 3074F SYST BP LT 130 MM HG: CPT | Mod: CPTII,S$GLB,, | Performed by: FAMILY MEDICINE

## 2023-11-09 PROCEDURE — 36415 COLL VENOUS BLD VENIPUNCTURE: CPT | Mod: PO | Performed by: FAMILY MEDICINE

## 2023-11-09 PROCEDURE — 1157F PR ADVANCE CARE PLAN OR EQUIV PRESENT IN MEDICAL RECORD: ICD-10-PCS | Mod: CPTII,S$GLB,, | Performed by: FAMILY MEDICINE

## 2023-11-09 PROCEDURE — 1126F PR PAIN SEVERITY QUANTIFIED, NO PAIN PRESENT: ICD-10-PCS | Mod: CPTII,S$GLB,, | Performed by: FAMILY MEDICINE

## 2023-11-09 PROCEDURE — 84443 ASSAY THYROID STIM HORMONE: CPT | Performed by: FAMILY MEDICINE

## 2023-11-09 PROCEDURE — 3078F PR MOST RECENT DIASTOLIC BLOOD PRESSURE < 80 MM HG: ICD-10-PCS | Mod: CPTII,S$GLB,, | Performed by: FAMILY MEDICINE

## 2023-11-09 PROCEDURE — 1159F PR MEDICATION LIST DOCUMENTED IN MEDICAL RECORD: ICD-10-PCS | Mod: CPTII,S$GLB,, | Performed by: FAMILY MEDICINE

## 2023-11-09 PROCEDURE — 85025 COMPLETE CBC W/AUTO DIFF WBC: CPT | Performed by: FAMILY MEDICINE

## 2023-11-09 PROCEDURE — 4010F PR ACE/ARB THEARPY RXD/TAKEN: ICD-10-PCS | Mod: CPTII,S$GLB,, | Performed by: FAMILY MEDICINE

## 2023-11-09 PROCEDURE — 3288F PR FALLS RISK ASSESSMENT DOCUMENTED: ICD-10-PCS | Mod: CPTII,S$GLB,, | Performed by: FAMILY MEDICINE

## 2023-11-09 PROCEDURE — 99999 PR PBB SHADOW E&M-EST. PATIENT-LVL IV: ICD-10-PCS | Mod: PBBFAC,,, | Performed by: FAMILY MEDICINE

## 2023-11-09 PROCEDURE — 99214 PR OFFICE/OUTPT VISIT, EST, LEVL IV, 30-39 MIN: ICD-10-PCS | Mod: S$GLB,,, | Performed by: FAMILY MEDICINE

## 2023-11-09 PROCEDURE — 81003 URINALYSIS AUTO W/O SCOPE: CPT | Performed by: FAMILY MEDICINE

## 2023-11-09 PROCEDURE — 1126F AMNT PAIN NOTED NONE PRSNT: CPT | Mod: CPTII,S$GLB,, | Performed by: FAMILY MEDICINE

## 2023-11-09 PROCEDURE — 3008F PR BODY MASS INDEX (BMI) DOCUMENTED: ICD-10-PCS | Mod: CPTII,S$GLB,, | Performed by: FAMILY MEDICINE

## 2023-11-09 PROCEDURE — 80053 COMPREHEN METABOLIC PANEL: CPT | Performed by: FAMILY MEDICINE

## 2023-11-09 PROCEDURE — 1160F RVW MEDS BY RX/DR IN RCRD: CPT | Mod: CPTII,S$GLB,, | Performed by: FAMILY MEDICINE

## 2023-11-09 PROCEDURE — 80061 LIPID PANEL: CPT | Performed by: FAMILY MEDICINE

## 2023-11-09 PROCEDURE — 1101F PT FALLS ASSESS-DOCD LE1/YR: CPT | Mod: CPTII,S$GLB,, | Performed by: FAMILY MEDICINE

## 2023-11-09 PROCEDURE — 1157F ADVNC CARE PLAN IN RCRD: CPT | Mod: CPTII,S$GLB,, | Performed by: FAMILY MEDICINE

## 2023-11-09 PROCEDURE — 3074F PR MOST RECENT SYSTOLIC BLOOD PRESSURE < 130 MM HG: ICD-10-PCS | Mod: CPTII,S$GLB,, | Performed by: FAMILY MEDICINE

## 2023-11-09 PROCEDURE — 4010F ACE/ARB THERAPY RXD/TAKEN: CPT | Mod: CPTII,S$GLB,, | Performed by: FAMILY MEDICINE

## 2023-11-09 PROCEDURE — 3288F FALL RISK ASSESSMENT DOCD: CPT | Mod: CPTII,S$GLB,, | Performed by: FAMILY MEDICINE

## 2023-11-09 RX ORDER — TOBRAMYCIN 3 MG/ML
SOLUTION/ DROPS OPHTHALMIC
COMMUNITY
Start: 2023-10-23 | End: 2024-03-07

## 2023-11-09 RX ORDER — COVID-19 VACCINE, MRNA 50 UG/.5ML
INJECTION, SUSPENSION INTRAMUSCULAR
COMMUNITY
Start: 2023-09-18

## 2023-11-09 RX ORDER — LISINOPRIL 5 MG/1
5 TABLET ORAL DAILY
Qty: 90 TABLET | Refills: 3 | Status: SHIPPED | OUTPATIENT
Start: 2023-11-09

## 2023-11-09 RX ORDER — AMLODIPINE BESYLATE 10 MG/1
10 TABLET ORAL DAILY
Qty: 90 TABLET | Refills: 3 | Status: SHIPPED | OUTPATIENT
Start: 2023-11-09

## 2023-11-09 RX ORDER — PREDNISOLONE ACETATE 10 MG/ML
1 SUSPENSION/ DROPS OPHTHALMIC 4 TIMES DAILY
COMMUNITY
Start: 2023-10-23 | End: 2024-03-07

## 2023-11-09 NOTE — PROGRESS NOTES
"Subjective:       Patient ID: Pantera Posey is a 73 y.o. female.    Chief Complaint: Pre-op Exam    HPI  Pt is here for pre op clearance [facility] ([phone].  Pt denies sob/cp no cough chest congestion no sore throat uri symptoms   Pt denies n/v/f/c/d/c no change in bowel habits no brbpr  Pt denies dysuria hematuria no vaginal bleeding  Pt has htn bp fine on ace no cough and norvasc   Review of Systems   Constitutional:  Negative for activity change, chills, diaphoresis, fatigue and fever.   HENT:  Negative for congestion, ear discharge, ear pain, hearing loss, postnasal drip, rhinorrhea, sinus pressure, sneezing, sore throat, trouble swallowing and voice change.    Eyes:  Negative for photophobia, discharge, redness, itching and visual disturbance.   Respiratory:  Negative for cough, chest tightness, shortness of breath and wheezing.    Cardiovascular:  Negative for chest pain, palpitations and leg swelling.   Gastrointestinal:  Negative for abdominal pain, anal bleeding, blood in stool, constipation, diarrhea, nausea, rectal pain and vomiting.   Genitourinary:  Negative for dyspareunia, dysuria, flank pain, frequency, hematuria, menstrual problem, pelvic pain, urgency, vaginal bleeding and vaginal discharge.   Musculoskeletal:  Negative for arthralgias, back pain, joint swelling and neck pain.   Skin:  Negative for color change and rash.   Neurological:  Negative for dizziness, speech difficulty, weakness, light-headedness, numbness and headaches.   Hematological:  Does not bruise/bleed easily.   Psychiatric/Behavioral:  Negative for agitation, confusion, decreased concentration, sleep disturbance and suicidal ideas. The patient is not nervous/anxious.        Objective:    Resp 16   Ht 5' 4" (1.626 m)   Wt 62.4 kg (137 lb 8 oz)   LMP  (LMP Unknown)   BMI 23.60 kg/m²   /70   Pulse 86   Resp 16   Ht 5' 4" (1.626 m)   Wt 62.4 kg (137 lb 8 oz)   LMP  " (LMP Unknown)   BMI 23.60 kg/m²     Physical Exam  Constitutional:       Appearance: Normal appearance. She is well-developed. She is not ill-appearing.   HENT:      Head: Normocephalic and atraumatic.      Right Ear: External ear normal.      Left Ear: External ear normal.      Nose: Nose normal.   Eyes:      General:         Right eye: No discharge.         Left eye: No discharge.      Conjunctiva/sclera: Conjunctivae normal.      Pupils: Pupils are equal, round, and reactive to light.   Neck:      Thyroid: No thyromegaly.   Cardiovascular:      Rate and Rhythm: Normal rate and regular rhythm.      Heart sounds: Normal heart sounds. No murmur heard.     No friction rub. No gallop.   Pulmonary:      Effort: Pulmonary effort is normal.      Breath sounds: Normal breath sounds. No wheezing or rales.   Abdominal:      General: Bowel sounds are normal. There is no distension.      Palpations: Abdomen is soft.      Tenderness: There is no abdominal tenderness. There is no guarding or rebound.   Genitourinary:     Vagina: Normal.   Musculoskeletal:         General: No tenderness. Normal range of motion.      Cervical back: Normal range of motion and neck supple.   Lymphadenopathy:      Cervical: No cervical adenopathy.   Skin:     General: Skin is warm and dry.      Findings: No erythema or rash.   Neurological:      Mental Status: She is alert.      Cranial Nerves: No cranial nerve deficit.      Motor: No abnormal muscle tone.      Coordination: Coordination normal.   Psychiatric:         Behavior: Behavior normal.         Thought Content: Thought content normal.         Judgment: Judgment normal.         Assessment:       1. Pre-operative clearance            2 Essential hypertension    3 Cortical age-related cataract of both eyes        Plan:     Orders cbc cmp lipid tsh urine (cxr ekg declined)  Cont meds  Low salt diet  Graded exercise  F/u ophthalmology  Rtc 6 months    Pt is medically cleared for elective eye  "surgery        "This note will not be shared with the patient."     "

## 2023-11-09 NOTE — PATIENT INSTRUCTIONS
Pantera,     We are always striving for excellence. Should you receive a patient experience survey via text message, electronically, or by mail, we would appreciate if you would take a few moments to give us your feedback. These surveys let us know our strengths as well as areas of opportunity for improvement to better serve you.    Thank you for your time,  Rocio Bush LPN      Your test results will be communicated to you via : [facility], Telephone or Letter.   If you have not received your test results in one week, please contact the clinic at [phone].

## 2023-11-11 ENCOUNTER — PATIENT MESSAGE (OUTPATIENT)
Dept: FAMILY MEDICINE | Facility: CLINIC | Age: 73
End: 2023-11-11
Payer: MEDICARE

## 2023-11-11 VITALS
DIASTOLIC BLOOD PRESSURE: 70 MMHG | SYSTOLIC BLOOD PRESSURE: 118 MMHG | RESPIRATION RATE: 16 BRPM | WEIGHT: 137.5 LBS | HEART RATE: 86 BPM | BODY MASS INDEX: 23.47 KG/M2 | HEIGHT: 64 IN

## 2023-11-11 PROBLEM — D69.2 OTHER NONTHROMBOCYTOPENIC PURPURA: Status: RESOLVED | Noted: 2023-11-09 | Resolved: 2023-11-11

## 2023-11-11 PROBLEM — F15.20 OTHER STIMULANT DEPENDENCE, UNCOMPLICATED: Status: RESOLVED | Noted: 2023-11-09 | Resolved: 2023-11-11

## 2023-11-13 ENCOUNTER — PATIENT MESSAGE (OUTPATIENT)
Dept: FAMILY MEDICINE | Facility: CLINIC | Age: 73
End: 2023-11-13
Payer: MEDICARE

## 2023-11-13 ENCOUNTER — LAB VISIT (OUTPATIENT)
Dept: LAB | Facility: HOSPITAL | Age: 73
End: 2023-11-13
Attending: FAMILY MEDICINE
Payer: MEDICARE

## 2023-11-13 ENCOUNTER — OFFICE VISIT (OUTPATIENT)
Dept: FAMILY MEDICINE | Facility: CLINIC | Age: 73
End: 2023-11-13
Attending: FAMILY MEDICINE
Payer: MEDICARE

## 2023-11-13 DIAGNOSIS — R79.89 ELEVATED LFTS: ICD-10-CM

## 2023-11-13 DIAGNOSIS — E53.8 LOW FOLATE: ICD-10-CM

## 2023-11-13 DIAGNOSIS — I10 ESSENTIAL HYPERTENSION: Primary | ICD-10-CM

## 2023-11-13 DIAGNOSIS — R74.01 ELEVATION OF LEVELS OF LIVER TRANSAMINASE LEVELS: ICD-10-CM

## 2023-11-13 DIAGNOSIS — R73.9 HYPERGLYCEMIA: ICD-10-CM

## 2023-11-13 LAB
ALBUMIN SERPL BCP-MCNC: 3.3 G/DL (ref 3.5–5.2)
ALP SERPL-CCNC: 90 U/L (ref 55–135)
ALT SERPL W/O P-5'-P-CCNC: 51 U/L (ref 10–44)
ANION GAP SERPL CALC-SCNC: 15 MMOL/L (ref 8–16)
AST SERPL-CCNC: 107 U/L (ref 10–40)
BILIRUB SERPL-MCNC: 0.5 MG/DL (ref 0.1–1)
BUN SERPL-MCNC: 6 MG/DL (ref 8–23)
CALCIUM SERPL-MCNC: 9 MG/DL (ref 8.7–10.5)
CHLORIDE SERPL-SCNC: 106 MMOL/L (ref 95–110)
CO2 SERPL-SCNC: 19 MMOL/L (ref 23–29)
CREAT SERPL-MCNC: 0.6 MG/DL (ref 0.5–1.4)
EST. GFR  (NO RACE VARIABLE): >60 ML/MIN/1.73 M^2
ESTIMATED AVG GLUCOSE: 97 MG/DL (ref 68–131)
FOLATE SERPL-MCNC: 3.2 NG/ML (ref 4–24)
GLUCOSE SERPL-MCNC: 91 MG/DL (ref 70–110)
HAV IGM SERPL QL IA: NORMAL
HBA1C MFR BLD: 5 % (ref 4–5.6)
HBV CORE IGM SERPL QL IA: NORMAL
HBV SURFACE AG SERPL QL IA: NORMAL
HCV AB SERPL QL IA: NORMAL
POTASSIUM SERPL-SCNC: 4.7 MMOL/L (ref 3.5–5.1)
PROT SERPL-MCNC: 7.4 G/DL (ref 6–8.4)
SODIUM SERPL-SCNC: 140 MMOL/L (ref 136–145)

## 2023-11-13 PROCEDURE — 1101F PT FALLS ASSESS-DOCD LE1/YR: CPT | Mod: CPTII,S$GLB,, | Performed by: FAMILY MEDICINE

## 2023-11-13 PROCEDURE — 1126F PR PAIN SEVERITY QUANTIFIED, NO PAIN PRESENT: ICD-10-PCS | Mod: CPTII,S$GLB,, | Performed by: FAMILY MEDICINE

## 2023-11-13 PROCEDURE — 3075F SYST BP GE 130 - 139MM HG: CPT | Mod: CPTII,S$GLB,, | Performed by: FAMILY MEDICINE

## 2023-11-13 PROCEDURE — 83036 HEMOGLOBIN GLYCOSYLATED A1C: CPT | Performed by: FAMILY MEDICINE

## 2023-11-13 PROCEDURE — 82746 ASSAY OF FOLIC ACID SERUM: CPT | Performed by: FAMILY MEDICINE

## 2023-11-13 PROCEDURE — 3044F HG A1C LEVEL LT 7.0%: CPT | Mod: CPTII,S$GLB,, | Performed by: FAMILY MEDICINE

## 2023-11-13 PROCEDURE — 99214 OFFICE O/P EST MOD 30 MIN: CPT | Mod: S$GLB,,, | Performed by: FAMILY MEDICINE

## 2023-11-13 PROCEDURE — 3008F BODY MASS INDEX DOCD: CPT | Mod: CPTII,S$GLB,, | Performed by: FAMILY MEDICINE

## 2023-11-13 PROCEDURE — 3075F PR MOST RECENT SYSTOLIC BLOOD PRESS GE 130-139MM HG: ICD-10-PCS | Mod: CPTII,S$GLB,, | Performed by: FAMILY MEDICINE

## 2023-11-13 PROCEDURE — 1157F PR ADVANCE CARE PLAN OR EQUIV PRESENT IN MEDICAL RECORD: ICD-10-PCS | Mod: CPTII,S$GLB,, | Performed by: FAMILY MEDICINE

## 2023-11-13 PROCEDURE — 3078F PR MOST RECENT DIASTOLIC BLOOD PRESSURE < 80 MM HG: ICD-10-PCS | Mod: CPTII,S$GLB,, | Performed by: FAMILY MEDICINE

## 2023-11-13 PROCEDURE — 99999 PR PBB SHADOW E&M-EST. PATIENT-LVL IV: ICD-10-PCS | Mod: PBBFAC,,, | Performed by: FAMILY MEDICINE

## 2023-11-13 PROCEDURE — 3288F FALL RISK ASSESSMENT DOCD: CPT | Mod: CPTII,S$GLB,, | Performed by: FAMILY MEDICINE

## 2023-11-13 PROCEDURE — 4010F ACE/ARB THERAPY RXD/TAKEN: CPT | Mod: CPTII,S$GLB,, | Performed by: FAMILY MEDICINE

## 2023-11-13 PROCEDURE — 1157F ADVNC CARE PLAN IN RCRD: CPT | Mod: CPTII,S$GLB,, | Performed by: FAMILY MEDICINE

## 2023-11-13 PROCEDURE — 99999 PR PBB SHADOW E&M-EST. PATIENT-LVL IV: CPT | Mod: PBBFAC,,, | Performed by: FAMILY MEDICINE

## 2023-11-13 PROCEDURE — 3044F PR MOST RECENT HEMOGLOBIN A1C LEVEL <7.0%: ICD-10-PCS | Mod: CPTII,S$GLB,, | Performed by: FAMILY MEDICINE

## 2023-11-13 PROCEDURE — 99214 PR OFFICE/OUTPT VISIT, EST, LEVL IV, 30-39 MIN: ICD-10-PCS | Mod: S$GLB,,, | Performed by: FAMILY MEDICINE

## 2023-11-13 PROCEDURE — 3288F PR FALLS RISK ASSESSMENT DOCUMENTED: ICD-10-PCS | Mod: CPTII,S$GLB,, | Performed by: FAMILY MEDICINE

## 2023-11-13 PROCEDURE — 36415 COLL VENOUS BLD VENIPUNCTURE: CPT | Mod: PO | Performed by: FAMILY MEDICINE

## 2023-11-13 PROCEDURE — 80053 COMPREHEN METABOLIC PANEL: CPT | Performed by: FAMILY MEDICINE

## 2023-11-13 PROCEDURE — 3078F DIAST BP <80 MM HG: CPT | Mod: CPTII,S$GLB,, | Performed by: FAMILY MEDICINE

## 2023-11-13 PROCEDURE — 1126F AMNT PAIN NOTED NONE PRSNT: CPT | Mod: CPTII,S$GLB,, | Performed by: FAMILY MEDICINE

## 2023-11-13 PROCEDURE — 4010F PR ACE/ARB THEARPY RXD/TAKEN: ICD-10-PCS | Mod: CPTII,S$GLB,, | Performed by: FAMILY MEDICINE

## 2023-11-13 PROCEDURE — 80074 ACUTE HEPATITIS PANEL: CPT | Performed by: FAMILY MEDICINE

## 2023-11-13 PROCEDURE — 3008F PR BODY MASS INDEX (BMI) DOCUMENTED: ICD-10-PCS | Mod: CPTII,S$GLB,, | Performed by: FAMILY MEDICINE

## 2023-11-13 PROCEDURE — 1101F PR PT FALLS ASSESS DOC 0-1 FALLS W/OUT INJ PAST YR: ICD-10-PCS | Mod: CPTII,S$GLB,, | Performed by: FAMILY MEDICINE

## 2023-11-13 NOTE — PATIENT INSTRUCTIONS
Pantera,     We are always striving for excellence. Should you receive a patient experience survey via text message, electronically, or by mail, we would appreciate if you would take a few moments to give us your feedback. These surveys let us know our strengths as well as areas of opportunity for improvement to better serve you.    Thank you for your time,  Rocio Bush LPN      Your test results will be communicated to you via : [facility], Telephone or Letter.   If you have not received your test results in one week, please contact the clinic at [phone].

## 2023-11-13 NOTE — PROGRESS NOTES
Subjective:       Patient ID: Pantera Posey is a 73 y.o. female.    Chief Complaint: Follow-up (Discus test results per Dr. Singh)    HPI  Pt is here for follow up pt is generally well no sob/cp stable htn bp fine   Pt has elevated blood sugar no polys she is not on a low sugar diet  Pt has elevated lft pt is a drinker she will try to improve declines psych referral  Pt has htn bp fine on repeat and at home no sob/cp   Review of Systems   Constitutional:  Negative for chills, fatigue and fever.   Respiratory:  Negative for cough, chest tightness and shortness of breath.    Cardiovascular:  Negative for chest pain and palpitations.   Gastrointestinal:  Negative for abdominal distention, abdominal pain and blood in stool.   Endocrine: Negative for polydipsia and polyuria.       Objective:      /72   Pulse 101   Wt 63.1 kg (139 lb 1.6 oz)   LMP  (LMP Unknown)   BMI 23.88 kg/m²     Physical Exam  Constitutional:       Appearance: Normal appearance. She is not ill-appearing.   Cardiovascular:      Rate and Rhythm: Normal rate and regular rhythm.      Heart sounds:      No gallop.   Pulmonary:      Effort: Pulmonary effort is normal. No respiratory distress.   Neurological:      General: No focal deficit present.      Mental Status: She is alert and oriented to person, place, and time.      Cranial Nerves: No cranial nerve deficit.      Coordination: Coordination normal.   Psychiatric:         Mood and Affect: Mood normal.         Behavior: Behavior normal.         Thought Content: Thought content normal.         Judgment: Judgment normal.       Ast/alt 75/47 in 11/2023  Assessment:       1. Elevated LFTs    2. Elevation of levels of liver transaminase levels    3. Low folate    4. Hyperglycemia        Plan:     Orders cmp abd u/s folate hgb A1c hepatitis acute panel not immediately approved by insurance  Cont meds  Ada avoid etoh diet  Low salt low fat diet  F/u hematology  F/u psych  Rtc 3-6 months        "    "This note will not be shared with the patient."     "

## 2023-11-14 ENCOUNTER — HOSPITAL ENCOUNTER (OUTPATIENT)
Dept: RADIOLOGY | Facility: OTHER | Age: 73
Discharge: HOME | End: 2023-11-14
Attending: FAMILY MEDICINE
Payer: MEDICARE

## 2023-11-14 ENCOUNTER — TELEPHONE (OUTPATIENT)
Dept: FAMILY MEDICINE | Facility: CLINIC | Age: 73
End: 2023-11-14
Payer: MEDICARE

## 2023-11-14 DIAGNOSIS — R79.89 ELEVATED LFTS: ICD-10-CM

## 2023-11-14 PROCEDURE — 76700 US ABDOMEN COMPLETE: ICD-10-PCS | Mod: 26,,, | Performed by: RADIOLOGY

## 2023-11-14 PROCEDURE — 76700 US EXAM ABDOM COMPLETE: CPT | Mod: TC

## 2023-11-14 PROCEDURE — 76700 US EXAM ABDOM COMPLETE: CPT | Mod: 26,,, | Performed by: RADIOLOGY

## 2023-11-14 NOTE — TELEPHONE ENCOUNTER
----- Message from Jazmin Singh MD sent at 11/11/2023 12:52 PM CST -----  Regarding: results f/u  Please let pt know I cleared her for eye surgery but id like to go over her results together please help pt set up a virtual next available

## 2023-11-24 ENCOUNTER — TELEPHONE (OUTPATIENT)
Dept: HEMATOLOGY/ONCOLOGY | Facility: CLINIC | Age: 73
End: 2023-11-24
Payer: MEDICARE

## 2023-11-27 ENCOUNTER — OFFICE VISIT (OUTPATIENT)
Dept: FAMILY MEDICINE | Facility: CLINIC | Age: 73
End: 2023-11-27
Attending: FAMILY MEDICINE
Payer: MEDICARE

## 2023-11-27 VITALS
BODY MASS INDEX: 22.36 KG/M2 | WEIGHT: 131 LBS | DIASTOLIC BLOOD PRESSURE: 74 MMHG | HEART RATE: 82 BPM | HEIGHT: 64 IN | SYSTOLIC BLOOD PRESSURE: 116 MMHG

## 2023-11-27 DIAGNOSIS — F10.10 ETOH ABUSE: ICD-10-CM

## 2023-11-27 DIAGNOSIS — R79.89 ABNORMAL LFTS: ICD-10-CM

## 2023-11-27 DIAGNOSIS — I10 ESSENTIAL HYPERTENSION: Primary | ICD-10-CM

## 2023-11-27 DIAGNOSIS — R71.8 ELEVATED MCV: ICD-10-CM

## 2023-11-27 PROCEDURE — 3044F PR MOST RECENT HEMOGLOBIN A1C LEVEL <7.0%: ICD-10-PCS | Mod: CPTII,95,, | Performed by: FAMILY MEDICINE

## 2023-11-27 PROCEDURE — 1160F RVW MEDS BY RX/DR IN RCRD: CPT | Mod: CPTII,95,, | Performed by: FAMILY MEDICINE

## 2023-11-27 PROCEDURE — 99214 OFFICE O/P EST MOD 30 MIN: CPT | Mod: 95,,, | Performed by: FAMILY MEDICINE

## 2023-11-27 PROCEDURE — 1159F MED LIST DOCD IN RCRD: CPT | Mod: CPTII,95,, | Performed by: FAMILY MEDICINE

## 2023-11-27 PROCEDURE — 1157F PR ADVANCE CARE PLAN OR EQUIV PRESENT IN MEDICAL RECORD: ICD-10-PCS | Mod: CPTII,95,, | Performed by: FAMILY MEDICINE

## 2023-11-27 PROCEDURE — 3008F BODY MASS INDEX DOCD: CPT | Mod: CPTII,95,, | Performed by: FAMILY MEDICINE

## 2023-11-27 PROCEDURE — 99214 PR OFFICE/OUTPT VISIT, EST, LEVL IV, 30-39 MIN: ICD-10-PCS | Mod: 95,,, | Performed by: FAMILY MEDICINE

## 2023-11-27 PROCEDURE — 3078F DIAST BP <80 MM HG: CPT | Mod: CPTII,95,, | Performed by: FAMILY MEDICINE

## 2023-11-27 PROCEDURE — 3008F PR BODY MASS INDEX (BMI) DOCUMENTED: ICD-10-PCS | Mod: CPTII,95,, | Performed by: FAMILY MEDICINE

## 2023-11-27 PROCEDURE — 1157F ADVNC CARE PLAN IN RCRD: CPT | Mod: CPTII,95,, | Performed by: FAMILY MEDICINE

## 2023-11-27 PROCEDURE — 3078F PR MOST RECENT DIASTOLIC BLOOD PRESSURE < 80 MM HG: ICD-10-PCS | Mod: CPTII,95,, | Performed by: FAMILY MEDICINE

## 2023-11-27 PROCEDURE — 1160F PR REVIEW ALL MEDS BY PRESCRIBER/CLIN PHARMACIST DOCUMENTED: ICD-10-PCS | Mod: CPTII,95,, | Performed by: FAMILY MEDICINE

## 2023-11-27 PROCEDURE — 3074F SYST BP LT 130 MM HG: CPT | Mod: CPTII,95,, | Performed by: FAMILY MEDICINE

## 2023-11-27 PROCEDURE — 4010F ACE/ARB THERAPY RXD/TAKEN: CPT | Mod: CPTII,95,, | Performed by: FAMILY MEDICINE

## 2023-11-27 PROCEDURE — 4010F PR ACE/ARB THEARPY RXD/TAKEN: ICD-10-PCS | Mod: CPTII,95,, | Performed by: FAMILY MEDICINE

## 2023-11-27 PROCEDURE — 1159F PR MEDICATION LIST DOCUMENTED IN MEDICAL RECORD: ICD-10-PCS | Mod: CPTII,95,, | Performed by: FAMILY MEDICINE

## 2023-11-27 PROCEDURE — 3044F HG A1C LEVEL LT 7.0%: CPT | Mod: CPTII,95,, | Performed by: FAMILY MEDICINE

## 2023-11-27 PROCEDURE — 3074F PR MOST RECENT SYSTOLIC BLOOD PRESSURE < 130 MM HG: ICD-10-PCS | Mod: CPTII,95,, | Performed by: FAMILY MEDICINE

## 2023-11-30 ENCOUNTER — PATIENT MESSAGE (OUTPATIENT)
Dept: FAMILY MEDICINE | Facility: CLINIC | Age: 73
End: 2023-11-30
Payer: MEDICARE

## 2023-12-01 NOTE — PROGRESS NOTES
The patient location is: home  The chief complaint leading to consultation is: htn    Visit type: audiovisual    Face to Face time with patient: 20  30 minutes of total time spent on the encounter, which includes face to face time and non-face to face time preparing to see the patient (eg, review of tests), Obtaining and/or reviewing separately obtained history, Documenting clinical information in the electronic or other health record, Independently interpreting results (not separately reported) and communicating results to the patient/family/caregiver, or Care coordination (not separately reported).         Each patient to whom he or she provides medical services by telemedicine is:  (1) informed of the relationship between the physician and patient and the respective role of any other health care provider with respect to management of the patient; and (2) notified that he or she may decline to receive medical services by telemedicine and may withdraw from such care at any time.    Notes:   Subjective:       Patient ID: Pantera Posey is a 73 y.o. female.    Chief Complaint: Hypertension    Hypertension  Pertinent negatives include no chest pain, headaches, neck pain or palpitations.     Pt is in virtual visit for follow up of htn bp fine no sob/cp on norvasc ace no cough  Pt is an etoh drinker she has elevated mcv stable from previous she has elevated lft as well with abnl abd u/s  Review of Systems   Constitutional:  Negative for activity change, chills, diaphoresis, fatigue and unexpected weight change.   HENT:  Negative for hearing loss, rhinorrhea and trouble swallowing.    Eyes:  Negative for discharge and visual disturbance.   Respiratory:  Negative for chest tightness and wheezing.    Cardiovascular:  Negative for chest pain and palpitations.   Gastrointestinal:  Negative for blood in stool, constipation, diarrhea and vomiting.   Endocrine: Negative for polydipsia and polyuria.   Genitourinary:  Negative  "for difficulty urinating, dysuria, hematuria and menstrual problem.   Musculoskeletal:  Negative for arthralgias, joint swelling and neck pain.   Neurological:  Negative for weakness and headaches.   Hematological:  Negative for adenopathy. Does not bruise/bleed easily.   Psychiatric/Behavioral:  Negative for confusion and dysphoric mood.        Objective:    /74   Pulse 82   Ht 5' 4" (1.626 m)   Wt 59.4 kg (131 lb)   LMP  (LMP Unknown)   BMI 22.49 kg/m²     Physical Exam  Constitutional:       Appearance: Normal appearance. She is not ill-appearing.   HENT:      Head: Normocephalic and atraumatic.   Pulmonary:      Effort: Pulmonary effort is normal. No respiratory distress.   Neurological:      General: No focal deficit present.      Mental Status: She is alert and oriented to person, place, and time.      Cranial Nerves: No cranial nerve deficit.      Coordination: Coordination normal.       Mcv 114 in 11/2023  Assessment:       1. Essential hypertension    2. Elevated MCV        Plan:     Orders cbc  Cont meds  D/c etoh  Increase lean meats and fish  Increase water intake  Low salt diet  Rtc 3-6 months  F/u liver clinic  F/u psych       "This note will not be shared with the patient."     "

## 2023-12-05 ENCOUNTER — PATIENT MESSAGE (OUTPATIENT)
Dept: FAMILY MEDICINE | Facility: CLINIC | Age: 73
End: 2023-12-05
Payer: MEDICARE

## 2023-12-05 VITALS
DIASTOLIC BLOOD PRESSURE: 72 MMHG | SYSTOLIC BLOOD PRESSURE: 136 MMHG | BODY MASS INDEX: 23.88 KG/M2 | HEART RATE: 101 BPM | WEIGHT: 139.13 LBS

## 2023-12-06 DIAGNOSIS — K70.9 ALCOHOL LIVER DAMAGE: Primary | ICD-10-CM

## 2024-01-02 ENCOUNTER — LAB VISIT (OUTPATIENT)
Dept: LAB | Facility: HOSPITAL | Age: 74
End: 2024-01-02
Attending: INTERNAL MEDICINE
Payer: MEDICARE

## 2024-01-02 ENCOUNTER — OFFICE VISIT (OUTPATIENT)
Dept: HEMATOLOGY/ONCOLOGY | Facility: CLINIC | Age: 74
End: 2024-01-02
Payer: MEDICARE

## 2024-01-02 VITALS
DIASTOLIC BLOOD PRESSURE: 66 MMHG | HEART RATE: 79 BPM | BODY MASS INDEX: 22.18 KG/M2 | TEMPERATURE: 98 F | SYSTOLIC BLOOD PRESSURE: 149 MMHG | OXYGEN SATURATION: 99 % | WEIGHT: 129.94 LBS | HEIGHT: 64 IN

## 2024-01-02 DIAGNOSIS — D53.9 MACROCYTIC ANEMIA: Primary | ICD-10-CM

## 2024-01-02 DIAGNOSIS — Z17.0 MALIGNANT NEOPLASM OF LOWER-OUTER QUADRANT OF LEFT BREAST OF FEMALE, ESTROGEN RECEPTOR POSITIVE: ICD-10-CM

## 2024-01-02 DIAGNOSIS — C50.512 MALIGNANT NEOPLASM OF LOWER-OUTER QUADRANT OF LEFT BREAST OF FEMALE, ESTROGEN RECEPTOR POSITIVE: ICD-10-CM

## 2024-01-02 DIAGNOSIS — D51.3 OTHER DIETARY VITAMIN B12 DEFICIENCY ANEMIA: ICD-10-CM

## 2024-01-02 DIAGNOSIS — E53.8 VITAMIN B 12 DEFICIENCY: ICD-10-CM

## 2024-01-02 LAB
ALBUMIN SERPL BCP-MCNC: 3.4 G/DL (ref 3.5–5.2)
ALP SERPL-CCNC: 71 U/L (ref 55–135)
ALT SERPL W/O P-5'-P-CCNC: 14 U/L (ref 10–44)
ANION GAP SERPL CALC-SCNC: 12 MMOL/L (ref 8–16)
AST SERPL-CCNC: 22 U/L (ref 10–40)
BASOPHILS # BLD AUTO: 0.06 K/UL (ref 0–0.2)
BASOPHILS NFR BLD: 0.9 % (ref 0–1.9)
BILIRUB SERPL-MCNC: 0.6 MG/DL (ref 0.1–1)
BUN SERPL-MCNC: 7 MG/DL (ref 8–23)
CALCIUM SERPL-MCNC: 9.6 MG/DL (ref 8.7–10.5)
CHLORIDE SERPL-SCNC: 106 MMOL/L (ref 95–110)
CO2 SERPL-SCNC: 24 MMOL/L (ref 23–29)
CREAT SERPL-MCNC: 0.7 MG/DL (ref 0.5–1.4)
DIFFERENTIAL METHOD BLD: ABNORMAL
EOSINOPHIL # BLD AUTO: 0.4 K/UL (ref 0–0.5)
EOSINOPHIL NFR BLD: 6.1 % (ref 0–8)
ERYTHROCYTE [DISTWIDTH] IN BLOOD BY AUTOMATED COUNT: 11.9 % (ref 11.5–14.5)
EST. GFR  (NO RACE VARIABLE): >60 ML/MIN/1.73 M^2
FOLATE SERPL-MCNC: 14.9 NG/ML (ref 4–24)
GLUCOSE SERPL-MCNC: 116 MG/DL (ref 70–110)
HCT VFR BLD AUTO: 40.3 % (ref 37–48.5)
HGB BLD-MCNC: 13.6 G/DL (ref 12–16)
IMM GRANULOCYTES # BLD AUTO: 0.01 K/UL (ref 0–0.04)
IMM GRANULOCYTES NFR BLD AUTO: 0.2 % (ref 0–0.5)
LYMPHOCYTES # BLD AUTO: 1.5 K/UL (ref 1–4.8)
LYMPHOCYTES NFR BLD: 23.2 % (ref 18–48)
MCH RBC QN AUTO: 36.2 PG (ref 27–31)
MCHC RBC AUTO-ENTMCNC: 33.7 G/DL (ref 32–36)
MCV RBC AUTO: 107 FL (ref 82–98)
MONOCYTES # BLD AUTO: 0.8 K/UL (ref 0.3–1)
MONOCYTES NFR BLD: 12.1 % (ref 4–15)
NEUTROPHILS # BLD AUTO: 3.7 K/UL (ref 1.8–7.7)
NEUTROPHILS NFR BLD: 57.5 % (ref 38–73)
NRBC BLD-RTO: 0 /100 WBC
PLATELET # BLD AUTO: 193 K/UL (ref 150–450)
PMV BLD AUTO: 11.9 FL (ref 9.2–12.9)
POTASSIUM SERPL-SCNC: 3.4 MMOL/L (ref 3.5–5.1)
PROT SERPL-MCNC: 7.7 G/DL (ref 6–8.4)
RBC # BLD AUTO: 3.76 M/UL (ref 4–5.4)
SODIUM SERPL-SCNC: 142 MMOL/L (ref 136–145)
VIT B12 SERPL-MCNC: 1495 PG/ML (ref 210–950)
WBC # BLD AUTO: 6.51 K/UL (ref 3.9–12.7)

## 2024-01-02 PROCEDURE — 85025 COMPLETE CBC W/AUTO DIFF WBC: CPT | Performed by: INTERNAL MEDICINE

## 2024-01-02 PROCEDURE — 99215 OFFICE O/P EST HI 40 MIN: CPT | Mod: S$GLB,,, | Performed by: INTERNAL MEDICINE

## 2024-01-02 PROCEDURE — 80053 COMPREHEN METABOLIC PANEL: CPT | Performed by: INTERNAL MEDICINE

## 2024-01-02 PROCEDURE — 82607 VITAMIN B-12: CPT | Performed by: INTERNAL MEDICINE

## 2024-01-02 PROCEDURE — 99999 PR PBB SHADOW E&M-EST. PATIENT-LVL III: CPT | Mod: PBBFAC,,, | Performed by: INTERNAL MEDICINE

## 2024-01-02 PROCEDURE — 36415 COLL VENOUS BLD VENIPUNCTURE: CPT | Performed by: INTERNAL MEDICINE

## 2024-01-02 PROCEDURE — 82746 ASSAY OF FOLIC ACID SERUM: CPT | Performed by: INTERNAL MEDICINE

## 2024-01-02 RX ORDER — FOLIC ACID 1 MG/1
1 TABLET ORAL DAILY
COMMUNITY

## 2024-01-02 NOTE — PROGRESS NOTES
Hematology and Medical Oncology   Follow Up     01/02/2024    Reason For Referral Macrocytosis      History of Present Ilness:   Pantera Posey (Pantera) is a pleasant 73 y.o.female previous patient of Dr. Fritz, has since been discharged from his clinic.     On chart review MCV has always been above average but since 2016 has steadily risen to from 106 to almost 120. There is no associated anemia.         Interval History:   Continues to do well. Eating well, sleeping. Exercising. Lost weight. Right eye cataract surgery done day before thanksgivig. Second surgery in several weeks.     Stopped drinking alcohol completely on Dec 7th. Did well during the holidays.      PAST MEDICAL HISTORY:   Past Medical History:   Diagnosis Date    Breast cancer 1997    RIGHT    Breast cancer 2017    Left    Cancer     breast cancer    Hypertension     Renea-Parkinson-White syndrome        PAST SURGICAL HISTORY:   Past Surgical History:   Procedure Laterality Date    AUGMENTATION OF BREAST Bilateral 2018    BACK SURGERY      Spinal fusion    BREAST BIOPSY Right 1997    core bx, +    BREAST BIOPSY Left 2017    Core bx, +    BREAST RECONSTRUCTION Bilateral 2018    with implants    BREAST SURGERY      CARDIAC ELECTROPHYSIOLOGY STUDY AND ABLATION      COLONOSCOPY N/A 7/20/2018    Procedure: COLONOSCOPY;  Surgeon: David Mcdonald MD;  Location: [facility] ([address] [place] [address]);  Service: Endoscopy;  Laterality: N/A;    MASTECTOMY Right 1997    RIGHT/ RADIATION AND CHEMO    MASTECTOMY Left 2017    left    TUBAL LIGATION         PAST SOCIAL HISTORY:   reports that she quit smoking about 35 years ago. Her smoking use included cigarettes. She has been exposed to tobacco smoke. She has never used smokeless tobacco. She reports current alcohol use of about 5.0 standard drinks of alcohol per week. She reports that she does not use drugs.    FAMILY HISTORY:  Family History   Problem Relation Age of Onset    Cancer Mother     Hypertension Father     Breast  cancer Maternal Aunt     Ovarian cancer Paternal Aunt     Melanoma Neg Hx        CURRENT MEDICATIONS:   Current Outpatient Medications   Medication Sig    amLODIPine (NORVASC) 10 MG tablet Take 1 tablet (10 mg total) by mouth once daily.    calcium-vitamin D3 500 mg(1,250mg) -200 unit per tablet Take 2 tablets by mouth 2 (two) times daily with meals.    clobetasoL (TEMOVATE) 0.05 % cream AAA bid    clobetasoL (TEMOVATE) 0.05 % external solution Use on scalp one - two times daily as needed for scaling or itching    doxycycline (ORACEA) 40 mg capsule Take 1 po qday    doxycycline 50 MG capsule TAKE 1 CAPSULE(50 MG) BY MOUTH EVERY DAY    folic acid (FOLVITE) 1 MG tablet Take 1 mg by mouth once daily.    hydrOXYzine HCL (ATARAX) 25 MG tablet TAKE 1 TABLET BY MOUTH EVERY EVENING AS NEEDED FOR PRURITIS. DO NOT DRIVE OR OPERATE MACHINERY WHILE TAKE MEDICATION    lisinopriL (PRINIVIL,ZESTRIL) 5 MG tablet Take 1 tablet (5 mg total) by mouth once daily.    metronidazole 0.75% (METROCREAM) 0.75 % Crea AAA face bid    prednisoLONE acetate (PRED FORTE) 1 % DrpS Place 1 drop into the right eye 4 (four) times daily.    predniSONE (DELTASONE) 20 MG tablet Take 3 pills po qam with breakfast x 1 wk then take 2 po qam with breakfast x 1 wk then take 1 po qam with breakfast x 1 wk    SPIKEVAX 4995-9097,12Y UP,,PF, 50 mcg/0.5 mL injection     tobramycin sulfate 0.3% (TOBREX) 0.3 % ophthalmic solution INSTILL 1 DROP IN RIGHT EYE FOUR TIMES DAILY. START DROPS 3 DAYS BEFORE SURGERY    triamcinolone acetonide 0.1% (KENALOG) 0.1 % cream AAA bid to affected areas of body    levocetirizine (XYZAL) 5 MG tablet Take 1 tablet (5 mg total) by mouth every evening. (Patient not taking: Reported on 1/2/2024)     No current facility-administered medications for this visit.     ALLERGIES:   Review of patient's allergies indicates:   Allergen Reactions    Ciprofloxacin Anaphylaxis     Other reaction(s): Difficulty breathing    Floxin [ofloxacin]  Anaphylaxis    Clindamycin Diarrhea and Rash    Amoxicillin      Other reaction(s): Rash    Indocin  [indomethacin sodium]      Other reaction(s): Headache    Bactrim [sulfamethoxazole-trimethoprim] Rash         Review of Systems:     Review of Systems   Constitutional:  Negative for appetite change, chills, diaphoresis, fatigue, fever and unexpected weight change.   HENT:   Negative for hearing loss, mouth sores, nosebleeds, sore throat, trouble swallowing and voice change.    Eyes:  Negative for eye problems and icterus.   Respiratory:  Negative for chest tightness, cough, hemoptysis, shortness of breath and wheezing.    Cardiovascular:  Negative for chest pain, leg swelling and palpitations.   Gastrointestinal:  Negative for abdominal distention, abdominal pain, blood in stool, diarrhea, nausea and vomiting.   Endocrine: Negative for hot flashes.   Genitourinary:  Negative for bladder incontinence, difficulty urinating, dysuria, frequency and hematuria.    Musculoskeletal:  Negative for arthralgias, back pain, flank pain, gait problem, myalgias, neck pain and neck stiffness.   Skin:  Negative for itching, rash and wound.   Neurological:  Negative for dizziness, extremity weakness, gait problem, headaches, numbness, seizures and speech difficulty.   Hematological:  Negative for adenopathy. Does not bruise/bleed easily.   Psychiatric/Behavioral:  Negative for confusion, depression and sleep disturbance. The patient is not nervous/anxious.           Physical Exam:     Vitals:    01/02/24 0819   BP: (!) 149/66   Pulse: 79   Temp: 98.2 °F (36.8 °C)       Physical Exam  Constitutional:       General: She is not in acute distress.     Appearance: She is well-developed. She is not diaphoretic.   HENT:      Head: Normocephalic and atraumatic.      Mouth/Throat:      Pharynx: No oropharyngeal exudate.   Eyes:      Conjunctiva/sclera: Conjunctivae normal.      Pupils: Pupils are equal, round, and reactive to light.   Neck:       Thyroid: No thyromegaly.      Vascular: No JVD.      Trachea: No tracheal deviation.   Cardiovascular:      Rate and Rhythm: Normal rate and regular rhythm.      Heart sounds: Normal heart sounds. No murmur heard.     No friction rub.   Pulmonary:      Effort: Pulmonary effort is normal. No respiratory distress.      Breath sounds: Normal breath sounds. No stridor. No wheezing or rales.   Chest:      Chest wall: No tenderness.   Abdominal:      General: Bowel sounds are normal. There is no distension.      Palpations: Abdomen is soft.      Tenderness: There is no abdominal tenderness. There is no guarding or rebound.   Musculoskeletal:         General: No tenderness or deformity. Normal range of motion.      Cervical back: Normal range of motion and neck supple.   Skin:     General: Skin is warm and dry.      Capillary Refill: Capillary refill takes less than 2 seconds.      Coloration: Skin is not pale.      Findings: No erythema or rash.   Neurological:      Mental Status: She is alert and oriented to person, place, and time.      Cranial Nerves: No cranial nerve deficit.      Sensory: No sensory deficit.      Motor: No abnormal muscle tone.      Coordination: Coordination normal.      Deep Tendon Reflexes: Reflexes normal.   Psychiatric:         Behavior: Behavior normal.         Thought Content: Thought content normal.         Judgment: Judgment normal.       ECOG Performance Status: (foot note - ECOG PS provided by Eastern Cooperative Oncology Group) 0 - Asymptomatic    Karnofsky Performance Score:  100%- Normal, No Complaints, No Evidence of Disease    Labs:   Lab Results   Component Value Date    WBC 6.51 01/02/2024    HGB 13.6 01/02/2024    HCT 40.3 01/02/2024     01/02/2024    CHOL 217 (H) 11/09/2023    TRIG 61 11/09/2023    HDL 84 (H) 11/09/2023    ALT 14 01/02/2024    AST 22 01/02/2024     01/02/2024    K 3.4 (L) 01/02/2024     01/02/2024    CREATININE 0.7 01/02/2024    BUN 7 (L)  01/02/2024    CO2 24 01/02/2024    TSH 1.893 11/09/2023    INR 1.0 10/25/2013    HGBA1C 5.0 11/13/2023         B12: 226  Folate: 4.1  Methylmalonic Acid pending  Homocysteine: 27.5 [elevated]   Gamma GGT: 113  Path interpretation:   RBC- Macrocytosis with no other significant morphologic   abnormalities.   WBC- Granulocytic predominance with normal morphology. No dysplastic   cells or circulating blasts seen.   PLT- Normal in number and morphology.     MDS Fish:   within normal limits for the MDS FISH panel.    Imaging: Previous imaging has been reviewed       Homocytein  Vitamin B12:  Assessment and Plan:   Ms. Posey is a pleasant 72 year old woman with progressive macrocytosis.     B 12 Deficiency  --MCV down to 107 in the 1 month since alcohol cessation  --Continue over the counter supplement  --MDS FISH negative  --Plan to repeat labs in 6 months      Folate Deficiency  --Continue oral folate replacement    ER+ Breast Cancer  --Previous managed by Dr. Fritz  --Completed 5 years of oral hormone therapy    Elevated Liver Function Tests  --values have normalized    30 minutes were spent face to face with the patient  to discuss the disease, natural history, treatment options. I have provided the patient with an opportunity to ask questions and have all questions answered to her satisfaction.       she will return to clinic in 6 months, but knows to call in the interim if symptoms change or should a problem arise.        Shayy Carpenter MD  Hematology and Medical Oncology  Bone Marrow Transplant  [facility]      BMT Chart Routing      Follow up with physician 6 months. 1. see me in 6 months with cbc,cmp, vitamin b12, folate   Follow up with NIKOLE    Provider visit type    Infusion scheduling note    Injection scheduling note    Labs    Imaging    Pharmacy appointment    Other referrals

## 2024-01-09 ENCOUNTER — OFFICE VISIT (OUTPATIENT)
Dept: FAMILY MEDICINE | Facility: CLINIC | Age: 74
End: 2024-01-09
Attending: FAMILY MEDICINE
Payer: MEDICARE

## 2024-01-09 VITALS
WEIGHT: 128.06 LBS | HEART RATE: 79 BPM | DIASTOLIC BLOOD PRESSURE: 70 MMHG | OXYGEN SATURATION: 99 % | SYSTOLIC BLOOD PRESSURE: 120 MMHG | BODY MASS INDEX: 21.99 KG/M2

## 2024-01-09 DIAGNOSIS — Z00.00 ANNUAL PHYSICAL EXAM: Primary | ICD-10-CM

## 2024-01-09 DIAGNOSIS — D75.89 MACROCYTOSIS: ICD-10-CM

## 2024-01-09 DIAGNOSIS — Z78.0 ASYMPTOMATIC MENOPAUSE: ICD-10-CM

## 2024-01-09 DIAGNOSIS — I10 ESSENTIAL HYPERTENSION: ICD-10-CM

## 2024-01-09 PROCEDURE — 99397 PER PM REEVAL EST PAT 65+ YR: CPT | Mod: S$GLB,,, | Performed by: NURSE PRACTITIONER

## 2024-01-09 PROCEDURE — 99999 PR PBB SHADOW E&M-EST. PATIENT-LVL IV: CPT | Mod: PBBFAC,,, | Performed by: NURSE PRACTITIONER

## 2024-01-09 NOTE — PROGRESS NOTES
Subjective:       Patient ID: Pantera Posey is a 73 y.o. female.    Chief Complaint: Annual Exam  Pantera Posey presents today for routine annual exam. Last seen in 11/27/2023 by PCP, JANA Singh MD. This is her first visit with me.     With regards to hypertension:  Current medication regimen: amlodipine 10 mg; lisinopril 5 mg  Compliance: yes   Home BP monitoring: n/a  Low sodium diet: n/a  Exercise: n/a  Denies cp, palpitations, sob, headaches, dizziness, vision changes, changes in urinary or bowel habits.     Patient Active Problem List   Diagnosis    HTN (hypertension), benign    Spondylosis without myelopathy    Spinal stenosis, lumbar region, with neurogenic claudication    Thoracic or lumbosacral neuritis or radiculitis, unspecified    Acquired spondylolisthesis    Lumbago    Degeneration of lumbar or lumbosacral intervertebral disc    Back pain    Spinal stenosis    Fever    Malignant neoplasm of lower-outer quadrant of left breast of female, estrogen receptor positive    Breast cancer, left    History of right breast cancer    Use of aromatase inhibitors    Breast cancer    Osteopenia    S/P breast reconstruction    Swelling of hand    Colon cancer screening    Macrocytosis    Vitamin B 12 deficiency       Current Outpatient Medications:     amLODIPine (NORVASC) 10 MG tablet, Take 1 tablet (10 mg total) by mouth once daily., Disp: 90 tablet, Rfl: 3    calcium-vitamin D3 500 mg(1,250mg) -200 unit per tablet, Take 2 tablets by mouth 2 (two) times daily with meals., Disp: , Rfl:     clobetasoL (TEMOVATE) 0.05 % cream, AAA bid, Disp: 60 g, Rfl: 3    clobetasoL (TEMOVATE) 0.05 % external solution, Use on scalp one - two times daily as needed for scaling or itching, Disp: 50 mL, Rfl: 3    doxycycline (ORACEA) 40 mg capsule, Take 1 po qday, Disp: 30 capsule, Rfl: 5    doxycycline 50 MG capsule, TAKE 1 CAPSULE(50 MG) BY MOUTH EVERY DAY, Disp: 30 capsule, Rfl: 5    folic acid (FOLVITE) 1 MG tablet, Take 1 mg  by mouth once daily., Disp: , Rfl:     hydrOXYzine HCL (ATARAX) 25 MG tablet, TAKE 1 TABLET BY MOUTH EVERY EVENING AS NEEDED FOR PRURITIS. DO NOT DRIVE OR OPERATE MACHINERY WHILE TAKE MEDICATION, Disp: 30 tablet, Rfl: 5    lisinopriL (PRINIVIL,ZESTRIL) 5 MG tablet, Take 1 tablet (5 mg total) by mouth once daily., Disp: 90 tablet, Rfl: 3    metronidazole 0.75% (METROCREAM) 0.75 % Crea, AAA face bid, Disp: 45 g, Rfl: 3    prednisoLONE acetate (PRED FORTE) 1 % DrpS, Place 1 drop into the right eye 4 (four) times daily., Disp: , Rfl:     predniSONE (DELTASONE) 20 MG tablet, Take 3 pills po qam with breakfast x 1 wk then take 2 po qam with breakfast x 1 wk then take 1 po qam with breakfast x 1 wk, Disp: 42 tablet, Rfl: 0    SPIKEVAX 3940-7163,12Y UP,,PF, 50 mcg/0.5 mL injection, , Disp: , Rfl:     tobramycin sulfate 0.3% (TOBREX) 0.3 % ophthalmic solution, INSTILL 1 DROP IN RIGHT EYE FOUR TIMES DAILY. START DROPS 3 DAYS BEFORE SURGERY, Disp: , Rfl:     triamcinolone acetonide 0.1% (KENALOG) 0.1 % cream, AAA bid to affected areas of body, Disp: 454 g, Rfl: 3    levocetirizine (XYZAL) 5 MG tablet, Take 1 tablet (5 mg total) by mouth every evening. (Patient not taking: Reported on 1/2/2024), Disp: 30 tablet, Rfl: 3    The following portions of the patient's history were reviewed and updated as appropriate: allergies, past family history, past medical history, past social history and past surgical history.    Review of Systems   Constitutional:  Negative for appetite change, fatigue, fever and unexpected weight change.   HENT:  Negative for hearing loss, rhinorrhea, sneezing, sore throat and trouble swallowing.    Eyes:  Negative for visual disturbance.   Respiratory:  Negative for cough, shortness of breath and wheezing.    Cardiovascular:  Negative for chest pain and palpitations.   Gastrointestinal:  Negative for abdominal pain, constipation, diarrhea, nausea and vomiting.   Genitourinary:  Negative for difficulty  urinating, dysuria, frequency and hematuria.   Musculoskeletal:  Negative for arthralgias and myalgias.   Neurological:  Negative for dizziness, weakness and numbness.   Psychiatric/Behavioral:  Negative for sleep disturbance. The patient is not nervous/anxious.        Objective:      /70   Pulse 79   Wt 58.1 kg (128 lb 1.4 oz)   LMP  (LMP Unknown)   SpO2 99%   BMI 21.99 kg/m²     Physical Exam  Constitutional:       General: She is not in acute distress.     Appearance: Normal appearance.   HENT:      Head: Normocephalic and atraumatic.   Eyes:      Extraocular Movements: Extraocular movements intact.      Pupils: Pupils are equal, round, and reactive to light.   Cardiovascular:      Rate and Rhythm: Normal rate and regular rhythm.   Pulmonary:      Effort: Pulmonary effort is normal.   Musculoskeletal:         General: No swelling or deformity. Normal range of motion.      Cervical back: Normal range of motion and neck supple.   Skin:     General: Skin is warm and dry.      Capillary Refill: Capillary refill takes less than 2 seconds.   Neurological:      General: No focal deficit present.      Mental Status: She is alert and oriented to person, place, and time.      Coordination: Coordination normal.      Gait: Gait normal.   Psychiatric:         Mood and Affect: Mood normal.         Behavior: Behavior normal.         Assessment:       1. Annual physical exam    2. Essential hypertension    3. Asymptomatic menopause    4. Macrocytosis        Plan:   Pantera was seen today for annual exam.    Diagnoses and all orders for this visit:    Annual physical exam    Essential hypertension    Asymptomatic menopause  -     DXA Bone Density Axial Skeleton 1 or more sites; Future    Macrocytosis    Discussed lab results with pt; cbc continues to improve.    F/U heme as directed.

## 2024-02-29 DIAGNOSIS — L71.9 ACNE ROSACEA: ICD-10-CM

## 2024-02-29 NOTE — TELEPHONE ENCOUNTER
Encounter Date: 08/15/2024     Acne rosacea  -     doxycycline 50 MG capsule; TAKE 1 CAPSULE(50 MG) BY MOUTH EVERY DAY  Dispense: 30 capsule; Refill: 5  - continue Azelaic Acid: 15%, Ivermectin: 1%, Metronidazole: 1%, Vehicle: Cream - bid

## 2024-03-01 RX ORDER — DOXYCYCLINE HYCLATE 50 MG/1
CAPSULE ORAL
Qty: 30 CAPSULE | Refills: 5 | Status: SHIPPED | OUTPATIENT
Start: 2024-03-01

## 2024-03-07 ENCOUNTER — TELEPHONE (OUTPATIENT)
Dept: HEPATOLOGY | Facility: CLINIC | Age: 74
End: 2024-03-07
Payer: MEDICARE

## 2024-03-07 ENCOUNTER — PROCEDURE VISIT (OUTPATIENT)
Dept: HEPATOLOGY | Facility: CLINIC | Age: 74
End: 2024-03-07
Payer: MEDICARE

## 2024-03-07 ENCOUNTER — OFFICE VISIT (OUTPATIENT)
Dept: HEPATOLOGY | Facility: CLINIC | Age: 74
End: 2024-03-07
Payer: MEDICARE

## 2024-03-07 VITALS — WEIGHT: 125 LBS | BODY MASS INDEX: 21.34 KG/M2 | HEIGHT: 64 IN

## 2024-03-07 DIAGNOSIS — R79.89 ABNORMAL LFTS: ICD-10-CM

## 2024-03-07 DIAGNOSIS — R74.8 ELEVATED LIVER ENZYMES: Primary | ICD-10-CM

## 2024-03-07 DIAGNOSIS — R74.8 ELEVATED LIVER ENZYMES: ICD-10-CM

## 2024-03-07 DIAGNOSIS — K74.01 HEPATIC FIBROSIS, EARLY FIBROSIS: ICD-10-CM

## 2024-03-07 DIAGNOSIS — R16.0 HEPATOMEGALY: ICD-10-CM

## 2024-03-07 PROBLEM — C50.912 BREAST CANCER, LEFT: Status: RESOLVED | Noted: 2017-08-03 | Resolved: 2024-03-07

## 2024-03-07 PROBLEM — C50.919 BREAST CANCER: Status: RESOLVED | Noted: 2017-11-20 | Resolved: 2024-03-07

## 2024-03-07 PROBLEM — Z12.11 COLON CANCER SCREENING: Status: RESOLVED | Noted: 2018-07-20 | Resolved: 2024-03-07

## 2024-03-07 PROCEDURE — 99999 PR PBB SHADOW E&M-EST. PATIENT-LVL IV: CPT | Mod: PBBFAC,,, | Performed by: NURSE PRACTITIONER

## 2024-03-07 PROCEDURE — 91200 LIVER ELASTOGRAPHY: CPT | Mod: S$GLB,,, | Performed by: NURSE PRACTITIONER

## 2024-03-07 PROCEDURE — 99214 OFFICE O/P EST MOD 30 MIN: CPT | Mod: S$GLB,,, | Performed by: NURSE PRACTITIONER

## 2024-03-07 NOTE — PROGRESS NOTES
[facility] VISIT NEW PT NOTE    REFERRING PROVIDER:  Dr. Jazmin Singh  PCP: Jazmin Singh MD     CHIEF COMPLAINT: liver fibrosis on fibroscan, previously elevated liver enzymes     HPI: This is a 73 y.o. patient with PMH noted below, presenting for evaluation of above    Previous serologic w/u negative for  viral hepatitis   Can repeat ferritin after abstinence    Prior serologic workup:   Lab Results   Component Value Date    FERRITIN 800 (H) 03/03/2023    FESATURATED 25 03/03/2023    HEPBSAG Non-reactive 11/13/2023    HEPCAB Non-reactive 11/13/2023    HEPAIGM Non-reactive 11/13/2023       Liver fibrosis staging:  -- fibroscan F2, S0 (kPA 9.7, )      Interval HPI: Presents today alone. Doing great with alcohol abstinence since December 2023, no plans to resume   Normal wt   Liver enzymes elevated with AST greater since 2018 but normalized quickly after stopping alcohol  Fibroscan with F2 fibrosis     Labs done 1/2024 show normal transaminase levels, previously elevated from 2018-11/2023, AST >ALT     Lab Results   Component Value Date    ALT 14 01/02/2024    AST 22 01/02/2024    ALKPHOS 71 01/02/2024    BILITOT 0.6 01/02/2024    ALBUMIN 3.4 (L) 01/02/2024    INR 1.0 10/25/2013     01/02/2024       Abd U/S done 11/2023 showed hepatomegaly - no prior imaging to compare     Denies family history of liver disease . + significant alcohol consumption in the past-   Social History     Substance and Sexual Activity   Alcohol Use Not Currently    Comment: 1 bottle most days of the week for ~ 4 years, stop 12/2023         Immunity to Hep A and B - will check with next labs          Allergy and medication list reviewed and updated     PMHX:  has a past medical history of Breast cancer (1997), Breast cancer (2017), Cancer, Hypertension, and Renea-Parkinson-White syndrome.    PSHX:  has a past surgical history that includes Cardiac electrophysiology study and ablation; Back surgery; Tubal  "ligation; Breast surgery; Colonoscopy (N/A, 7/20/2018); Breast biopsy (Right, 1997); Breast biopsy (Left, 2017); Mastectomy (Right, 1997); Mastectomy (Left, 2017); Augmentation of breast (Bilateral, 2018); and Breast reconstruction (Bilateral, 2018).    FAMILY HISTORY: Updated and reviewed in EPIC    ROS:   GENERAL: Denies fatigue  CARDIOVASCULAR: Denies edema  GI: Denies abdominal pain  SKIN: Denies rash, itching   NEURO: Denies confusion, memory loss, or mood changes    PHYSICAL EXAM:   In no acute distress; alert and oriented to person, place and time  VITALS: Ht 5' 4" (1.626 m)   Wt 56.7 kg (125 lb)   LMP  (LMP Unknown)   BMI 21.46 kg/m²   EYES: Sclerae anicteric  GI: Soft, non-tender, non-distended. No ascites.  EXTREMITIES:  No edema.  SKIN: Warm and dry. No jaundice. No telangectasias noted. No palmar erythema.  NEURO:  No asterixis.  PSYCH:  Thought and speech pattern appropriate. Behavior normal      EDUCATION:  See instructions discussed with patient in Instructions section of the After Visit Summary     ASSESSMENT & PLAN:  73 y.o. female with:  1. Elevated liver enzymes, elevated ferritin   -- Labs note elevated transaminase levels since 2018, normalized quickly after alcohol abstinence. Pattern of AST >ALT and low albumin suggests inflammation from alcohol use.   --- synthetic liver function WNL  --- Abd US done showed hepatomegaly, can repeat after longer period of abstinence   -- do not suspect any medications contributing   --- previous serological work up : see HPI  --- Hep A and B immunity: see HPI    2. Liver fibrosis  F2 on fibroscan, likely due to previous alcohol use, will repeat in 6 months of abstinence, likely will resolve with continue abstinence               Follow up in about 6 months (around 9/7/2024). with lab, US and fibroscan before  Orders Placed This Encounter   Procedures    FibroScan Transplant Hepatology(Vibration Controlled Transient Elastography)    US Abdomen Complete    " Ferritin    Iron and TIBC    Hepatic Function Panel        Thank you for allowing me to participate in the care of Pantera HAYNES RADHA WallaceC    I spent a total of 30 minutes on the day of the visit.This includes face to face time and non-face to face time preparing to see the patient (eg, review of tests), obtaining and/or reviewing separately obtained history, documenting clinical information in the electronic or other health record, independently interpreting results and communicating results to the patient/family/caregiver, and coordinating care.         CC'ed note to:   Jazmin Singh MD

## 2024-03-07 NOTE — PROCEDURES
FibroScan Transplant Hepatology(Vibration Controlled Transient Elastography)    Date/Time: 3/7/2024 11:00 AM    Performed by: Klarissa Velasquez NP  Authorized by: Klarissa Velasquez NP    Diagnosis:  Other    Probe:  M    Universal Protocol: Patient's identity, procedure and site were verified, confirmatory pause was performed.  Discussed procedure including risks and potential complications.  Questions answered.  Patient verbalizes understanding and wishes to proceed with VCTE.     Procedure: After providing explanations of the procedure, patient was placed in the supine position with right arm in maximum abduction to allow optimal exposure of right lateral abdomen.  Patient was briefly assessed, Testing was performed in the mid-axillary location, 50Hz Shear Wave pulses were applied and the resulting Shear Wave and Propagation Speed detected with a 3.5 MHz ultrasonic signal, using the FibroScan probe, Skin to liver capsule distance and liver parenchyma were accessed during the entire examination with the FibroScan probe, Patient was instructed to breathe normally and to abstain from sudden movements during the procedure, allowing for random measurements of liver stiffness. At least 10 Shear Waves were produced, Individual measurements of each Shear Wave were calculated.  Patient tolerated the procedure well with no complications.  Meets discharge criteria as was dismissed.  Rates pain 0 out of 10.  Patient will follow up with ordering provider to review results.    Findings  Median liver stiffness score:  9.7  CAP Reading: dB/m:  176    IQR/med %:  16  Interpretation  Fibrosis interpretation is based on medial liver stiffness - Kilopascal (kPa).    Fibrosis Stage:  F2  Steatosis interpretation is based on controlled attenuation parameter - (dB/m).    Steatosis Grade:  <S1

## 2024-03-07 NOTE — TELEPHONE ENCOUNTER
Reached out to pt in regards to discuss and confirm appts. Pt did answer, phone goes straight to . Left  for pt to give the office a call back

## 2024-03-07 NOTE — TELEPHONE ENCOUNTER
Returned pts call. Pt did not answer, left vm for pt to give the office a call back    ----- Message from Rivka Choudhary RN sent at 3/7/2024  9:22 AM CST -----  Regarding: FW: Returning a Missed Call    ----- Message -----  From: Rocio Blanton  Sent: 3/7/2024   9:06 AM CST  To: [facility] Clinical Staff  Subject: Returning a Missed Call                              Caller:    Pantera      Returning call to:   Kasie Warren       Caller can be reached @:   [phone]       Nature of the call:    Pt has some questions about their appts today.

## 2024-03-07 NOTE — PATIENT INSTRUCTIONS
1. Fibroscan to look for fat or scar tissue in the liver showed no fatty liver but stage 2 scar tissue  2.  Follow up in 6 months with US and labs a few days before, fibroscan same day   3. Limit alcohol consumption, continue to avoid

## 2024-03-22 ENCOUNTER — HOSPITAL ENCOUNTER (OUTPATIENT)
Dept: RADIOLOGY | Facility: CLINIC | Age: 74
Discharge: HOME | End: 2024-03-22
Attending: NURSE PRACTITIONER
Payer: MEDICARE

## 2024-03-22 DIAGNOSIS — Z78.0 ASYMPTOMATIC MENOPAUSE: ICD-10-CM

## 2024-03-22 PROCEDURE — 77080 DXA BONE DENSITY AXIAL: CPT | Mod: 26,,, | Performed by: INTERNAL MEDICINE

## 2024-03-22 PROCEDURE — 77080 DXA BONE DENSITY AXIAL: CPT | Mod: TC

## 2024-03-28 ENCOUNTER — PATIENT MESSAGE (OUTPATIENT)
Dept: FAMILY MEDICINE | Facility: CLINIC | Age: 74
End: 2024-03-28
Payer: MEDICARE

## 2024-03-28 DIAGNOSIS — M81.0 OSTEOPOROSIS WITHOUT CURRENT PATHOLOGICAL FRACTURE, UNSPECIFIED OSTEOPOROSIS TYPE: Primary | ICD-10-CM

## 2024-04-01 ENCOUNTER — TELEPHONE (OUTPATIENT)
Dept: ORTHOPEDICS | Facility: CLINIC | Age: 74
End: 2024-04-01
Payer: MEDICARE

## 2024-05-27 ENCOUNTER — TELEPHONE (OUTPATIENT)
Dept: FAMILY MEDICINE | Facility: CLINIC | Age: 74
End: 2024-05-27
Payer: MEDICARE

## 2024-05-27 NOTE — PROGRESS NOTES
ENDOCRINOLOGY CLINIC PATIENT NOTE      Subjective:        Patient ID: Pantera Posey is referred by Breanne Anaya, *     Chief Complaint:  Osteoporosis    HPI:   Pantera Posey is a 73 y.o. female who presents for evaluation of osteoporosis      T scores   Year Hip Femoral neck Distal 1/3 radius   2022 -1.3 -2.0 -0.3       BMD: T-score and % change  Year Hip %ch. vs prior Femoral neck Distal 1/3 radius %ch. vs prior   03/2024 -1.8 -7% -2.1 -1.1 -7%     FRAX 10 year probability of fracture:   Major Osteoporotic Fracture: 12%   Hip Fracture: 3%    H/o spinal surgery in 2013 and has hardware.     Lab Results   Component Value Date    CALCIUM 9.6 01/02/2024    ALBUMIN 3.4 (L) 01/02/2024    CREATININE 0.7 01/02/2024    ALKPHOS 71 01/02/2024    HZBKLMKH65GM 36 04/08/2020    TSH 1.893 11/09/2023    EGFRNORACEVR >60.0 01/02/2024        Current treatment: None    Previous Treatment: Fosamax in 2022 for 3 months, had GI intolerance.     History of previous fracture: No  Parent with fractured hip: No  Smoking status: Quit 30 years back, had smoked 0.5 PDD  Glucocorticoid use: No  History of RA: No  Alcohol 3 or more/day: No  Nephrolithiasis: Yes at 17-18 years old - 2 episodes. Seen urologist and was told she was taking too much calcium.   Diabetes: No  Frequent falls: No  Loss of height: No  Menopause: 48 years    CKD: No, CKD stage  Cancer/XRT:  Breast cancer Dx in 1999. Breast cancer recurrence  in 2017 and was arimedix for 5 years.  Denies radiation treatment.  H/o WPW syndrome s/p ablation in 2000    Dental visits:  Every 12 months. Partials upper and lower. No current dental procedures planned.    GERD: No    Supplements:   Ca+ vitamin-D: 1200 mg BID.  Takes milk, yogurt and green leafy vegetables regularly.    Exercise: Walks daily, or does stationary bike.  Does arm and floor exercise, squats for about 20 mins.     H/o Macrocytosis and follows up hematologist.  H/o  Hepatic fibrosis.     Family history:    Osteoporosis: Denies        ROS: see HPI     Objective:     Physical Exam     BP (!) 158/73 (BP Location: Left arm, Patient Position: Sitting, BP Method: Small (Automatic))   Pulse 74   Wt 56 kg (123 lb 7.3 oz)   LMP  (LMP Unknown)   BMI 21.19 kg/m²     Wt Readings from Last 3 Encounters:   05/28/24 56 kg (123 lb 7.3 oz)   03/07/24 56.7 kg (125 lb)   01/09/24 58.1 kg (128 lb 1.4 oz)       Constitutional:  Pleasant, in no acute distress.   HENT:   Head:    Normocephalic and atraumatic.   Eyes:    No scleral icterus.   Cardiovascular:  Normal rate  Respiratory:   Effort normal   Neurological:  Normal speech  Skin:    Skin is warm, dry      LABORATORY REVIEW:    Chemistry        Component Value Date/Time     01/02/2024 0718    K 3.4 (L) 01/02/2024 0718     01/02/2024 0718    CO2 24 01/02/2024 0718    BUN 7 (L) 01/02/2024 0718    CREATININE 0.7 01/02/2024 0718     (H) 01/02/2024 0718        Component Value Date/Time    CALCIUM 9.6 01/02/2024 0718    ALKPHOS 71 01/02/2024 0718    AST 22 01/02/2024 0718    ALT 14 01/02/2024 0718    BILITOT 0.6 01/02/2024 0718    ESTGFRAFRICA >60.0 02/22/2022 0932    EGFRNONAA >60.0 02/22/2022 0932          Lab Results   Component Value Date    HGBA1C 5.0 11/13/2023    HGBA1C 4.8 02/23/2022    HGBA1C 5.3 04/16/2020       Assessment/Plan:     Problem List Items Addressed This Visit          Endocrine    Age-related osteoporosis without current pathological fracture - Primary       Osteoporosis based on her FRAX.  Denies history of fragility fractures.   Prior use of Arimidex.  Reports intolerance to oral bisphosphonates.  Discussed alternative treatment options with Reclast infusion once a year or Prolia every 6 months injection.  Patient will decide if she wants to go ahead with the treatment, does report concerns with side effects.    Discussed her risk of fractures and benefits of osteoporosis treatment  Side effects including osteonecrosis of the jaw and  atypical femoral fractures discussed with patient    Encouraged safe movements and fall precautions.  Continue routine dental visits  Instructed on Calcium and vitamin D   Labs ordered.              Relevant Orders    Vitamin D    PTH, Intact        Isabelle Caraballo MD

## 2024-05-27 NOTE — TELEPHONE ENCOUNTER
----- Message from Leslie Roper sent at 5/27/2024 12:07 PM CDT -----  Regarding: P/t advice  Type: Patient Call Back    Who called:    What is the request in detail: p/t calling to speak to Nurse Chan. Please call to assist.     Can the clinic reply by [facility]?  Yes     Would the patient rather a call back or a response via [facility]?     Best call back number:   [phone]    Additional Information:

## 2024-05-28 ENCOUNTER — OFFICE VISIT (OUTPATIENT)
Dept: ENDOCRINOLOGY | Facility: CLINIC | Age: 74
End: 2024-05-28
Payer: MEDICARE

## 2024-05-28 VITALS
SYSTOLIC BLOOD PRESSURE: 158 MMHG | WEIGHT: 123.44 LBS | BODY MASS INDEX: 21.19 KG/M2 | HEART RATE: 74 BPM | DIASTOLIC BLOOD PRESSURE: 73 MMHG

## 2024-05-28 DIAGNOSIS — M81.0 AGE-RELATED OSTEOPOROSIS WITHOUT CURRENT PATHOLOGICAL FRACTURE: Primary | ICD-10-CM

## 2024-05-28 PROCEDURE — 3288F FALL RISK ASSESSMENT DOCD: CPT | Mod: CPTII,S$GLB,, | Performed by: INTERNAL MEDICINE

## 2024-05-28 PROCEDURE — 1157F ADVNC CARE PLAN IN RCRD: CPT | Mod: CPTII,S$GLB,, | Performed by: INTERNAL MEDICINE

## 2024-05-28 PROCEDURE — 1101F PT FALLS ASSESS-DOCD LE1/YR: CPT | Mod: CPTII,S$GLB,, | Performed by: INTERNAL MEDICINE

## 2024-05-28 PROCEDURE — 3077F SYST BP >= 140 MM HG: CPT | Mod: CPTII,S$GLB,, | Performed by: INTERNAL MEDICINE

## 2024-05-28 PROCEDURE — 1159F MED LIST DOCD IN RCRD: CPT | Mod: CPTII,S$GLB,, | Performed by: INTERNAL MEDICINE

## 2024-05-28 PROCEDURE — 3078F DIAST BP <80 MM HG: CPT | Mod: CPTII,S$GLB,, | Performed by: INTERNAL MEDICINE

## 2024-05-28 PROCEDURE — 99204 OFFICE O/P NEW MOD 45 MIN: CPT | Mod: S$GLB,,, | Performed by: INTERNAL MEDICINE

## 2024-05-28 PROCEDURE — 1126F AMNT PAIN NOTED NONE PRSNT: CPT | Mod: CPTII,S$GLB,, | Performed by: INTERNAL MEDICINE

## 2024-05-28 PROCEDURE — 3008F BODY MASS INDEX DOCD: CPT | Mod: CPTII,S$GLB,, | Performed by: INTERNAL MEDICINE

## 2024-05-28 PROCEDURE — 1160F RVW MEDS BY RX/DR IN RCRD: CPT | Mod: CPTII,S$GLB,, | Performed by: INTERNAL MEDICINE

## 2024-05-28 PROCEDURE — 99999 PR PBB SHADOW E&M-EST. PATIENT-LVL IV: CPT | Mod: PBBFAC,,, | Performed by: INTERNAL MEDICINE

## 2024-05-28 NOTE — ASSESSMENT & PLAN NOTE
Osteoporosis based on her FRAX.  Denies history of fragility fractures.   Prior use of Arimidex.  Reports intolerance to oral bisphosphonates.  Discussed alternative treatment options with Reclast infusion once a year or Prolia every 6 months injection.  Patient will decide if she wants to go ahead with the treatment, does report concerns with side effects.    Discussed her risk of fractures and benefits of osteoporosis treatment  Side effects including osteonecrosis of the jaw and atypical femoral fractures discussed with patient    Encouraged safe movements and fall precautions.  Continue routine dental visits  Instructed on Calcium and vitamin D   Labs ordered.

## 2024-05-28 NOTE — PATIENT INSTRUCTIONS
Take calcium total 1200 mg a day from diet and supplements.  You can take calcium supplement 600 mg and 2 servings of dairy daily.  Take vitamin D 1000 units daily.  Avoid bending forwards at the waist and deep twisting  Continue weight bearing exercises like walking and do light weights  Take fall precautions  Call if you have any new fractures   Get regular dental visit and let your dentist know that you are on osteoporosis medication.      --------------------------------------------------------------        RECLAST    DOSAGE AND ADMINISTRATION    The recommended regimen is a 5 mg infusion once a year given intravenously over no less than 15 minutes.    Patients must be appropriately hydrated prior to administration of Reclast. You should drink 2 glasses of fluid at least one hour before receiving your infusion.   You may eat normally before your infusion.   You should not take Zolendronic acid if you are pregnant, breast feeding, have kidney problems, or have low blood calcium.  Administration of acetaminophen following Reclast administration may reduce the incidence of acute-phase reaction symptoms.    Please follow-up for routine oral examination prior to initiation of Reclast treatment if you have not seen a dentist more than a year.    Calcium and Vitamin D Supplementation  Take supplemental calcium and vitamin D if their dietary intake is inadequate. An average of at least 1200 mg calcium and 800-1000 international units vitamin D daily is recommended.    Reclast is contraindicated in patients with the following conditions:  - Hypocalcemia   - Creatinine clearance less than 35 mL/min and in those with evidence of acute renal impairment due to an increased risk of renal failure   - Known hypersensitivity to zoledronic acid or any components of Reclast. Hypersensitivity reactions, including urticaria, angioedema, and anaphylactic reaction/shock have been reported       Acute Phase Reaction  The signs and  symptoms of acute phase reaction occurred Reclast infusion, including fever (18%), myalgia (9%), flu-like symptoms (8%), headache (7%), and arthralgia (7%). The majority of these symptoms occurred within the first 3 days following the dose of Reclast and usually resolved within 3 days of onset but resolution could take up to 7-14 days.     Osteonecrosis of the Jaw  Osteonecrosis of the jaw (ONJ) has been reported in patients treated with bisphosphonates, including zoledronic acid. Most cases have been in cancer patients treated with intravenous bisphosphonates undergoing dental procedures. Some cases have occurred in patients with postmenopausal osteoporosis treated with either oral or intravenous bisphosphonates.    Atypical Subtrochanteric and Diaphyseal Femoral Fractures  Atypical, low-energy, or low trauma fractures of the femoral shaft have been reported in bisphosphonate-treated patients.     Musculoskeletal Pain  Severe and occasionally incapacitating bone, joint, and/or muscle pain have been infrequently reported in patients taking bisphosphonates, including Reclast. The time to onset of symptoms varied from one day to several months after starting the drug.    Patients With Asthma  There have been reports of bronchoconstriction in aspirin-sensitive patients receiving bisphosphonates.     Injection-Site Reactions  Local reactions at the infusion site, such as itching, redness and/or pain have been reported.    Atrial Fibrillation    Ocular Adverse Events  Cases of iritis/uveitis/episcleritis/conjunctivitis have been reported in patients treated with bisphosphonates, including zoledronic acid.            .

## 2024-05-30 ENCOUNTER — PATIENT MESSAGE (OUTPATIENT)
Dept: ADMINISTRATIVE | Facility: HOSPITAL | Age: 74
End: 2024-05-30
Payer: MEDICARE

## 2024-05-30 ENCOUNTER — PATIENT OUTREACH (OUTPATIENT)
Dept: ADMINISTRATIVE | Facility: HOSPITAL | Age: 74
End: 2024-05-30
Payer: MEDICARE

## 2024-05-30 VITALS — SYSTOLIC BLOOD PRESSURE: 112 MMHG | DIASTOLIC BLOOD PRESSURE: 67 MMHG

## 2024-05-30 NOTE — PROGRESS NOTES
Remote BP reading via portal on 5/30/2024  Last Blood Pressure <= 139/89 (5/30/2024): Yes  112/67  113/64

## 2024-07-02 ENCOUNTER — PATIENT MESSAGE (OUTPATIENT)
Dept: ENDOCRINOLOGY | Facility: CLINIC | Age: 74
End: 2024-07-02
Payer: MEDICARE

## 2024-07-02 ENCOUNTER — LAB VISIT (OUTPATIENT)
Dept: LAB | Facility: HOSPITAL | Age: 74
End: 2024-07-02
Attending: INTERNAL MEDICINE
Payer: MEDICARE

## 2024-07-02 DIAGNOSIS — M81.0 AGE-RELATED OSTEOPOROSIS WITHOUT CURRENT PATHOLOGICAL FRACTURE: ICD-10-CM

## 2024-07-02 DIAGNOSIS — D53.9 MACROCYTIC ANEMIA: ICD-10-CM

## 2024-07-02 LAB
25(OH)D3+25(OH)D2 SERPL-MCNC: 40 NG/ML (ref 30–96)
ALBUMIN SERPL BCP-MCNC: 3.8 G/DL (ref 3.5–5.2)
ALP SERPL-CCNC: 70 U/L (ref 55–135)
ALT SERPL W/O P-5'-P-CCNC: 20 U/L (ref 10–44)
ANION GAP SERPL CALC-SCNC: 7 MMOL/L (ref 8–16)
AST SERPL-CCNC: 24 U/L (ref 10–40)
BASOPHILS # BLD AUTO: 0.1 K/UL (ref 0–0.2)
BASOPHILS NFR BLD: 1.6 % (ref 0–1.9)
BILIRUB SERPL-MCNC: 0.5 MG/DL (ref 0.1–1)
BUN SERPL-MCNC: 12 MG/DL (ref 8–23)
CALCIUM SERPL-MCNC: 9.8 MG/DL (ref 8.7–10.5)
CHLORIDE SERPL-SCNC: 107 MMOL/L (ref 95–110)
CO2 SERPL-SCNC: 25 MMOL/L (ref 23–29)
CREAT SERPL-MCNC: 0.8 MG/DL (ref 0.5–1.4)
DIFFERENTIAL METHOD BLD: ABNORMAL
EOSINOPHIL # BLD AUTO: 0.2 K/UL (ref 0–0.5)
EOSINOPHIL NFR BLD: 3.5 % (ref 0–8)
ERYTHROCYTE [DISTWIDTH] IN BLOOD BY AUTOMATED COUNT: 13.5 % (ref 11.5–14.5)
EST. GFR  (NO RACE VARIABLE): >60 ML/MIN/1.73 M^2
FOLATE SERPL-MCNC: 36.8 NG/ML (ref 4–24)
GLUCOSE SERPL-MCNC: 99 MG/DL (ref 70–110)
HCT VFR BLD AUTO: 41.2 % (ref 37–48.5)
HGB BLD-MCNC: 13.1 G/DL (ref 12–16)
IMM GRANULOCYTES # BLD AUTO: 0.02 K/UL (ref 0–0.04)
IMM GRANULOCYTES NFR BLD AUTO: 0.3 % (ref 0–0.5)
LYMPHOCYTES # BLD AUTO: 2.2 K/UL (ref 1–4.8)
LYMPHOCYTES NFR BLD: 34.2 % (ref 18–48)
MCH RBC QN AUTO: 31.4 PG (ref 27–31)
MCHC RBC AUTO-ENTMCNC: 31.8 G/DL (ref 32–36)
MCV RBC AUTO: 99 FL (ref 82–98)
MONOCYTES # BLD AUTO: 0.7 K/UL (ref 0.3–1)
MONOCYTES NFR BLD: 11 % (ref 4–15)
NEUTROPHILS # BLD AUTO: 3.1 K/UL (ref 1.8–7.7)
NEUTROPHILS NFR BLD: 49.4 % (ref 38–73)
NRBC BLD-RTO: 0 /100 WBC
PLATELET # BLD AUTO: 216 K/UL (ref 150–450)
PMV BLD AUTO: 11.5 FL (ref 9.2–12.9)
POTASSIUM SERPL-SCNC: 4.8 MMOL/L (ref 3.5–5.1)
PROT SERPL-MCNC: 7.6 G/DL (ref 6–8.4)
PTH-INTACT SERPL-MCNC: 53.9 PG/ML (ref 9–77)
RBC # BLD AUTO: 4.17 M/UL (ref 4–5.4)
SODIUM SERPL-SCNC: 139 MMOL/L (ref 136–145)
VIT B12 SERPL-MCNC: 1875 PG/ML (ref 210–950)
WBC # BLD AUTO: 6.34 K/UL (ref 3.9–12.7)

## 2024-07-02 PROCEDURE — 85025 COMPLETE CBC W/AUTO DIFF WBC: CPT | Performed by: INTERNAL MEDICINE

## 2024-07-02 PROCEDURE — 80053 COMPREHEN METABOLIC PANEL: CPT | Performed by: INTERNAL MEDICINE

## 2024-07-02 PROCEDURE — 36415 COLL VENOUS BLD VENIPUNCTURE: CPT | Performed by: INTERNAL MEDICINE

## 2024-07-02 PROCEDURE — 82306 VITAMIN D 25 HYDROXY: CPT | Performed by: INTERNAL MEDICINE

## 2024-07-02 PROCEDURE — 82607 VITAMIN B-12: CPT | Performed by: INTERNAL MEDICINE

## 2024-07-02 PROCEDURE — 83970 ASSAY OF PARATHORMONE: CPT | Performed by: INTERNAL MEDICINE

## 2024-07-02 PROCEDURE — 82746 ASSAY OF FOLIC ACID SERUM: CPT | Performed by: INTERNAL MEDICINE

## 2024-07-09 ENCOUNTER — TELEPHONE (OUTPATIENT)
Dept: HEMATOLOGY/ONCOLOGY | Facility: CLINIC | Age: 74
End: 2024-07-09

## 2024-07-09 ENCOUNTER — TELEPHONE (OUTPATIENT)
Dept: HEMATOLOGY/ONCOLOGY | Facility: CLINIC | Age: 74
End: 2024-07-09
Payer: MEDICARE

## 2024-07-09 DIAGNOSIS — D53.9 MACROCYTIC ANEMIA: Primary | ICD-10-CM

## 2024-07-09 NOTE — TELEPHONE ENCOUNTER
"----- Message from Abraham Biswas sent at 7/9/2024  9:09 AM CDT -----  Consult/Advisory    Name Of Caller: Self    Contact Preference?: [phone]    Provider Name: Pauline    Does patient feel the need to be seen today? No    What is the nature of the call?: Returning call to Dr. Carpenter    Additional Notes:  "Thank you for all that you do for our patients"  "

## 2024-07-09 NOTE — TELEPHONE ENCOUNTER
Spoke with pt who stated that she spoke with Dr. Carpenter in regards to her appt on 7/11/24. Pt says provider stated that she will need to cancel the appointment and that pt can r/s whenever she is available to come back to main campus for her appt. Pt r/s to 8/27/24 at 8:30 am, she will come in for 7:30 am to do labs.

## 2024-07-15 ENCOUNTER — PATIENT MESSAGE (OUTPATIENT)
Dept: FAMILY MEDICINE | Facility: CLINIC | Age: 74
End: 2024-07-15
Payer: MEDICARE

## 2024-07-29 ENCOUNTER — TELEPHONE (OUTPATIENT)
Dept: FAMILY MEDICINE | Facility: CLINIC | Age: 74
End: 2024-07-29
Payer: MEDICARE

## 2024-07-29 DIAGNOSIS — L23.9 ALLERGIC CONTACT DERMATITIS, UNSPECIFIED TRIGGER: ICD-10-CM

## 2024-07-29 NOTE — TELEPHONE ENCOUNTER
Encounter Date: 08/15/2023    Allergic contact dermatitis, unspecified trigger - see list of allergens above.  Continue strict avoidance.  Topical steroids for flares; call for pred taper if more severe.  -     hydrOXYzine HCL (ATARAX) 25 MG tablet; TAKE 1 TABLET BY MOUTH EVERY EVENING AS NEEDED FOR PRURITIS. DO NOT DRIVE OR OPERATE MACHINERY WHILE TAKE MEDICATION  Dispense: 30 tablet; Refill: 5

## 2024-07-31 RX ORDER — HYDROXYZINE HYDROCHLORIDE 25 MG/1
TABLET, FILM COATED ORAL
Qty: 30 TABLET | Refills: 5 | Status: SHIPPED | OUTPATIENT
Start: 2024-07-31

## 2024-08-09 ENCOUNTER — OFFICE VISIT (OUTPATIENT)
Dept: FAMILY MEDICINE | Facility: CLINIC | Age: 74
End: 2024-08-09
Payer: MEDICARE

## 2024-08-09 VITALS
HEART RATE: 62 BPM | OXYGEN SATURATION: 99 % | SYSTOLIC BLOOD PRESSURE: 113 MMHG | BODY MASS INDEX: 20.6 KG/M2 | WEIGHT: 120 LBS | DIASTOLIC BLOOD PRESSURE: 54 MMHG

## 2024-08-09 DIAGNOSIS — M25.511 ACUTE PAIN OF RIGHT SHOULDER: ICD-10-CM

## 2024-08-09 DIAGNOSIS — G62.9 NEUROPATHY: ICD-10-CM

## 2024-08-09 DIAGNOSIS — I10 ESSENTIAL HYPERTENSION: Primary | ICD-10-CM

## 2024-08-09 PROCEDURE — 99999 PR PBB SHADOW E&M-EST. PATIENT-LVL IV: CPT | Mod: PBBFAC,,, | Performed by: NURSE PRACTITIONER

## 2024-08-09 RX ORDER — NAPROXEN 500 MG/1
500 TABLET ORAL 2 TIMES DAILY WITH MEALS
Qty: 28 TABLET | Refills: 0 | Status: SHIPPED | OUTPATIENT
Start: 2024-08-09 | End: 2024-08-23

## 2024-08-09 RX ORDER — AMLODIPINE BESYLATE 5 MG/1
5 TABLET ORAL DAILY
Qty: 90 TABLET | Refills: 3 | Status: SHIPPED | OUTPATIENT
Start: 2024-08-09 | End: 2025-08-09

## 2024-08-09 RX ORDER — GABAPENTIN 100 MG/1
100 CAPSULE ORAL NIGHTLY
Qty: 30 CAPSULE | Refills: 2 | Status: SHIPPED | OUTPATIENT
Start: 2024-08-09 | End: 2025-08-09

## 2024-08-12 ENCOUNTER — HOSPITAL ENCOUNTER (OUTPATIENT)
Dept: CARDIOLOGY | Facility: OTHER | Age: 74
Discharge: HOME | End: 2024-08-12
Attending: NURSE PRACTITIONER
Payer: MEDICARE

## 2024-08-12 ENCOUNTER — PATIENT MESSAGE (OUTPATIENT)
Dept: FAMILY MEDICINE | Facility: CLINIC | Age: 74
End: 2024-08-12
Payer: MEDICARE

## 2024-08-12 ENCOUNTER — HOSPITAL ENCOUNTER (OUTPATIENT)
Dept: RADIOLOGY | Facility: OTHER | Age: 74
Discharge: HOME | End: 2024-08-12
Attending: NURSE PRACTITIONER
Payer: MEDICARE

## 2024-08-12 DIAGNOSIS — I10 ESSENTIAL HYPERTENSION: ICD-10-CM

## 2024-08-12 DIAGNOSIS — R94.31 ABNORMAL EKG: Primary | ICD-10-CM

## 2024-08-12 LAB
OHS QRS DURATION: 102 MS
OHS QTC CALCULATION: 440 MS

## 2024-08-12 PROCEDURE — 71046 X-RAY EXAM CHEST 2 VIEWS: CPT | Mod: TC,FY

## 2024-08-12 PROCEDURE — 93005 ELECTROCARDIOGRAM TRACING: CPT

## 2024-08-12 PROCEDURE — 71046 X-RAY EXAM CHEST 2 VIEWS: CPT | Mod: 26,,, | Performed by: RADIOLOGY

## 2024-08-12 PROCEDURE — 93010 ELECTROCARDIOGRAM REPORT: CPT | Mod: ,,, | Performed by: INTERNAL MEDICINE

## 2024-08-23 DIAGNOSIS — M25.511 ACUTE PAIN OF RIGHT SHOULDER: ICD-10-CM

## 2024-08-23 RX ORDER — NAPROXEN 500 MG/1
TABLET ORAL
Qty: 28 TABLET | Refills: 0 | Status: SHIPPED | OUTPATIENT
Start: 2024-08-23

## 2024-08-27 ENCOUNTER — LAB VISIT (OUTPATIENT)
Dept: LAB | Facility: HOSPITAL | Age: 74
End: 2024-08-27
Attending: FAMILY MEDICINE
Payer: MEDICARE

## 2024-08-27 ENCOUNTER — OFFICE VISIT (OUTPATIENT)
Dept: HEMATOLOGY/ONCOLOGY | Facility: CLINIC | Age: 74
End: 2024-08-27
Payer: MEDICARE

## 2024-08-27 VITALS
HEIGHT: 64 IN | HEART RATE: 68 BPM | OXYGEN SATURATION: 100 % | SYSTOLIC BLOOD PRESSURE: 154 MMHG | WEIGHT: 121.5 LBS | TEMPERATURE: 98 F | DIASTOLIC BLOOD PRESSURE: 70 MMHG | BODY MASS INDEX: 20.74 KG/M2

## 2024-08-27 DIAGNOSIS — E53.8 VITAMIN B 12 DEFICIENCY: ICD-10-CM

## 2024-08-27 DIAGNOSIS — C50.512 MALIGNANT NEOPLASM OF LOWER-OUTER QUADRANT OF LEFT BREAST OF FEMALE, ESTROGEN RECEPTOR POSITIVE: ICD-10-CM

## 2024-08-27 DIAGNOSIS — D53.9 MACROCYTIC ANEMIA: Primary | ICD-10-CM

## 2024-08-27 DIAGNOSIS — D53.9 MACROCYTIC ANEMIA: ICD-10-CM

## 2024-08-27 DIAGNOSIS — Z17.0 MALIGNANT NEOPLASM OF LOWER-OUTER QUADRANT OF LEFT BREAST OF FEMALE, ESTROGEN RECEPTOR POSITIVE: ICD-10-CM

## 2024-08-27 LAB
ALBUMIN SERPL BCP-MCNC: 3.7 G/DL (ref 3.5–5.2)
ALP SERPL-CCNC: 68 U/L (ref 55–135)
ALT SERPL W/O P-5'-P-CCNC: 16 U/L (ref 10–44)
ANION GAP SERPL CALC-SCNC: 8 MMOL/L (ref 8–16)
AST SERPL-CCNC: 20 U/L (ref 10–40)
BASOPHILS # BLD AUTO: 0.09 K/UL (ref 0–0.2)
BASOPHILS NFR BLD: 1.3 % (ref 0–1.9)
BILIRUB SERPL-MCNC: 0.4 MG/DL (ref 0.1–1)
BUN SERPL-MCNC: 15 MG/DL (ref 8–23)
CALCIUM SERPL-MCNC: 9.6 MG/DL (ref 8.7–10.5)
CHLORIDE SERPL-SCNC: 108 MMOL/L (ref 95–110)
CO2 SERPL-SCNC: 23 MMOL/L (ref 23–29)
CREAT SERPL-MCNC: 0.8 MG/DL (ref 0.5–1.4)
DIFFERENTIAL METHOD BLD: ABNORMAL
EOSINOPHIL # BLD AUTO: 0.3 K/UL (ref 0–0.5)
EOSINOPHIL NFR BLD: 4.4 % (ref 0–8)
ERYTHROCYTE [DISTWIDTH] IN BLOOD BY AUTOMATED COUNT: 13.5 % (ref 11.5–14.5)
EST. GFR  (NO RACE VARIABLE): >60 ML/MIN/1.73 M^2
FOLATE SERPL-MCNC: >40 NG/ML (ref 4–24)
GLUCOSE SERPL-MCNC: 106 MG/DL (ref 70–110)
HCT VFR BLD AUTO: 40.1 % (ref 37–48.5)
HGB BLD-MCNC: 12.5 G/DL (ref 12–16)
IMM GRANULOCYTES # BLD AUTO: 0.02 K/UL (ref 0–0.04)
IMM GRANULOCYTES NFR BLD AUTO: 0.3 % (ref 0–0.5)
LYMPHOCYTES # BLD AUTO: 2.4 K/UL (ref 1–4.8)
LYMPHOCYTES NFR BLD: 34.9 % (ref 18–48)
MCH RBC QN AUTO: 30.9 PG (ref 27–31)
MCHC RBC AUTO-ENTMCNC: 31.2 G/DL (ref 32–36)
MCV RBC AUTO: 99 FL (ref 82–98)
MONOCYTES # BLD AUTO: 0.6 K/UL (ref 0.3–1)
MONOCYTES NFR BLD: 9.2 % (ref 4–15)
NEUTROPHILS # BLD AUTO: 3.5 K/UL (ref 1.8–7.7)
NEUTROPHILS NFR BLD: 49.9 % (ref 38–73)
NRBC BLD-RTO: 0 /100 WBC
PLATELET # BLD AUTO: 188 K/UL (ref 150–450)
PMV BLD AUTO: 10.8 FL (ref 9.2–12.9)
POTASSIUM SERPL-SCNC: 4.9 MMOL/L (ref 3.5–5.1)
PROT SERPL-MCNC: 7.3 G/DL (ref 6–8.4)
RBC # BLD AUTO: 4.05 M/UL (ref 4–5.4)
SODIUM SERPL-SCNC: 139 MMOL/L (ref 136–145)
VIT B12 SERPL-MCNC: 1505 PG/ML (ref 210–950)
WBC # BLD AUTO: 6.97 K/UL (ref 3.9–12.7)

## 2024-08-27 PROCEDURE — 3008F BODY MASS INDEX DOCD: CPT | Mod: CPTII,S$GLB,, | Performed by: INTERNAL MEDICINE

## 2024-08-27 PROCEDURE — 1101F PT FALLS ASSESS-DOCD LE1/YR: CPT | Mod: CPTII,S$GLB,, | Performed by: INTERNAL MEDICINE

## 2024-08-27 PROCEDURE — 3077F SYST BP >= 140 MM HG: CPT | Mod: CPTII,S$GLB,, | Performed by: INTERNAL MEDICINE

## 2024-08-27 PROCEDURE — 99999 PR PBB SHADOW E&M-EST. PATIENT-LVL III: CPT | Mod: PBBFAC,,, | Performed by: INTERNAL MEDICINE

## 2024-08-27 PROCEDURE — 36415 COLL VENOUS BLD VENIPUNCTURE: CPT | Performed by: INTERNAL MEDICINE

## 2024-08-27 PROCEDURE — 4010F ACE/ARB THERAPY RXD/TAKEN: CPT | Mod: CPTII,S$GLB,, | Performed by: INTERNAL MEDICINE

## 2024-08-27 PROCEDURE — 1126F AMNT PAIN NOTED NONE PRSNT: CPT | Mod: CPTII,S$GLB,, | Performed by: INTERNAL MEDICINE

## 2024-08-27 PROCEDURE — 99215 OFFICE O/P EST HI 40 MIN: CPT | Mod: S$GLB,,, | Performed by: INTERNAL MEDICINE

## 2024-08-27 PROCEDURE — 1159F MED LIST DOCD IN RCRD: CPT | Mod: CPTII,S$GLB,, | Performed by: INTERNAL MEDICINE

## 2024-08-27 PROCEDURE — 82607 VITAMIN B-12: CPT | Performed by: INTERNAL MEDICINE

## 2024-08-27 PROCEDURE — 3288F FALL RISK ASSESSMENT DOCD: CPT | Mod: CPTII,S$GLB,, | Performed by: INTERNAL MEDICINE

## 2024-08-27 PROCEDURE — 3078F DIAST BP <80 MM HG: CPT | Mod: CPTII,S$GLB,, | Performed by: INTERNAL MEDICINE

## 2024-08-27 PROCEDURE — 80053 COMPREHEN METABOLIC PANEL: CPT | Performed by: INTERNAL MEDICINE

## 2024-08-27 PROCEDURE — 1157F ADVNC CARE PLAN IN RCRD: CPT | Mod: CPTII,S$GLB,, | Performed by: INTERNAL MEDICINE

## 2024-08-27 PROCEDURE — 82746 ASSAY OF FOLIC ACID SERUM: CPT | Performed by: INTERNAL MEDICINE

## 2024-08-27 PROCEDURE — 85025 COMPLETE CBC W/AUTO DIFF WBC: CPT | Performed by: INTERNAL MEDICINE

## 2024-08-27 NOTE — PROGRESS NOTES
Hematology and Medical Oncology   Follow Up     08/27/2024    Reason For Referral Macrocytosis      History of Present Ilness:   Pantera Posey (Pantera) is a pleasant 74 y.o.female previous patient of Dr. Fritz, has since been discharged from his clinic.     On chart review MCV has always been above average but since 2016 has steadily risen to from 106 to almost 120. There is no associated anemia.         Interval History:   Ms. Posey continues to do well. Eating well, sleeping. Exercising. Lost weight. Both cataract surgeries were completed and have healed nicely. Can now see without issue. TV is sharp and in focus again.     Stopped drinking alcohol completely on Dec 7th. Did well during the holidays. Pleased to normalization in labs as a result.    Experiences neuropathy in her feet. Recently started on gabapentin.       PAST MEDICAL HISTORY:   Past Medical History:   Diagnosis Date    Breast cancer 1997    RIGHT    Breast cancer 2017    Left    Cancer     breast cancer    Hypertension     Renea-Parkinson-White syndrome        PAST SURGICAL HISTORY:   Past Surgical History:   Procedure Laterality Date    AUGMENTATION OF BREAST Bilateral 2018    BACK SURGERY      Spinal fusion    BREAST BIOPSY Right 1997    core bx, +    BREAST BIOPSY Left 2017    Core bx, +    BREAST RECONSTRUCTION Bilateral 2018    with implants    BREAST SURGERY      CARDIAC ELECTROPHYSIOLOGY STUDY AND ABLATION      COLONOSCOPY N/A 7/20/2018    Procedure: COLONOSCOPY;  Surgeon: David Mcdonald MD;  Location: [facility] ([address] [place] [address]);  Service: Endoscopy;  Laterality: N/A;    MASTECTOMY Right 1997    RIGHT/ RADIATION AND CHEMO    MASTECTOMY Left 2017    left    TUBAL LIGATION         PAST SOCIAL HISTORY:   reports that she quit smoking about 35 years ago. Her smoking use included cigarettes. She has been exposed to tobacco smoke. She has never used smokeless tobacco. She reports that she does not currently use alcohol. She reports that she does  not use drugs.    FAMILY HISTORY:  Family History   Problem Relation Name Age of Onset    Cancer Mother      Hypertension Father      Breast cancer Maternal Aunt      Ovarian cancer Paternal Aunt      Melanoma Neg Hx         CURRENT MEDICATIONS:   Current Outpatient Medications   Medication Sig    amLODIPine (NORVASC) 5 MG tablet Take 1 tablet (5 mg total) by mouth once daily.    calcium-vitamin D3 500 mg(1,250mg) -200 unit per tablet Take 2 tablets by mouth 2 (two) times daily with meals.    clobetasoL (TEMOVATE) 0.05 % cream AAA bid    clobetasoL (TEMOVATE) 0.05 % external solution Use on scalp one - two times daily as needed for scaling or itching    doxycycline (ORACEA) 40 mg capsule Take 1 po qday    doxycycline 50 MG capsule TAKE 1 CAPSULE(50 MG) BY MOUTH EVERY DAY    folic acid (FOLVITE) 1 MG tablet Take 1 mg by mouth once daily.    gabapentin (NEURONTIN) 100 MG capsule Take 1 capsule (100 mg total) by mouth every evening.    hydrOXYzine HCL (ATARAX) 25 MG tablet TAKE 1 TABLET BY MOUTH EVERY EVENING AS NEEDED FOR ITCHING. DO NOT DRIVE OR OPERATE MACHINERY WHILE TAKING THIS MEDICATION    lisinopriL (PRINIVIL,ZESTRIL) 5 MG tablet Take 1 tablet (5 mg total) by mouth once daily.    metronidazole 0.75% (METROCREAM) 0.75 % Crea AAA face bid    naproxen (NAPROSYN) 500 MG tablet TAKE 1 TABLET(500 MG) BY MOUTH TWICE DAILY WITH MEALS FOR 14 DAYS    SPIKEVAX 4034-0296,12Y UP,,PF, 50 mcg/0.5 mL injection     triamcinolone acetonide 0.1% (KENALOG) 0.1 % cream AAA bid to affected areas of body     No current facility-administered medications for this visit.     ALLERGIES:   Review of patient's allergies indicates:   Allergen Reactions    Ciprofloxacin Anaphylaxis     Other reaction(s): Difficulty breathing    Floxin [ofloxacin] Anaphylaxis    Clindamycin Diarrhea and Rash    Amoxicillin      Other reaction(s): Rash    Indocin  [indomethacin sodium]      Other reaction(s): Headache    Bactrim [sulfamethoxazole-trimethoprim]  Rash         Review of Systems:     Review of Systems   Constitutional:  Negative for appetite change, chills, diaphoresis, fatigue, fever and unexpected weight change.   HENT:   Negative for hearing loss, mouth sores, nosebleeds, sore throat, trouble swallowing and voice change.    Eyes:  Negative for eye problems and icterus.   Respiratory:  Negative for chest tightness, cough, hemoptysis, shortness of breath and wheezing.    Cardiovascular:  Negative for chest pain, leg swelling and palpitations.   Gastrointestinal:  Negative for abdominal distention, abdominal pain, blood in stool, diarrhea, nausea and vomiting.   Endocrine: Negative for hot flashes.   Genitourinary:  Negative for bladder incontinence, difficulty urinating, dysuria, frequency and hematuria.    Musculoskeletal:  Negative for arthralgias, back pain, flank pain, gait problem, myalgias, neck pain and neck stiffness.   Skin:  Negative for itching, rash and wound.   Neurological:  Positive for numbness. Negative for dizziness, extremity weakness, gait problem, headaches, seizures and speech difficulty.   Hematological:  Negative for adenopathy. Does not bruise/bleed easily.   Psychiatric/Behavioral:  Negative for confusion, depression and sleep disturbance. The patient is not nervous/anxious.       Physical Exam:     Vitals:    08/27/24 0819   BP: (!) 154/70   Pulse: 68   Temp: 97.6 °F (36.4 °C)       Physical Exam  Constitutional:       General: She is not in acute distress.     Appearance: She is well-developed. She is not diaphoretic.   HENT:      Head: Normocephalic and atraumatic.      Mouth/Throat:      Pharynx: No oropharyngeal exudate.   Eyes:      Conjunctiva/sclera: Conjunctivae normal.      Pupils: Pupils are equal, round, and reactive to light.   Neck:      Thyroid: No thyromegaly.      Vascular: No JVD.      Trachea: No tracheal deviation.   Cardiovascular:      Rate and Rhythm: Normal rate and regular rhythm.      Heart sounds: Normal  heart sounds. No murmur heard.     No friction rub.   Pulmonary:      Effort: Pulmonary effort is normal. No respiratory distress.      Breath sounds: Normal breath sounds. No stridor. No wheezing or rales.   Chest:      Chest wall: No tenderness.   Abdominal:      General: Bowel sounds are normal. There is no distension.      Palpations: Abdomen is soft.      Tenderness: There is no abdominal tenderness. There is no guarding or rebound.   Musculoskeletal:         General: No tenderness or deformity. Normal range of motion.      Cervical back: Normal range of motion and neck supple.   Skin:     General: Skin is warm and dry.      Capillary Refill: Capillary refill takes less than 2 seconds.      Coloration: Skin is not pale.      Findings: No erythema or rash.   Neurological:      Mental Status: She is alert and oriented to person, place, and time.      Cranial Nerves: No cranial nerve deficit.      Sensory: No sensory deficit.      Motor: No abnormal muscle tone.      Coordination: Coordination normal.      Deep Tendon Reflexes: Reflexes normal.   Psychiatric:         Behavior: Behavior normal.         Thought Content: Thought content normal.         Judgment: Judgment normal.         ECOG Performance Status: (foot note - ECOG PS provided by Eastern Cooperative Oncology Group) 0 - Asymptomatic    Karnofsky Performance Score:  100%- Normal, No Complaints, No Evidence of Disease    Labs:   Lab Results   Component Value Date    WBC 6.97 08/27/2024    HGB 12.5 08/27/2024    HCT 40.1 08/27/2024     08/27/2024    CHOL 217 (H) 11/09/2023    TRIG 61 11/09/2023    HDL 84 (H) 11/09/2023    ALT 16 08/27/2024    AST 20 08/27/2024     08/27/2024    K 4.9 08/27/2024     08/27/2024    CREATININE 0.8 08/27/2024    BUN 15 08/27/2024    CO2 23 08/27/2024    TSH 1.893 11/09/2023    INR 1.0 10/25/2013    HGBA1C 5.0 11/13/2023         B12: 226 --> 1505  Folate: 4.1    MDS Fish:   within normal limits for the MDS FISH  panel.    Imaging: Previous imaging has been reviewed         Assessment and Plan:   Ms. Posey is a pleasant 74 year old woman with progressive macrocytosis.     B 12 Deficiency  --MCV now 99  --Highest MCV was 119 prior to cessation of alcohol and then 107 in 1 month following alcohol cessation  --Continue over the counter supplement  --MDS FISH negative  --Plan to repeat labs in 6 months      Folate Deficiency  --Continue oral folate replacement    ER+ Breast Cancer  --Previous managed by Dr. Fritz  --Completed 5 years of oral hormone therapy    Elevated Liver Function Tests  --values have normalized    40 minutes in total were spent on this encounter of which 30 minutes were spent face to face with the patient  to discuss the disease, natural history, treatment options. I have provided the patient with an opportunity to ask questions and have all questions answered to her satisfaction.       she will return to clinic in 12 months, but knows to call in the interim if symptoms change or should a problem arise.        Shayy Carpenter MD  Hematology and Medical Oncology  Bone Marrow Transplant  [facility]      BMT Chart Routing      Follow up with physician 1 year. 1. see me in 1 year with cbc, cmp, b12, folate   Follow up with NIKOLE    Provider visit type    Infusion scheduling note    Injection scheduling note    Labs    Imaging    Pharmacy appointment    Other referrals

## 2024-09-03 ENCOUNTER — HOSPITAL ENCOUNTER (OUTPATIENT)
Dept: RADIOLOGY | Facility: HOSPITAL | Age: 74
Discharge: HOME | End: 2024-09-03
Attending: NURSE PRACTITIONER
Payer: MEDICARE

## 2024-09-03 DIAGNOSIS — R74.8 ELEVATED LIVER ENZYMES: ICD-10-CM

## 2024-09-03 DIAGNOSIS — K74.01 HEPATIC FIBROSIS, EARLY FIBROSIS: ICD-10-CM

## 2024-09-03 PROCEDURE — 76700 US EXAM ABDOM COMPLETE: CPT | Mod: TC

## 2024-09-03 PROCEDURE — 76700 US EXAM ABDOM COMPLETE: CPT | Mod: 26,,, | Performed by: RADIOLOGY

## 2024-09-06 ENCOUNTER — OFFICE VISIT (OUTPATIENT)
Dept: FAMILY MEDICINE | Facility: CLINIC | Age: 74
End: 2024-09-06
Payer: MEDICARE

## 2024-09-06 ENCOUNTER — LAB VISIT (OUTPATIENT)
Dept: LAB | Facility: HOSPITAL | Age: 74
End: 2024-09-06
Attending: NURSE PRACTITIONER
Payer: MEDICARE

## 2024-09-06 VITALS
OXYGEN SATURATION: 100 % | HEART RATE: 76 BPM | BODY MASS INDEX: 20.25 KG/M2 | SYSTOLIC BLOOD PRESSURE: 115 MMHG | WEIGHT: 118 LBS | DIASTOLIC BLOOD PRESSURE: 62 MMHG

## 2024-09-06 DIAGNOSIS — I70.0 AORTIC ATHEROSCLEROSIS: ICD-10-CM

## 2024-09-06 DIAGNOSIS — I10 HTN (HYPERTENSION), BENIGN: Primary | ICD-10-CM

## 2024-09-06 DIAGNOSIS — I10 ESSENTIAL HYPERTENSION: ICD-10-CM

## 2024-09-06 LAB
CHOLEST SERPL-MCNC: 219 MG/DL (ref 120–199)
CHOLEST/HDLC SERPL: 3.4 {RATIO} (ref 2–5)
HDLC SERPL-MCNC: 64 MG/DL (ref 40–75)
HDLC SERPL: 29.2 % (ref 20–50)
LDLC SERPL CALC-MCNC: 144.2 MG/DL (ref 63–159)
NONHDLC SERPL-MCNC: 155 MG/DL
TRIGL SERPL-MCNC: 54 MG/DL (ref 30–150)
TSH SERPL DL<=0.005 MIU/L-ACNC: 1.96 UIU/ML (ref 0.4–4)

## 2024-09-06 PROCEDURE — 36415 COLL VENOUS BLD VENIPUNCTURE: CPT | Mod: PO | Performed by: NURSE PRACTITIONER

## 2024-09-06 PROCEDURE — 84443 ASSAY THYROID STIM HORMONE: CPT | Performed by: NURSE PRACTITIONER

## 2024-09-06 PROCEDURE — 99999 PR PBB SHADOW E&M-EST. PATIENT-LVL III: CPT | Mod: PBBFAC,,, | Performed by: NURSE PRACTITIONER

## 2024-09-06 PROCEDURE — 80061 LIPID PANEL: CPT | Performed by: NURSE PRACTITIONER

## 2024-09-06 NOTE — ASSESSMENT & PLAN NOTE
-- home BP's continue to be on lower end of normal; can hold amlodipine for now  -- continue lisinopril  -- Continue home BP monitoring

## 2024-09-06 NOTE — PROGRESS NOTES
Subjective:       Patient ID: Pantera Posey is a 74 y.o. female.    Chief Complaint: Hypertension  Pantera Posey presents today for follow up of hypertension. Last seen in 2024.     With regards to hypertension:  Current medication regimen: amlodipine 5 mg; lisinopril 5 mg  Compliance: yes   Home BP monitorin-118's  Low sodium diet: yes   Exercise: yes   Denies cp, palpitations, sob, headaches, dizziness, vision changes, changes in urinary or bowel habits.     Hypertension  Pertinent negatives include no chest pain, headaches, neck pain or palpitations.     Patient Active Problem List   Diagnosis    HTN (hypertension), benign    Spondylosis without myelopathy    Spinal stenosis, lumbar region, with neurogenic claudication    Thoracic or lumbosacral neuritis or radiculitis, unspecified    Acquired spondylolisthesis    Lumbago    Degeneration of lumbar or lumbosacral intervertebral disc    Back pain    Malignant neoplasm of lower-outer quadrant of left breast of female, estrogen receptor positive    History of right breast cancer    Use of aromatase inhibitors    Age-related osteoporosis without current pathological fracture    S/P breast reconstruction    Swelling of hand    Macrocytosis    Vitamin B 12 deficiency    Elevated liver enzymes    Hepatic fibrosis, early fibrosis    Aortic atherosclerosis       Current Outpatient Medications:     calcium-vitamin D3 500 mg(1,250mg) -200 unit per tablet, Take 2 tablets by mouth 2 (two) times daily with meals., Disp: , Rfl:     clobetasoL (TEMOVATE) 0.05 % cream, AAA bid, Disp: 60 g, Rfl: 3    clobetasoL (TEMOVATE) 0.05 % external solution, Use on scalp one - two times daily as needed for scaling or itching, Disp: 50 mL, Rfl: 3    doxycycline (ORACEA) 40 mg capsule, Take 1 po qday, Disp: 30 capsule, Rfl: 5    doxycycline 50 MG capsule, TAKE 1 CAPSULE(50 MG) BY MOUTH EVERY DAY, Disp: 30 capsule, Rfl: 5    folic acid (FOLVITE) 1 MG tablet, Take 1 mg by mouth once  daily., Disp: , Rfl:     gabapentin (NEURONTIN) 100 MG capsule, Take 1 capsule (100 mg total) by mouth every evening., Disp: 30 capsule, Rfl: 2    hydrOXYzine HCL (ATARAX) 25 MG tablet, TAKE 1 TABLET BY MOUTH EVERY EVENING AS NEEDED FOR ITCHING. DO NOT DRIVE OR OPERATE MACHINERY WHILE TAKING THIS MEDICATION, Disp: 30 tablet, Rfl: 5    lisinopriL (PRINIVIL,ZESTRIL) 5 MG tablet, Take 1 tablet (5 mg total) by mouth once daily., Disp: 90 tablet, Rfl: 3    metronidazole 0.75% (METROCREAM) 0.75 % Crea, AAA face bid, Disp: 45 g, Rfl: 3    triamcinolone acetonide 0.1% (KENALOG) 0.1 % cream, AAA bid to affected areas of body, Disp: 454 g, Rfl: 3    SPIKEVAX 2661-2881,12Y UP,,PF, 50 mcg/0.5 mL injection, , Disp: , Rfl:     The following portions of the patient's history were reviewed and updated as appropriate: allergies, past family history, past medical history, past social history and past surgical history.    Review of Systems   Constitutional:  Negative for activity change and unexpected weight change.   HENT:  Negative for hearing loss, rhinorrhea and trouble swallowing.    Eyes:  Negative for discharge and visual disturbance.   Respiratory:  Negative for chest tightness and wheezing.    Cardiovascular:  Negative for chest pain and palpitations.   Gastrointestinal:  Negative for blood in stool, constipation, diarrhea and vomiting.   Genitourinary:  Negative for difficulty urinating and hematuria.   Musculoskeletal:  Negative for neck pain.   Neurological:  Negative for headaches.   Psychiatric/Behavioral:  Negative for dysphoric mood.        Objective:      /62   Pulse 76   Wt 53.5 kg (118 lb)   LMP  (LMP Unknown)   SpO2 100%   BMI 20.25 kg/m²     Physical Exam  Constitutional:       General: She is not in acute distress.     Appearance: Normal appearance.   Cardiovascular:      Rate and Rhythm: Normal rate and regular rhythm.      Pulses: Normal pulses.      Heart sounds: Normal heart sounds.   Pulmonary:       Effort: Pulmonary effort is normal.      Breath sounds: Normal breath sounds.   Musculoskeletal:         General: Normal range of motion.   Skin:     General: Skin is warm and dry.   Neurological:      Mental Status: She is alert and oriented to person, place, and time.   Psychiatric:         Mood and Affect: Mood normal.         Behavior: Behavior normal.         Assessment:       1. HTN (hypertension), benign    2. Aortic atherosclerosis        Plan:   1. HTN (hypertension), benign  Assessment & Plan:  -- home BP's continue to be on lower end of normal; can hold amlodipine for now  -- continue lisinopril  -- Continue home BP monitoring       2. Aortic atherosclerosis  Assessment & Plan:  -- noted on x-ray 8/2024  -- not currently on statin  -- f/u with cardiology as scheduled       F/U 6 mos and PRN.

## 2024-09-09 ENCOUNTER — OFFICE VISIT (OUTPATIENT)
Dept: CARDIOLOGY | Facility: CLINIC | Age: 74
End: 2024-09-09
Payer: MEDICARE

## 2024-09-09 VITALS
DIASTOLIC BLOOD PRESSURE: 70 MMHG | WEIGHT: 121.06 LBS | SYSTOLIC BLOOD PRESSURE: 176 MMHG | HEART RATE: 86 BPM | BODY MASS INDEX: 20.78 KG/M2

## 2024-09-09 DIAGNOSIS — R94.31 ABNORMAL EKG: Primary | ICD-10-CM

## 2024-09-09 DIAGNOSIS — I10 WHITE COAT SYNDROME WITH DIAGNOSIS OF HYPERTENSION: ICD-10-CM

## 2024-09-09 DIAGNOSIS — I45.6 WPW (WOLFF-PARKINSON-WHITE SYNDROME): ICD-10-CM

## 2024-09-09 DIAGNOSIS — I10 HTN (HYPERTENSION), BENIGN: ICD-10-CM

## 2024-09-09 DIAGNOSIS — I70.0 AORTIC ATHEROSCLEROSIS: ICD-10-CM

## 2024-09-09 PROCEDURE — 1101F PT FALLS ASSESS-DOCD LE1/YR: CPT | Mod: CPTII,S$GLB,, | Performed by: INTERNAL MEDICINE

## 2024-09-09 PROCEDURE — 1159F MED LIST DOCD IN RCRD: CPT | Mod: CPTII,S$GLB,, | Performed by: INTERNAL MEDICINE

## 2024-09-09 PROCEDURE — 3288F FALL RISK ASSESSMENT DOCD: CPT | Mod: CPTII,S$GLB,, | Performed by: INTERNAL MEDICINE

## 2024-09-09 PROCEDURE — 1157F ADVNC CARE PLAN IN RCRD: CPT | Mod: CPTII,S$GLB,, | Performed by: INTERNAL MEDICINE

## 2024-09-09 PROCEDURE — 4010F ACE/ARB THERAPY RXD/TAKEN: CPT | Mod: CPTII,S$GLB,, | Performed by: INTERNAL MEDICINE

## 2024-09-09 PROCEDURE — 99999 PR PBB SHADOW E&M-EST. PATIENT-LVL III: CPT | Mod: PBBFAC,,, | Performed by: INTERNAL MEDICINE

## 2024-09-09 PROCEDURE — 3078F DIAST BP <80 MM HG: CPT | Mod: CPTII,S$GLB,, | Performed by: INTERNAL MEDICINE

## 2024-09-09 PROCEDURE — 3008F BODY MASS INDEX DOCD: CPT | Mod: CPTII,S$GLB,, | Performed by: INTERNAL MEDICINE

## 2024-09-09 PROCEDURE — 1160F RVW MEDS BY RX/DR IN RCRD: CPT | Mod: CPTII,S$GLB,, | Performed by: INTERNAL MEDICINE

## 2024-09-09 PROCEDURE — 99204 OFFICE O/P NEW MOD 45 MIN: CPT | Mod: S$GLB,,, | Performed by: INTERNAL MEDICINE

## 2024-09-09 PROCEDURE — 3077F SYST BP >= 140 MM HG: CPT | Mod: CPTII,S$GLB,, | Performed by: INTERNAL MEDICINE

## 2024-09-09 RX ORDER — ROSUVASTATIN CALCIUM 5 MG/1
5 TABLET, COATED ORAL DAILY
Qty: 90 TABLET | Refills: 3 | Status: SHIPPED | OUTPATIENT
Start: 2024-09-09

## 2024-09-09 NOTE — PROGRESS NOTES
HISTORY:    74-year-old female with a history of WPW s/p ablation '00, hypertension, aortic atherosclerosis, breast cancer '97 and '07, DDD presenting for initial evaluation by me.      Referred for [facility] ecg and hypertension.     The patient denies any symptoms of chest pain, shortness of breath, or dyspnea on exertion.    Activity levels very good. Walks 3-4 miles 4x/week for exercise. Uses a stationary bicycle sometimes. No CV limitations.    The patient denies any previous history of myocardial infarction, coronary artery disease, peripheral arterial disease, stroke, congestive heart failure, or cardiomyopathy.    Tolerates lisinopril 5 x 1. Recently off titrated off amlodipine. Extensive home log with controlled Bps. Known to have an element of white coat hypertension.     PHYSICAL EXAM:    Vitals:    09/09/24 1448   BP: (!) 176/70   Pulse: 86       NAD, A+Ox3.  No jvd, no bruit.  RRR nml s1,s2. No murmurs.  CTA B no wheezes or crackles.  No edema.    LABS/STUDIES (imaging reviewed during clinic visit):    August 2024 CBC and CMP normal.  /HDL 64//TG 54.  TSH normal.  2023 A1c normal.  ECG August 2024 demonstrates sinus rhythm with LVH.  No Q-waves.  Unchanged from 2022.  Abdominal ultrasound 2024 no evidence of AAA.    CXR August 2024 aortic atherosclerosis.     ASSESSMENT & PLAN:    1. Abnormal EKG    2. Aortic atherosclerosis    3. HTN (hypertension), benign    4. White coat syndrome with diagnosis of hypertension    5. WPW (Renea-Parkinson-White syndrome)        Orders Placed This Encounter    Echo    rosuvastatin (CRESTOR) 5 MG tablet        ECG w LVH. Controlled Bps on extensive home log with documented white coat hypertension. No CV symptoms. Can check TTE and if unremarkable no further zaragoza. ECG unchanged from '22 and stable dating back to '09. H/o WPW s/p ablation.     Aortic atherosclerosis documented in chart with . Would start rosuvastatin 5 TIW and uptitrate to daily if  tolerating.     Follow up in about 4 months (around 1/9/2025).      Anya Segura MD

## 2024-09-10 ENCOUNTER — OFFICE VISIT (OUTPATIENT)
Dept: HEPATOLOGY | Facility: CLINIC | Age: 74
End: 2024-09-10
Payer: MEDICARE

## 2024-09-10 ENCOUNTER — PROCEDURE VISIT (OUTPATIENT)
Dept: HEPATOLOGY | Facility: CLINIC | Age: 74
End: 2024-09-10
Payer: MEDICARE

## 2024-09-10 VITALS — HEIGHT: 64 IN | BODY MASS INDEX: 20.67 KG/M2 | WEIGHT: 121.06 LBS

## 2024-09-10 DIAGNOSIS — R74.8 ELEVATED LIVER ENZYMES: ICD-10-CM

## 2024-09-10 DIAGNOSIS — K74.01 HEPATIC FIBROSIS, EARLY FIBROSIS: ICD-10-CM

## 2024-09-10 DIAGNOSIS — K74.01 HEPATIC FIBROSIS, EARLY FIBROSIS: Primary | ICD-10-CM

## 2024-09-10 DIAGNOSIS — R79.89 ELEVATED FERRITIN: ICD-10-CM

## 2024-09-10 PROCEDURE — 1126F AMNT PAIN NOTED NONE PRSNT: CPT | Mod: CPTII,S$GLB,, | Performed by: NURSE PRACTITIONER

## 2024-09-10 PROCEDURE — 3288F FALL RISK ASSESSMENT DOCD: CPT | Mod: CPTII,S$GLB,, | Performed by: NURSE PRACTITIONER

## 2024-09-10 PROCEDURE — 99999 PR PBB SHADOW E&M-EST. PATIENT-LVL III: CPT | Mod: PBBFAC,,, | Performed by: NURSE PRACTITIONER

## 2024-09-10 PROCEDURE — 91200 LIVER ELASTOGRAPHY: CPT | Mod: S$GLB,,, | Performed by: NURSE PRACTITIONER

## 2024-09-10 PROCEDURE — 99214 OFFICE O/P EST MOD 30 MIN: CPT | Mod: S$GLB,,, | Performed by: NURSE PRACTITIONER

## 2024-09-10 PROCEDURE — 3008F BODY MASS INDEX DOCD: CPT | Mod: CPTII,S$GLB,, | Performed by: NURSE PRACTITIONER

## 2024-09-10 PROCEDURE — 1160F RVW MEDS BY RX/DR IN RCRD: CPT | Mod: CPTII,S$GLB,, | Performed by: NURSE PRACTITIONER

## 2024-09-10 PROCEDURE — 1157F ADVNC CARE PLAN IN RCRD: CPT | Mod: CPTII,S$GLB,, | Performed by: NURSE PRACTITIONER

## 2024-09-10 PROCEDURE — 1159F MED LIST DOCD IN RCRD: CPT | Mod: CPTII,S$GLB,, | Performed by: NURSE PRACTITIONER

## 2024-09-10 PROCEDURE — 4010F ACE/ARB THERAPY RXD/TAKEN: CPT | Mod: CPTII,S$GLB,, | Performed by: NURSE PRACTITIONER

## 2024-09-10 PROCEDURE — 1101F PT FALLS ASSESS-DOCD LE1/YR: CPT | Mod: CPTII,S$GLB,, | Performed by: NURSE PRACTITIONER

## 2024-09-10 NOTE — PROCEDURES
FibroScan Transplant Hepatology(Vibration Controlled Transient Elastography)    Date/Time: 9/10/2024 10:00 AM    Performed by: Klarissa Velasquez NP  Authorized by: Klarissa Velasquez NP    Probe:  M    Universal Protocol: Patient's identity, procedure and site were verified, confirmatory pause was performed.  Discussed procedure including risks and potential complications.  Questions answered.  Patient verbalizes understanding and wishes to proceed with VCTE.     Procedure: After providing explanations of the procedure, patient was placed in the supine position with right arm in maximum abduction to allow optimal exposure of right lateral abdomen.  Patient was briefly assessed, Testing was performed in the mid-axillary location, 50Hz Shear Wave pulses were applied and the resulting Shear Wave and Propagation Speed detected with a 3.5 MHz ultrasonic signal, using the FibroScan probe, Skin to liver capsule distance and liver parenchyma were accessed during the entire examination with the FibroScan probe, Patient was instructed to breathe normally and to abstain from sudden movements during the procedure, allowing for random measurements of liver stiffness. At least 10 Shear Waves were produced, Individual measurements of each Shear Wave were calculated.  Patient tolerated the procedure well with no complications.  Meets discharge criteria as was dismissed.  Rates pain 0 out of 10.  Patient will follow up with ordering provider to review results.    Findings  Median liver stiffness score:  7.5  CAP Reading: dB/m:  224    IQR/med %:  14  Interpretation  Fibrosis interpretation is based on medial liver stiffness - Kilopascal (kPa).    Fibrosis Stage:  F2  Steatosis interpretation is based on controlled attenuation parameter - (dB/m).    Steatosis Grade:  <S1

## 2024-09-10 NOTE — PROGRESS NOTES
[facility] VISIT FOLLOW UP NOTE    PCP: Jazmin Singh MD     CHIEF COMPLAINT: liver fibrosis on fibroscan, previously elevated liver enzymes     HPI: This is a 74 y.o. patient with PMH noted below, presenting for follow up of above    Previous serologic w/u negative for  viral hepatitis   Ferritin trending down     Prior serologic workup:   Lab Results   Component Value Date    FERRITIN 308 (H) 09/03/2024    FESATURATED 31 09/03/2024    HEPBSAG Non-reactive 11/13/2023    HEPCAB Non-reactive 11/13/2023    HEPAIGM Non-reactive 11/13/2023       Liver fibrosis staging:  -- fibroscan F2, S0 (kPA 9.7, )  -- fibroscan F2, So (kPA 7.5, )      Interval HPI: Presents today alone. Doing great with alcohol abstinence since December 2023, no plans to resume. Ferritin and fibrosis markedly improved   Normal wt   Liver enzymes normalized without alcohol  Fibroscan with F2 fibrosis but near normal     Labs done 9/2024 show normal transaminase levels, previously elevated from 2018-11/2023, AST >ALT     Lab Results   Component Value Date    ALT 21 09/03/2024    AST 24 09/03/2024    ALKPHOS 72 09/03/2024    BILITOT 0.4 09/03/2024    ALBUMIN 3.9 09/03/2024    INR 1.0 10/25/2013     08/27/2024       Abd U/S done 9/2024 showed normal liver     Denies family history of liver disease . Denies current alcohol consumption   Social History     Substance and Sexual Activity   Alcohol Use Not Currently    Comment: 1 bottle most days of the week for ~ 4 years, stop 12/2023          Allergy and medication list reviewed and updated     PMHX:  has a past medical history of Breast cancer (1997), Breast cancer (2017), Cancer, Hypertension, and Renea-Parkinson-White syndrome.    PSHX:  has a past surgical history that includes Cardiac electrophysiology study and ablation; Back surgery; Tubal ligation; Breast surgery; Colonoscopy (N/A, 7/20/2018); Breast biopsy (Right, 1997); Breast biopsy (Left, 2017);  "Mastectomy (Right, 1997); Mastectomy (Left, 2017); Augmentation of breast (Bilateral, 2018); and Breast reconstruction (Bilateral, 2018).    FAMILY HISTORY: Updated and reviewed in EPIC    ROS:   GENERAL: Denies fatigue  CARDIOVASCULAR: Denies edema  GI: Denies abdominal pain  SKIN: Denies rash, itching   NEURO: Denies confusion, memory loss, or mood changes    PHYSICAL EXAM:   In no acute distress; alert and oriented to person, place and time  VITALS: Ht 5' 4" (1.626 m)   Wt 54.9 kg (121 lb 0.5 oz)   LMP  (LMP Unknown)   BMI 20.78 kg/m²   EYES: Sclerae anicteric  GI: Soft, non-tender, non-distended. No ascites.  EXTREMITIES:  No edema.  SKIN: Warm and dry. No jaundice. No telangectasias noted. No palmar erythema.  NEURO:  No asterixis.  PSYCH:  Thought and speech pattern appropriate. Behavior normal      EDUCATION:  See instructions discussed with patient in Instructions section of the After Visit Summary     ASSESSMENT & PLAN:  74 y.o. female with:  1. Elevated liver enzymes, elevated ferritin   Liver enzymes normalized, ferritin markedly improved with alcohol cessation. If normalizes in 6 months, no f/u needed   -- Labs note normal transaminase levels   --- synthetic liver function WNL  --- Abd US now normal   --- previous serological work up : see HPI  --- Hep A and B immunity: see HPI    2. Liver fibrosis  Markedly improved with alcohol cessation, now near normal. Will repeat in 6 months to assure normalization               Follow up in about 6 months (around 3/10/2025). with lab and fibroscan before  Orders Placed This Encounter   Procedures    FibroScan Transplant Hepatology(Vibration Controlled Transient Elastography)    Hepatic Function Panel    Ferritin    Iron and TIBC        Thank you for allowing me to participate in the care of ANDREE Moyer    I spent a total of 30 minutes on the day of the visit.This includes face to face time and non-face to face time preparing to see " the patient (eg, review of tests), obtaining and/or reviewing separately obtained history, documenting clinical information in the electronic or other health record, independently interpreting results and communicating results to the patient/family/caregiver, and coordinating care.         CC'ed note to:   Jazmin Singh MD

## 2024-09-10 NOTE — PATIENT INSTRUCTIONS
Fibroscan, labs and ferritin all markedly improved without alcohol, keep up the great work    Repeat in 6 months. If all normal, no follow up needed after that

## 2024-09-17 ENCOUNTER — PATIENT OUTREACH (OUTPATIENT)
Dept: ADMINISTRATIVE | Facility: HOSPITAL | Age: 74
End: 2024-09-17
Payer: MEDICARE

## 2024-09-17 VITALS — DIASTOLIC BLOOD PRESSURE: 67 MMHG | SYSTOLIC BLOOD PRESSURE: 112 MMHG

## 2024-09-17 DIAGNOSIS — L71.9 ACNE ROSACEA: ICD-10-CM

## 2024-09-17 RX ORDER — DOXYCYCLINE 40 MG/1
CAPSULE ORAL
Qty: 30 CAPSULE | Refills: 5 | Status: SHIPPED | OUTPATIENT
Start: 2024-09-17 | End: 2024-09-18

## 2024-09-18 ENCOUNTER — PATIENT MESSAGE (OUTPATIENT)
Dept: DERMATOLOGY | Facility: CLINIC | Age: 74
End: 2024-09-18
Payer: MEDICARE

## 2024-09-18 DIAGNOSIS — L71.9 ACNE ROSACEA: Primary | ICD-10-CM

## 2024-09-18 RX ORDER — DOXYCYCLINE HYCLATE 50 MG/1
50 CAPSULE, GELATIN COATED ORAL DAILY
Qty: 30 CAPSULE | Refills: 11 | Status: SHIPPED | OUTPATIENT
Start: 2024-09-18

## 2024-10-03 ENCOUNTER — PATIENT MESSAGE (OUTPATIENT)
Dept: CARDIOLOGY | Facility: CLINIC | Age: 74
End: 2024-10-03
Payer: MEDICARE

## 2024-10-03 ENCOUNTER — PATIENT MESSAGE (OUTPATIENT)
Dept: FAMILY MEDICINE | Facility: CLINIC | Age: 74
End: 2024-10-03
Payer: MEDICARE

## 2024-10-04 NOTE — TELEPHONE ENCOUNTER
Patient states that her blood pressure reading his been good. The ranges are between 110/52 range to 117/69

## 2024-10-31 DIAGNOSIS — G62.9 NEUROPATHY: ICD-10-CM

## 2024-10-31 RX ORDER — GABAPENTIN 100 MG/1
100 CAPSULE ORAL
Qty: 30 CAPSULE | Refills: 2 | Status: SHIPPED | OUTPATIENT
Start: 2024-10-31

## 2024-12-17 ENCOUNTER — PATIENT MESSAGE (OUTPATIENT)
Dept: DERMATOLOGY | Facility: CLINIC | Age: 74
End: 2024-12-17
Payer: MEDICARE

## 2024-12-27 DIAGNOSIS — I10 ESSENTIAL HYPERTENSION: ICD-10-CM

## 2024-12-27 RX ORDER — LISINOPRIL 5 MG/1
5 TABLET ORAL
Qty: 90 TABLET | Refills: 0 | Status: SHIPPED | OUTPATIENT
Start: 2024-12-27

## 2024-12-27 NOTE — TELEPHONE ENCOUNTER
Care Due:                  Date            Visit Type   Department     Provider  --------------------------------------------------------------------------------                                ESTABLISHED                              PATIENT -    [facility]  Last Visit: 11-      [facility]      MEDICINE       Jazmin Singh  Next Visit: None Scheduled  None         None Found                                                            Last  Test          Frequency    Reason                     Performed    Due Date  --------------------------------------------------------------------------------    Office Visit  15 months..  lisinopriL...............  11- 02-    [facility] Embedded Care Due Messages. Reference number: [id number].   12/27/2024 3:21:35 AM CST   Statement Selected

## 2024-12-27 NOTE — TELEPHONE ENCOUNTER
Provider Staff:  Action required for this patient    Requires appointment      Please see care gap opportunities below in Care Due Message.    Thanks!  [facility]     Appointments      Date Provider   Last Visit   11/27/2023 Jazmin Singh MD   Next Visit   Visit date not found Jazmin Singh MD     Refill Decision Note   Pantera Posey  is requesting a refill authorization.  Brief Assessment and Rationale for Refill:  Approve     Medication Therapy Plan:         Comments:     Note composed:10:08 AM 12/27/2024

## 2025-01-03 DIAGNOSIS — G62.9 NEUROPATHY: ICD-10-CM

## 2025-01-06 RX ORDER — GABAPENTIN 100 MG/1
100 CAPSULE ORAL
Qty: 30 CAPSULE | Refills: 2 | Status: SHIPPED | OUTPATIENT
Start: 2025-01-06

## 2025-01-09 ENCOUNTER — HOSPITAL ENCOUNTER (OUTPATIENT)
Dept: CARDIOLOGY | Facility: HOSPITAL | Age: 75
Discharge: HOME | End: 2025-01-09
Attending: INTERNAL MEDICINE
Payer: MEDICARE

## 2025-01-09 VITALS
WEIGHT: 121 LBS | HEIGHT: 64 IN | DIASTOLIC BLOOD PRESSURE: 70 MMHG | BODY MASS INDEX: 20.66 KG/M2 | SYSTOLIC BLOOD PRESSURE: 160 MMHG | HEART RATE: 80 BPM

## 2025-01-09 DIAGNOSIS — I10 HTN (HYPERTENSION), BENIGN: ICD-10-CM

## 2025-01-09 DIAGNOSIS — R94.31 ABNORMAL EKG: ICD-10-CM

## 2025-01-09 PROCEDURE — 93306 TTE W/DOPPLER COMPLETE: CPT

## 2025-01-09 PROCEDURE — 93306 TTE W/DOPPLER COMPLETE: CPT | Mod: 26,,, | Performed by: INTERNAL MEDICINE

## 2025-01-10 LAB
ASCENDING AORTA: 2.42 CM
AV AREA BY CONTINUOUS VTI: 2.6 CM2
AV INDEX (PROSTH): 0.91
AV LVOT MEAN GRADIENT: 4 MMHG
AV LVOT PEAK GRADIENT: 8 MMHG
AV MEAN GRADIENT: 4.6 MMHG
AV PEAK GRADIENT: 9 MMHG
AV VALVE AREA BY VELOCITY RATIO: 2.6 CM²
AV VALVE AREA: 2.6 CM2
AV VELOCITY RATIO: 0.93
BSA FOR ECHO PROCEDURE: 1.57 M2
CV ECHO LV RWT: 0.35 CM
DOP CALC AO PEAK VEL: 1.5 M/S
DOP CALC AO VTI: 30.8 CM
DOP CALC LVOT AREA: 2.8 CM2
DOP CALC LVOT DIAMETER: 1.9 CM
DOP CALC LVOT PEAK VEL: 1.4 M/S
DOP CALC LVOT STROKE VOLUME: 79.3 CM3
DOP CALC RVOT AREA: 2.98 CM2
DOP CALC RVOT DIAMETER: 1.95 CM
DOP CALCLVOT PEAK VEL VTI: 28 CM
E WAVE DECELERATION TIME: 307.21 MS
E/A RATIO: 0.86
E/E' RATIO: 8.6 M/S
ECHO EF ESTIMATED: 68 %
ECHO LV POSTERIOR WALL: 0.7 CM (ref 0.6–1.1)
FRACTIONAL SHORTENING: 37.5 % (ref 28–44)
HR MV ECHO: 80 BPM
INTERVENTRICULAR SEPTUM: 0.7 CM (ref 0.6–1.1)
LA MAJOR: 3.76 CM
LA MINOR: 3.58 CM
LA WIDTH: 3.09 CM
LEFT ATRIUM SIZE: 3.7 CM
LEFT ATRIUM VOLUME INDEX MOD: 18.4 ML/M2
LEFT ATRIUM VOLUME INDEX: 22.6 ML/M2
LEFT ATRIUM VOLUME MOD: 29.15 ML
LEFT ATRIUM VOLUME: 35.64 CM3
LEFT INTERNAL DIMENSION IN SYSTOLE: 2.5 CM (ref 2.1–4)
LEFT VENTRICLE DIASTOLIC VOLUME INDEX: 44.93 ML/M2
LEFT VENTRICLE DIASTOLIC VOLUME: 70.99 ML
LEFT VENTRICLE MASS INDEX: 49.6 G/M2
LEFT VENTRICLE SYSTOLIC VOLUME INDEX: 14.5 ML/M2
LEFT VENTRICLE SYSTOLIC VOLUME: 22.97 ML
LEFT VENTRICULAR INTERNAL DIMENSION IN DIASTOLE: 4 CM (ref 3.5–6)
LEFT VENTRICULAR MASS: 78.4 G
LV LATERAL E/E' RATIO: 9.56
LV SEPTAL E/E' RATIO: 7.82
MV A" WAVE DURATION": 94.2 MS
MV MEAN GRADIENT: 3 MMHG
MV PEAK A VEL: 1 M/S
MV PEAK E VEL: 0.86 M/S
MV PEAK GRADIENT: 3 MMHG
OHS CV RV/LV RATIO: 0.63 CM
PULM VEIN A" WAVE DURATION": 94.2 MS
PULM VEIN S/D RATIO: 1.51
PULMONIC VEIN PEAK A VELOCITY: 0.5 M/S
PV PEAK D VEL: 0.39 M/S
PV PEAK S VEL: 0.59 M/S
RA MAJOR: 3.64 CM
RA WIDTH: 2.59 CM
RIGHT ATRIAL AREA: 10.8 CM2
RIGHT VENTRICLE DIASTOLIC BASEL DIMENSION: 2.5 CM
RV TISSUE DOPPLER FREE WALL SYSTOLIC VELOCITY 1 (APICAL 4 CHAMBER VIEW): 20.1 CM/S
SINUS: 2.75 CM
STJ: 2.16 CM
TDI LATERAL: 0.09 M/S
TDI SEPTAL: 0.11 M/S
TDI: 0.1 M/S
TRICUSPID ANNULAR PLANE SYSTOLIC EXCURSION: 1.99 CM
Z-SCORE OF LEFT VENTRICULAR DIMENSION IN END DIASTOLE: -1.23
Z-SCORE OF LEFT VENTRICULAR DIMENSION IN END SYSTOLE: -0.92

## 2025-01-17 ENCOUNTER — OFFICE VISIT (OUTPATIENT)
Dept: CARDIOLOGY | Facility: CLINIC | Age: 75
End: 2025-01-17
Payer: MEDICARE

## 2025-01-17 VITALS
HEART RATE: 75 BPM | DIASTOLIC BLOOD PRESSURE: 74 MMHG | BODY MASS INDEX: 21.91 KG/M2 | WEIGHT: 127.63 LBS | SYSTOLIC BLOOD PRESSURE: 174 MMHG

## 2025-01-17 DIAGNOSIS — I45.6 WPW (WOLFF-PARKINSON-WHITE SYNDROME): ICD-10-CM

## 2025-01-17 DIAGNOSIS — I70.0 AORTIC ATHEROSCLEROSIS: Primary | ICD-10-CM

## 2025-01-17 DIAGNOSIS — I10 HTN (HYPERTENSION), BENIGN: ICD-10-CM

## 2025-01-17 PROCEDURE — 1159F MED LIST DOCD IN RCRD: CPT | Mod: CPTII,S$GLB,, | Performed by: INTERNAL MEDICINE

## 2025-01-17 PROCEDURE — 99999 PR PBB SHADOW E&M-EST. PATIENT-LVL III: CPT | Mod: PBBFAC,,, | Performed by: INTERNAL MEDICINE

## 2025-01-17 PROCEDURE — 99214 OFFICE O/P EST MOD 30 MIN: CPT | Mod: S$GLB,,, | Performed by: INTERNAL MEDICINE

## 2025-01-17 PROCEDURE — 1160F RVW MEDS BY RX/DR IN RCRD: CPT | Mod: CPTII,S$GLB,, | Performed by: INTERNAL MEDICINE

## 2025-01-17 PROCEDURE — 3077F SYST BP >= 140 MM HG: CPT | Mod: CPTII,S$GLB,, | Performed by: INTERNAL MEDICINE

## 2025-01-17 PROCEDURE — 3008F BODY MASS INDEX DOCD: CPT | Mod: CPTII,S$GLB,, | Performed by: INTERNAL MEDICINE

## 2025-01-17 PROCEDURE — 1126F AMNT PAIN NOTED NONE PRSNT: CPT | Mod: CPTII,S$GLB,, | Performed by: INTERNAL MEDICINE

## 2025-01-17 PROCEDURE — 3078F DIAST BP <80 MM HG: CPT | Mod: CPTII,S$GLB,, | Performed by: INTERNAL MEDICINE

## 2025-01-17 PROCEDURE — 1157F ADVNC CARE PLAN IN RCRD: CPT | Mod: CPTII,S$GLB,, | Performed by: INTERNAL MEDICINE

## 2025-01-17 PROCEDURE — 1101F PT FALLS ASSESS-DOCD LE1/YR: CPT | Mod: CPTII,S$GLB,, | Performed by: INTERNAL MEDICINE

## 2025-01-17 PROCEDURE — 3288F FALL RISK ASSESSMENT DOCD: CPT | Mod: CPTII,S$GLB,, | Performed by: INTERNAL MEDICINE

## 2025-01-17 RX ORDER — ROSUVASTATIN CALCIUM 10 MG/1
10 TABLET, COATED ORAL DAILY
Qty: 90 TABLET | Refills: 3 | Status: SHIPPED | OUTPATIENT
Start: 2025-01-17

## 2025-01-17 NOTE — PROGRESS NOTES
HISTORY:    74-year-old female with a history of WPW s/p ablation '00, hypertension, aortic atherosclerosis, breast cancer '97 and '07, DDD presenting for follow-up..      Referred in late 2024 for abnml ecg and hypertension.     The patient denies any symptoms of chest pain, shortness of breath, or dyspnea on exertion.    Activity levels very good. Walks 3-4 miles 4x/week for exercise. Uses a stationary bicycle sometimes. No CV limitations.    The patient denies any previous history of myocardial infarction, coronary artery disease, peripheral arterial disease, stroke, congestive heart failure, or cardiomyopathy.    Tolerates lisinopril 5 x 1. Recently off titrated off amlodipine. Extensive home log with controlled Bps. Known to have an element of white coat hypertension.     Started on rosuvastatin 5x1 at last visit and tolerating.     PHYSICAL EXAM:    Vitals:    01/17/25 1116   BP: (!) 174/74   Pulse: 75       NAD, A+Ox3.  No jvd, no bruit.  RRR nml s1,s2. No murmurs.  CTA B no wheezes or crackles.  No edema.    LABS/STUDIES (imaging reviewed during clinic visit):    August 2024 CBC and CMP normal.  /HDL 64//TG 54.  TSH normal.  2023 A1c normal.  ECG August 2024 demonstrates sinus rhythm with LVH.  No Q-waves.  Unchanged from 2022.  TTE January 2025 normal LV size and function with EF 60 65%.  Normal diastology.  Abdominal ultrasound 2024 no evidence of AAA.    CXR August 2024 aortic atherosclerosis.     ASSESSMENT & PLAN:    1. Aortic atherosclerosis    2. HTN (hypertension), benign    3. WPW (Renea-Parkinson-White syndrome)          Orders Placed This Encounter    rosuvastatin (CRESTOR) 10 MG tablet          ECG w LVH. Controlled Bps on extensive home log with documented white coat hypertension. No CV symptoms. TTE unremarkable. No further zaragoza. ECG unchanged from '22 and stable dating back to '09. H/o WPW s/p ablation.     Aortic atherosclerosis documented in chart with . Now tolerating  rosuvastatin 5x1. Will increase to 10x1. PCP can uptitrate to max tolerated dose.    No need to follow-up with cards at this time.      Anya Segura MD

## 2025-02-10 ENCOUNTER — OFFICE VISIT (OUTPATIENT)
Dept: FAMILY MEDICINE | Facility: CLINIC | Age: 75
End: 2025-02-10
Payer: MEDICARE

## 2025-02-10 ENCOUNTER — APPOINTMENT (OUTPATIENT)
Dept: RADIOLOGY | Facility: OTHER | Age: 75
End: 2025-02-10
Attending: NURSE PRACTITIONER
Payer: MEDICARE

## 2025-02-10 VITALS
HEART RATE: 76 BPM | SYSTOLIC BLOOD PRESSURE: 115 MMHG | DIASTOLIC BLOOD PRESSURE: 70 MMHG | OXYGEN SATURATION: 98 % | HEIGHT: 64 IN | BODY MASS INDEX: 20.83 KG/M2 | WEIGHT: 122 LBS

## 2025-02-10 DIAGNOSIS — G89.29 CHRONIC RIGHT SHOULDER PAIN: ICD-10-CM

## 2025-02-10 DIAGNOSIS — I70.0 AORTIC ATHEROSCLEROSIS: ICD-10-CM

## 2025-02-10 DIAGNOSIS — I10 HTN (HYPERTENSION), BENIGN: ICD-10-CM

## 2025-02-10 DIAGNOSIS — Z00.00 ANNUAL PHYSICAL EXAM: Primary | ICD-10-CM

## 2025-02-10 DIAGNOSIS — C50.512 MALIGNANT NEOPLASM OF LOWER-OUTER QUADRANT OF LEFT BREAST OF FEMALE, ESTROGEN RECEPTOR POSITIVE: ICD-10-CM

## 2025-02-10 DIAGNOSIS — Z13.1 SCREENING FOR DIABETES MELLITUS: ICD-10-CM

## 2025-02-10 DIAGNOSIS — M25.511 CHRONIC RIGHT SHOULDER PAIN: ICD-10-CM

## 2025-02-10 DIAGNOSIS — Z17.0 MALIGNANT NEOPLASM OF LOWER-OUTER QUADRANT OF LEFT BREAST OF FEMALE, ESTROGEN RECEPTOR POSITIVE: ICD-10-CM

## 2025-02-10 LAB
BILIRUB UR QL STRIP: NEGATIVE
CLARITY UR REFRACT.AUTO: CLEAR
COLOR UR AUTO: COLORLESS
GLUCOSE UR QL STRIP: NEGATIVE
HGB UR QL STRIP: NEGATIVE
KETONES UR QL STRIP: NEGATIVE
LEUKOCYTE ESTERASE UR QL STRIP: NEGATIVE
NITRITE UR QL STRIP: NEGATIVE
PH UR STRIP: 6 [PH] (ref 5–8)
PROT UR QL STRIP: NEGATIVE
SP GR UR STRIP: 1 (ref 1–1.03)
URN SPEC COLLECT METH UR: ABNORMAL

## 2025-02-10 PROCEDURE — 1160F RVW MEDS BY RX/DR IN RCRD: CPT | Mod: CPTII,S$GLB,, | Performed by: NURSE PRACTITIONER

## 2025-02-10 PROCEDURE — 1159F MED LIST DOCD IN RCRD: CPT | Mod: CPTII,S$GLB,, | Performed by: NURSE PRACTITIONER

## 2025-02-10 PROCEDURE — 3288F FALL RISK ASSESSMENT DOCD: CPT | Mod: CPTII,S$GLB,, | Performed by: NURSE PRACTITIONER

## 2025-02-10 PROCEDURE — 81003 URINALYSIS AUTO W/O SCOPE: CPT | Performed by: NURSE PRACTITIONER

## 2025-02-10 PROCEDURE — 3078F DIAST BP <80 MM HG: CPT | Mod: CPTII,S$GLB,, | Performed by: NURSE PRACTITIONER

## 2025-02-10 PROCEDURE — 73030 X-RAY EXAM OF SHOULDER: CPT | Mod: TC,PN,RT

## 2025-02-10 PROCEDURE — 73030 X-RAY EXAM OF SHOULDER: CPT | Mod: 26,RT,, | Performed by: RADIOLOGY

## 2025-02-10 PROCEDURE — 3008F BODY MASS INDEX DOCD: CPT | Mod: CPTII,S$GLB,, | Performed by: NURSE PRACTITIONER

## 2025-02-10 PROCEDURE — 1101F PT FALLS ASSESS-DOCD LE1/YR: CPT | Mod: CPTII,S$GLB,, | Performed by: NURSE PRACTITIONER

## 2025-02-10 PROCEDURE — 1126F AMNT PAIN NOTED NONE PRSNT: CPT | Mod: CPTII,S$GLB,, | Performed by: NURSE PRACTITIONER

## 2025-02-10 PROCEDURE — 99999 PR PBB SHADOW E&M-EST. PATIENT-LVL IV: CPT | Mod: PBBFAC,,, | Performed by: NURSE PRACTITIONER

## 2025-02-10 PROCEDURE — 1157F ADVNC CARE PLAN IN RCRD: CPT | Mod: CPTII,S$GLB,, | Performed by: NURSE PRACTITIONER

## 2025-02-10 PROCEDURE — 99397 PER PM REEVAL EST PAT 65+ YR: CPT | Mod: S$GLB,,, | Performed by: NURSE PRACTITIONER

## 2025-02-10 PROCEDURE — 3074F SYST BP LT 130 MM HG: CPT | Mod: CPTII,S$GLB,, | Performed by: NURSE PRACTITIONER

## 2025-02-10 NOTE — PROGRESS NOTES
Subjective:       Patient ID: Pantera Posey is a 74 y.o. female.    Chief Complaint: Annual Exam  Pantera Posey presents today for routine annual exam. Last seen in 2024.     With regards to hypertension:  Current medication regimen: amlodipine 10 mg; lisinopril 5 mg  Compliance: yes   Home BP monitorin-118's  Low sodium diet: yes   Exercise: yes   Denies cp, palpitations, sob, headaches, dizziness, vision changes, changes in urinary or bowel habits.     HPI per ROS.     Patient Active Problem List   Diagnosis    HTN (hypertension), benign    Spondylosis without myelopathy    Spinal stenosis, lumbar region, with neurogenic claudication    Thoracic or lumbosacral neuritis or radiculitis, unspecified    Acquired spondylolisthesis    Lumbago    Degeneration of lumbar or lumbosacral intervertebral disc    Back pain    Malignant neoplasm of lower-outer quadrant of left breast of female, estrogen receptor positive    History of right breast cancer    Use of aromatase inhibitors    Age-related osteoporosis without current pathological fracture    S/P breast reconstruction    Swelling of hand    Macrocytosis    Vitamin B 12 deficiency    Hepatic fibrosis, early fibrosis    Aortic atherosclerosis    WPW (Renea-Parkinson-White syndrome)         Current Outpatient Medications:     calcium-vitamin D3 500 mg(1,250mg) -200 unit per tablet, Take 2 tablets by mouth 2 (two) times daily with meals., Disp: , Rfl:     clobetasoL (TEMOVATE) 0.05 % cream, AAA bid, Disp: 60 g, Rfl: 3    clobetasoL (TEMOVATE) 0.05 % external solution, Use on scalp one - two times daily as needed for scaling or itching, Disp: 50 mL, Rfl: 3    doxycycline 50 MG capsule, Take 1 capsule (50 mg total) by mouth once daily., Disp: 30 capsule, Rfl: 11    folic acid (FOLVITE) 1 MG tablet, Take 1 mg by mouth once daily., Disp: , Rfl:     gabapentin (NEURONTIN) 100 MG capsule, TAKE 1 CAPSULE(100 MG) BY MOUTH EVERY EVENING, Disp: 30 capsule, Rfl: 2     "hydrOXYzine HCL (ATARAX) 25 MG tablet, TAKE 1 TABLET BY MOUTH EVERY EVENING AS NEEDED FOR ITCHING. DO NOT DRIVE OR OPERATE MACHINERY WHILE TAKING THIS MEDICATION, Disp: 30 tablet, Rfl: 5    lisinopriL (PRINIVIL,ZESTRIL) 5 MG tablet, TAKE 1 TABLET(5 MG) BY MOUTH EVERY DAY, Disp: 90 tablet, Rfl: 0    metronidazole 0.75% (METROCREAM) 0.75 % Crea, AAA face bid, Disp: 45 g, Rfl: 3    rosuvastatin (CRESTOR) 10 MG tablet, Take 1 tablet (10 mg total) by mouth once daily., Disp: 90 tablet, Rfl: 3    triamcinolone acetonide 0.1% (KENALOG) 0.1 % cream, AAA bid to affected areas of body, Disp: 454 g, Rfl: 3    The following portions of the patient's history were reviewed and updated as appropriate: allergies, past family history, past medical history, past social history and past surgical history.    Review of Systems   Constitutional:  Negative for activity change and unexpected weight change.   HENT:  Negative for hearing loss and trouble swallowing.    Eyes:  Negative for discharge and visual disturbance.   Respiratory:  Negative for chest tightness and wheezing.    Cardiovascular:  Negative for chest pain and palpitations.   Gastrointestinal:  Negative for blood in stool, constipation, diarrhea and vomiting.   Endocrine: Negative for polydipsia and polyuria.   Genitourinary:  Negative for difficulty urinating, dysuria, hematuria and menstrual problem.   Musculoskeletal:  Positive for arthralgias. Negative for joint swelling and neck pain.   Neurological:  Negative for weakness and headaches.   Psychiatric/Behavioral:  Negative for confusion and dysphoric mood.        Objective:      /70   Pulse 76   Ht 5' 4" (1.626 m)   Wt 55.3 kg (122 lb)   LMP  (LMP Unknown)   SpO2 98%   BMI 20.94 kg/m²     Physical Exam  Constitutional:       General: She is not in acute distress.     Appearance: Normal appearance.   HENT:      Head: Normocephalic and atraumatic.      Nose: Nose normal.   Eyes:      Extraocular Movements: " Extraocular movements intact.      Pupils: Pupils are equal, round, and reactive to light.   Cardiovascular:      Rate and Rhythm: Normal rate and regular rhythm.      Pulses: Normal pulses.      Heart sounds: Normal heart sounds.   Pulmonary:      Effort: Pulmonary effort is normal.      Breath sounds: Normal breath sounds.   Abdominal:      Palpations: Abdomen is soft.   Musculoskeletal:         General: No swelling or deformity. Normal range of motion.      Cervical back: Normal range of motion and neck supple.   Skin:     General: Skin is warm and dry.      Capillary Refill: Capillary refill takes less than 2 seconds.   Neurological:      General: No focal deficit present.      Mental Status: She is alert and oriented to person, place, and time.      Coordination: Coordination normal.      Gait: Gait normal.   Psychiatric:         Mood and Affect: Mood normal.         Behavior: Behavior normal.         Assessment:       1. Annual physical exam    2. HTN (hypertension), benign    3. Aortic atherosclerosis    4. Chronic right shoulder pain    5. Screening for diabetes mellitus    6. Malignant neoplasm of lower-outer quadrant of left breast of female, estrogen receptor positive        Plan:   1. Annual physical exam    2. HTN (hypertension), benign  Assessment & Plan:  -- BP stable; continue current medication regimen  -- Continue home BP monitoring     Orders:  -     Urinalysis    3. Aortic atherosclerosis  Assessment & Plan:  -- noted on x-ray 8/2024  -- on statin therapy    Orders:  -     Lipid Panel; Future; Expected date: 02/10/2025    4. Chronic right shoulder pain  -     X-ray Shoulder 2 or More Views Right; Future; Expected date: 02/10/2025    5. Screening for diabetes mellitus  -     Hemoglobin A1C; Future; Expected date: 02/24/2025    6. Malignant neoplasm of lower-outer quadrant of left breast of female, estrogen receptor positive    --Previous managed by Dr. Fritz  --Completed 5 years of oral  hormone therapy

## 2025-02-11 ENCOUNTER — PATIENT MESSAGE (OUTPATIENT)
Dept: FAMILY MEDICINE | Facility: CLINIC | Age: 75
End: 2025-02-11
Payer: MEDICARE

## 2025-03-05 ENCOUNTER — RESULTS FOLLOW-UP (OUTPATIENT)
Dept: HEPATOLOGY | Facility: CLINIC | Age: 75
End: 2025-03-05

## 2025-03-05 ENCOUNTER — LAB VISIT (OUTPATIENT)
Dept: LAB | Facility: OTHER | Age: 75
End: 2025-03-05
Attending: NURSE PRACTITIONER
Payer: MEDICARE

## 2025-03-05 DIAGNOSIS — I70.0 AORTIC ATHEROSCLEROSIS: ICD-10-CM

## 2025-03-05 DIAGNOSIS — R79.89 ELEVATED FERRITIN: ICD-10-CM

## 2025-03-05 DIAGNOSIS — K74.01 HEPATIC FIBROSIS, EARLY FIBROSIS: ICD-10-CM

## 2025-03-05 DIAGNOSIS — Z13.1 SCREENING FOR DIABETES MELLITUS: ICD-10-CM

## 2025-03-05 DIAGNOSIS — R74.8 ELEVATED LIVER ENZYMES: ICD-10-CM

## 2025-03-05 LAB
ALBUMIN SERPL BCP-MCNC: 3.9 G/DL (ref 3.5–5.2)
ALP SERPL-CCNC: 73 U/L (ref 40–150)
ALT SERPL W/O P-5'-P-CCNC: 20 U/L (ref 10–44)
AST SERPL-CCNC: 24 U/L (ref 10–40)
BILIRUB DIRECT SERPL-MCNC: 0.2 MG/DL (ref 0.1–0.3)
BILIRUB SERPL-MCNC: 0.4 MG/DL (ref 0.1–1)
CHOLEST SERPL-MCNC: 162 MG/DL (ref 120–199)
CHOLEST/HDLC SERPL: 2.6 {RATIO} (ref 2–5)
ESTIMATED AVG GLUCOSE: 111 MG/DL (ref 68–131)
FERRITIN SERPL-MCNC: 187 NG/ML (ref 20–300)
HBA1C MFR BLD: 5.5 % (ref 4–5.6)
HDLC SERPL-MCNC: 63 MG/DL (ref 40–75)
HDLC SERPL: 38.9 % (ref 20–50)
IRON SERPL-MCNC: 66 UG/DL (ref 30–160)
LDLC SERPL CALC-MCNC: 89.8 MG/DL (ref 63–159)
NONHDLC SERPL-MCNC: 99 MG/DL
PROT SERPL-MCNC: 7.7 G/DL (ref 6–8.4)
SATURATED IRON: 21 % (ref 20–50)
TOTAL IRON BINDING CAPACITY: 320 UG/DL (ref 250–450)
TRANSFERRIN SERPL-MCNC: 216 MG/DL (ref 200–375)
TRIGL SERPL-MCNC: 46 MG/DL (ref 30–150)

## 2025-03-05 PROCEDURE — 36415 COLL VENOUS BLD VENIPUNCTURE: CPT | Performed by: NURSE PRACTITIONER

## 2025-03-05 PROCEDURE — 83036 HEMOGLOBIN GLYCOSYLATED A1C: CPT | Performed by: NURSE PRACTITIONER

## 2025-03-05 PROCEDURE — 82728 ASSAY OF FERRITIN: CPT | Performed by: NURSE PRACTITIONER

## 2025-03-05 PROCEDURE — 80076 HEPATIC FUNCTION PANEL: CPT | Performed by: NURSE PRACTITIONER

## 2025-03-05 PROCEDURE — 83540 ASSAY OF IRON: CPT | Performed by: NURSE PRACTITIONER

## 2025-03-05 PROCEDURE — 80061 LIPID PANEL: CPT | Performed by: NURSE PRACTITIONER

## 2025-03-07 ENCOUNTER — RESULTS FOLLOW-UP (OUTPATIENT)
Dept: FAMILY MEDICINE | Facility: CLINIC | Age: 75
End: 2025-03-07

## 2025-03-12 ENCOUNTER — PROCEDURE VISIT (OUTPATIENT)
Dept: HEPATOLOGY | Facility: CLINIC | Age: 75
End: 2025-03-12
Payer: MEDICARE

## 2025-03-12 ENCOUNTER — OFFICE VISIT (OUTPATIENT)
Dept: HEPATOLOGY | Facility: CLINIC | Age: 75
End: 2025-03-12
Payer: MEDICARE

## 2025-03-12 VITALS — HEIGHT: 64 IN | BODY MASS INDEX: 21.94 KG/M2 | WEIGHT: 128.5 LBS

## 2025-03-12 DIAGNOSIS — R74.8 ELEVATED LIVER ENZYMES: ICD-10-CM

## 2025-03-12 DIAGNOSIS — K74.01 HEPATIC FIBROSIS, EARLY FIBROSIS: ICD-10-CM

## 2025-03-12 DIAGNOSIS — R79.89 ELEVATED FERRITIN: ICD-10-CM

## 2025-03-12 DIAGNOSIS — F40.240 CLAUSTROPHOBIA: ICD-10-CM

## 2025-03-12 DIAGNOSIS — K74.02 HEPATIC FIBROSIS, STAGE 3: Primary | ICD-10-CM

## 2025-03-12 PROCEDURE — 3008F BODY MASS INDEX DOCD: CPT | Mod: CPTII,S$GLB,, | Performed by: NURSE PRACTITIONER

## 2025-03-12 PROCEDURE — 99214 OFFICE O/P EST MOD 30 MIN: CPT | Mod: S$GLB,,, | Performed by: NURSE PRACTITIONER

## 2025-03-12 PROCEDURE — 1159F MED LIST DOCD IN RCRD: CPT | Mod: CPTII,S$GLB,, | Performed by: NURSE PRACTITIONER

## 2025-03-12 PROCEDURE — 99999 PR PBB SHADOW E&M-EST. PATIENT-LVL III: CPT | Mod: PBBFAC,,, | Performed by: NURSE PRACTITIONER

## 2025-03-12 PROCEDURE — 3044F HG A1C LEVEL LT 7.0%: CPT | Mod: CPTII,S$GLB,, | Performed by: NURSE PRACTITIONER

## 2025-03-12 PROCEDURE — 1160F RVW MEDS BY RX/DR IN RCRD: CPT | Mod: CPTII,S$GLB,, | Performed by: NURSE PRACTITIONER

## 2025-03-12 PROCEDURE — 1101F PT FALLS ASSESS-DOCD LE1/YR: CPT | Mod: CPTII,S$GLB,, | Performed by: NURSE PRACTITIONER

## 2025-03-12 PROCEDURE — 91200 LIVER ELASTOGRAPHY: CPT | Mod: S$GLB,,, | Performed by: NURSE PRACTITIONER

## 2025-03-12 PROCEDURE — 3288F FALL RISK ASSESSMENT DOCD: CPT | Mod: CPTII,S$GLB,, | Performed by: NURSE PRACTITIONER

## 2025-03-12 PROCEDURE — 1157F ADVNC CARE PLAN IN RCRD: CPT | Mod: CPTII,S$GLB,, | Performed by: NURSE PRACTITIONER

## 2025-03-12 PROCEDURE — 1126F AMNT PAIN NOTED NONE PRSNT: CPT | Mod: CPTII,S$GLB,, | Performed by: NURSE PRACTITIONER

## 2025-03-12 RX ORDER — DIAZEPAM 5 MG/1
5 TABLET ORAL ONCE
Qty: 2 TABLET | Refills: 0 | Status: SHIPPED | OUTPATIENT
Start: 2025-03-12 | End: 2025-03-12

## 2025-03-12 NOTE — PROGRESS NOTES
[facility] VISIT FOLLOW UP NOTE    PCP: Jazmin Singh MD     CHIEF COMPLAINT: liver fibrosis on fibroscan, previously elevated liver enzymes     HPI: This is a 74 y.o. patient with PMH noted below, presenting for follow up of above    Previous serologic w/u negative for hemochromatosis and viral hepatitis       Prior serologic workup:   Lab Results   Component Value Date    FERRITIN 187 03/05/2025    FESATURATED 21 03/05/2025    HEPBSAG Non-reactive 11/13/2023    HEPCAB Non-reactive 11/13/2023    HEPAIGM Non-reactive 11/13/2023       Liver fibrosis staging:  -- fibroscan 3/2024 F2, S0 (kPA 9.7, )  -- fibroscan 9/2024 F2, So (kPA 7.5, )  -- fibroscan 3/2025 noted F3, S0 (kPA 11.5, )    Interval HPI: Presents today alone. Doing great with alcohol abstinence since December 2023, no plans to resume. Ferritin and liver enzymes normalized with abstinence  Previous fibroscan improving but now fibrosis F3 on fibroscan without clear cause   Normal wt   Liver enzymes normalized without alcohol  Fibroscan F3, needs further investigation     Labs done 3/2025 show normal transaminase levels, previously elevated from 2018-11/2023, AST >ALT     Lab Results   Component Value Date    ALT 20 03/05/2025    AST 24 03/05/2025    ALKPHOS 73 03/05/2025    BILITOT 0.4 03/05/2025    ALBUMIN 3.9 03/05/2025    INR 1.0 10/25/2013     08/27/2024       Abd U/S done 9/2024 showed normal liver     Denies family history of liver disease . Denies current alcohol consumption   Social History     Substance and Sexual Activity   Alcohol Use Not Currently    Comment: 1 bottle most days of the week for ~ 4 years, stop 12/2023          Allergy and medication list reviewed and updated     PMHX:  has a past medical history of Breast cancer (1997), Breast cancer (2017), Cancer, Hypertension, and Renea-Parkinson-White syndrome.    PSHX:  has a past surgical history that includes Cardiac electrophysiology study  "and ablation; Back surgery; Tubal ligation; Breast surgery; Colonoscopy (N/A, 2018); Breast biopsy (Right, ); Breast biopsy (Left, ); Mastectomy (Right, ); Mastectomy (Left, ); Augmentation of breast (Bilateral, ); and Breast reconstruction (Bilateral, 2018).    FAMILY HISTORY: Updated and reviewed in EPIC    ROS:   GENERAL: Denies fatigue  CARDIOVASCULAR: Denies edema  GI: Denies abdominal pain  SKIN: Denies rash, itching   NEURO: Denies confusion, memory loss, or mood changes    PHYSICAL EXAM:   In no acute distress; alert and oriented to person, place and time  VITALS: Ht 5' 4" (1.626 m)   Wt 58.3 kg (128 lb 8.5 oz)   LMP  (LMP Unknown)   BMI 22.06 kg/m²   EYES: Sclerae anicteric  GI: Soft, non-tender, non-distended. No ascites.  EXTREMITIES:  No edema.  SKIN: Warm and dry. No jaundice. No telangectasias noted. No palmar erythema.  NEURO:  No asterixis.  PSYCH:  Thought and speech pattern appropriate. Behavior normal      EDUCATION:  See instructions discussed with patient in Instructions section of the After Visit Summary     ASSESSMENT & PLAN:  74 y.o. female with:  1.  Liver fibrosis on fibroscan  Markedly improved on last fibroscan but now fibroscan worsening despite alcohol cessation and normal liver enzymes  Needs further investigation  Discussed options for further evaluation/confirmation of fibrosis/cirrhosis includin. Liver biopsy OR  2. MRI elastography    Discussed liver biopsy procedure and possible complications associated with liver biopsy including pain, bleeding, infection, and organ perforation. Reviewed the role of the procedure including confirming of diagnosis and staging of liver disease so pt can be appropriately followed from this point forward.    Will proceed with MRI elasto first, can consider liver biopsy if MRI notes fibrosis but currently with normal liver enzymes, MRI elasto recommended as next step, as current fibroscan may be inaccurate "             Follow up for pending results of workup. Based on MRI elasto results   Orders Placed This Encounter   Procedures    MR Elastography        Thank you for allowing me to participate in the care of ANDREE Moyer    I spent a total of 30 minutes on the day of the visit.This includes face to face time and non-face to face time preparing to see the patient (eg, review of tests), obtaining and/or reviewing separately obtained history, documenting clinical information in the electronic or other health record, independently interpreting results and communicating results to the patient/family/caregiver, and coordinating care.         CC'ed note to:   Jazmin Singh MD

## 2025-03-12 NOTE — PROCEDURES
FibroScan Transplant Hepatology(Vibration Controlled Transient Elastography)    Date/Time: 3/12/2025 11:45 AM    Performed by: Klarissa Velasquez NP  Authorized by: Klarissa Velasquez NP    Diagnosis:  Other    Probe:  M    Universal Protocol: Patient's identity, procedure and site were verified, confirmatory pause was performed.  Discussed procedure including risks and potential complications.  Questions answered.  Patient verbalizes understanding and wishes to proceed with VCTE.     Procedure: After providing explanations of the procedure, patient was placed in the supine position with right arm in maximum abduction to allow optimal exposure of right lateral abdomen.  Patient was briefly assessed, Testing was performed in the mid-axillary location, 50Hz Shear Wave pulses were applied and the resulting Shear Wave and Propagation Speed detected with a 3.5 MHz ultrasonic signal, using the FibroScan probe, Skin to liver capsule distance and liver parenchyma were accessed during the entire examination with the FibroScan probe, Patient was instructed to breathe normally and to abstain from sudden movements during the procedure, allowing for random measurements of liver stiffness. At least 10 Shear Waves were produced, Individual measurements of each Shear Wave were calculated.  Patient tolerated the procedure well with no complications.  Meets discharge criteria as was dismissed.  Rates pain 0 out of 10.  Patient will follow up with ordering provider to review results.    Findings  Median liver stiffness score:  11.5  CAP Reading: dB/m:  189    IQR/med %:  3  Interpretation  Fibrosis interpretation is based on medial liver stiffness - Kilopascal (kPa).    Fibrosis Stage:  F3  Steatosis interpretation is based on controlled attenuation parameter - (dB/m).    Steatosis Grade:  <S1

## 2025-03-12 NOTE — PATIENT INSTRUCTIONS
1. Fibroscan to look for fat or scar tissue in the liver showed possible significant scar tissue in the liver. No fatty liver  2. options for further evaluation/confirmation of fibrosis/cirrhosis including:   Liver biopsy OR  MRI elastography to compare to the fibroscan    3. Recommend vaccines for Hepatitis  A and B if you have not completed them in the past, see below   3. Follow up based on results of MRI       HEP A/B VACCINE  The CDC recommends that all adults complete the combination Hepatitis A and B vaccine called TwinRix. This will protect your liver from these viruses, which can make your liver very sick.     Hep A can be transmitted through food and water and can cause significant liver injury. There was previously a significant Hepatitis A outbreak in [place]. Hep B vaccine is transmitted through blood or bodily fluids (there are no symptoms typically) and can develop longstanding (chronic) Hep B and there is no cure, so many people have it for life. Therefore, the vaccines provide immunity against these viruses that can cause harm to the liver.     The vaccine series is 3 vaccines: one now, one at 4 weeks and one 6 months after the 1st one. You can get it from any pharmacy but any [facility] pharmacy typically stocks it and administers it frequently      If the vaccine is not covered at the pharmacy level with your insurance, you may be able to get it with your PCP or in the infectious disease department at [facility] or from your [facility].

## 2025-03-21 ENCOUNTER — HOSPITAL ENCOUNTER (OUTPATIENT)
Dept: RADIOLOGY | Facility: HOSPITAL | Age: 75
Discharge: HOME | End: 2025-03-21
Attending: NURSE PRACTITIONER
Payer: MEDICARE

## 2025-03-21 DIAGNOSIS — K74.02 HEPATIC FIBROSIS, STAGE 3: ICD-10-CM

## 2025-03-21 PROCEDURE — 76391 MR ELASTOGRAPHY: CPT | Mod: TC

## 2025-03-24 ENCOUNTER — RESULTS FOLLOW-UP (OUTPATIENT)
Dept: HEPATOLOGY | Facility: CLINIC | Age: 75
End: 2025-03-24

## 2025-03-24 DIAGNOSIS — Z00.00 ENCOUNTER FOR MEDICARE ANNUAL WELLNESS EXAM: ICD-10-CM

## 2025-03-24 DIAGNOSIS — I10 ESSENTIAL HYPERTENSION: ICD-10-CM

## 2025-03-24 RX ORDER — LISINOPRIL 5 MG/1
5 TABLET ORAL
Qty: 90 TABLET | Refills: 3 | Status: SHIPPED | OUTPATIENT
Start: 2025-03-24

## 2025-03-24 NOTE — TELEPHONE ENCOUNTER
Care Due:                  Date            Visit Type   Department     Provider  --------------------------------------------------------------------------------                                ESTABLISHED                              PATIENT -    [facility]  Last Visit: 11-      [facility]      MEDICINE       Jazmin Singh  Next Visit: None Scheduled  None         None Found                                                            Last  Test          Frequency    Reason                     Performed    Due Date  --------------------------------------------------------------------------------    Office Visit  15 months..  lisinopriL...............  11- 02-    [facility] Embedded Care Due Messages. Reference number: [id number].   3/24/2025 3:21:36 AM CDT

## 2025-03-24 NOTE — TELEPHONE ENCOUNTER
Refill Encounter    PCP Visits: Recent Visits  Date Type Provider Dept   02/10/25 Office Visit Breanne Anaya, [facility]   09/06/24 Office Visit Breanne Anaya, [facility]   08/09/24 Office Visit Breanne Anaya, [facility]   Showing recent visits within past 360 days and meeting all other requirements  Future Appointments  No visits were found meeting these conditions.  Showing future appointments within next 720 days and meeting all other requirements      Last 3 Blood Pressure:   BP Readings from Last 3 Encounters:   02/10/25 115/70   01/17/25 (!) 174/74   01/09/25 (!) 160/70     Preferred Pharmacy:   [id number] - [place] [address] [address] [facility] [address] [place] [address] [address] [facility] [address]  Phone: [phone] Fax: [phone]     [facility] - [facility] [address] [facility]  [place] [address]  Phone: [phone] Fax: [phone]    [facility] - [place], [place] - [address]  [address] [place], [place] [address]  [address] [address]  Phone: [phone] Fax: [phone]    Requested RX:  Requested Prescriptions     Pending Prescriptions Disp Refills    lisinopriL (PRINIVIL,ZESTRIL) 5 MG tablet [Pharmacy Med Name: LISINOPRIL 5MG TABLETS] 90 tablet 0     Sig: TAKE 1 TABLET(5 MG) BY MOUTH EVERY DAY      RX Route: Normal

## 2025-03-28 ENCOUNTER — PATIENT MESSAGE (OUTPATIENT)
Dept: FAMILY MEDICINE | Facility: CLINIC | Age: 75
End: 2025-03-28
Payer: MEDICARE

## 2025-03-29 DIAGNOSIS — N94.9 ADNEXAL CYST: ICD-10-CM

## 2025-03-29 DIAGNOSIS — N28.1 RENAL CYST: ICD-10-CM

## 2025-03-31 ENCOUNTER — HOSPITAL ENCOUNTER (OUTPATIENT)
Dept: RADIOLOGY | Facility: OTHER | Age: 75
Discharge: HOME | End: 2025-03-31
Attending: FAMILY MEDICINE
Payer: MEDICARE

## 2025-03-31 DIAGNOSIS — N28.1 RENAL CYST: ICD-10-CM

## 2025-03-31 DIAGNOSIS — N94.9 ADNEXAL CYST: ICD-10-CM

## 2025-03-31 PROCEDURE — 76770 US EXAM ABDO BACK WALL COMP: CPT | Mod: 26,,, | Performed by: RADIOLOGY

## 2025-03-31 PROCEDURE — 76856 US EXAM PELVIC COMPLETE: CPT | Mod: TC

## 2025-03-31 PROCEDURE — 76856 US EXAM PELVIC COMPLETE: CPT | Mod: 26,,, | Performed by: RADIOLOGY

## 2025-03-31 PROCEDURE — 76770 US EXAM ABDO BACK WALL COMP: CPT | Mod: TC

## 2025-04-01 ENCOUNTER — RESULTS FOLLOW-UP (OUTPATIENT)
Dept: FAMILY MEDICINE | Facility: CLINIC | Age: 75
End: 2025-04-01

## 2025-04-01 DIAGNOSIS — N83.201 RIGHT OVARIAN CYST: Primary | ICD-10-CM

## 2025-04-02 ENCOUNTER — OFFICE VISIT (OUTPATIENT)
Dept: OBSTETRICS AND GYNECOLOGY | Facility: CLINIC | Age: 75
End: 2025-04-02
Attending: FAMILY MEDICINE
Payer: MEDICARE

## 2025-04-02 ENCOUNTER — LAB VISIT (OUTPATIENT)
Dept: LAB | Facility: OTHER | Age: 75
End: 2025-04-02
Attending: OBSTETRICS & GYNECOLOGY
Payer: MEDICARE

## 2025-04-02 VITALS
SYSTOLIC BLOOD PRESSURE: 150 MMHG | BODY MASS INDEX: 22.38 KG/M2 | HEIGHT: 63 IN | DIASTOLIC BLOOD PRESSURE: 70 MMHG | WEIGHT: 126.31 LBS

## 2025-04-02 DIAGNOSIS — Z85.3 HISTORY OF RIGHT BREAST CANCER: ICD-10-CM

## 2025-04-02 DIAGNOSIS — N83.201 RIGHT OVARIAN CYST: Primary | ICD-10-CM

## 2025-04-02 DIAGNOSIS — R19.03 RIGHT LOWER QUADRANT ABDOMINAL SWELLING, MASS AND LUMP: ICD-10-CM

## 2025-04-02 DIAGNOSIS — N83.201 RIGHT OVARIAN CYST: ICD-10-CM

## 2025-04-02 DIAGNOSIS — N94.9 ADNEXAL CYST: ICD-10-CM

## 2025-04-02 PROBLEM — Z79.811 USE OF AROMATASE INHIBITORS: Status: RESOLVED | Noted: 2017-10-23 | Resolved: 2025-04-02

## 2025-04-02 PROBLEM — Z98.890 S/P BREAST RECONSTRUCTION: Status: RESOLVED | Noted: 2018-02-22 | Resolved: 2025-04-02

## 2025-04-02 LAB — CANCER AG125 SERPL-ACNC: 12 UNIT/ML

## 2025-04-02 PROCEDURE — 3077F SYST BP >= 140 MM HG: CPT | Mod: CPTII,S$GLB,, | Performed by: OBSTETRICS & GYNECOLOGY

## 2025-04-02 PROCEDURE — 1126F AMNT PAIN NOTED NONE PRSNT: CPT | Mod: CPTII,S$GLB,, | Performed by: OBSTETRICS & GYNECOLOGY

## 2025-04-02 PROCEDURE — 1159F MED LIST DOCD IN RCRD: CPT | Mod: CPTII,S$GLB,, | Performed by: OBSTETRICS & GYNECOLOGY

## 2025-04-02 PROCEDURE — 1101F PT FALLS ASSESS-DOCD LE1/YR: CPT | Mod: CPTII,S$GLB,, | Performed by: OBSTETRICS & GYNECOLOGY

## 2025-04-02 PROCEDURE — 86304 IMMUNOASSAY TUMOR CA 125: CPT

## 2025-04-02 PROCEDURE — 3288F FALL RISK ASSESSMENT DOCD: CPT | Mod: CPTII,S$GLB,, | Performed by: OBSTETRICS & GYNECOLOGY

## 2025-04-02 PROCEDURE — 36415 COLL VENOUS BLD VENIPUNCTURE: CPT

## 2025-04-02 PROCEDURE — 99203 OFFICE O/P NEW LOW 30 MIN: CPT | Mod: S$GLB,,, | Performed by: OBSTETRICS & GYNECOLOGY

## 2025-04-02 PROCEDURE — 3078F DIAST BP <80 MM HG: CPT | Mod: CPTII,S$GLB,, | Performed by: OBSTETRICS & GYNECOLOGY

## 2025-04-02 PROCEDURE — 4010F ACE/ARB THERAPY RXD/TAKEN: CPT | Mod: CPTII,S$GLB,, | Performed by: OBSTETRICS & GYNECOLOGY

## 2025-04-02 PROCEDURE — 1160F RVW MEDS BY RX/DR IN RCRD: CPT | Mod: CPTII,S$GLB,, | Performed by: OBSTETRICS & GYNECOLOGY

## 2025-04-02 PROCEDURE — 3044F HG A1C LEVEL LT 7.0%: CPT | Mod: CPTII,S$GLB,, | Performed by: OBSTETRICS & GYNECOLOGY

## 2025-04-02 PROCEDURE — 1157F ADVNC CARE PLAN IN RCRD: CPT | Mod: CPTII,S$GLB,, | Performed by: OBSTETRICS & GYNECOLOGY

## 2025-04-02 PROCEDURE — 99999 PR PBB SHADOW E&M-EST. PATIENT-LVL IV: CPT | Mod: PBBFAC,,, | Performed by: OBSTETRICS & GYNECOLOGY

## 2025-04-02 PROCEDURE — 3008F BODY MASS INDEX DOCD: CPT | Mod: CPTII,S$GLB,, | Performed by: OBSTETRICS & GYNECOLOGY

## 2025-04-02 NOTE — PROGRESS NOTES
Clinic Note  4/2/2025      Subjective:         Chief Complaint:   HPI  Pantera Posey is a 74 y.o. female with bilateral renal cysts.  Had MR Elastography on 3/21/2025 which showed right simple to minimally complex cyst.  Renal ultrasound on 3/31/2025 showed bilateral small simple renal cysts. A 1 cm cyst on right has thin septation.    Discussed complexity and grading of renal cysts termed the Bosniak classification. Explained that per my read the patient exhibited a grade 1-2 classification.     Bosniak 1 - simple cyst with: imperceptible wall, round characterization. No further workup is needed as the likelihood of malignancy is ~0%.  Bosniak 2 - minimally complex with: a few thin <1mm septa or thin calcifications (thickness not measurable); non-enhancing high attenuation (due to proteinaceous or hemorrhagic contents); renal lesions of <3cm are also included; well marginated. No further workup is needed as the likelihood of malignancy is ~0%.  Bosniak 2F - minimally complex with: an increased number of septa, minimally thickened with nodular or thick calcifications; enhancement of hairlin-thin smooth septa; hyperdense cyst >3cm in diameter, mostly intrarenal with less than 25% of wall visible; no enhancement. This type of lesion requires additional followup with Ultrasound/CT in approximately 6 months. The likelihood of malignancy is ~5%.  Bosniak 3 - indeterminate with: thick, nodular multiple septa or wall with measurable enhancement, hyperdense on CT. Due to the higher likelihood of malignancy of ~55%, typically treatment is recommended: partial nephrectomy or radiofrequency ablation.   Bosniak 4 - clearly malignancy with: solid mass with a large cystic or necrotic component. Due to the very high likelihood of malignancy nearing ~80% or greater the recommendation for treatment include a partial or a radical nephrectomy.      Past Medical History:   Diagnosis Date    Breast cancer 1997    RIGHT    Breast  "cancer 2017    Left    Hypertension     Renea-Parkinson-White syndrome      Family History   Problem Relation Name Age of Onset    Cancer Mother      Hypertension Father      Breast cancer Maternal Aunt      Ovarian cancer Paternal Aunt      Melanoma Neg Hx       Social History[1]  Past Surgical History:   Procedure Laterality Date    AUGMENTATION OF BREAST Bilateral 2018    BACK SURGERY      Spinal fusion    BREAST BIOPSY Right 1997    core bx, +    BREAST BIOPSY Left 2017    Core bx, +    BREAST RECONSTRUCTION Bilateral 2018    with implants    CARDIAC ELECTROPHYSIOLOGY STUDY AND ABLATION      COLONOSCOPY N/A 07/20/2018    Procedure: COLONOSCOPY;  Surgeon: David Mcdonald MD;  Location: [facility] ([address] [place] [address]);  Service: Endoscopy;  Laterality: N/A;    MASTECTOMY Right 1997    RIGHT/ RADIATION AND CHEMO    MASTECTOMY Left 2017    left    TUBAL LIGATION       Problem List[2]  Review of Systems   Constitutional:  Negative for activity change, appetite change, fever and unexpected weight change.   HENT:  Negative for nosebleeds.    Eyes: Negative.    Respiratory:  Negative for shortness of breath and wheezing.    Cardiovascular:  Negative for chest pain, palpitations and leg swelling.   Gastrointestinal:  Negative for abdominal distention, abdominal pain, constipation, diarrhea, nausea and vomiting.   Genitourinary:  Negative for dysuria, flank pain and hematuria.   Musculoskeletal:  Negative for arthralgias.   Neurological:  Negative for dizziness, seizures and syncope.   Hematological:  Negative for adenopathy.   Psychiatric/Behavioral:  Negative for dysphoric mood.          Objective:      LMP  (LMP Unknown)   Estimated body mass index is 22.38 kg/m² as calculated from the following:    Height as of 4/2/25: 5' 3" (1.6 m).    Weight as of 4/2/25: 57.3 kg (126 lb 5.2 oz).  Physical Exam  Constitutional:       General: She is not in acute distress.     Appearance: Normal appearance.   HENT:      Head: Normocephalic and " atraumatic.      Nose: Nose normal.   Eyes:      Conjunctiva/sclera: Conjunctivae normal.   Cardiovascular:      Rate and Rhythm: Normal rate.   Pulmonary:      Effort: Pulmonary effort is normal. No respiratory distress.   Abdominal:      General: There is no distension.   Musculoskeletal:         General: No deformity.   Skin:     General: Skin is warm and dry.   Neurological:      General: No focal deficit present.      Mental Status: She is alert.   Psychiatric:         Mood and Affect: Mood normal.         Behavior: Behavior normal.         Assessment and Plan:   Bosniak 1 and 2 cysts. Reassurance. No further  F/U needed.  Letter to Dr. Singh.        Problem List Items Addressed This Visit    None      Follow up:       Kamlesh Lacy             [1]   Social History  Socioeconomic History    Marital status:    Tobacco Use    Smoking status: Former     Current packs/day: 0.00     Types: Cigarettes     Quit date: 12/3/1988     Years since quittin.3     Passive exposure: Past    Smokeless tobacco: Never   Substance and Sexual Activity    Alcohol use: Not Currently    Drug use: No     Comment: Tubal Ligation    Sexual activity: Not Currently     Partners: Male     Birth control/protection: Post-menopausal     Social Drivers of Health     Financial Resource Strain: Low Risk  (2025)    Overall Financial Resource Strain (CARDIA)     Difficulty of Paying Living Expenses: Not hard at all   Food Insecurity: No Food Insecurity (2025)    Hunger Vital Sign     Worried About Running Out of Food in the Last Year: Never true     Ran Out of Food in the Last Year: Never true   Transportation Needs: No Transportation Needs (2023)    PRAPARE - Transportation     Lack of Transportation (Medical): No     Lack of Transportation (Non-Medical): No   Physical Activity: Sufficiently Active (2025)    Exercise Vital Sign     Days of Exercise per Week: 5 days     Minutes of Exercise per Session: 70 min    Stress: No Stress Concern Present (2/4/2025)    [place] [employer] - Occupational Stress Questionnaire     Feeling of Stress : Only a little   Housing Stability: Low Risk  (11/24/2023)    Housing Stability Vital Sign     Unable to Pay for Housing in the Last Year: No     Number of Places Lived in the Last Year: 1     Unstable Housing in the Last Year: No   [2]   Patient Active Problem List  Diagnosis    HTN (hypertension), benign    Spondylosis without myelopathy    Spinal stenosis, lumbar region, with neurogenic claudication    Thoracic or lumbosacral neuritis or radiculitis, unspecified    Acquired spondylolisthesis    Lumbago    Degeneration of lumbar or lumbosacral intervertebral disc    Back pain    Malignant neoplasm of lower-outer quadrant of left breast of female, estrogen receptor positive    History of right breast cancer    Age-related osteoporosis without current pathological fracture    Swelling of hand    Macrocytosis    Vitamin B 12 deficiency    Hepatic fibrosis, early fibrosis    Aortic atherosclerosis    WPW (Renea-Parkinson-White syndrome)

## 2025-04-02 NOTE — PROGRESS NOTES
SUBJECTIVE:   74 y.o. female   for abnormal pelvic u/s finding. No LMP recorded (lmp unknown). Patient is postmenopausal..  She had MRI to evaluate liver which also showed incidental finding of right ovarian cyst. Follow up pelvic u/s showed 3.5 cm right ovarian cyst. No pelvic pain. No PMB. No HRT. H/o breast CA and completed AI tx.         Past Medical History:   Diagnosis Date    Breast cancer     RIGHT    Breast cancer 2017    Left    Hypertension     Renea-Parkinson-White syndrome      Past Surgical History:   Procedure Laterality Date    AUGMENTATION OF BREAST Bilateral 2018    BACK SURGERY      Spinal fusion    BREAST BIOPSY Right     core bx, +    BREAST BIOPSY Left 2017    Core bx, +    BREAST RECONSTRUCTION Bilateral 2018    with implants    CARDIAC ELECTROPHYSIOLOGY STUDY AND ABLATION      COLONOSCOPY N/A 2018    Procedure: COLONOSCOPY;  Surgeon: David Mcdonald MD;  Location: [facility] ([address] [place] [address]);  Service: Endoscopy;  Laterality: N/A;    MASTECTOMY Right     RIGHT/ RADIATION AND CHEMO    MASTECTOMY Left 2017    left    TUBAL LIGATION       Social History[1]  Family History   Problem Relation Name Age of Onset    Cancer Mother      Hypertension Father      Breast cancer Maternal Aunt      Ovarian cancer Paternal Aunt      Melanoma Neg Hx       OB History    Para Term  AB Living   2 2 2   2   SAB IAB Ectopic Multiple Live Births       2      # Outcome Date GA Lbr Flo/2nd Weight Sex Type Anes PTL Lv   2 Term            1 Term                  Current Medications[2]  Allergies: Ciprofloxacin, Floxin [ofloxacin], Clindamycin, Amoxicillin, Indocin  [indomethacin sodium], and Bactrim [sulfamethoxazole-trimethoprim]     ROS:  Constitutional: no weight loss, weight gain, fever, fatigue  Eyes:  No vision changes, glasses/contacts  ENT/Mouth: No ulcers, sinus problems, ears ringing, headache  Cardiovascular: No inability to lie flat, chest pain, exercise intolerance, swelling,  "heart palpitations  Respiratory: No wheezing, coughing blood, shortness of breath, or cough  Gastrointestinal: No diarrhea, bloody stool, nausea/vomiting, constipation, gas, hemorrhoids  Genitourinary: No blood in urine, painful urination, urgency of urination, frequency of urination, incomplete emptying, incontinence, abnormal bleeding, painful periods, heavy periods, vaginal discharge, vaginal odor, painful intercourse, sexual problems, bleeding after intercourse.  Musculoskeletal: No muscle weakness  Skin/Breast: No painful breasts, nipple discharge, masses, rash, ulcers  Neurological: No passing out, seizures, numbness, headache  Endocrine: No diabetes, hypothyroid, hyperthyroid, hot flashes, hair loss, abnormal hair growth, ance  Psychiatric: No depression, crying  Hematologic: No bruises, bleeding, swollen lymph nodes, anemia.      OBJECTIVE:   The patient appears well, alert, oriented x 3, in no distress.  BP (!) 150/70 (BP Location: Left arm, Patient Position: Sitting)   Ht 5' 3" (1.6 m)   Wt 57.3 kg (126 lb 5.2 oz)   LMP  (LMP Unknown)   BMI 22.38 kg/m²   ABDOMEN: no hernias, masses, or hepatosplenomegaly  GENITALIA: normal external genitalia, no erythema, no discharge  URETHRA: normal urethra, normal urethral meatus  VAGINA: Normal  CERVIX: no lesions or cervical motion tenderness  UTERUS: normal size, contour, position, consistency, mobility, non-tender  ADNEXA: no mass, fullness, tenderness      ASSESSMENT:   1. Right ovarian cyst  Ambulatory referral/consult to Gynecology    US Pelvis Comp with Transvag NON-OB (xpd          2. Right lower quadrant abdominal swelling, mass and lump        3. Adnexal cyst  Ambulatory referral/consult to Gynecology      4. History of right breast cancer            PLAN:   Orders Placed This Encounter    US Pelvis Comp with Transvag NON-OB (xpd         Discussed asymptomatic 3.5 cm right ovarian cyst, mgt options.   Discussed definitive mgt of ovarian " cyst with surgery, but has surgery has risks.   Findings appear to be benign, therefore following up ultrasounds seem reasonable.  If sx occur, then can reevaluate  Return to clinic 6-10 weeks         [1]   Social History  Socioeconomic History    Marital status:    Tobacco Use    Smoking status: Former     Current packs/day: 0.00     Types: Cigarettes     Quit date: 12/3/1988     Years since quittin.3     Passive exposure: Past    Smokeless tobacco: Never   Substance and Sexual Activity    Alcohol use: Not Currently    Drug use: No     Comment: Tubal Ligation    Sexual activity: Not Currently     Partners: Male     Birth control/protection: Post-menopausal     Social Drivers of Health     Financial Resource Strain: Low Risk  (2025)    Overall Financial Resource Strain (CARDIA)     Difficulty of Paying Living Expenses: Not hard at all   Food Insecurity: No Food Insecurity (2025)    Hunger Vital Sign     Worried About Running Out of Food in the Last Year: Never true     Ran Out of Food in the Last Year: Never true   Transportation Needs: No Transportation Needs (2023)    PRAPARE - Transportation     Lack of Transportation (Medical): No     Lack of Transportation (Non-Medical): No   Physical Activity: Sufficiently Active (2025)    Exercise Vital Sign     Days of Exercise per Week: 5 days     Minutes of Exercise per Session: 70 min   Stress: No Stress Concern Present (2025)    [place] [employer] - Occupational Stress Questionnaire     Feeling of Stress : Only a little   Housing Stability: Low Risk  (2023)    Housing Stability Vital Sign     Unable to Pay for Housing in the Last Year: No     Number of Places Lived in the Last Year: 1     Unstable Housing in the Last Year: No   [2]   Current Outpatient Medications   Medication Sig Dispense Refill    calcium-vitamin D3 500 mg(1,250mg) -200 unit per tablet Take 2 tablets by mouth 2 (two) times daily with meals.       clobetasoL (TEMOVATE) 0.05 % cream AAA bid 60 g 3    clobetasoL (TEMOVATE) 0.05 % external solution Use on scalp one - two times daily as needed for scaling or itching 50 mL 3    doxycycline 50 MG capsule Take 1 capsule (50 mg total) by mouth once daily. 30 capsule 11    folic acid (FOLVITE) 1 MG tablet Take 1 mg by mouth once daily.      gabapentin (NEURONTIN) 100 MG capsule TAKE 1 CAPSULE(100 MG) BY MOUTH EVERY EVENING 30 capsule 2    hydrOXYzine HCL (ATARAX) 25 MG tablet TAKE 1 TABLET BY MOUTH EVERY EVENING AS NEEDED FOR ITCHING. DO NOT DRIVE OR OPERATE MACHINERY WHILE TAKING THIS MEDICATION 30 tablet 5    lisinopriL (PRINIVIL,ZESTRIL) 5 MG tablet TAKE 1 TABLET(5 MG) BY MOUTH EVERY DAY 90 tablet 3    metronidazole 0.75% (METROCREAM) 0.75 % Crea AAA face bid 45 g 3    rosuvastatin (CRESTOR) 10 MG tablet Take 1 tablet (10 mg total) by mouth once daily. 90 tablet 3    triamcinolone acetonide 0.1% (KENALOG) 0.1 % cream AAA bid to affected areas of body 454 g 3     No current facility-administered medications for this visit.

## 2025-04-03 ENCOUNTER — RESULTS FOLLOW-UP (OUTPATIENT)
Dept: OBSTETRICS AND GYNECOLOGY | Facility: CLINIC | Age: 75
End: 2025-04-03

## 2025-04-03 ENCOUNTER — TELEPHONE (OUTPATIENT)
Dept: UROLOGY | Facility: CLINIC | Age: 75
End: 2025-04-03
Payer: MEDICARE

## 2025-04-03 ENCOUNTER — OFFICE VISIT (OUTPATIENT)
Dept: UROLOGY | Facility: CLINIC | Age: 75
End: 2025-04-03
Payer: MEDICARE

## 2025-04-03 VITALS
HEART RATE: 68 BPM | SYSTOLIC BLOOD PRESSURE: 153 MMHG | WEIGHT: 126.75 LBS | HEIGHT: 63 IN | DIASTOLIC BLOOD PRESSURE: 69 MMHG | BODY MASS INDEX: 22.46 KG/M2

## 2025-04-03 DIAGNOSIS — N28.1 RENAL CYST: ICD-10-CM

## 2025-04-03 PROCEDURE — 1157F ADVNC CARE PLAN IN RCRD: CPT | Mod: CPTII,S$GLB,, | Performed by: UROLOGY

## 2025-04-03 PROCEDURE — 1101F PT FALLS ASSESS-DOCD LE1/YR: CPT | Mod: CPTII,S$GLB,, | Performed by: UROLOGY

## 2025-04-03 PROCEDURE — 3044F HG A1C LEVEL LT 7.0%: CPT | Mod: CPTII,S$GLB,, | Performed by: UROLOGY

## 2025-04-03 PROCEDURE — 99999 PR PBB SHADOW E&M-EST. PATIENT-LVL III: CPT | Mod: PBBFAC,,, | Performed by: UROLOGY

## 2025-04-03 PROCEDURE — 99205 OFFICE O/P NEW HI 60 MIN: CPT | Mod: S$GLB,,, | Performed by: UROLOGY

## 2025-04-03 PROCEDURE — 1126F AMNT PAIN NOTED NONE PRSNT: CPT | Mod: CPTII,S$GLB,, | Performed by: UROLOGY

## 2025-04-03 PROCEDURE — 3078F DIAST BP <80 MM HG: CPT | Mod: CPTII,S$GLB,, | Performed by: UROLOGY

## 2025-04-03 PROCEDURE — 1160F RVW MEDS BY RX/DR IN RCRD: CPT | Mod: CPTII,S$GLB,, | Performed by: UROLOGY

## 2025-04-03 PROCEDURE — 4010F ACE/ARB THERAPY RXD/TAKEN: CPT | Mod: CPTII,S$GLB,, | Performed by: UROLOGY

## 2025-04-03 PROCEDURE — 3077F SYST BP >= 140 MM HG: CPT | Mod: CPTII,S$GLB,, | Performed by: UROLOGY

## 2025-04-03 PROCEDURE — 3288F FALL RISK ASSESSMENT DOCD: CPT | Mod: CPTII,S$GLB,, | Performed by: UROLOGY

## 2025-04-03 PROCEDURE — 3008F BODY MASS INDEX DOCD: CPT | Mod: CPTII,S$GLB,, | Performed by: UROLOGY

## 2025-04-03 PROCEDURE — 1159F MED LIST DOCD IN RCRD: CPT | Mod: CPTII,S$GLB,, | Performed by: UROLOGY

## 2025-04-27 DIAGNOSIS — G62.9 NEUROPATHY: ICD-10-CM

## 2025-04-28 RX ORDER — GABAPENTIN 100 MG/1
100 CAPSULE ORAL
Qty: 30 CAPSULE | Refills: 2 | Status: SHIPPED | OUTPATIENT
Start: 2025-04-28

## 2025-05-21 ENCOUNTER — HOSPITAL ENCOUNTER (OUTPATIENT)
Dept: RADIOLOGY | Facility: OTHER | Age: 75
Discharge: HOME | End: 2025-05-21
Attending: OBSTETRICS & GYNECOLOGY
Payer: MEDICARE

## 2025-05-21 DIAGNOSIS — N83.201 RIGHT OVARIAN CYST: ICD-10-CM

## 2025-05-21 PROCEDURE — 76856 US EXAM PELVIC COMPLETE: CPT | Mod: 26,,, | Performed by: RADIOLOGY

## 2025-05-21 PROCEDURE — 76856 US EXAM PELVIC COMPLETE: CPT | Mod: TC

## 2025-06-06 ENCOUNTER — PATIENT MESSAGE (OUTPATIENT)
Dept: OBSTETRICS AND GYNECOLOGY | Facility: CLINIC | Age: 75
End: 2025-06-06
Payer: MEDICARE

## 2025-06-23 ENCOUNTER — PATIENT MESSAGE (OUTPATIENT)
Dept: OBSTETRICS AND GYNECOLOGY | Facility: CLINIC | Age: 75
End: 2025-06-23
Payer: MEDICARE

## 2025-06-26 ENCOUNTER — OFFICE VISIT (OUTPATIENT)
Dept: OBSTETRICS AND GYNECOLOGY | Facility: CLINIC | Age: 75
End: 2025-06-26
Attending: OBSTETRICS & GYNECOLOGY
Payer: MEDICARE

## 2025-06-26 DIAGNOSIS — Z85.3 HISTORY OF RIGHT BREAST CANCER: ICD-10-CM

## 2025-06-26 DIAGNOSIS — N83.201 RIGHT OVARIAN CYST: Primary | ICD-10-CM

## 2025-06-26 PROCEDURE — 1159F MED LIST DOCD IN RCRD: CPT | Mod: CPTII,95,, | Performed by: OBSTETRICS & GYNECOLOGY

## 2025-06-26 PROCEDURE — 1157F ADVNC CARE PLAN IN RCRD: CPT | Mod: CPTII,95,, | Performed by: OBSTETRICS & GYNECOLOGY

## 2025-06-26 PROCEDURE — 1160F RVW MEDS BY RX/DR IN RCRD: CPT | Mod: CPTII,95,, | Performed by: OBSTETRICS & GYNECOLOGY

## 2025-06-26 PROCEDURE — 4010F ACE/ARB THERAPY RXD/TAKEN: CPT | Mod: CPTII,95,, | Performed by: OBSTETRICS & GYNECOLOGY

## 2025-06-26 PROCEDURE — 98005 SYNCH AUDIO-VIDEO EST LOW 20: CPT | Mod: 95,,, | Performed by: OBSTETRICS & GYNECOLOGY

## 2025-06-26 PROCEDURE — 3044F HG A1C LEVEL LT 7.0%: CPT | Mod: CPTII,95,, | Performed by: OBSTETRICS & GYNECOLOGY

## 2025-06-26 NOTE — PROGRESS NOTES
The patient location is: [place]  The chief complaint leading to consultation is: Ovarian cyst    Visit type: audiovisual    Face to Face time with patient: 25 monutes  25 minutes of total time spent on the encounter, which includes face to face time and non-face to face time preparing to see the patient (eg, review of tests), Obtaining and/or reviewing separately obtained history, Documenting clinical information in the electronic or other health record, Independently interpreting results (not separately reported) and communicating results to the patient/family/caregiver, or Care coordination (not separately reported).         Each patient to whom he or she provides medical services by telemedicine is:  (1) informed of the relationship between the physician and patient and the respective role of any other health care provider with respect to management of the patient; and (2) notified that he or she may decline to receive medical services by telemedicine and may withdraw from such care at any time.    Notes:       SUBJECTIVE:   75 y.o. female   for follow up of ovarian cyst. No LMP recorded (lmp unknown). Patient is postmenopausal..   She had MRI 3/21/25 to evaluate liver which also showed incidental finding of right ovarian cyst.  Follow up pelvic u/s 3/31/25 showed 3.5 cm right ovarian cyst, 5 cm uterus, 4 mm endometrium. Left ovary not seen.   25 was normal  Follow up u/s 25 showed stable right ovarian cyst, uterus, endometrium. Left ovary not seen   was normal    No pelvic pain. No PMB. No HRT. H/o breast CA and completed AI tx.          Past Medical History:   Diagnosis Date    Breast cancer     RIGHT    Breast cancer 2017    Left    Hypertension     Renea-Parkinson-White syndrome      Past Surgical History:   Procedure Laterality Date    AUGMENTATION OF BREAST Bilateral 2018    BACK SURGERY      Spinal fusion    BREAST BIOPSY Right     core bx, +    BREAST BIOPSY Left  2017    Core bx, +    BREAST RECONSTRUCTION Bilateral 2018    with implants    CARDIAC ELECTROPHYSIOLOGY STUDY AND ABLATION      COLONOSCOPY N/A 2018    Procedure: COLONOSCOPY;  Surgeon: David Mcdonald MD;  Location: [facility] ([address] [place] [address]);  Service: Endoscopy;  Laterality: N/A;    MASTECTOMY Right     RIGHT/ RADIATION AND CHEMO    MASTECTOMY Left 2017    left    TUBAL LIGATION       Social History[1]  Family History   Problem Relation Name Age of Onset    Cancer Mother      Hypertension Father      Breast cancer Maternal Aunt      Ovarian cancer Paternal Aunt      Melanoma Neg Hx       OB History    Para Term  AB Living   2 2 2   2   SAB IAB Ectopic Multiple Live Births       2      # Outcome Date GA Lbr Flo/2nd Weight Sex Type Anes PTL Lv   2 Term            1 Term                  Current Medications[2]  Allergies: Ciprofloxacin, Floxin [ofloxacin], Clindamycin, Amoxicillin, Indocin  [indomethacin sodium], and Bactrim [sulfamethoxazole-trimethoprim]     ROS:  Constitutional: no weight loss, weight gain, fever, fatigue  Eyes:  No vision changes, glasses/contacts  ENT/Mouth: No ulcers, sinus problems, ears ringing, headache  Cardiovascular: No inability to lie flat, chest pain, exercise intolerance, swelling, heart palpitations  Respiratory: No wheezing, coughing blood, shortness of breath, or cough  Gastrointestinal: No diarrhea, bloody stool, nausea/vomiting, constipation, gas, hemorrhoids  Genitourinary: No blood in urine, painful urination, urgency of urination, frequency of urination, incomplete emptying, incontinence, abnormal bleeding, painful periods, heavy periods, vaginal discharge, vaginal odor, painful intercourse, sexual problems, bleeding after intercourse.  Musculoskeletal: No muscle weakness  Skin/Breast: No painful breasts, nipple discharge, masses, rash, ulcers  Neurological: No passing out, seizures, numbness, headache  Endocrine: No diabetes, hypothyroid, hyperthyroid, hot  flashes, hair loss, abnormal hair growth, ance  Psychiatric: No depression, crying  Hematologic: No bruises, bleeding, swollen lymph nodes, anemia.      OBJECTIVE:   The patient appears well, alert, oriented x 3, in no distress.      ASSESSMENT:   1. Right ovarian cyst  US Pelvis Comp with Transvag NON-OB (xpd      2. History of right breast cancer            PLAN:   Discussed asymptomatic 3.5 cm right ovarian cyst, stab le from 3/2025 to now, and mgt options.   Discussed definitive mgt of ovarian cyst with surgery, but has surgery has risks.   Findings appear to be benign, therefore following up ultrasounds seem reasonable.  If sx occur, then can reevaluate  Return to clinic 3-6 months weeks for repeat u/s  Orders Placed This Encounter    US Pelvis Comp with Transvag NON-OB (xpd     Return to clinic 3-6 months after u/s                               [1]   Social History  Socioeconomic History    Marital status:    Tobacco Use    Smoking status: Former     Current packs/day: 0.00     Types: Cigarettes     Quit date: 12/3/1988     Years since quittin.5     Passive exposure: Past    Smokeless tobacco: Never   Substance and Sexual Activity    Alcohol use: Not Currently    Drug use: No     Comment: Tubal Ligation    Sexual activity: Not Currently     Partners: Male     Birth control/protection: Post-menopausal     Social Drivers of Health     Financial Resource Strain: Low Risk  (2025)    Overall Financial Resource Strain (CARDIA)     Difficulty of Paying Living Expenses: Not hard at all   Food Insecurity: No Food Insecurity (2025)    Hunger Vital Sign     Worried About Running Out of Food in the Last Year: Never true     Ran Out of Food in the Last Year: Never true   Transportation Needs: No Transportation Needs (2023)    PRAPARE - Transportation     Lack of Transportation (Medical): No     Lack of Transportation (Non-Medical): No   Physical Activity: Sufficiently Active (2025)    Exercise  Vital Sign     Days of Exercise per Week: 5 days     Minutes of Exercise per Session: 70 min   Stress: No Stress Concern Present (2/4/2025)    [employer] [place] [employer] - Occupational Stress Questionnaire     Feeling of Stress : Only a little   Housing Stability: Low Risk  (11/24/2023)    Housing Stability Vital Sign     Unable to Pay for Housing in the Last Year: No     Number of Places Lived in the Last Year: 1     Unstable Housing in the Last Year: No   [2]   Current Outpatient Medications   Medication Sig Dispense Refill    calcium-vitamin D3 500 mg(1,250mg) -200 unit per tablet Take 2 tablets by mouth 2 (two) times daily with meals.      clobetasoL (TEMOVATE) 0.05 % cream AAA bid 60 g 3    clobetasoL (TEMOVATE) 0.05 % external solution Use on scalp one - two times daily as needed for scaling or itching 50 mL 3    doxycycline 50 MG capsule Take 1 capsule (50 mg total) by mouth once daily. 30 capsule 11    folic acid (FOLVITE) 1 MG tablet Take 1 mg by mouth once daily.      gabapentin (NEURONTIN) 100 MG capsule TAKE 1 CAPSULE(100 MG) BY MOUTH EVERY EVENING 30 capsule 2    hydrOXYzine HCL (ATARAX) 25 MG tablet TAKE 1 TABLET BY MOUTH EVERY EVENING AS NEEDED FOR ITCHING. DO NOT DRIVE OR OPERATE MACHINERY WHILE TAKING THIS MEDICATION 30 tablet 5    lisinopriL (PRINIVIL,ZESTRIL) 5 MG tablet TAKE 1 TABLET(5 MG) BY MOUTH EVERY DAY 90 tablet 3    metronidazole 0.75% (METROCREAM) 0.75 % Crea AAA face bid 45 g 3    rosuvastatin (CRESTOR) 10 MG tablet Take 1 tablet (10 mg total) by mouth once daily. 90 tablet 3    triamcinolone acetonide 0.1% (KENALOG) 0.1 % cream AAA bid to affected areas of body 454 g 3     No current facility-administered medications for this visit.

## 2025-06-29 PROBLEM — N83.201 RIGHT OVARIAN CYST: Status: ACTIVE | Noted: 2025-06-29

## 2025-07-17 ENCOUNTER — TELEPHONE (OUTPATIENT)
Dept: HEMATOLOGY/ONCOLOGY | Facility: CLINIC | Age: 75
End: 2025-07-17
Payer: MEDICARE

## 2025-07-17 NOTE — TELEPHONE ENCOUNTER
Copied from CRM #[id number]. Topic: Appointments - Appointment Access  >> Jul 17, 2025 10:09 AM Marge wrote:  Appt Type:  Follow up      Date/Time Preference:   08/2025 if possible      Treating Provider:  Shayy Carpenter MD    Caller Name: Pantera      Contact Prefer:  [phone]    Comments/Notes:   Called to schedule appt

## 2025-07-17 NOTE — TELEPHONE ENCOUNTER
Copied from CRM #[id number]. Topic: General Inquiry - Patient Advice  >> Jul 17, 2025  1:44 PM Edie wrote:          Scheduling Request           Appt Type:  Annual F/U     Date/Time Preference:08/28/2025 , prefer morning     Treating Provider:Pauline     Caller Name: Pantera Posey         Contact Preference:[phone] (home), requesting a call back.        Comments/notes: Pt received recall letter.

## 2025-07-17 NOTE — TELEPHONE ENCOUNTER
----- Message from ERIKA Matute sent at 7/17/2025  2:14 PM CDT -----  Hey! Can you please assist with scheduling her with one of our benign heme providers?  ----- Message -----  From: Bobby Stokes  Sent: 7/17/2025   2:00 PM CDT  To: Pauline Lucas Staff                                       Hi, can't find anything for pt f/u appt Date/Time Preference:08/28/2025 , prefer morning     Treating Provider:[place]     Caller Name: Pantera Posey     Contact Preference:[phone] (home), requesting a call back.     Comments/notes: Pt received recall letter.         Thanks

## 2025-07-24 DIAGNOSIS — G62.9 NEUROPATHY: ICD-10-CM

## 2025-07-24 RX ORDER — GABAPENTIN 100 MG/1
100 CAPSULE ORAL
Qty: 30 CAPSULE | Refills: 2 | Status: SHIPPED | OUTPATIENT
Start: 2025-07-24

## 2025-08-04 ENCOUNTER — OFFICE VISIT (OUTPATIENT)
Dept: FAMILY MEDICINE | Facility: CLINIC | Age: 75
End: 2025-08-04
Payer: MEDICARE

## 2025-08-04 VITALS
OXYGEN SATURATION: 99 % | BODY MASS INDEX: 22.86 KG/M2 | HEIGHT: 63 IN | DIASTOLIC BLOOD PRESSURE: 55 MMHG | SYSTOLIC BLOOD PRESSURE: 111 MMHG | WEIGHT: 129 LBS | HEART RATE: 71 BPM

## 2025-08-04 DIAGNOSIS — I10 HTN (HYPERTENSION), BENIGN: Primary | ICD-10-CM

## 2025-08-04 DIAGNOSIS — I70.0 AORTIC ATHEROSCLEROSIS: ICD-10-CM

## 2025-08-04 PROCEDURE — 99999 PR PBB SHADOW E&M-EST. PATIENT-LVL III: CPT | Mod: PBBFAC,,, | Performed by: NURSE PRACTITIONER

## 2025-08-04 PROCEDURE — 1157F ADVNC CARE PLAN IN RCRD: CPT | Mod: CPTII,S$GLB,, | Performed by: NURSE PRACTITIONER

## 2025-08-04 PROCEDURE — 99214 OFFICE O/P EST MOD 30 MIN: CPT | Mod: S$GLB,,, | Performed by: NURSE PRACTITIONER

## 2025-08-04 PROCEDURE — 3288F FALL RISK ASSESSMENT DOCD: CPT | Mod: CPTII,S$GLB,, | Performed by: NURSE PRACTITIONER

## 2025-08-04 PROCEDURE — 3074F SYST BP LT 130 MM HG: CPT | Mod: CPTII,S$GLB,, | Performed by: NURSE PRACTITIONER

## 2025-08-04 PROCEDURE — 3078F DIAST BP <80 MM HG: CPT | Mod: CPTII,S$GLB,, | Performed by: NURSE PRACTITIONER

## 2025-08-04 PROCEDURE — 1101F PT FALLS ASSESS-DOCD LE1/YR: CPT | Mod: CPTII,S$GLB,, | Performed by: NURSE PRACTITIONER

## 2025-08-04 PROCEDURE — 1126F AMNT PAIN NOTED NONE PRSNT: CPT | Mod: CPTII,S$GLB,, | Performed by: NURSE PRACTITIONER

## 2025-08-04 PROCEDURE — 4010F ACE/ARB THERAPY RXD/TAKEN: CPT | Mod: CPTII,S$GLB,, | Performed by: NURSE PRACTITIONER

## 2025-08-04 PROCEDURE — 3044F HG A1C LEVEL LT 7.0%: CPT | Mod: CPTII,S$GLB,, | Performed by: NURSE PRACTITIONER

## 2025-08-04 PROCEDURE — 1160F RVW MEDS BY RX/DR IN RCRD: CPT | Mod: CPTII,S$GLB,, | Performed by: NURSE PRACTITIONER

## 2025-08-04 PROCEDURE — 1159F MED LIST DOCD IN RCRD: CPT | Mod: CPTII,S$GLB,, | Performed by: NURSE PRACTITIONER

## 2025-08-04 RX ORDER — HYDROXYZINE HYDROCHLORIDE 25 MG/1
TABLET, FILM COATED ORAL
Qty: 30 TABLET | Refills: 5 | Status: SHIPPED | OUTPATIENT
Start: 2025-08-04 | End: 2025-08-04 | Stop reason: CLARIF

## 2025-08-04 NOTE — PROGRESS NOTES
"Subjective:       Patient ID: Pantera Posey is a 75 y.o. female.    Chief Complaint: Hypertension  Pantera Posey presents today for follow up of hypertension. Last seen in 2/10/2025.     With regards to hypertension:  Current medication regimen: lisinopril 5 mg  Compliance: yes   Home BP monitorin-120's  Low sodium diet: yes   Exercise: yes   Denies cp, palpitations, sob, headaches, dizziness, vision changes, changes in urinary or bowel habits.     Problem List[1]    Current Medications[2]    The following portions of the patient's history were reviewed and updated as appropriate: allergies, past family history, past medical history, past social history and past surgical history.    Review of Systems   Constitutional:  Negative for appetite change, fatigue, fever and unexpected weight change.   HENT:  Negative for hearing loss, rhinorrhea, sneezing, sore throat and trouble swallowing.    Eyes:  Negative for visual disturbance.   Respiratory:  Positive for cough. Negative for shortness of breath and wheezing.    Cardiovascular:  Negative for chest pain and palpitations.   Gastrointestinal:  Negative for abdominal pain, constipation, diarrhea, nausea and vomiting.   Genitourinary:  Negative for difficulty urinating, dysuria, frequency and hematuria.   Musculoskeletal:  Negative for arthralgias and myalgias.   Neurological:  Negative for dizziness, weakness and numbness.   Psychiatric/Behavioral:  Negative for sleep disturbance. The patient is not nervous/anxious.        Objective:      BP (!) 111/55 (BP Location: Left arm, Patient Position: Sitting)   Pulse 71   Ht 5' 3" (1.6 m)   Wt 58.5 kg (129 lb)   LMP  (LMP Unknown)   SpO2 99%   BMI 22.85 kg/m²     Physical Exam  Constitutional:       General: She is not in acute distress.     Appearance: Normal appearance.   Cardiovascular:      Rate and Rhythm: Normal rate and regular rhythm.      Pulses: Normal pulses.      Heart sounds: Normal heart sounds. "   Pulmonary:      Effort: Pulmonary effort is normal.      Breath sounds: Normal breath sounds.   Musculoskeletal:         General: Normal range of motion.   Skin:     General: Skin is warm and dry.   Neurological:      Mental Status: She is alert and oriented to person, place, and time.   Psychiatric:         Mood and Affect: Mood normal.         Behavior: Behavior normal.         Assessment:       1. HTN (hypertension), benign    2. Aortic atherosclerosis        Plan:   1. HTN (hypertension), benign  Assessment & Plan:  -- BP stable; continue current medication regimen  -- Continue home BP monitoring       2. Aortic atherosclerosis  Assessment & Plan:  -- noted on x-ray 8/2024  -- on statin therapy      Other orders  -     Discontinue: hydrOXYzine HCL (ATARAX) 25 MG tablet; TAKE 1 TABLET BY MOUTH EVERY EVENING AS NEEDED FOR ITCHING. DO NOT DRIVE OR OPERATE MACHINERY WHILE TAKING THIS MEDICATION  Dispense: 30 tablet; Refill: 5               [1]   Patient Active Problem List  Diagnosis    HTN (hypertension), benign    Spondylosis without myelopathy    Spinal stenosis, lumbar region, with neurogenic claudication    Thoracic or lumbosacral neuritis or radiculitis, unspecified    Acquired spondylolisthesis    Lumbago    Degeneration of lumbar or lumbosacral intervertebral disc    Back pain    Malignant neoplasm of lower-outer quadrant of left breast of female, estrogen receptor positive    History of right breast cancer    Age-related osteoporosis without current pathological fracture    Swelling of hand    Macrocytosis    Vitamin B 12 deficiency    Hepatic fibrosis, early fibrosis    Aortic atherosclerosis    WPW (Renea-Parkinson-White syndrome)    Right ovarian cyst   [2]   Current Outpatient Medications:     calcium-vitamin D3 500 mg(1,250mg) -200 unit per tablet, Take 2 tablets by mouth 2 (two) times daily with meals., Disp: , Rfl:     clobetasoL (TEMOVATE) 0.05 % cream, AAA bid, Disp: 60 g, Rfl: 3    clobetasoL  (TEMOVATE) 0.05 % external solution, Use on scalp one - two times daily as needed for scaling or itching, Disp: 50 mL, Rfl: 3    doxycycline 50 MG capsule, Take 1 capsule (50 mg total) by mouth once daily., Disp: 30 capsule, Rfl: 11    folic acid (FOLVITE) 1 MG tablet, Take 1 mg by mouth once daily., Disp: , Rfl:     gabapentin (NEURONTIN) 100 MG capsule, TAKE 1 CAPSULE(100 MG) BY MOUTH EVERY EVENING, Disp: 30 capsule, Rfl: 2    lisinopriL (PRINIVIL,ZESTRIL) 5 MG tablet, TAKE 1 TABLET(5 MG) BY MOUTH EVERY DAY, Disp: 90 tablet, Rfl: 3    metronidazole 0.75% (METROCREAM) 0.75 % Crea, AAA face bid, Disp: 45 g, Rfl: 3    rosuvastatin (CRESTOR) 10 MG tablet, Take 1 tablet (10 mg total) by mouth once daily., Disp: 90 tablet, Rfl: 3    triamcinolone acetonide 0.1% (KENALOG) 0.1 % cream, AAA bid to affected areas of body, Disp: 454 g, Rfl: 3

## 2025-08-11 ENCOUNTER — LAB VISIT (OUTPATIENT)
Dept: LAB | Facility: HOSPITAL | Age: 75
End: 2025-08-11
Attending: INTERNAL MEDICINE
Payer: MEDICARE

## 2025-08-11 ENCOUNTER — OFFICE VISIT (OUTPATIENT)
Dept: HEMATOLOGY/ONCOLOGY | Facility: CLINIC | Age: 75
End: 2025-08-11
Payer: MEDICARE

## 2025-08-11 VITALS
HEART RATE: 70 BPM | BODY MASS INDEX: 23.25 KG/M2 | HEIGHT: 63 IN | WEIGHT: 131.19 LBS | OXYGEN SATURATION: 99 % | TEMPERATURE: 99 F | SYSTOLIC BLOOD PRESSURE: 153 MMHG | DIASTOLIC BLOOD PRESSURE: 66 MMHG

## 2025-08-11 DIAGNOSIS — D53.9 MACROCYTIC ANEMIA: ICD-10-CM

## 2025-08-11 DIAGNOSIS — D75.89 MACROCYTOSIS: Primary | ICD-10-CM

## 2025-08-11 LAB
ABSOLUTE EOSINOPHIL (OHS): 0.21 K/UL
ABSOLUTE MONOCYTE (OHS): 0.74 K/UL (ref 0.3–1)
ABSOLUTE NEUTROPHIL COUNT (OHS): 5 K/UL (ref 1.8–7.7)
ALBUMIN SERPL BCP-MCNC: 3.7 G/DL (ref 3.5–5.2)
ALP SERPL-CCNC: 70 UNIT/L (ref 40–150)
ALT SERPL W/O P-5'-P-CCNC: 13 UNIT/L (ref 0–55)
ANION GAP (OHS): 7 MMOL/L (ref 8–16)
AST SERPL-CCNC: 23 UNIT/L (ref 0–50)
BASOPHILS # BLD AUTO: 0.08 K/UL
BASOPHILS NFR BLD AUTO: 1 %
BILIRUB SERPL-MCNC: 0.4 MG/DL (ref 0.1–1)
BUN SERPL-MCNC: 15 MG/DL (ref 8–23)
CALCIUM SERPL-MCNC: 9.2 MG/DL (ref 8.7–10.5)
CHLORIDE SERPL-SCNC: 106 MMOL/L (ref 95–110)
CO2 SERPL-SCNC: 27 MMOL/L (ref 23–29)
CREAT SERPL-MCNC: 0.8 MG/DL (ref 0.5–1.4)
ERYTHROCYTE [DISTWIDTH] IN BLOOD BY AUTOMATED COUNT: 13.2 % (ref 11.5–14.5)
FOLATE SERPL-MCNC: >40 NG/ML (ref 4–24)
GFR SERPLBLD CREATININE-BSD FMLA CKD-EPI: >60 ML/MIN/1.73/M2
GLUCOSE SERPL-MCNC: 96 MG/DL (ref 70–110)
HCT VFR BLD AUTO: 37.9 % (ref 37–48.5)
HGB BLD-MCNC: 12.2 GM/DL (ref 12–16)
IMM GRANULOCYTES # BLD AUTO: 0.06 K/UL (ref 0–0.04)
IMM GRANULOCYTES NFR BLD AUTO: 0.7 % (ref 0–0.5)
LYMPHOCYTES # BLD AUTO: 2.02 K/UL (ref 1–4.8)
MCH RBC QN AUTO: 30.7 PG (ref 27–31)
MCHC RBC AUTO-ENTMCNC: 32.2 G/DL (ref 32–36)
MCV RBC AUTO: 96 FL (ref 82–98)
NUCLEATED RBC (/100WBC) (OHS): 0 /100 WBC
PLATELET # BLD AUTO: 218 K/UL (ref 150–450)
PMV BLD AUTO: 10.7 FL (ref 9.2–12.9)
POTASSIUM SERPL-SCNC: 4.7 MMOL/L (ref 3.5–5.1)
PROT SERPL-MCNC: 7.2 GM/DL (ref 6–8.4)
RBC # BLD AUTO: 3.97 M/UL (ref 4–5.4)
RELATIVE EOSINOPHIL (OHS): 2.6 %
RELATIVE LYMPHOCYTE (OHS): 24.9 % (ref 18–48)
RELATIVE MONOCYTE (OHS): 9.1 % (ref 4–15)
RELATIVE NEUTROPHIL (OHS): 61.7 % (ref 38–73)
SODIUM SERPL-SCNC: 140 MMOL/L (ref 136–145)
VIT B12 SERPL-MCNC: 1163 PG/ML (ref 210–950)
WBC # BLD AUTO: 8.11 K/UL (ref 3.9–12.7)

## 2025-08-11 PROCEDURE — 3077F SYST BP >= 140 MM HG: CPT | Mod: CPTII,S$GLB,, | Performed by: INTERNAL MEDICINE

## 2025-08-11 PROCEDURE — 85025 COMPLETE CBC W/AUTO DIFF WBC: CPT

## 2025-08-11 PROCEDURE — 82746 ASSAY OF FOLIC ACID SERUM: CPT

## 2025-08-11 PROCEDURE — 1126F AMNT PAIN NOTED NONE PRSNT: CPT | Mod: CPTII,S$GLB,, | Performed by: INTERNAL MEDICINE

## 2025-08-11 PROCEDURE — 4010F ACE/ARB THERAPY RXD/TAKEN: CPT | Mod: CPTII,S$GLB,, | Performed by: INTERNAL MEDICINE

## 2025-08-11 PROCEDURE — 36415 COLL VENOUS BLD VENIPUNCTURE: CPT

## 2025-08-11 PROCEDURE — 3078F DIAST BP <80 MM HG: CPT | Mod: CPTII,S$GLB,, | Performed by: INTERNAL MEDICINE

## 2025-08-11 PROCEDURE — 82607 VITAMIN B-12: CPT

## 2025-08-11 PROCEDURE — 99999 PR PBB SHADOW E&M-EST. PATIENT-LVL III: CPT | Mod: PBBFAC,,, | Performed by: INTERNAL MEDICINE

## 2025-08-11 PROCEDURE — 3288F FALL RISK ASSESSMENT DOCD: CPT | Mod: CPTII,S$GLB,, | Performed by: INTERNAL MEDICINE

## 2025-08-11 PROCEDURE — 1157F ADVNC CARE PLAN IN RCRD: CPT | Mod: CPTII,S$GLB,, | Performed by: INTERNAL MEDICINE

## 2025-08-11 PROCEDURE — 3044F HG A1C LEVEL LT 7.0%: CPT | Mod: CPTII,S$GLB,, | Performed by: INTERNAL MEDICINE

## 2025-08-11 PROCEDURE — 99214 OFFICE O/P EST MOD 30 MIN: CPT | Mod: S$GLB,,, | Performed by: INTERNAL MEDICINE

## 2025-08-11 PROCEDURE — 1101F PT FALLS ASSESS-DOCD LE1/YR: CPT | Mod: CPTII,S$GLB,, | Performed by: INTERNAL MEDICINE

## 2025-08-11 PROCEDURE — 84132 ASSAY OF SERUM POTASSIUM: CPT

## 2025-08-11 PROCEDURE — 1159F MED LIST DOCD IN RCRD: CPT | Mod: CPTII,S$GLB,, | Performed by: INTERNAL MEDICINE

## 2025-08-11 RX ORDER — HYDROXYZINE HYDROCHLORIDE 25 MG/1
TABLET, FILM COATED ORAL
COMMUNITY
Start: 2025-08-04

## (undated) DEVICE — SUT 2-0 VICRYL / SH (J417)

## (undated) DEVICE — STOCKINET 4INX48

## (undated) DEVICE — TRAY MINOR GEN SURG

## (undated) DEVICE — PAD ABD 8X10 STERILE

## (undated) DEVICE — GOWN AERO CHROME W/ TOWEL XL

## (undated) DEVICE — Device

## (undated) DEVICE — SUT PDS II 2-0 CT1

## (undated) DEVICE — SET FLUID TRANSFER ASEPTIC

## (undated) DEVICE — SUT 2-0 VICRYL / CT-1

## (undated) DEVICE — TRAY FOLEY 16FR INFECTION CONT

## (undated) DEVICE — SEE MEDLINE ITEM 152622

## (undated) DEVICE — SEE MEDLINE ITEM 146417

## (undated) DEVICE — SYR DISP LL 5CC

## (undated) DEVICE — DRAPE THYROID WITH ARMBOARD

## (undated) DEVICE — HOLDER TUBE

## (undated) DEVICE — SUT ETHILON 2-0 PSLX 30IN

## (undated) DEVICE — TUBING 8FT HI VACLIPOSUCTION

## (undated) DEVICE — GAUZE SPONGE 4X4 12PLY

## (undated) DEVICE — PACK UNIVERSAL SPLIT II

## (undated) DEVICE — NDL HYPO REG 25G X 1 1/2

## (undated) DEVICE — SKINMARKER & RULER REGULAR X-F

## (undated) DEVICE — SOL 0.9% NACL IRRI.IN STERIL

## (undated) DEVICE — APPLICATOR CHLORAPREP ORN 26ML

## (undated) DEVICE — SYS REVOLVE FAT PROCESSING

## (undated) DEVICE — SYR 50CC LL

## (undated) DEVICE — SEE MEDLINE ITEM 157117

## (undated) DEVICE — SOL NS 1000CC

## (undated) DEVICE — EVACUATOR WOUND BULB 100CC

## (undated) DEVICE — ELECTRODE BLADE INSULATED 1 IN

## (undated) DEVICE — SYS CLSR DERMABOND PRINEO 22CM

## (undated) DEVICE — SEE MEDLINE ITEM 152742

## (undated) DEVICE — SUT MONOCRYL 3-0 PS-2 UND

## (undated) DEVICE — STAPLER SKIN ROTATING HEAD

## (undated) DEVICE — ELECTRODE REM PLYHSV RETURN 9

## (undated) DEVICE — DRAIN CHANNEL ROUND 15FR

## (undated) DEVICE — SEE MEDLINE ITEM 157128

## (undated) DEVICE — SYS LABEL CORRECT MED

## (undated) DEVICE — SPONGE DERMACEA GAUZE 4X4

## (undated) DEVICE — DRAIN CHANNEL ROUND 19FR

## (undated) DEVICE — CONTAINER SPECIMEN STRL 4OZ

## (undated) DEVICE — CUP MEDICINE STERILE 2OZ

## (undated) DEVICE — ELECTRODE EXTENDED BLADE

## (undated) DEVICE — SEE MEDLINE ITEM 152512

## (undated) DEVICE — GOWN SURGICAL X-LARGE

## (undated) DEVICE — BOVIE SUCTION

## (undated) DEVICE — DRAPE STERI INSTRUMENT 1018

## (undated) DEVICE — STAPLER SKIN REGULAR

## (undated) DEVICE — SUT 2/0 30IN SILK BLK BRAI

## (undated) DEVICE — DRESSING XEROFORM FOIL PK 1X8

## (undated) DEVICE — COVER PROBE NL STRL 3.6X96IN

## (undated) DEVICE — SUT MCRYL PLUS 4-0 PS2 27IN

## (undated) DEVICE — SPONGE LAP 18X18 PREWASHED

## (undated) DEVICE — SYS PRINEO SKIN CLOSURE

## (undated) DEVICE — SYR CATHETER TIP 60ML

## (undated) DEVICE — BLADE SURG CARBON STEEL SZ11

## (undated) DEVICE — HOOK STAY ELAS 5MM 8EA/PK

## (undated) DEVICE — GIRDLE COMPRESS A/K XLG BLK

## (undated) DEVICE — BLADE SURG #15 CARBON STEEL

## (undated) DEVICE — SUT VICRYL 3-0 27 SH

## (undated) DEVICE — SEE MEDLINE ITEM 152487

## (undated) DEVICE — SYR 10CC LUER LOCK

## (undated) DEVICE — BRA POST SURGICAL 34-36 B-D

## (undated) DEVICE — GAUZE FLUFF XXLG 36X36 2 PLY

## (undated) DEVICE — APPLIER CLIP LIAGCLIP 9.375IN

## (undated) DEVICE — NDL 18GA X1 1/2 REG BEVEL